# Patient Record
Sex: FEMALE | Race: WHITE | NOT HISPANIC OR LATINO | Employment: STUDENT | URBAN - METROPOLITAN AREA
[De-identification: names, ages, dates, MRNs, and addresses within clinical notes are randomized per-mention and may not be internally consistent; named-entity substitution may affect disease eponyms.]

---

## 2017-01-16 ENCOUNTER — TRANSFERRED RECORDS (OUTPATIENT)
Dept: HEALTH INFORMATION MANAGEMENT | Facility: CLINIC | Age: 32
End: 2017-01-16

## 2018-08-10 ENCOUNTER — OFFICE VISIT (OUTPATIENT)
Dept: FAMILY MEDICINE | Facility: CLINIC | Age: 33
End: 2018-08-10
Payer: COMMERCIAL

## 2018-08-10 VITALS
DIASTOLIC BLOOD PRESSURE: 95 MMHG | WEIGHT: 159.1 LBS | SYSTOLIC BLOOD PRESSURE: 137 MMHG | HEART RATE: 80 BPM | RESPIRATION RATE: 16 BRPM | TEMPERATURE: 98.1 F | OXYGEN SATURATION: 96 %

## 2018-08-10 DIAGNOSIS — Z00.00 ROUTINE GENERAL MEDICAL EXAMINATION AT A HEALTH CARE FACILITY: Primary | ICD-10-CM

## 2018-08-10 DIAGNOSIS — F41.9 ANXIETY: ICD-10-CM

## 2018-08-10 DIAGNOSIS — F90.0 ADHD (ATTENTION DEFICIT HYPERACTIVITY DISORDER), INATTENTIVE TYPE: ICD-10-CM

## 2018-08-10 DIAGNOSIS — I10 BENIGN ESSENTIAL HYPERTENSION: ICD-10-CM

## 2018-08-10 ASSESSMENT — ANXIETY QUESTIONNAIRES
IF YOU CHECKED OFF ANY PROBLEMS ON THIS QUESTIONNAIRE, HOW DIFFICULT HAVE THESE PROBLEMS MADE IT FOR YOU TO DO YOUR WORK, TAKE CARE OF THINGS AT HOME, OR GET ALONG WITH OTHER PEOPLE: NOT DIFFICULT AT ALL
2. NOT BEING ABLE TO STOP OR CONTROL WORRYING: SEVERAL DAYS
GAD7 TOTAL SCORE: 3
5. BEING SO RESTLESS THAT IT IS HARD TO SIT STILL: SEVERAL DAYS
6. BECOMING EASILY ANNOYED OR IRRITABLE: NOT AT ALL
7. FEELING AFRAID AS IF SOMETHING AWFUL MIGHT HAPPEN: NOT AT ALL
3. WORRYING TOO MUCH ABOUT DIFFERENT THINGS: NOT AT ALL
1. FEELING NERVOUS, ANXIOUS, OR ON EDGE: SEVERAL DAYS

## 2018-08-10 ASSESSMENT — PATIENT HEALTH QUESTIONNAIRE - PHQ9: 5. POOR APPETITE OR OVEREATING: NOT AT ALL

## 2018-08-10 NOTE — PATIENT INSTRUCTIONS
Home blood pressure monitoring: please check your blood pressure daily and keep track. Please sign up for [employer] and send me your results please so we can go from there.   Stop the metoprolol.   When you need refills on your Vyvanse please bring in your prescription bottle with you to do that.   Please sign the release of information to get your previous records.

## 2018-08-10 NOTE — MR AVS SNAPSHOT
After Visit Summary   8/10/2018    Gabriele Rodriguez    MRN: [id number]           Patient Information     Date Of Birth          1985        Visit Information        Provider Department      8/10/2018 4:00 PM Shana Sharif APRN [facility]        Today's Diagnoses     Routine general medical examination at a health care facility    -  1    ADHD (attention deficit hyperactivity disorder), inattentive type        Anxiety          Care Instructions    Home blood pressure monitoring: please check your blood pressure daily and keep track. Please sign up for [email or web address] and send me your results please so we can go from there.   Stop the metoprolol.   When you need refills on your Vyvanse please bring in your prescription bottle with you to do that.   Please sign the release of information to get your previous records.               Follow-ups after your visit        Follow-up notes from your care team     Return for Prior to Vyvanse runs out.      Who to contact     Please call your clinic at [phone] to:    Ask questions about your health    Make or cancel appointments    Discuss your medicines    Learn about your test results    Speak to your doctor            Additional Information About Your Visit        MyChart Information     [employer]ek gives you secure access to your electronic health record. If you see a primary care provider, you can also send messages to your care team and make appointments. If you have questions, please call your primary care clinic.  If you do not have a primary care provider, please call [phone] and they will assist you.      Muzeek is an electronic gateway that provides easy, online access to your medical records. With [employer], you can request a clinic appointment, read your test results, renew a prescription or communicate with your care team.     To access your existing account, please contact your [facility] or call  [phone] for assistance.        Care EveryWhere ID     This is your Care EveryWhere ID. This could be used by other organizations to access your [facility] medical records  [id number]        Your Vitals Were     Pulse Temperature Respirations Pulse Oximetry Breastfeeding?       80 98.1  F (36.7  C) (Oral) 16 96% No        Blood Pressure from Last 3 Encounters:   08/10/18 (!) 137/95    Weight from Last 3 Encounters:   08/10/18 159 lb 1.6 oz (72.2 kg)              Today, you had the following     No orders found for display       Primary Care Provider    None Specified       No primary provider on file.        Equal Access to Services     [facility]: Hadii aad william hadlinda Soconnor, waaxda luqadaha, qaybta kaalmada adelanceyada, aditi guzman . So [facility] [place] [phone].    ATENCIÓN: Si habla español, tiene a hensley disposición servicios gratuitos de asistencia lingüística. Llame al [phone].    We comply with applicable federal civil rights laws and [place] laws. We do not discriminate on the basis of race, color, national origin, age, disability, sex, sexual orientation, or gender identity.            Thank you!     Thank you for choosing [facility]  for your care. Our goal is always to provide you with excellent care. Hearing back from our patients is one way we can continue to improve our services. Please take a few minutes to complete the written survey that you may receive in the mail after your visit with us. Thank you!             Your Updated Medication List - Protect others around you: Learn how to safely use, store and throw away your medicines at www.disposemymeds.org.          This list is accurate as of 8/10/18  4:34 PM.  Always use your most recent med list.                   Brand Name Dispense Instructions for use Diagnosis    BUPROPION HCL PO      Take 100 mg by mouth        etonogestrel 68 MG Impl    IMPLANON/NEXPLANON     1 each by Subdermal route once         TRAZODONE HCL PO      Take 50 mg by mouth as needed for sleep        VYVANSE PO      Take 50 mg by mouth

## 2018-08-10 NOTE — PROGRESS NOTES
" SUBJECTIVE:   Gabriele Rodriguez is an 33 year old year old woman who presents for preventive health visit.     ADD: diagnosed several years ago at age 21 just prior to college. Also had neuropsychiatric testing to confirm as well. Was previously going to a mental health provider in [place] at [facility]. Has been stable on medications for several years and feels as though the Vyvanse is the best formulation she has been on. Had recent dose increase from 40mg to 50mg and does not feel as though it would be a problem. Has tried provigil but interacted with Nexplanon, Concerta caused headaches in past.     Anxiety: ongoing for several years. Currently controlled with bupropion. 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 Has stopped in past and is self-aware on anxiety needs and when she needs to restart her medication. Has PRN trazodone and does not take at this time.     ALVIN-7 SCORE 8/10/2018   Total Score 3       Hypertension: has been on metoprolol for \"a long time\". Originally started for an adjunct for anxiety, no longer needed for that component currently but does have ongoing hypertension. Takes medication at nighttime and does not notice and dizziness, exercise intolerance. Has checked blood pressure at home intermittently, tends to run [id number] without medication. Had asked previous provider if she could go off and was told \"no:\" Is interested in stopping medication and seeing how her blood pressure is without pharmacotherapy.     Healthy Habits:    Do you get at least three servings of calcium containing foods daily (dairy, green leafy vegetables, etc.)? Soy milk drinks \"fairly often\", yogurt a few times a week, will eat salads once weekly.     Amount of exercise or daily activities, outside of work: exercises roughly 3-5 times a week with spouse.     Have you had an eye exam in the past two years? no    Do you see a dentist twice per year? " yes    Do you have sleep apnea, excessive snoring or daytime drowsiness?no    Sleep: 7 hours/night. Sleep disruptions: No    Caffeine: does not need usually, maybe has 1 cup of coffee 2-3 times/week    Water: 2-3 glasses/day      Today's PHQ-2 Score: PHQ-2 Score:   PHQ-2 ( 1999 Pfizer) 8/10/2018   Q1: Little interest or pleasure in doing things 0   Q2: Feeling down, depressed or hopeless 0   PHQ-2 Score 0     Today's ALVIN-7 Score:   ALVIN-7 SCORE 8/10/2018   Total Score 3       Abuse: Current or Past (Physical, Sexual or Emotional) - No  Do you feel safe in your environment - Yes    If you drink alcohol do you typically have >3 drinks per day or >7 drinks per week? No                     Reviewed orders with patient.  Reviewed health maintenance and updated orders accordingly - Yes      Mammo discussed, not appropriate for or declined by this patient.. Pertinent mammograms are reviewed under the imaging tab. Will review recommendations for screening for breast cancer with mother's diagnosis of breast cancer (HER-2 negative, estrogen positive, negative BRCA).     History of abnormal Pap smear: NO - age 30- 65 PAP every 3 years recommended    Reviewed and updated as needed this visit by clinical staff  Tobacco  Allergies  Meds       Reviewed and updated as needed this visit by Provider    ROS:  CONSTITUTIONAL: NEGATIVE for fever, chills, change in weight  INTEGUMENTARU/SKIN: NEGATIVE for worrisome rashes, moles or lesions  EYES: NEGATIVE for vision changes or irritation  ENT: NEGATIVE for ear, mouth and throat problems  RESP: NEGATIVE for significant cough or SOB  BREAST: NEGATIVE for masses, tenderness or discharge  CV: NEGATIVE for chest pain, palpitations or peripheral edema  GI: NEGATIVE for nausea, abdominal pain, heartburn, or change in bowel habits  : NEGATIVE for unusual urinary or vaginal symptoms. Periods are regular.  MUSCULOSKELETAL: NEGATIVE for significant arthralgias or myalgia  NEURO: NEGATIVE for  weakness, dizziness or paresthesias  PSYCHIATRIC: NEGATIVE for changes in mood or affect    OBJECTIVE:   BP (!) 137/95  Pulse 80  Temp 98.1  F (36.7  C) (Oral)  Resp 16  Wt 159 lb 1.6 oz (72.2 kg)  SpO2 96%  Breastfeeding? No      EXAM:  GENERAL: healthy, alert and no distress  EYES: Eyes grossly normal to inspection, PERRL and conjunctivae and sclerae normal  HENT: ear canals and TM's normal, nose and mouth without ulcers or lesions  NECK: no adenopathy, no asymmetry, masses, or scars and thyroid normal to palpation  RESP: lungs clear to auscultation - no rales, rhonchi or wheezes  CV: regular rate and rhythm, normal S1 S2, no S3 or S4, no murmur, click or rub, no peripheral edema and peripheral pulses strong  ABDOMEN: soft, nontender, no hepatosplenomegaly, no masses and bowel sounds normal  MS: no gross musculoskeletal defects noted, no edema  SKIN: no suspicious lesions or rashes  NEURO: Normal strength and tone, mentation intact and speech normal  PSYCH: mentation appears normal, affect normal/bright    ASSESSMENT/PLAN:     1. Routine general medical examination at a health care facility  Last pap in 2017, declined need today. Will sign CARISSA to obtain records from previous providers. Mammogram not indicated at this time, will review guidelines with patient about when she should start breast cancer monitoring with maternal history.  to , in monogamous relationship and no need for STI testing. Declined breast exam today.     2. ADHD (attention deficit hyperactivity disorder), inattentive type  Confirmed with neuropsychiatric testing prior to medical school. Currently controlled on Vyvanse. 50mg 1 tab daily and tolerating well. Does not currently need refills. Advised patient to return to clinic just prior to refill needed with prescription bottle to verify information and as long as she feels well controlled and tolerating medication can have managed within primary care.     3. Anxiety  ALVIN-7  score today 3. Currently well controlled with daily bupropion and is hoping to gradually come off of medication entirely now that she is in her residency and in a more stable work environment. Had been going to mental health provider in [place] previously. As she is stable on this medication and hoping to transition off provider is agreeable to managing at this time.     4. Benign essential hypertension  Annaliisa to stop metoprolol at nighttime, begin regular monitoring of blood pressure at home and will check in with provider after about a month to see what her readings have been and if different pharmacotherapy is indicated at that time. Does have known white-coat syndrome and has been high in PCP's offices in past.     COUNSELING:   Reviewed preventive health counseling, as reflected in patient instructions       Regular exercise       Healthy diet/nutrition       Vision screening       Family planning  Reports that she has never smoked. She has never used smokeless tobacco.      Counseling Resources:  ATP IV Guidelines  Pooled Cohorts Equation Calculator  Breast Cancer Risk Calculator  FRAX Risk Assessment  ICSI Preventive Guidelines  Dietary Guidelines for Americans, 2010  USDA's MyPlate  ASA Prophylaxis  Lung CA Screening    Shana Sharif, DORA CNP on 8/10/2018 at 4:00 PM  [facility]

## 2018-08-11 ASSESSMENT — ANXIETY QUESTIONNAIRES: GAD7 TOTAL SCORE: 3

## 2018-08-15 ENCOUNTER — HEALTH MAINTENANCE LETTER (OUTPATIENT)
Age: 33
End: 2018-08-15

## 2018-09-14 ENCOUNTER — OFFICE VISIT (OUTPATIENT)
Dept: FAMILY MEDICINE | Facility: CLINIC | Age: 33
End: 2018-09-14
Payer: COMMERCIAL

## 2018-09-14 VITALS
HEART RATE: 86 BPM | SYSTOLIC BLOOD PRESSURE: 136 MMHG | HEIGHT: 64 IN | RESPIRATION RATE: 16 BRPM | TEMPERATURE: 98.2 F | WEIGHT: 161.5 LBS | DIASTOLIC BLOOD PRESSURE: 84 MMHG | OXYGEN SATURATION: 97 % | BODY MASS INDEX: 27.57 KG/M2

## 2018-09-14 DIAGNOSIS — Z11.1 SCREENING EXAMINATION FOR PULMONARY TUBERCULOSIS: Primary | ICD-10-CM

## 2018-09-14 DIAGNOSIS — F90.0 ADHD (ATTENTION DEFICIT HYPERACTIVITY DISORDER), INATTENTIVE TYPE: ICD-10-CM

## 2018-09-14 DIAGNOSIS — I10 BENIGN ESSENTIAL HYPERTENSION: ICD-10-CM

## 2018-09-14 LAB
ALBUMIN SERPL-MCNC: 4.1 G/DL (ref 3.4–5)
ALP SERPL-CCNC: 75 U/L (ref 40–150)
ALT SERPL W P-5'-P-CCNC: 18 U/L (ref 0–50)
ANION GAP SERPL CALCULATED.3IONS-SCNC: 9 MMOL/L (ref 3–14)
AST SERPL W P-5'-P-CCNC: 17 U/L (ref 0–45)
BILIRUB SERPL-MCNC: 0.4 MG/DL (ref 0.2–1.3)
BUN SERPL-MCNC: 14 MG/DL (ref 7–30)
CALCIUM SERPL-MCNC: 8.8 MG/DL (ref 8.5–10.1)
CHLORIDE SERPL-SCNC: 108 MMOL/L (ref 94–109)
CO2 SERPL-SCNC: 24 MMOL/L (ref 20–32)
CREAT SERPL-MCNC: 0.9 MG/DL (ref 0.52–1.04)
GFR SERPL CREATININE-BSD FRML MDRD: 73 ML/MIN/1.7M2
GLUCOSE SERPL-MCNC: 104 MG/DL (ref 70–99)
POTASSIUM SERPL-SCNC: 3.8 MMOL/L (ref 3.4–5.3)
PROT SERPL-MCNC: 7.6 G/DL (ref 6.8–8.8)
SODIUM SERPL-SCNC: 140 MMOL/L (ref 133–144)

## 2018-09-14 RX ORDER — LISDEXAMFETAMINE DIMESYLATE 50 MG/1
50 CAPSULE ORAL EVERY MORNING
Qty: 30 CAPSULE | Refills: 0 | Status: SHIPPED | OUTPATIENT
Start: 2018-09-14 | End: 2018-10-31

## 2018-09-14 RX ORDER — NIFEDIPINE 30 MG/1
30 TABLET, EXTENDED RELEASE ORAL DAILY
Qty: 30 TABLET | Refills: 1 | Status: SHIPPED | OUTPATIENT
Start: 2018-09-14 | End: 2018-12-14

## 2018-09-14 ASSESSMENT — ANXIETY QUESTIONNAIRES
1. FEELING NERVOUS, ANXIOUS, OR ON EDGE: SEVERAL DAYS
GAD7 TOTAL SCORE: 2
6. BECOMING EASILY ANNOYED OR IRRITABLE: NOT AT ALL
2. NOT BEING ABLE TO STOP OR CONTROL WORRYING: NOT AT ALL
IF YOU CHECKED OFF ANY PROBLEMS ON THIS QUESTIONNAIRE, HOW DIFFICULT HAVE THESE PROBLEMS MADE IT FOR YOU TO DO YOUR WORK, TAKE CARE OF THINGS AT HOME, OR GET ALONG WITH OTHER PEOPLE: NOT DIFFICULT AT ALL
7. FEELING AFRAID AS IF SOMETHING AWFUL MIGHT HAPPEN: NOT AT ALL
5. BEING SO RESTLESS THAT IT IS HARD TO SIT STILL: NOT AT ALL
3. WORRYING TOO MUCH ABOUT DIFFERENT THINGS: SEVERAL DAYS

## 2018-09-14 ASSESSMENT — PATIENT HEALTH QUESTIONNAIRE - PHQ9: 5. POOR APPETITE OR OVEREATING: NOT AT ALL

## 2018-09-14 NOTE — PROGRESS NOTES
TOD       Gabriele Rodriguez is a 33 year old  who presents for   Chief Complaint   Patient presents with     RECHECK     Pt. presents to the clinic today for a medication follow up and TB test       ADHD Follow-Up (Adult)  Concerns: None - has been on Vyvanse in 2/2018 and feels as though it eduin going well. Drake developed headaches.   Changes since last visit: Stable  Taking controlled (daily) medications as prescribed: Yes: sometimes does not take Vyvanse dose on weekends if not needed  Sleep: has had some changes in sleep secondary to work load, not ADD or medication.  Adult ADHD Self-Reporting form given to patient?:  Yes  Currently in counseling: No    Medication Benefits:   Controlled symptoms: Attention span, Distractability and Finishing tasks  Uncontrolled symptoms:  Distractability    Medication Side Effects:  Reports:  appetite suppression  Sleep Problems? no  ++++++++++++++++++++++++++++++++++++++++++++++++    Employer Concerns/Feedback: Stable  Coworker Concerns:   Stable  Home/Family Concerns: Stable    ,   Depression/Anxiety follow up      Your mood since your last visit: No change    Medication? Bupropion 100mg     Therapy? None    Exercise? Has been working late recently; until 7p-8p and has been limited during the work-week.     What is going well? Likes her job a lot, likes moving to [place]     Life Stressors:working late nights    Other associated symptoms :None    How is your sleep? Good    New significant life event:Yes-  New job, living in [place]    Current substance use: None    Anxiety / Panic symptoms: No     Recent PHQ-9 Scores:     PHQ-9 SCORE 9/14/2018   Total Score 2     Recent ALVIN-7 Scores:      ALVIN-7 SCORE 8/10/2018 9/14/2018   Total Score 3 2         Today' sPHQ 9 Reviewed and it is same      Adherence and Exercise  Medication side effects: no  How often is a medication missed? Less than 1 time per week  Exercise: elipitical 2-5 days/week     Essential hypertension:  "has history of hypertension and was on metoprolol up until last visit where Gabriele wanted to trial going without medication for a month to see how her blood pressure was. Home readings have been roughly 130-140s/80-90smmHg consistently for the past month. With ongoing hypertensive readings Gabriele is in agreement to restart a different medication than a beta-blocker. Denies any angina, dyspnea, dizziness, headaches, lightheadedness, syncopal events.     Problem, Medication and Allergy Lists were       Current Outpatient Prescriptions   Medication Sig Dispense Refill     BUPROPION HCL PO Take 100 mg by mouth       etonogestrel (IMPLANON/NEXPLANON) 68 MG IMPL 1 each by Subdermal route once       lisdexamfetamine (VYVANSE) 50 MG capsule Take 1 capsule (50 mg) by mouth every morning 30 capsule 0     NIFEdipine ER osmotic (PROCARDIA XL) 30 MG 24 hr tablet Take 1 tablet (30 mg) by mouth daily 30 tablet 1     TRAZODONE HCL PO Take 50 mg by mouth as needed for sleep           Allergies   Allergen Reactions     Nickel    .    Patient is an established patient of this clinic..         Review of Systems:   Review of Systems         Physical Exam:     Vitals:    09/14/18 1603 09/14/18 1650   BP: (!) 134/110 136/84   BP Location: Left arm    Patient Position: Chair    Cuff Size: Adult Regular    Pulse: 86    Resp: 16    Temp: 98.2  F (36.8  C)    TempSrc: Oral    SpO2: 97%    Weight: 161 lb 8 oz (73.3 kg)    Height: 5' 4.2\" (163.1 cm)      Body mass index is 27.55 kg/(m^2).  Vitals were reviewed and were normal     Physical Exam   Constitutional: She is oriented to person, place, and time. She appears well-developed and well-nourished. No distress.   HENT:   Head: Normocephalic and atraumatic.   Right Ear: External ear normal.   Left Ear: External ear normal.   Eyes: Conjunctivae and EOM are normal. Pupils are equal, round, and reactive to light. Right eye exhibits no discharge. Left eye exhibits no discharge. No scleral " icterus.   Neck: Normal range of motion. Neck supple. No thyromegaly present.   Cardiovascular: Normal rate, regular rhythm and normal heart sounds.  Exam reveals no gallop and no friction rub.    No murmur heard.  Pulmonary/Chest: Effort normal and breath sounds normal. No respiratory distress. She has no wheezes. She has no rales. She exhibits no tenderness.   Musculoskeletal: Normal range of motion.   Lymphadenopathy:     She has no cervical adenopathy.   Neurological: She is alert and oriented to person, place, and time. No cranial nerve deficit.   Skin: Skin is warm and dry. No rash noted. She is not diaphoretic. No erythema. No pallor.   Psychiatric: She has a normal mood and affect. Her behavior is normal.   Vitals reviewed.        Results:   Results are ordered and pending  TB quantiferon gold, CMP  Assessment and Plan        1. Screening examination for pulmonary tuberculosis  - M Tuberculosis by Quantiferon    2. ADHD (attention deficit hyperactivity disorder), inattentive type  Self-report form filled out while in clinic today for baseline in symptoms. Gabriele given hard copy of medication and will need to have refills either mailed or her to  monthly. Advised her she will need to check in every 3 months for provider visits with this medication and agreeable.   - Comprehensive metabolic panel - Results > 1hr  - lisdexamfetamine (VYVANSE) 50 MG capsule; Take 1 capsule (50 mg) by mouth every morning  Dispense: 30 capsule; Refill: 0    3. Benign essential hypertension  With continued elevated BP readings at home will start calcium-channel blocker. Discussed with Gabriele preferred treatment and as she and her  are hoping to have children in a few years, she would like to start medication that is ACOG approved for hypertension control. Gabriele encouraged to continue taking blood pressure at home for continued monitoring and report side effects. Will titrate medication pending home and  clinic readings as needed. No   - NIFEdipine ER osmotic (PROCARDIA XL) 30 MG 24 hr tablet; Take 1 tablet (30 mg) by mouth daily  Dispense: 30 tablet; Refill: 1       There are no discontinued medications.    Options for treatment and follow-up care were reviewed with the patient. Gabriele Rodriguez  engaged in the decision making process and verbalized understanding of the options discussed and agreed with the final plan.    DORA Chaidez CNP

## 2018-09-14 NOTE — NURSING NOTE
"33 year old  Chief Complaint   Patient presents with     RECHECK     Pt. presents to the clinic today for a medication follow up and TB test       Blood pressure (!) 134/110, pulse 86, temperature 98.2  F (36.8  C), temperature source Oral, resp. rate 16, height 5' 4.2\" (163.1 cm), weight 161 lb 8 oz (73.3 kg), SpO2 97 %, not currently breastfeeding. Body mass index is 27.55 kg/(m^2).  BP completed using cuff size:    Patient Active Problem List   Diagnosis     Anxiety     ADHD (attention deficit hyperactivity disorder), inattentive type     Benign essential hypertension       Wt Readings from Last 2 Encounters:   09/14/18 161 lb 8 oz (73.3 kg)   08/10/18 159 lb 1.6 oz (72.2 kg)     BP Readings from Last 3 Encounters:   09/14/18 (!) 134/110   08/10/18 (!) 137/95       Allergies   Allergen Reactions     Nickel        Current Outpatient Prescriptions   Medication     BUPROPION HCL PO     etonogestrel (IMPLANON/NEXPLANON) 68 MG IMPL     Lisdexamfetamine Dimesylate (VYVANSE PO)     TRAZODONE HCL PO     No current facility-administered medications for this visit.        Social History   Substance Use Topics     Smoking status: Never Smoker     Smokeless tobacco: Never Used     Alcohol use No         Honoring Choices - Health Care Directive Guide offered to patient at time of visit.    Health Maintenance Due   Topic Date Due     TETANUS IMMUNIZATION (SYSTEM ASSIGNED)  07/24/2005     HIV SCREEN (SYSTEM ASSIGNED)  07/24/2005     PAP SCREENING Q3 YR (SYSTEM ASSIGNED)  07/24/2008     INFLUENZA VACCINE (1) 09/01/2018         There is no immunization history on file for this patient.    No results found for: PAP      No lab results found.    PHQ-2 ( 1999 Pfizer) 9/14/2018 8/10/2018   Q1: Little interest or pleasure in doing things 0 0   Q2: Feeling down, depressed or hopeless 0 0   PHQ-2 Score 0 0       PHQ-9 SCORE 9/14/2018   Total Score 2       ALVIN-7 SCORE 8/10/2018 9/14/2018   Total Score 3 2       No flowsheet data " found.    Grace Aviles, [facility]  September 14, 2018 4:06 PM

## 2018-09-14 NOTE — PATIENT INSTRUCTIONS
Continue to check your blood pressure regularly with the new medication. Please let me know via My Chart how your readings are at home.       Nifedipine extended-release tablets  Brand Names: Adalat CC, Afeditab CR, Nifediac CC, Nifedical XL, Procardia XL  What is this medicine?  NIFEDIPINE (candelario lundberg) is a calcium-channel blocker. It affects the amount of calcium found in your heart and muscle cells. This relaxes your blood vessels, which can reduce the amount of work the heart has to do. This medicine is used to treat high blood pressure and chest pain caused by angina.  How should I use this medicine?  Take this medicine by mouth with a glass of water. Follow the directions on the prescription label. Do not cut, crush or chew. Take your doses at regular intervals. Do not take your medicine more often then directed. Do not suddenly stop taking this medicine. Your doctor will tell you how much medicine to take. If your doctor wants you to stop the medicine, the dose will be slowly lowered over time to avoid any side effects.  Talk to your pediatrician regarding the use of this medicine in children. Special care may be needed.  What side effects may I notice from receiving this medicine?  Side effects that you should report to your doctor or health care professional as soon as possible:    blood in the urine    difficulty breathing    fast heartbeat, palpitations, irregular heartbeat, chest pain    redness, blistering, peeling or loosening of the skin, including inside the mouth    reduced amount of urine passed    skin rash    swelling of the legs and ankles  Side effects that usually do not require medical attention (report to your doctor or health care professional if they continue or are bothersome):    constipation    facial flushing    headache    weakness or tiredness  What may interact with this medicine?  Do not take this medicine with any of the following medications:    certain medicines for seizures  like carbamazepine, phenobarbital, phenytoin    lumacaftor; ivacaftor    rifabutin    rifampin    rifapentine    Jose L's Wort  This medicine may also interact with the following medications:    antiviral medicines for HIV or AIDS    certain medicines for blood pressure    certain medicines for diabetes    certain medicines for erectile dysfunction    certain medicines for fungal infections like ketoconazole, fluconazole, and itraconazole    certain medicines for irregular heart beat like flecainide and quinidine    certain medicines that treat or prevent blood clots like warfarin    clarithromycin    digoxin    dolasetron    erythromycin    fluoxetine    grapefruit juice    local or general anesthetics    nefazodone    orlistat    quinupristin; dalfopristin    sirolimus    stomach acid blockers like cimetidine, ranitidine, omeprazole, or pantoprazole    tacrolimus    valproic acid  What if I miss a dose?  If you miss a dose, take it as soon as you can. If it is almost time for your next dose, take only that dose. Do not take double or extra doses.  Where should I keep my medicine?  Keep out of the reach of children.  Store at room temperature below 30 degrees C (86 degrees F). Protect from moisture and humidity. Keep container tightly closed. Throw away any unused medicine after the expiration date.  What should I tell my health care provider before I take this medicine?  They need to know if you have any of these conditions:    heart problems, low blood pressure, slow or irregular heartbeat    kidney disease    liver disease    previous heart attack    stomach or intestine problems    an unusual or allergic reaction to nifedipine, other medicines, foods, dyes, or preservatives    pregnant or trying to get pregnant    breast-feeding  What should I watch for while using this medicine?  Visit your doctor or health care professional for regular check ups. Check your blood pressure and pulse rate regularly. Ask your  doctor or health care professional what your blood pressure and pulse rate should be and when you should contact him or her.  You may get drowsy or dizzy. Do not drive, use machinery, or do anything that needs mental alertness until you know how this medicine affects you. Do not stand or sit up quickly, especially if you are an older patient. This reduces the risk of dizzy or fainting spells. Alcohol may interfere with the effect of this medicine. Avoid alcoholic drinks.  If you are taking Procardia XL, you may notice the empty shell of the tablet in your stool.  NOTE:This sheet is a summary. It may not cover all possible information. If you have questions about this medicine, talk to your doctor, pharmacist, or health care provider. Copyright  2018 Elsevier

## 2018-09-14 NOTE — MR AVS SNAPSHOT
After Visit Summary   9/14/2018    Gabriele Rodriguez    MRN: [id number]           Patient Information     Date Of Birth          1985        Visit Information        Provider Department      9/14/2018 4:00 PM Shana Sharif APRN CNP [facility]        Today's Diagnoses     Screening examination for pulmonary tuberculosis    -  1    ADHD (attention deficit hyperactivity disorder), inattentive type        Benign essential hypertension          Care Instructions    Continue to check your blood pressure regularly with the new medication. Please let me know via My Chart how your readings are at home.       Nifedipine extended-release tablets  Brand Names: Adalat CC, Afeditab CR, Nifediac CC, Nifedical XL, Procardia XL  What is this medicine?  NIFEDIPINE (candelario FED i peen) is a calcium-channel blocker. It affects the amount of calcium found in your heart and muscle cells. This relaxes your blood vessels, which can reduce the amount of work the heart has to do. This medicine is used to treat high blood pressure and chest pain caused by angina.  How should I use this medicine?  Take this medicine by mouth with a glass of water. Follow the directions on the prescription label. Do not cut, crush or chew. Take your doses at regular intervals. Do not take your medicine more often then directed. Do not suddenly stop taking this medicine. Your doctor will tell you how much medicine to take. If your doctor wants you to stop the medicine, the dose will be slowly lowered over time to avoid any side effects.  Talk to your pediatrician regarding the use of this medicine in children. Special care may be needed.  What side effects may I notice from receiving this medicine?  Side effects that you should report to your doctor or health care professional as soon as possible:    blood in the urine    difficulty breathing    fast heartbeat, palpitations, irregular heartbeat, chest pain    redness, blistering,  peeling or loosening of the skin, including inside the mouth    reduced amount of urine passed    skin rash    swelling of the legs and ankles  Side effects that usually do not require medical attention (report to your doctor or health care professional if they continue or are bothersome):    constipation    facial flushing    headache    weakness or tiredness  What may interact with this medicine?  Do not take this medicine with any of the following medications:    certain medicines for seizures like carbamazepine, phenobarbital, phenytoin    lumacaftor; ivacaftor    rifabutin    rifampin    rifapentine    Jose L's Wort  This medicine may also interact with the following medications:    antiviral medicines for HIV or AIDS    certain medicines for blood pressure    certain medicines for diabetes    certain medicines for erectile dysfunction    certain medicines for fungal infections like ketoconazole, fluconazole, and itraconazole    certain medicines for irregular heart beat like flecainide and quinidine    certain medicines that treat or prevent blood clots like warfarin    clarithromycin    digoxin    dolasetron    erythromycin    fluoxetine    grapefruit juice    local or general anesthetics    nefazodone    orlistat    quinupristin; dalfopristin    sirolimus    stomach acid blockers like cimetidine, ranitidine, omeprazole, or pantoprazole    tacrolimus    valproic acid  What if I miss a dose?  If you miss a dose, take it as soon as you can. If it is almost time for your next dose, take only that dose. Do not take double or extra doses.  Where should I keep my medicine?  Keep out of the reach of children.  Store at room temperature below 30 degrees C (86 degrees F). Protect from moisture and humidity. Keep container tightly closed. Throw away any unused medicine after the expiration date.  What should I tell my health care provider before I take this medicine?  They need to know if you have any of these  conditions:    heart problems, low blood pressure, slow or irregular heartbeat    kidney disease    liver disease    previous heart attack    stomach or intestine problems    an unusual or allergic reaction to nifedipine, other medicines, foods, dyes, or preservatives    pregnant or trying to get pregnant    breast-feeding  What should I watch for while using this medicine?  Visit your doctor or health care professional for regular check ups. Check your blood pressure and pulse rate regularly. Ask your doctor or health care professional what your blood pressure and pulse rate should be and when you should contact him or her.  You may get drowsy or dizzy. Do not drive, use machinery, or do anything that needs mental alertness until you know how this medicine affects you. Do not stand or sit up quickly, especially if you are an older patient. This reduces the risk of dizzy or fainting spells. Alcohol may interfere with the effect of this medicine. Avoid alcoholic drinks.  If you are taking Procardia XL, you may notice the empty shell of the tablet in your stool.  NOTE:This sheet is a summary. It may not cover all possible information. If you have questions about this medicine, talk to your doctor, pharmacist, or health care provider. Copyright  2018 [employer]                Follow-ups after your visit        Who to contact     Please call your clinic at [phone] to:    Ask questions about your health    Make or cancel appointments    Discuss your medicines    Learn about your test results    Speak to your doctor            Additional Information About Your Visit        [employer] Information     [employer] gives you secure access to your electronic health record. If you see a primary care provider, you can also send messages to your care team and make appointments. If you have questions, please call your primary care clinic.  If you do not have a primary care provider, please call [phone] and they will assist you.    "   [employer] is an electronic gateway that provides easy, online access to your medical records. With [employer] you can request a clinic appointment, read your test results, renew a prescription or communicate with your care team.     To access your existing account, please contact your [facility] or call [phone] for assistance.        Care EveryWhere ID     This is your Care EveryWhere ID. This could be used by other organizations to access your [facility] medical records  [id number]        Your Vitals Were     Pulse Temperature Respirations Height Pulse Oximetry BMI (Body Mass Index)    86 98.2  F (36.8  C) (Oral) 16 5' 4.2\" (163.1 cm) 97% 27.55 kg/m2       Blood Pressure from Last 3 Encounters:   09/14/18 (!) 134/110   08/10/18 (!) 137/95    Weight from Last 3 Encounters:   09/14/18 161 lb 8 oz (73.3 kg)   08/10/18 159 lb 1.6 oz (72.2 kg)              We Performed the Following     Comprehensive metabolic panel - Results > 1hr     M Tuberculosis by Quantiferon          Today's Medication Changes          These changes are accurate as of 9/14/18  4:36 PM.  If you have any questions, ask your nurse or doctor.               Start taking these medicines.        Dose/Directions    NIFEdipine ER osmotic 30 MG 24 hr tablet   Commonly known as:  PROCARDIA XL   Used for:  Benign essential hypertension   Started by:  Shana Sharif APRN CNP        Dose:  30 mg   Take 1 tablet (30 mg) by mouth daily   Quantity:  30 tablet   Refills:  1         These medicines have changed or have updated prescriptions.        Dose/Directions    lisdexamfetamine 50 MG capsule   Commonly known as:  VYVANSE   This may have changed:    - medication strength  - when to take this   Used for:  ADHD (attention deficit hyperactivity disorder), inattentive type   Changed by:  Shana Sharif APRN CNP        Dose:  50 mg   Take 1 capsule (50 mg) by mouth every morning   Quantity:  30 capsule   Refills:  0    "         Where to get your medicines      Some of these will need a paper prescription and others can be bought over the counter.  Ask your nurse if you have questions.     Bring a paper prescription for each of these medications     lisdexamfetamine 50 MG capsule    NIFEdipine ER osmotic 30 MG 24 hr tablet                Primary Care Provider    None Specified       No primary provider on file.        Equal Access to Services     RAFA RAMAN : Hadii sukhjinder thomas hadramirezo Sotoniali, waaxda luqadaha, qaybta kaalmada wei, aditi marquezmaritachristei drake. So [facility] [phone].    ATENCIÓN: Si habla español, tiene a hensley disposición servicios gratuitos de asistencia lingüística. Llame al [phone].    We comply with applicable federal civil rights laws and [place] laws. We do not discriminate on the basis of race, color, national origin, age, disability, sex, sexual orientation, or gender identity.            Thank you!     Thank you for choosing [facility]  for your care. Our goal is always to provide you with excellent care. Hearing back from our patients is one way we can continue to improve our services. Please take a few minutes to complete the written survey that you may receive in the mail after your visit with us. Thank you!             Your Updated Medication List - Protect others around you: Learn how to safely use, store and throw away your medicines at www.disposemymeds.org.          This list is accurate as of 9/14/18  4:36 PM.  Always use your most recent med list.                   Brand Name Dispense Instructions for use Diagnosis    BUPROPION HCL PO      Take 100 mg by mouth        etonogestrel 68 MG Impl    IMPLANON/NEXPLANON     1 each by Subdermal route once        lisdexamfetamine 50 MG capsule    VYVANSE    30 capsule    Take 1 capsule (50 mg) by mouth every morning    ADHD (attention deficit hyperactivity disorder), inattentive type       NIFEdipine ER osmotic 30 MG 24 hr  tablet    PROCARDIA XL    30 tablet    Take 1 tablet (30 mg) by mouth daily    Benign essential hypertension       TRAZODONE HCL PO      Take 50 mg by mouth as needed for sleep

## 2018-09-15 ASSESSMENT — ANXIETY QUESTIONNAIRES: GAD7 TOTAL SCORE: 2

## 2018-09-15 ASSESSMENT — PATIENT HEALTH QUESTIONNAIRE - PHQ9: SUM OF ALL RESPONSES TO PHQ QUESTIONS 1-9: 2

## 2018-09-18 LAB
M TB TUBERC IFN-G BLD QL: NEGATIVE
M TB TUBERC IFN-G/MITOGEN IGNF BLD: 0 IU/ML

## 2018-10-31 DIAGNOSIS — F90.0 ADHD (ATTENTION DEFICIT HYPERACTIVITY DISORDER), INATTENTIVE TYPE: ICD-10-CM

## 2018-10-31 RX ORDER — LISDEXAMFETAMINE DIMESYLATE 50 MG/1
50 CAPSULE ORAL EVERY MORNING
Qty: 30 CAPSULE | Refills: 0 | Status: SHIPPED | OUTPATIENT
Start: 2018-10-31 | End: 2018-10-31

## 2018-10-31 RX ORDER — LISDEXAMFETAMINE DIMESYLATE 50 MG/1
50 CAPSULE ORAL EVERY MORNING
Qty: 30 CAPSULE | Refills: 0 | Status: SHIPPED | OUTPATIENT
Start: 2018-10-31 | End: 2018-12-14 | Stop reason: DRUGHIGH

## 2018-12-13 NOTE — PROGRESS NOTES
"       HPI       Gabriele Rodriguez is a 33 year old  who presents for   Chief Complaint   Patient presents with     Hypertension     Would like to discuss meds, some side effects: constipation, edema,      A.D.H.D       ADHD Follow-Up (Adult)  Concerns: feels as though she can taper down in dose  Changes since last visit: Stable  Taking controlled (daily) medications as prescribed: Yes  Sleep: has had some issues with falling asleep related to anxiety; overall with ADHD is not an issue.  Adult ADHD Self-Reporting form given to patient?:  Yes  Currently in counseling: No    Medication Benefits:   Controlled symptoms: Attention span and Distractability  Uncontrolled symptoms:  None    Medication Side Effects:  Reports:  appetite suppression  Sleep Problems? no  ++++++++++++++++++++++++++++++++++++++++++++++++    Employer Concerns/Feedback: Stable  Coworker Concerns:   Stable  Home/Family Concerns: Stable        Adherence and Exercise  Medication side effects: no  How often is a medication missed? Never  Exercise: elipitical 2-3 days/week for an average of 30-45 minutes     Hypertension Follow-up  <130/80    Outpatient blood pressures are not being checked.    Chest Pain? :No     Low Salt Diet: \"Not so great\"    Daily NSAID Use?No     Did patient take their HTN pills today/last night as usual?  Yes Details: Has not had medication for past week.     Last Basic Metabolic Panel:  Lab Results   Component Value Date     09/14/2018      Lab Results   Component Value Date    POTASSIUM 3.8 09/14/2018     Lab Results   Component Value Date    CHLORIDE 108 09/14/2018     Lab Results   Component Value Date    COLT 8.8 09/14/2018     Lab Results   Component Value Date    CO2 24 09/14/2018     Lab Results   Component Value Date    BUN 14 09/14/2018     Lab Results   Component Value Date    CR 0.90 09/14/2018     Lab Results   Component Value Date     09/14/2018       Adherence and Exercise  Medication side effects: yes: " "edema and constipation. These issues has resolved with her not being on medication for the past week.   How often is a medication missed? Aside from not picking up refill, none.   Exercise:elipitical 2-3 days/week for an average of 30-45 minutes     Problem, Medication and Allergy Lists were       Current Outpatient Medications   Medication Sig Dispense Refill     BUPROPION HCL PO Take 100 mg by mouth       etonogestrel (IMPLANON/NEXPLANON) 68 MG IMPL 1 each by Subdermal route once       hydrochlorothiazide (HYDRODIURIL) 12.5 MG tablet Take 1 tablet (12.5 mg) by mouth daily 60 tablet 5     lisdexamfetamine (VYVANSE) 40 MG capsule Take 1 capsule (40 mg) by mouth every morning 30 capsule 0         Allergies   Allergen Reactions     Nickel    .    Patient is an established patient of this clinic..         Review of Systems:   Review of Systems   Constitutional: Negative for activity change, appetite change, fatigue and fever.   Respiratory: Negative for cough, chest tightness, shortness of breath and wheezing.    Cardiovascular: Positive for leg swelling. Negative for chest pain and palpitations.   Gastrointestinal: Negative for abdominal distention, diarrhea, nausea and vomiting.   Neurological: Negative for dizziness, tremors, speech difficulty, weakness, light-headedness, numbness and headaches.            Physical Exam:     Vitals:    12/14/18 1555   BP: (!) 143/97   Pulse: 92   Resp: 16   Temp: 98.7  F (37.1  C)   TempSrc: Oral   SpO2: 95%   Weight: 69.9 kg (154 lb 1.6 oz)   Height: 1.585 m (5' 2.4\")     Body mass index is 27.83 kg/m .  Vitals were reviewed and were normal except for elevated blood pressure consistent with not taking medication for past 7 days.      Physical Exam   Constitutional: She is oriented to person, place, and time. She appears well-developed and well-nourished. No distress.   HENT:   Head: Normocephalic and atraumatic.   Right Ear: External ear normal.   Left Ear: External ear normal. "   Eyes: Conjunctivae and EOM are normal. Right eye exhibits no discharge. Left eye exhibits no discharge. No scleral icterus.   Neck: Normal range of motion. Neck supple. Normal carotid pulses present. Carotid bruit is not present. No thyromegaly present.   Cardiovascular: Normal rate, regular rhythm and normal heart sounds. PMI is not displaced. Exam reveals no gallop and no friction rub.   No murmur heard.  Pulmonary/Chest: Effort normal and breath sounds normal. No stridor. No respiratory distress. She has no wheezes. She has no rales. She exhibits no tenderness.   Abdominal: Soft. Bowel sounds are normal. She exhibits no distension and no mass. There is no tenderness. There is no rebound and no guarding. No hernia.   Musculoskeletal: Normal range of motion. She exhibits no edema.   Lymphadenopathy:     She has no cervical adenopathy.   Neurological: She is alert and oriented to person, place, and time.   Skin: Skin is warm and dry. Capillary refill takes less than 2 seconds. No rash noted. She is not diaphoretic. No erythema. No pallor.   Psychiatric: Her behavior is normal.   Vitals reviewed.      Results:   No testing ordered today    Assessment and Plan        1. Attention deficit hyperactivity disorder (ADHD), predominantly inattentive type  Gabriele requesting dose decrease to see how she does with 40mg rather than 50mg daily. Self-report survey filled out and consistent with previous months. Overall Gabriele reports distractability and inattentive her largest issues but are managed and she feels she is doing well in all aspects of her life. Plan to follow up in 3-4 weeks to evaluate effectiveness of dose change and discuss tapering of bupropion.   - lisdexamfetamine (VYVANSE) 40 MG capsule; Take 1 capsule (40 mg) by mouth every morning  Dispense: 30 capsule; Refill: 0    2. Essential hypertension  Currently uncontrolled: with side effects experienced by nifedipine will discontinue and start  hydrochlorothiazide as it is second-line recommended medication by ACOG and first line medication within [facility] for treatment of HTN. As patient has lost 7lbs, continuing to work on weight loss, and requesting dose decrease of Vyvanse will start with 12.5mg rather than higher dose and gradually titrate up as needed. Plan to follow up in 3-4 weeks for blood pressure recheck as well as BMP to ensure no hypokalemia secondary to starting diuretic.   - hydrochlorothiazide (HYDRODIURIL) 12.5 MG tablet; Take 1 tablet (12.5 mg) by mouth daily  Dispense: 60 tablet; Refill: 5  BP Readings from Last 3 Encounters:   12/14/18 (!) 143/97   09/14/18 136/84   08/10/18 (!) 137/95            Medications Discontinued During This Encounter   Medication Reason     TRAZODONE HCL PO Stopped by Patient     NIFEdipine ER osmotic (PROCARDIA XL) 30 MG 24 hr tablet Therapy completed     lisdexamfetamine (VYVANSE) 50 MG capsule Dose adjustment       Options for treatment and follow-up care were reviewed with the patient. Gabriele Rodriguez  engaged in the decision making process and verbalized understanding of the options discussed and agreed with the final plan.    DORA Chaidez CNP

## 2018-12-14 ENCOUNTER — OFFICE VISIT (OUTPATIENT)
Dept: FAMILY MEDICINE | Facility: CLINIC | Age: 33
End: 2018-12-14
Payer: COMMERCIAL

## 2018-12-14 VITALS
HEIGHT: 62 IN | SYSTOLIC BLOOD PRESSURE: 143 MMHG | BODY MASS INDEX: 28.36 KG/M2 | DIASTOLIC BLOOD PRESSURE: 97 MMHG | TEMPERATURE: 98.7 F | WEIGHT: 154.1 LBS | OXYGEN SATURATION: 95 % | RESPIRATION RATE: 16 BRPM | HEART RATE: 92 BPM

## 2018-12-14 DIAGNOSIS — I10 ESSENTIAL HYPERTENSION: ICD-10-CM

## 2018-12-14 DIAGNOSIS — F90.0 ATTENTION DEFICIT HYPERACTIVITY DISORDER (ADHD), PREDOMINANTLY INATTENTIVE TYPE: Primary | ICD-10-CM

## 2018-12-14 RX ORDER — HYDROCHLOROTHIAZIDE 12.5 MG/1
12.5 TABLET ORAL DAILY
Qty: 60 TABLET | Refills: 5 | Status: SHIPPED | OUTPATIENT
Start: 2018-12-14 | End: 2019-01-07

## 2018-12-14 RX ORDER — LISDEXAMFETAMINE DIMESYLATE 40 MG/1
40 CAPSULE ORAL EVERY MORNING
Qty: 30 CAPSULE | Refills: 0 | Status: SHIPPED | OUTPATIENT
Start: 2018-12-14 | End: 2019-03-12

## 2018-12-14 ASSESSMENT — ENCOUNTER SYMPTOMS
APPETITE CHANGE: 0
TREMORS: 0
WHEEZING: 0
LIGHT-HEADEDNESS: 0
ABDOMINAL DISTENTION: 0
SHORTNESS OF BREATH: 0
NUMBNESS: 0
FEVER: 0
ACTIVITY CHANGE: 0
CHEST TIGHTNESS: 0
VOMITING: 0
WEAKNESS: 0
SPEECH DIFFICULTY: 0
DIARRHEA: 0
NAUSEA: 0
FATIGUE: 0
PALPITATIONS: 0
COUGH: 0
DIZZINESS: 0
HEADACHES: 0

## 2018-12-14 ASSESSMENT — MIFFLIN-ST. JEOR: SCORE: 1373.59

## 2018-12-14 NOTE — NURSING NOTE
"33 year old  Chief Complaint   Patient presents with     Hypertension     Would like to discuss meds, some side effects: constipation, edema,      A.D.H.D       Blood pressure (!) 143/97, pulse 92, temperature 98.7  F (37.1  C), temperature source Oral, resp. rate 16, height 1.585 m (5' 2.4\"), weight 69.9 kg (154 lb 1.6 oz), SpO2 95 %, not currently breastfeeding. Body mass index is 27.83 kg/m .  BP completed using cuff size:    Patient Active Problem List   Diagnosis     Anxiety     ADHD (attention deficit hyperactivity disorder), inattentive type     Benign essential hypertension       Wt Readings from Last 2 Encounters:   12/14/18 69.9 kg (154 lb 1.6 oz)   09/14/18 73.3 kg (161 lb 8 oz)     BP Readings from Last 3 Encounters:   12/14/18 (!) 143/97   09/14/18 136/84   08/10/18 (!) 137/95       Allergies   Allergen Reactions     Nickel        Current Outpatient Medications   Medication     BUPROPION HCL PO     etonogestrel (IMPLANON/NEXPLANON) 68 MG IMPL     lisdexamfetamine (VYVANSE) 50 MG capsule     NIFEdipine ER osmotic (PROCARDIA XL) 30 MG 24 hr tablet     TRAZODONE HCL PO     No current facility-administered medications for this visit.        Social History     Tobacco Use     Smoking status: Never Smoker     Smokeless tobacco: Never Used   Substance Use Topics     Alcohol use: No     Drug use: No         Honoring Choices - Health Care Directive Guide offered to patient at time of visit.    Health Maintenance Due   Topic Date Due     HIV SCREEN (SYSTEM ASSIGNED)  07/24/2005     PAP SCREENING Q3 YR (SYSTEM ASSIGNED)  07/24/2008     DTAP/TDAP/TD IMMUNIZATION (1 - Tdap) 07/24/2012     INFLUENZA VACCINE (1) 09/01/2018         There is no immunization history on file for this patient.    No results found for: PAP      Recent Labs   Lab Test 09/14/18  1609   ALT 18   CR 0.90   GFRESTIMATED 73   GFRESTBLACK 89   ALBUMIN 4.1   POTASSIUM 3.8       PHQ-2 ( 1999 Pfizer) 12/14/2018 9/14/2018   Q1: Little interest or " pleasure in doing things 0 0   Q2: Feeling down, depressed or hopeless 0 0   PHQ-2 Score 0 0       PHQ-9 SCORE 9/14/2018   PHQ-9 Total Score 2       ALVIN-7 SCORE 8/10/2018 9/14/2018   Total Score 3 2       No flowsheet data found.    Abby Toscano, [facility]     December 14, 2018 3:56 PM

## 2018-12-14 NOTE — PATIENT INSTRUCTIONS
- Change in meds to hydrochlorothiazide 1 tab daily and increase fluid intake   - Decrease Vyvanse to 40mg 1 tab daily  - Follow up in 3-4 weeks for BP check and labs

## 2019-01-07 ENCOUNTER — OFFICE VISIT (OUTPATIENT)
Dept: FAMILY MEDICINE | Facility: CLINIC | Age: 34
End: 2019-01-07
Payer: COMMERCIAL

## 2019-01-07 VITALS
WEIGHT: 162 LBS | OXYGEN SATURATION: 100 % | HEIGHT: 63 IN | SYSTOLIC BLOOD PRESSURE: 137 MMHG | TEMPERATURE: 98.7 F | BODY MASS INDEX: 28.7 KG/M2 | DIASTOLIC BLOOD PRESSURE: 91 MMHG | HEART RATE: 84 BPM | RESPIRATION RATE: 16 BRPM

## 2019-01-07 DIAGNOSIS — R07.0 THROAT PAIN: Primary | ICD-10-CM

## 2019-01-07 DIAGNOSIS — J01.20 ACUTE NON-RECURRENT ETHMOIDAL SINUSITIS: ICD-10-CM

## 2019-01-07 DIAGNOSIS — H10.32 ACUTE BACTERIAL CONJUNCTIVITIS OF LEFT EYE: ICD-10-CM

## 2019-01-07 DIAGNOSIS — I10 ESSENTIAL HYPERTENSION: ICD-10-CM

## 2019-01-07 LAB — S PYO AG THROAT QL IA.RAPID: NEGATIVE

## 2019-01-07 RX ORDER — ERYTHROMYCIN 5 MG/G
OINTMENT OPHTHALMIC EVERY 6 HOURS SCHEDULED
Status: DISCONTINUED | OUTPATIENT
Start: 2019-01-07 | End: 2020-11-22

## 2019-01-07 RX ORDER — HYDROCHLOROTHIAZIDE 25 MG/1
25 TABLET ORAL DAILY
Qty: 60 TABLET | Refills: 0 | Status: SHIPPED | OUTPATIENT
Start: 2019-01-07 | End: 2019-03-12

## 2019-01-07 ASSESSMENT — ENCOUNTER SYMPTOMS
EYE DISCHARGE: 1
SORE THROAT: 1
VOICE CHANGE: 0
DIZZINESS: 0
WEAKNESS: 0
FATIGUE: 1
WHEEZING: 0
ACTIVITY CHANGE: 1
SINUS PRESSURE: 1
TROUBLE SWALLOWING: 0
EYE ITCHING: 1
FEVER: 0
COUGH: 1
CHEST TIGHTNESS: 0
SHORTNESS OF BREATH: 0
SINUS PAIN: 0
LIGHT-HEADEDNESS: 1
RHINORRHEA: 1

## 2019-01-07 ASSESSMENT — MIFFLIN-ST. JEOR: SCORE: 1415.78

## 2019-01-07 NOTE — PATIENT INSTRUCTIONS
Patient Education     Bacterial Conjunctivitis    You have an infection in the membranes covering the white part of the eye. This part of the eye is called the conjunctiva. The infection is called conjunctivitis. The most common symptoms of conjunctivitis include a thick, pus-like discharge from the eye, swollen eyelids, redness, eyelids sticking together upon awakening, and a gritty or scratchy feeling in the eye. Your infection was caused by bacteria. It may be treated with medicine. With treatment, the infection takes about 7 to 10 days to resolve.  Home care    Use prescribed antibiotic eye drops or ointment as directed to treat the infection.    Apply a warm compress (towel soaked in warm water) to the affected eye 3 to 4 times a day. Do this just before applying medicine to the eye.    Use a warm, wet cloth to wipe away crusting of the eyelids in the morning. This is caused by mucus drainage during the night. You may also use saline irrigating solution or artificial tears to rinse away mucus in the eye. Do not put a patch over the eye.    Wash your hands before and after touching the infected eye. This is to prevent spreading the infection to the other eye, and to other people. Don't share your towels or washcloths with others.    You may use acetaminophen or ibuprofen to control pain, unless another medicine was prescribed. (Note: If you have chronic liver or kidney disease or have ever had a stomach ulcer or gastrointestinal bleeding, talk with your doctor before using these medicines.)    Don't wear contact lenses until your eyes have healed and all symptoms are gone.  Follow-up care  Follow up with your healthcare provider, or as advised.  When to seek medical advice  Call your healthcare provider right away if any of these occur:    Worsening vision    Increasing pain in the eye    Increasing swelling or redness of the eyelid    Redness spreading around the eye  Date Last Reviewed: 7/1/2017 2000-2018  [employer]. [address] [place] [address], [place] [address]. All rights reserved. This information is not intended as a substitute for professional medical care. Always follow your healthcare professional's instructions.    Patient Education     Acute Sinusitis    Acute sinusitis is irritation and swelling of the sinuses. It is usually caused by a viral infection after a common cold. Your doctor can help you find relief.  What is acute sinusitis?  Sinuses are air-filled spaces in the skull behind the face. They are kept moist and clean by a lining of mucosa. Things such as pollen, smoke, and chemical fumes can irritate the mucosa. It can then swell up. As a response to irritation, the mucosa makes more mucus and other fluids. Tiny hairlike cilia cover the mucosa. Cilia help carry mucus toward the opening of the sinus. Too much mucus may cause the cilia to stop working. This blocks the sinus opening. A buildup of fluid in the sinuses then causes pain and pressure. It can also encourage bacteria to grow in the sinuses.  Common symptoms of acute sinusitis  You may have:    Facial soreness pain    Headache    Fever    Fluid draining in the back of the throat (postnasal drip)    Congestion    Drainage that is thick and colored, instead of clear    Cough  Diagnosing acute sinusitis  Your doctor will ask about your symptoms and health history. He or she will look at your ear, nose, and throat. You usually won't need to have X-rays taken.    The doctor may take a sample of mucus to check for bacteria. If you have sinusitis that keeps coming back, you may need imaging tests such as X-rays or CAT scans. This will help your doctor check for a structural problem that may be causing the infection.  Treating acute sinusitis  Treatment is aimed at unblocking the sinus opening and helping the cilia work again. You may need to take antihistamine and decongestant medicine. These can reduce inflammation and decrease the  amount of fluid your sinuses make. If you have a bacterial infection, you will need to take antibiotic medicine for 10 to 14 days. Take this medicine until it is gone, even if you feel better.  Date Last Reviewed: 10/1/2016    8797-1902 [employer]. [address] [place] [address], [place] [address]. All rights reserved. This information is not intended as a substitute for professional medical care. Always follow your healthcare professional's instructions.

## 2019-01-07 NOTE — PROGRESS NOTES
"       HPI       Gabriele Rodriguez is a 33 year old  who presents for   Chief Complaint   Patient presents with     Eye Problem     Pt. presents to the clinic today with left eye crust X 1 day.      Nasal Congestion     chills, sinus drainage, pressure in ears, cough at night. X 1 week.     Woke with left eye crusted over/shut yesterday morning and again this morning. Continues to have drainage that is yellow in color. Has irritation and foreign body sensation.     Has had cold symptoms for the past week with mainly congestion, rhinorrhea, eustactian tube dysfunction, \"sick voice\", cough at nighttime, chills last week without any actual fever. Malaise, some sinus pressure mainly in the ethmoid sinus area. Was taking sudafed for symptoms, last dose was yesterday. Uncertain if it was helpful or not.     Problem, Medication and Allergy Lists were       Current Outpatient Medications   Medication Sig Dispense Refill     amoxicillin-clavulanate (AUGMENTIN) 875-125 MG tablet Take 1 tablet by mouth 2 times daily for 10 days 20 tablet 0     hydrochlorothiazide (HYDRODIURIL) 25 MG tablet Take 1 tablet (25 mg) by mouth daily 60 tablet 0     BUPROPION HCL PO Take 100 mg by mouth       etonogestrel (IMPLANON/NEXPLANON) 68 MG IMPL 1 each by Subdermal route once       lisdexamfetamine (VYVANSE) 40 MG capsule Take 1 capsule (40 mg) by mouth every morning 30 capsule 0         Allergies   Allergen Reactions     Nickel    .    Patient is an established patient of this clinic..         Review of Systems:   Review of Systems   Constitutional: Positive for activity change and fatigue. Negative for fever.   HENT: Positive for congestion, ear pain, postnasal drip, rhinorrhea, sinus pressure and sore throat. Negative for ear discharge, hearing loss, sinus pain, sneezing, tinnitus, trouble swallowing and voice change.    Eyes: Positive for discharge and itching.   Respiratory: Positive for cough. Negative for chest tightness, shortness of " "breath and wheezing.    Cardiovascular: Negative for chest pain.   Neurological: Positive for light-headedness. Negative for dizziness, syncope and weakness.            Physical Exam:     Vitals:    01/07/19 1033   BP: (!) 137/91   BP Location: Left arm   Patient Position: Chair   Cuff Size: Adult Regular   Pulse: 84   Resp: 16   Temp: 98.7  F (37.1  C)   TempSrc: Oral   SpO2: 100%   Weight: 73.5 kg (162 lb)   Height: 1.595 m (5' 2.8\")     Body mass index is 28.88 kg/m .  Vitals were reviewed and were normal     Physical Exam   Constitutional: She is oriented to person, place, and time. She appears well-developed and well-nourished. No distress.   HENT:   Head: Normocephalic and atraumatic.   Right Ear: Hearing, tympanic membrane, external ear and ear canal normal.   Left Ear: Hearing, external ear and ear canal normal. Tympanic membrane is bulging.   Nose: Mucosal edema and rhinorrhea present. Right sinus exhibits no maxillary sinus tenderness and no frontal sinus tenderness. Left sinus exhibits no maxillary sinus tenderness and no frontal sinus tenderness.   Mouth/Throat: Uvula is midline, oropharynx is clear and moist and mucous membranes are normal.   Tenderness predominantly along sides of nose consistent with ethmoid sinusitis   Eyes: EOM and lids are normal. Pupils are equal, round, and reactive to light. Lids are everted and swept, no foreign bodies found. Right eye exhibits no discharge. Left eye exhibits chemosis and discharge. Left eye exhibits no hordeolum. No foreign body present in the left eye. Right conjunctiva is not injected. Right conjunctiva has no hemorrhage. Left conjunctiva is injected. Left conjunctiva has no hemorrhage. No scleral icterus. Right eye exhibits normal extraocular motion and no nystagmus. Left eye exhibits normal extraocular motion and no nystagmus.   Neck: Normal range of motion. Neck supple. No thyromegaly present.   Cardiovascular: Normal rate, regular rhythm and normal heart " sounds. Exam reveals no gallop and no friction rub.   No murmur heard.  Pulmonary/Chest: Effort normal and breath sounds normal. No stridor. No respiratory distress. She has no wheezes. She has no rales. She exhibits no tenderness.   Lymphadenopathy:     She has cervical adenopathy.   Neurological: She is alert and oriented to person, place, and time.   Skin: Skin is warm and dry. Capillary refill takes less than 2 seconds. No rash noted. She is not diaphoretic. No erythema. No pallor.   Psychiatric: She has a normal mood and affect. Her behavior is normal.   Nursing note and vitals reviewed.        Results:      Results from this visit  Results for orders placed or performed in visit on 01/07/19   Rapid Strep Screen (Group) ([facility])   Result Value Ref Range    Rapid Strep A Screen NEGATIVE Negative       Assessment and Plan        1. Throat pain  - Rapid Strep Screen (Group) ([facility])  - Beta strep group A culture    2. Acute bacterial conjunctivitis of left eye  - erythromycin (ROMYCIN) ophthalmic ointment; Place Into the left eye every 6 hours    3. Acute non-recurrent ethmoidal sinusitis  - amoxicillin-clavulanate (AUGMENTIN) 875-125 MG tablet; Take 1 tablet by mouth 2 times daily for 10 days  Dispense: 20 tablet; Refill: 0    4. Essential hypertension  Noted continue elevation of diastolic blood pressure on exam today. Gabriele tolerating hydrochlorothiazide well and agreeable to increasing dose from 12.5mg to 25mg daily. Advised to take 2 of current prescription,   - hydrochlorothiazide (HYDRODIURIL) 25 MG tablet; Take 1 tablet (25 mg) by mouth daily  Dispense: 60 tablet; Refill: 0       There are no discontinued medications.    Options for treatment and follow-up care were reviewed with the patient. Gabriele Rodriguez  engaged in the decision making process and verbalized understanding of the options discussed and agreed with the final plan.    DORA Chaidez CNP

## 2019-01-09 LAB
BACTERIA SPEC CULT: NORMAL
SPECIMEN SOURCE: NORMAL

## 2019-03-12 ENCOUNTER — OFFICE VISIT (OUTPATIENT)
Dept: FAMILY MEDICINE | Facility: CLINIC | Age: 34
End: 2019-03-12
Payer: COMMERCIAL

## 2019-03-12 VITALS
TEMPERATURE: 99.2 F | HEIGHT: 63 IN | BODY MASS INDEX: 27.84 KG/M2 | WEIGHT: 157.1 LBS | RESPIRATION RATE: 16 BRPM | HEART RATE: 107 BPM | OXYGEN SATURATION: 96 % | SYSTOLIC BLOOD PRESSURE: 145 MMHG | DIASTOLIC BLOOD PRESSURE: 96 MMHG

## 2019-03-12 DIAGNOSIS — F90.0 ATTENTION DEFICIT HYPERACTIVITY DISORDER (ADHD), PREDOMINANTLY INATTENTIVE TYPE: ICD-10-CM

## 2019-03-12 DIAGNOSIS — I10 ESSENTIAL HYPERTENSION: Primary | ICD-10-CM

## 2019-03-12 LAB — TSH SERPL DL<=0.005 MIU/L-ACNC: 2.78 MU/L (ref 0.4–4)

## 2019-03-12 RX ORDER — LISDEXAMFETAMINE DIMESYLATE 40 MG/1
40 CAPSULE ORAL EVERY MORNING
Qty: 30 CAPSULE | Refills: 0 | Status: SHIPPED | OUTPATIENT
Start: 2019-03-12 | End: 2019-04-17

## 2019-03-12 RX ORDER — HYDROCHLOROTHIAZIDE 25 MG/1
25 TABLET ORAL DAILY
Qty: 60 TABLET | Refills: 0 | Status: SHIPPED | OUTPATIENT
Start: 2019-03-12 | End: 2019-04-17

## 2019-03-12 RX ORDER — LISINOPRIL 5 MG/1
5 TABLET ORAL DAILY
Qty: 30 TABLET | Refills: 1 | Status: SHIPPED | OUTPATIENT
Start: 2019-03-12 | End: 2019-04-17

## 2019-03-12 ASSESSMENT — MIFFLIN-ST. JEOR: SCORE: 1390.38

## 2019-03-12 ASSESSMENT — ANXIETY QUESTIONNAIRES
5. BEING SO RESTLESS THAT IT IS HARD TO SIT STILL: NOT AT ALL
GAD7 TOTAL SCORE: 4
2. NOT BEING ABLE TO STOP OR CONTROL WORRYING: SEVERAL DAYS
1. FEELING NERVOUS, ANXIOUS, OR ON EDGE: SEVERAL DAYS
6. BECOMING EASILY ANNOYED OR IRRITABLE: NOT AT ALL
7. FEELING AFRAID AS IF SOMETHING AWFUL MIGHT HAPPEN: NOT AT ALL
3. WORRYING TOO MUCH ABOUT DIFFERENT THINGS: MORE THAN HALF THE DAYS

## 2019-03-12 ASSESSMENT — PATIENT HEALTH QUESTIONNAIRE - PHQ9
5. POOR APPETITE OR OVEREATING: NOT AT ALL
SUM OF ALL RESPONSES TO PHQ QUESTIONS 1-9: 2

## 2019-03-12 NOTE — PROGRESS NOTES
TSH       HPI       Gabriele Rodriguez is a 33 year old  who presents for   Chief Complaint   Patient presents with     A.D.H.D     Medican f/u, went down smaller dose, would like to stay at that dose for now     Hypertension     concerns about BP climbing in   33 year-old female presents to clinic for follow up. Tapered Vyvanse at last visit down to 40mg. Would like to stay at this level for now as feels like attention might be suffering a little and is noting more stress. She just started a program in pathology. She is also worried about her BP which has been creeping up a little.  She is interested in lowering this. Was on Metoprolol in the past for both anxiety and BP and felt BP was better controlled. She also tried nifedipine in the past for BP but that did not improve BP.  FMH: Mother has HTN also and was recently started on lisinopril.     Habits  Diet: Typical day: egg sandwich or no breakfast  Lunch: sushi  Dinner: largest meal: usually prepackaged meal from freezer  Has been trying to lose weight. Trying to exercise 2-3 times per week.   Denies drinking sugared beverages      Nexplanon summer 2017  Problem, Medication and Allergy Lists were   reviewed and updated if needed.     Patient Active Problem List    Diagnosis Date Noted     Anxiety 08/10/2018     Priority: Medium     ADHD (attention deficit hyperactivity disorder), inattentive type 08/10/2018     Priority: Medium     Benign essential hypertension 08/10/2018     Priority: Medium         Current Outpatient Medications   Medication Sig Dispense Refill     BUPROPION HCL PO Take 100 mg by mouth       etonogestrel (IMPLANON/NEXPLANON) 68 MG IMPL 1 each by Subdermal route once       hydrochlorothiazide (HYDRODIURIL) 25 MG tablet Take 1 tablet (25 mg) by mouth daily 60 tablet 0     lisdexamfetamine (VYVANSE) 40 MG capsule Take 1 capsule (40 mg) by mouth every morning 30 capsule 0     lisinopril (PRINIVIL/ZESTRIL) 5 MG tablet Take 1 tablet (5 mg) by mouth  "daily 30 tablet 1         Allergies   Allergen Reactions     Nickel    .    Patient is .         Review of Systems:   Review of Systems  GEN: has been trying to lose weight  EYES: denies vision changes  Head: occasional sinus headache above nose  CV: negative for chest pain, irregular heart beat or peripheral edema although has noted sometimes ankles swell a little.   RESP: negative for SOB  ENDO: dry skin, dry hair  MOOD: anxiety as per HPI about starting school and having a lot to do.       Physical Exam:     Vitals:    03/12/19 1600   BP: (!) 145/96   Pulse: 107   Resp: 16   Temp: 99.2  F (37.3  C)   TempSrc: Oral   SpO2: 96%   Weight: 71.3 kg (157 lb 1.6 oz)   Height: 1.59 m (5' 2.6\")     Body mass index is 28.19 kg/m .  Vital signs normal except BP was elevated and this has been increasing over time     Physical Exam  GEN: alert female, talkative in no acute distress  HEENT: Eyes clear, EOM intact  NECK: Thyroid smooth and not enlarged.   RESP: Lungs clear to auscutation  CV: HRRR, s1, S2, no MRG, slight dysrhythmia    Results:   Results are ordered and pending    Assessment and Plan        1. Essential hypertension  Reviewed diet and exercise recommendations. Discussed small lifestyle changes she can make.  - lisinopril (PRINIVIL/ZESTRIL) 5 MG tablet; Take 1 tablet (5 mg) by mouth daily  Dispense: 30 tablet; Refill: 1  - hydrochlorothiazide (HYDRODIURIL) 25 MG tablet; Take 1 tablet (25 mg) by mouth daily  Dispense: 60 tablet; Refill: 0  - TSH with free T4 reflex  Reviewed SE of lisinopril. If she tolerates and we titrate up, we can change to combination pill.    2. Attention deficit hyperactivity disorder (ADHD), predominantly inattentive type  Reviewed mood: OK for now. Will stay at same Vyvanse dose and she will follow up with Phuong Sharif  - lisdexamfetamine (VYVANSE) 40 MG capsule; Take 1 capsule (40 mg) by mouth every morning  Dispense: 30 capsule; Refill: 0       There are no discontinued " medications.    Options for treatment and follow-up care were reviewed with the patient. Gabriele Rodriguez  engaged in the decision making process and verbalized understanding of the options discussed and agreed with the final plan.    I was present with the NP student, Karmen Rodriguez RN, P N student who participated in the service and in the documentation of the services provided. I have verified the history and personally performed the physical exam and medical decision making, as documented by the student and edited by me.    DORA Christian CNP CNP

## 2019-03-12 NOTE — PATIENT INSTRUCTIONS
ADHD  Refilled Vyvanese (paper RX)  Will try to taper when stress level allows.   Mindfulness may help reduce stress    HTN  Adding 5 mg lisinopril. Continue hydrochlorothiazide dose of 25 mg. Make sure to get plenty of foods with potassium (oranges, bananas, citrus fruits)   If you tolerate the lisinopril, will titrate up to a combination pill.   Attention to diet: low sodium, low fat, Increase vegetables  Regular exercise  Follow with Phuong  Bring in lab results from insurance

## 2019-03-12 NOTE — NURSING NOTE
"33 year old  Chief Complaint   Patient presents with     A.D.H.RAINA     Medican f/u, went down smaller dose, would like to stay at that dose for now     Hypertension     concerns about BP climbing in       Blood pressure (!) 145/96, pulse 107, temperature 99.2  F (37.3  C), temperature source Oral, resp. rate 16, height 1.59 m (5' 2.6\"), weight 71.3 kg (157 lb 1.6 oz), SpO2 96 %, not currently breastfeeding. Body mass index is 28.19 kg/m .  BP completed using cuff size:    Patient Active Problem List   Diagnosis     Anxiety     ADHD (attention deficit hyperactivity disorder), inattentive type     Benign essential hypertension       Wt Readings from Last 2 Encounters:   03/12/19 71.3 kg (157 lb 1.6 oz)   01/07/19 73.5 kg (162 lb)     BP Readings from Last 3 Encounters:   03/12/19 (!) 145/96   01/07/19 (!) 137/91   12/14/18 (!) 143/97       Allergies   Allergen Reactions     Nickel        Current Outpatient Medications   Medication     BUPROPION HCL PO     etonogestrel (IMPLANON/NEXPLANON) 68 MG IMPL     hydrochlorothiazide (HYDRODIURIL) 25 MG tablet     Current Facility-Administered Medications   Medication     erythromycin (ROMYCIN) ophthalmic ointment       Social History     Tobacco Use     Smoking status: Never Smoker     Smokeless tobacco: Never Used   Substance Use Topics     Alcohol use: No     Drug use: No       Honoring Choices - Health Care Directive Guide offered to patient at time of visit.    Health Maintenance Due   Topic Date Due     HIV SCREEN (SYSTEM ASSIGNED)  07/24/2005     PAP SCREENING Q3 YR (SYSTEM ASSIGNED)  07/24/2008     DTAP/TDAP/TD IMMUNIZATION (1 - Tdap) 07/24/2012     INFLUENZA VACCINE (1) 09/01/2018         There is no immunization history on file for this patient.    No results found for: PAP      Recent Labs   Lab Test 09/14/18  1609   ALT 18   CR 0.90   GFRESTIMATED 73   GFRESTBLACK 89   ALBUMIN 4.1   POTASSIUM 3.8       PHQ-2 ( 1999 Pfizer) 12/14/2018 9/14/2018   Q1: Little interest or " pleasure in doing things 0 0   Q2: Feeling down, depressed or hopeless 0 0   PHQ-2 Score 0 0       PHQ-9 SCORE 9/14/2018   PHQ-9 Total Score 2       ALVIN-7 SCORE 8/10/2018 9/14/2018   Total Score 3 2       No flowsheet data found.      Abby Toscano, [facility]  March 12, 2019 4:02 PM

## 2019-03-13 ASSESSMENT — ANXIETY QUESTIONNAIRES: GAD7 TOTAL SCORE: 4

## 2019-04-17 ENCOUNTER — MYC REFILL (OUTPATIENT)
Dept: FAMILY MEDICINE | Facility: CLINIC | Age: 34
End: 2019-04-17

## 2019-04-17 DIAGNOSIS — F90.0 ATTENTION DEFICIT HYPERACTIVITY DISORDER (ADHD), PREDOMINANTLY INATTENTIVE TYPE: ICD-10-CM

## 2019-04-17 DIAGNOSIS — I10 ESSENTIAL HYPERTENSION: ICD-10-CM

## 2019-04-17 RX ORDER — HYDROCHLOROTHIAZIDE 25 MG/1
25 TABLET ORAL DAILY
Qty: 60 TABLET | Refills: 0 | Status: SHIPPED | OUTPATIENT
Start: 2019-04-17 | End: 2019-04-17

## 2019-04-17 RX ORDER — LISDEXAMFETAMINE DIMESYLATE 40 MG/1
40 CAPSULE ORAL EVERY MORNING
Qty: 30 CAPSULE | Refills: 0 | Status: SHIPPED | OUTPATIENT
Start: 2019-04-17 | End: 2019-04-29

## 2019-04-17 RX ORDER — HYDROCHLOROTHIAZIDE 25 MG/1
25 TABLET ORAL DAILY
Qty: 60 TABLET | Refills: 0 | Status: SHIPPED | OUTPATIENT
Start: 2019-04-17 | End: 2019-07-23

## 2019-04-17 RX ORDER — LISINOPRIL 5 MG/1
5 TABLET ORAL DAILY
Qty: 30 TABLET | Refills: 1 | Status: SHIPPED | OUTPATIENT
Start: 2019-04-17 | End: 2019-07-23

## 2019-04-17 RX ORDER — LISINOPRIL 5 MG/1
5 TABLET ORAL DAILY
Qty: 30 TABLET | Refills: 1 | Status: SHIPPED | OUTPATIENT
Start: 2019-04-17 | End: 2019-04-17

## 2019-04-29 DIAGNOSIS — F90.0 ATTENTION DEFICIT HYPERACTIVITY DISORDER (ADHD), PREDOMINANTLY INATTENTIVE TYPE: ICD-10-CM

## 2019-04-29 RX ORDER — LISDEXAMFETAMINE DIMESYLATE 40 MG/1
40 CAPSULE ORAL EVERY MORNING
Qty: 30 CAPSULE | Refills: 0 | Status: SHIPPED | OUTPATIENT
Start: 2019-04-29 | End: 2019-06-13

## 2019-06-12 ENCOUNTER — TELEPHONE (OUTPATIENT)
Dept: FAMILY MEDICINE | Facility: CLINIC | Age: 34
End: 2019-06-12

## 2019-06-12 NOTE — TELEPHONE ENCOUNTER
Patient called the clinic today needing a refill on her Vyvanse.     Patient would like to come pick this up Thursday afternoon. This is her 3rd request for the refill.    Grace Aviles CMA

## 2019-06-13 DIAGNOSIS — F90.0 ATTENTION DEFICIT HYPERACTIVITY DISORDER (ADHD), PREDOMINANTLY INATTENTIVE TYPE: ICD-10-CM

## 2019-06-13 RX ORDER — LISDEXAMFETAMINE DIMESYLATE 40 MG/1
40 CAPSULE ORAL EVERY MORNING
Qty: 30 CAPSULE | Refills: 0 | Status: SHIPPED | OUTPATIENT
Start: 2019-06-13 | End: 2019-07-23

## 2019-07-23 ENCOUNTER — OFFICE VISIT (OUTPATIENT)
Dept: FAMILY MEDICINE | Facility: CLINIC | Age: 34
End: 2019-07-23
Payer: COMMERCIAL

## 2019-07-23 VITALS
TEMPERATURE: 98.1 F | WEIGHT: 154.6 LBS | DIASTOLIC BLOOD PRESSURE: 96 MMHG | SYSTOLIC BLOOD PRESSURE: 136 MMHG | HEART RATE: 71 BPM | BODY MASS INDEX: 27.39 KG/M2 | OXYGEN SATURATION: 97 % | RESPIRATION RATE: 16 BRPM | HEIGHT: 63 IN

## 2019-07-23 DIAGNOSIS — F90.0 ATTENTION DEFICIT HYPERACTIVITY DISORDER (ADHD), PREDOMINANTLY INATTENTIVE TYPE: ICD-10-CM

## 2019-07-23 DIAGNOSIS — I10 ESSENTIAL HYPERTENSION: ICD-10-CM

## 2019-07-23 LAB
ALBUMIN SERPL-MCNC: 4.5 G/DL (ref 3.4–5)
ALP SERPL-CCNC: 71 U/L (ref 40–150)
ALT SERPL W P-5'-P-CCNC: 22 U/L (ref 0–50)
ANION GAP SERPL CALCULATED.3IONS-SCNC: 5 MMOL/L (ref 3–14)
AST SERPL W P-5'-P-CCNC: 18 U/L (ref 0–45)
BILIRUB SERPL-MCNC: 0.7 MG/DL (ref 0.2–1.3)
BUN SERPL-MCNC: 10 MG/DL (ref 7–30)
CALCIUM SERPL-MCNC: 9.6 MG/DL (ref 8.5–10.1)
CHLORIDE SERPL-SCNC: 104 MMOL/L (ref 94–109)
CO2 SERPL-SCNC: 27 MMOL/L (ref 20–32)
CREAT SERPL-MCNC: 0.83 MG/DL (ref 0.52–1.04)
GFR SERPL CREATININE-BSD FRML MDRD: >90 ML/MIN/{1.73_M2}
GLUCOSE SERPL-MCNC: 85 MG/DL (ref 70–99)
POTASSIUM SERPL-SCNC: 3.6 MMOL/L (ref 3.4–5.3)
PROT SERPL-MCNC: 8.3 G/DL (ref 6.8–8.8)
SODIUM SERPL-SCNC: 137 MMOL/L (ref 133–144)

## 2019-07-23 RX ORDER — LISDEXAMFETAMINE DIMESYLATE 40 MG/1
40 CAPSULE ORAL EVERY MORNING
Qty: 30 CAPSULE | Refills: 0 | Status: SHIPPED | OUTPATIENT
Start: 2019-07-23 | End: 2019-08-29

## 2019-07-23 RX ORDER — HYDROCHLOROTHIAZIDE 25 MG/1
25 TABLET ORAL DAILY
Qty: 90 TABLET | Refills: 1 | Status: SHIPPED | OUTPATIENT
Start: 2019-07-23 | End: 2019-11-06

## 2019-07-23 RX ORDER — LISINOPRIL 5 MG/1
5 TABLET ORAL DAILY
Qty: 90 TABLET | Refills: 1 | Status: SHIPPED | OUTPATIENT
Start: 2019-07-23 | End: 2019-11-06

## 2019-07-23 ASSESSMENT — PAIN SCALES - GENERAL: PAINLEVEL: NO PAIN (0)

## 2019-07-23 ASSESSMENT — MIFFLIN-ST. JEOR: SCORE: 1379.04

## 2019-07-23 NOTE — PATIENT INSTRUCTIONS

## 2019-07-23 NOTE — PROGRESS NOTES
Subjective     Gabriele Rodriguez is a 33 year old female who presents to clinic today for the following health issues:    HPI   Hypertension Follow-up      Do you check your blood pressure regularly outside of the clinic? Yes - less than 130/90.     Are your blood pressures ever more than 140 on the top number (systolic) OR more   than 90 on the bottom number (diastolic), for example 140/90? NO.  She ran out of the lisinopril 1 week ago.     Amount of exercise or physical activity: 1 day/week running - for about an hour.      Problems taking medications regularly: No    Medication side effects: none    Diet: regular (no restrictions)    She had been on metoprolol in the past for this and wondering about that medication.    She is on Vyvanse 40mg daily.  She states this is going well.  She is a resident in pathology.  She did recently reduce her dose from 60mg to 40mg.   She declines side effects or reduced appetite.  She feels like this dose is going well.     Wt Readings from Last 2 Encounters:   07/23/19 70.1 kg (154 lb 9.6 oz)   03/12/19 71.3 kg (157 lb 1.6 oz)       Patient Active Problem List   Diagnosis     Anxiety     ADHD (attention deficit hyperactivity disorder), inattentive type     Benign essential hypertension     Past Surgical History:   Procedure Laterality Date     BIOPSY NAIL (LOCATION)      left hand, 4th digit       Social History     Tobacco Use     Smoking status: Never Smoker     Smokeless tobacco: Never Used   Substance Use Topics     Alcohol use: No     Family History   Problem Relation Age of Onset     Hypertension Mother      Breast Cancer Mother         ductal stage 2, HER-2 negative, (+) estrogen/progesterone     No Known Problems Father      No Known Problems Sister      Pancreatic Cancer Maternal Grandfather      Brain Cancer Paternal Aunt          Current Outpatient Medications   Medication Sig Dispense Refill     etonogestrel (IMPLANON/NEXPLANON) 68 MG IMPL 1 each by Subdermal route  "once       hydrochlorothiazide (HYDRODIURIL) 25 MG tablet Take 1 tablet (25 mg) by mouth daily 90 tablet 1     lisdexamfetamine (VYVANSE) 40 MG capsule Take 1 capsule (40 mg) by mouth every morning 30 capsule 0     lisinopril (PRINIVIL/ZESTRIL) 5 MG tablet Take 1 tablet (5 mg) by mouth daily 90 tablet 1     BUPROPION HCL PO Take 100 mg by mouth       Allergies   Allergen Reactions     Nickel      Recent Labs   Lab Test 07/23/19  1509 03/12/19  1627 09/14/18  1609   ALT 22  --  18   CR 0.83  --  0.90   GFRESTIMATED >90  --  73   GFRESTBLACK >90  --  89   POTASSIUM 3.6  --  3.8   TSH  --  2.78  --       BP Readings from Last 3 Encounters:   07/23/19 (!) 136/96   03/12/19 (!) 145/96   01/07/19 (!) 137/91    Wt Readings from Last 3 Encounters:   07/23/19 70.1 kg (154 lb 9.6 oz)   03/12/19 71.3 kg (157 lb 1.6 oz)   01/07/19 73.5 kg (162 lb)           Reviewed and updated as needed this visit by Provider         Review of Systems   CONSTITUTIONAL: NEGATIVE for fever, chills, change in weight  RESP: NEGATIVE for significant cough or SOB  CV: NEGATIVE for chest pain, palpitations or peripheral edema  PSYCHIATRIC:  History of ADHD - see HPI.          Objective    BP (!) 136/96   Pulse 71   Temp 98.1  F (36.7  C) (Oral)   Resp 16   Ht 1.59 m (5' 2.6\")   Wt 70.1 kg (154 lb 9.6 oz)   SpO2 97%   BMI 27.74 kg/m    Body mass index is 27.74 kg/m .  Physical Exam   GENERAL: healthy, alert and no distress  RESP: lungs clear to auscultation - no rales, rhonchi or wheezes  CV: regular rate and rhythm, normal S1 S2, no S3 or S4, no murmur, click or rub, no peripheral edema and peripheral pulses strong  PSYCH: mentation appears normal, affect normal/bright    Diagnostic Test Results:  Labs reviewed in Epic  Results for orders placed or performed in visit on 03/12/19   TSH with free T4 reflex   Result Value Ref Range    TSH 2.78 0.40 - 4.00 mU/L           Assessment & Plan     Gabriele was seen today for recheck " "medication.    Diagnoses and all orders for this visit:    Essential hypertension  -     hydrochlorothiazide (HYDRODIURIL) 25 MG tablet; Take 1 tablet (25 mg) by mouth daily  -     lisinopril (PRINIVIL/ZESTRIL) 5 MG tablet; Take 1 tablet (5 mg) by mouth daily  -     Comprehensive metabolic panel; Future        -      Patient had ran out of lisinopril.  Discussed returning for a nurse only BP check in 1 to 2 weeks and her BP goal of less than 140/90.        Attention deficit hyperactivity disorder (ADHD), predominantly inattentive type  -     lisdexamfetamine (VYVANSE) 40 MG capsule; Take 1 capsule (40 mg) by mouth every morning  -     She could like to cotinue on this dose for now.        Patient is considering whether to have her nexplanon removed and replaced.  She said it has been about 3 years that she had it placed. She will consider and follow-up regarding.        BMI:   Estimated body mass index is 27.74 kg/m  as calculated from the following:    Height as of this encounter: 1.59 m (5' 2.6\").    Weight as of this encounter: 70.1 kg (154 lb 9.6 oz).           See Patient Instructions  Follow-up in regards to nexplanon  Follow-up for a nurse only BP check in 1 to 2 weeks  Follow-up in 3 to 6 months, sooner if needed.  Follow-up regarding BP medications if she decides she would like to become pregnant, or becomes pregnant.  Return to clinic if no improvement or symptoms worsen.  Return to clinic if no improvement or symptoms worsen.      DORA Teixeira, CNP  [facility]        "

## 2019-07-23 NOTE — NURSING NOTE
Chief Complaint   Patient presents with     Recheck Medication     Pt is here for medication refills.         Chase Crespo, EMT on 7/23/2019 at 2:35 PM

## 2019-08-29 ENCOUNTER — TELEPHONE (OUTPATIENT)
Dept: FAMILY MEDICINE | Facility: CLINIC | Age: 34
End: 2019-08-29

## 2019-08-29 DIAGNOSIS — F90.0 ATTENTION DEFICIT HYPERACTIVITY DISORDER (ADHD), PREDOMINANTLY INATTENTIVE TYPE: ICD-10-CM

## 2019-08-29 RX ORDER — LISDEXAMFETAMINE DIMESYLATE 40 MG/1
40 CAPSULE ORAL EVERY MORNING
Qty: 30 CAPSULE | Refills: 0 | Status: SHIPPED | OUTPATIENT
Start: 2019-08-29 | End: 2019-10-10

## 2019-08-29 NOTE — TELEPHONE ENCOUNTER
lisdexamfetamine (VYVANSE) 40 MG capsule      Last Written Prescription Date:  7-23-19  Last Fill Quantity: 30,   # refills: 0  Last Office Visit : 7-23-19  Future Office visit:  none    Routing refill request to provider for review/approval because:  Controlled medication.      Kathleen M Doege RN

## 2019-08-29 NOTE — TELEPHONE ENCOUNTER
Please call patient. I refilled her Vyvanse electronically and sent it to the [facility].   Has the patient had her BP rechecked since the last itme I saw her?    BP Readings from Last 6 Encounters:   07/23/19 (!) 136/96   03/12/19 (!) 145/96   01/07/19 (!) 137/91   12/14/18 (!) 143/97   09/14/18 136/84   08/10/18 (!) 137/95       Thanks,  Jojo

## 2019-08-29 NOTE — TELEPHONE ENCOUNTER
[facility] Call Center    Phone Message    May a detailed message be left on voicemail: yes    Reason for Call: Medication Refill Request    Has the patient contacted the pharmacy for the refill? Yes   Name of medication being requested:  lisdexamfetamine (VYVANSE) 40 MG capsule  Provider who prescribed the medication: Jojo Gardner  Pharmacy: [facility]  Date medication is needed: 8/30/19          Action Taken: Message routed to:  Other: med Refill Team

## 2019-08-29 NOTE — TELEPHONE ENCOUNTER
Patient tried calling back but she missed the call please call her back asap to address concerns thank you

## 2019-08-30 ENCOUNTER — TELEPHONE (OUTPATIENT)
Dept: FAMILY MEDICINE | Facility: CLINIC | Age: 34
End: 2019-08-30

## 2019-10-09 ENCOUNTER — TELEPHONE (OUTPATIENT)
Dept: FAMILY MEDICINE | Facility: CLINIC | Age: 34
End: 2019-10-09

## 2019-10-09 NOTE — TELEPHONE ENCOUNTER
[facility] phone call message- patient requesting a refill:    Full Medication Name: lisdexamfetamine (VYVANSE) 40 MG capsule    Dose: See Chart     Pharmacy confirmed as     [facility] - [place], [place] - [address]  [place]   [facility] [address]  Phone: [phone] Fax: [phone]  : Yes    Additional Comments: Patient states she has only one pill remaining. Patient also would like to know if she can get the refill electronically sent to the pharmacy above. Patient does plan to follow up in clinic in soon but states she wouldn't be able to make one before she runs out.      OK to leave a message on voice mail? Yes    Primary language: English      needed? No    Call taken on October 9, 2019 at 10:34 AM by Abby Toscano CMA

## 2019-10-10 DIAGNOSIS — F90.0 ATTENTION DEFICIT HYPERACTIVITY DISORDER (ADHD), PREDOMINANTLY INATTENTIVE TYPE: ICD-10-CM

## 2019-10-10 RX ORDER — LISDEXAMFETAMINE DIMESYLATE 40 MG/1
40 CAPSULE ORAL EVERY MORNING
Qty: 30 CAPSULE | Refills: 0 | Status: SHIPPED | OUTPATIENT
Start: 2019-10-10 | End: 2019-11-11

## 2019-10-10 RX ORDER — LISDEXAMFETAMINE DIMESYLATE 40 MG/1
40 CAPSULE ORAL EVERY MORNING
Qty: 30 CAPSULE | Refills: 0 | Status: SHIPPED | OUTPATIENT
Start: 2019-10-10 | End: 2019-10-10

## 2019-11-06 ENCOUNTER — MYC REFILL (OUTPATIENT)
Dept: FAMILY MEDICINE | Facility: CLINIC | Age: 34
End: 2019-11-06

## 2019-11-06 DIAGNOSIS — I10 ESSENTIAL HYPERTENSION: ICD-10-CM

## 2019-11-06 RX ORDER — HYDROCHLOROTHIAZIDE 25 MG/1
25 TABLET ORAL DAILY
Qty: 30 TABLET | Refills: 0 | Status: SHIPPED | OUTPATIENT
Start: 2019-11-06 | End: 2019-11-06

## 2019-11-06 RX ORDER — LISINOPRIL 5 MG/1
5 TABLET ORAL DAILY
Qty: 90 TABLET | Refills: 1 | Status: SHIPPED | OUTPATIENT
Start: 2019-11-06 | End: 2019-11-08

## 2019-11-06 RX ORDER — HYDROCHLOROTHIAZIDE 25 MG/1
25 TABLET ORAL DAILY
Qty: 90 TABLET | Refills: 1 | Status: SHIPPED | OUTPATIENT
Start: 2019-11-06 | End: 2019-11-08

## 2019-11-08 ENCOUNTER — OFFICE VISIT (OUTPATIENT)
Dept: FAMILY MEDICINE | Facility: CLINIC | Age: 34
End: 2019-11-08
Payer: COMMERCIAL

## 2019-11-08 VITALS
DIASTOLIC BLOOD PRESSURE: 85 MMHG | OXYGEN SATURATION: 98 % | WEIGHT: 150 LBS | SYSTOLIC BLOOD PRESSURE: 125 MMHG | TEMPERATURE: 98.2 F | BODY MASS INDEX: 27.6 KG/M2 | HEART RATE: 88 BPM | HEIGHT: 62 IN | RESPIRATION RATE: 16 BRPM

## 2019-11-08 DIAGNOSIS — I10 ESSENTIAL HYPERTENSION: ICD-10-CM

## 2019-11-08 DIAGNOSIS — F90.0 ATTENTION DEFICIT HYPERACTIVITY DISORDER (ADHD), PREDOMINANTLY INATTENTIVE TYPE: ICD-10-CM

## 2019-11-08 DIAGNOSIS — Z00.00 ENCOUNTER FOR PREVENTIVE CARE: Primary | ICD-10-CM

## 2019-11-08 DIAGNOSIS — Z11.1 SCREENING EXAMINATION FOR PULMONARY TUBERCULOSIS: ICD-10-CM

## 2019-11-08 DIAGNOSIS — Z11.4 SCREENING FOR HIV (HUMAN IMMUNODEFICIENCY VIRUS): ICD-10-CM

## 2019-11-08 DIAGNOSIS — Z12.4 SCREENING FOR CERVICAL CANCER: ICD-10-CM

## 2019-11-08 RX ORDER — LABETALOL 100 MG/1
100 TABLET, FILM COATED ORAL 2 TIMES DAILY
Qty: 60 TABLET | Refills: 3 | Status: SHIPPED | OUTPATIENT
Start: 2019-11-08 | End: 2020-01-02

## 2019-11-08 ASSESSMENT — MIFFLIN-ST. JEOR: SCORE: 1343.65

## 2019-11-08 ASSESSMENT — ANXIETY QUESTIONNAIRES
1. FEELING NERVOUS, ANXIOUS, OR ON EDGE: SEVERAL DAYS
7. FEELING AFRAID AS IF SOMETHING AWFUL MIGHT HAPPEN: NOT AT ALL
5. BEING SO RESTLESS THAT IT IS HARD TO SIT STILL: SEVERAL DAYS
2. NOT BEING ABLE TO STOP OR CONTROL WORRYING: SEVERAL DAYS
GAD7 TOTAL SCORE: 5
IF YOU CHECKED OFF ANY PROBLEMS ON THIS QUESTIONNAIRE, HOW DIFFICULT HAVE THESE PROBLEMS MADE IT FOR YOU TO DO YOUR WORK, TAKE CARE OF THINGS AT HOME, OR GET ALONG WITH OTHER PEOPLE: NOT DIFFICULT AT ALL
3. WORRYING TOO MUCH ABOUT DIFFERENT THINGS: SEVERAL DAYS
6. BECOMING EASILY ANNOYED OR IRRITABLE: NOT AT ALL

## 2019-11-08 ASSESSMENT — PATIENT HEALTH QUESTIONNAIRE - PHQ9
SUM OF ALL RESPONSES TO PHQ QUESTIONS 1-9: 0
5. POOR APPETITE OR OVEREATING: SEVERAL DAYS

## 2019-11-08 NOTE — NURSING NOTE
"34 year old  Chief Complaint   Patient presents with     Gyn Exam     Pt. presents to the clinic today for a PAP and a BP check       Blood pressure 125/85, pulse 88, temperature 98.2  F (36.8  C), temperature source Oral, resp. rate 16, height 1.575 m (5' 2\"), weight 68 kg (150 lb), SpO2 98 %, not currently breastfeeding. Body mass index is 27.44 kg/m .  BP completed using cuff size:    Patient Active Problem List   Diagnosis     Anxiety     ADHD (attention deficit hyperactivity disorder), inattentive type     Benign essential hypertension       Wt Readings from Last 2 Encounters:   11/08/19 68 kg (150 lb)   07/23/19 70.1 kg (154 lb 9.6 oz)     BP Readings from Last 3 Encounters:   11/08/19 125/85   07/23/19 (!) 136/96   03/12/19 (!) 145/96       Allergies   Allergen Reactions     Nickel        Current Outpatient Medications   Medication     etonogestrel (IMPLANON/NEXPLANON) 68 MG IMPL     hydrochlorothiazide (HYDRODIURIL) 25 MG tablet     lisdexamfetamine (VYVANSE) 40 MG capsule     lisinopril (PRINIVIL/ZESTRIL) 5 MG tablet     BUPROPION HCL PO     Current Facility-Administered Medications   Medication     erythromycin (ROMYCIN) ophthalmic ointment       Social History     Tobacco Use     Smoking status: Never Smoker     Smokeless tobacco: Never Used   Substance Use Topics     Alcohol use: No     Drug use: No       Honoring Choices - Health Care Directive Guide offered to patient at time of visit.    Health Maintenance Due   Topic Date Due     HIV SCREENING  07/24/2002     HPV  07/24/2008     PAP  07/24/2012     DTAP/TDAP/TD IMMUNIZATION (1 - Tdap) 07/24/2012     PREVENTIVE CARE VISIT  08/10/2019       Immunization History   Administered Date(s) Administered     Influenza Vaccine IM > 6 months Valent IIV4 09/24/2019       No results found for: PAP      Recent Labs   Lab Test 07/23/19  1509 03/12/19  1627 09/14/18  1609   ALT 22  --  18   CR 0.83  --  0.90   GFRESTIMATED >90  --  73   GFRESTBLACK >90  --  89 "   ALBUMIN 4.5  --  4.1   POTASSIUM 3.6  --  3.8   TSH  --  2.78  --        PHQ-2 ( 1999 Pfizer) 11/8/2019 12/14/2018   Q1: Little interest or pleasure in doing things 0 0   Q2: Feeling down, depressed or hopeless 0 0   PHQ-2 Score 0 0       PHQ-9 SCORE 9/14/2018 3/12/2019 11/8/2019   PHQ-9 Total Score 2 2 0       ALVIN-7 SCORE 9/14/2018 3/12/2019 11/8/2019   Total Score 2 4 5       No flowsheet data found.    Grace Aviles, MAGDALENA  November 8, 2019 3:48 PM

## 2019-11-08 NOTE — PROGRESS NOTES
SUBJECTIVE:   Gabriele Rodriguez is an 34 year old year old woman who presents for preventive health visit.     Healthy Habits:    Amount of exercise or daily activities, outside of work: intermittently.     Problems taking medications regularly No    Medication side effects: No    Have you had an eye exam in the past two years? no    Do you see a dentist twice per year? yes    Do you have sleep apnea, excessive snoring or daytime drowsiness?no    Water: 'pretty good'     Caffeine: 1 cup of coffee/daily     Currently has nexplanon for birth control, has not had a period since implanted in 2016. Uncertain if she would like to have implant replaced in 2020 or to star trying to have a family. Feels as though she may be ready and would like to make changes to her medications to reflect pregnancy-safe options before having it removed.     Discussed hypertension, BP readings at home remain consistent with what is seen in clinic; denies any angina, dyspnea, dizziness, Exercising intermittently. Has been taking medication as prescribed, does not routinely check BP at home. In light of possibly starting a family would like to change medication to ones that are safe in pregnancy. Is also wondering if there is a better alternative than Vyvanse for her ADHD and possible pregnancy. Is in need of a refill but interested in collaboration with pharmacist for options that minimize fetal risk.     Today's PHQ-2 Score:   PHQ-2 Score:     PHQ-2 ( 1999 Pfizer) 11/8/2019 12/14/2018   Q1: Little interest or pleasure in doing things 0 0   Q2: Feeling down, depressed or hopeless 0 0   PHQ-2 Score 0 0     Abuse: Current or Past(Physical, Sexual or Emotional)- No  Do you feel safe in your environment - Yes    Past Medical History:   Diagnosis Date     ADHD      Anxiety      Depressive disorder      Hypertension      Past Surgical History:   Procedure Laterality Date     BIOPSY NAIL (LOCATION)      left hand, 4th digit     Social History      Socioeconomic History     Marital status:      Spouse name: Not on file     Number of children: Not on file     Years of education: Not on file     Highest education level: Not on file   Occupational History     Not on file   Social Needs     Financial resource strain: Not on file     Food insecurity:     Worry: Not on file     Inability: Not on file     Transportation needs:     Medical: Not on file     Non-medical: Not on file   Tobacco Use     Smoking status: Never Smoker     Smokeless tobacco: Never Used   Substance and Sexual Activity     Alcohol use: No     Drug use: No     Sexual activity: Yes     Partners: Male     Birth control/protection: Implant   Lifestyle     Physical activity:     Days per week: Not on file     Minutes per session: Not on file     Stress: Not on file   Relationships     Social connections:     Talks on phone: Not on file     Gets together: Not on file     Attends [employer] service: Not on file     Active member of club or organization: Not on file     Attends meetings of clubs or organizations: Not on file     Relationship status: Not on file     Intimate partner violence:     Fear of current or ex partner: Not on file     Emotionally abused: Not on file     Physically abused: Not on file     Forced sexual activity: Not on file   Other Topics Concern     Not on file   Social History Narrative    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 No                     Reviewed orders with patient.  Reviewed health maintenance and updated orders accordingly -       Current Outpatient Medications   Medication Sig  Dispense Refill     etonogestrel (IMPLANON/NEXPLANON) 68 MG IMPL 1 each by Subdermal route once       hydrochlorothiazide (HYDRODIURIL) 25 MG tablet Take 1 tablet (25 mg) by mouth daily 90 tablet 1     lisdexamfetamine (VYVANSE) 40 MG capsule Take 1 capsule (40 mg) by mouth every morning 30 capsule 0     lisinopril (PRINIVIL/ZESTRIL) 5 MG tablet Take 1 tablet (5 mg) by mouth daily 90 tablet 1     BUPROPION HCL PO Take 100 mg by mouth            Allergies   Allergen Reactions     Nickel       Preventive Health Screenings:  Mammogram: not indicated  Pap: completed today 11/8/19  Colonoscopy: not indicated  HIV: completed today 11/8/19  STI: patient declined  A1c: not indicated  Lung cancer screening:  Lipids: not indicated  HCV: not indicated  Vaccinations:  DEXA: not indicated    ROS:  Constitutional: no fevers, chills, night sweats or unintentional weight change   Eyes: no vision change, diplopia or red eyes   Ears, Nose, Mouth, Throat: no tinnitus or hearing change, no epistaxis or nasal discharge, no oral lesions, throat clear   Cardiovascular: no chest pain, palpitations, or pain with walking, no orthopnea or PND   Respiratory: no dyspnea, cough, shortness of breath or wheezing   GI: no nausea, vomiting, diarrhea or constipation, no abdominal pain   : no change in urine, no dysuria or hematuria, no sexual dysfunction   Musculoskeletal: no joint or muscle pain or swelling   Integumentary: no concerning lesions or moles   Neuro: no loss of strength or sensation, no numbness or tingling, no tremor, no dizziness, no headache, no gait disturbance   Endo: no polyuria or polydipsia, no temperature intolerance   Heme/Lymph: no concerning bumps, no bleeding problems   Allergy: no environmental allergies   Psych: no depression or anxiety, no sleep problems    OBJECTIVE:   /85 (BP Location: Left arm, Patient Position: Chair, Cuff Size: Adult Regular)   Pulse 88   Temp 98.2  F (36.8  C) (Oral)   Resp 16   Ht  "1.575 m (5' 2\")   Wt 68 kg (150 lb)   LMP  (LMP Unknown)   SpO2 98%   BMI 27.44 kg/m    EXAM:  GENERAL: healthy, alert and no distress  EYES: Eyes grossly normal to inspection, PERRL and conjunctivae and sclerae normal  HENT: ear canals and TM's normal, nose and mouth without ulcers or lesions  NECK: no adenopathy, no asymmetry, masses, or scars and thyroid normal to palpation  RESP: lungs clear to auscultation - no rales, rhonchi or wheezes  BREAST: normal without masses, tenderness or nipple discharge and no palpable axillary masses or adenopathy  CV: regular rate and rhythm, normal S1 S2, no S3 or S4, no murmur, click or rub, no peripheral edema and peripheral pulses strong  ABDOMEN: soft, nontender, no hepatosplenomegaly, no masses and bowel sounds normal   (female): normal female external genitalia, normal urethral meatus, vaginal mucosa pink, moist, well rugated, and normal cervix/adnexa/uterus without masses or discharge  MS: no gross musculoskeletal defects noted, no edema  SKIN: no suspicious lesions or rashes  NEURO: Normal strength and tone, mentation intact and speech normal  PSYCH: mentation appears normal, affect normal/bright  LYMPH: no cervical, supraclavicular, axillary, or inguinal adenopathy    ASSESSMENT/PLAN:   1. Encounter for preventive care  *Exercise recommended: goal from American Heart Association is 150 minutes/week (30 minutes 5 days a week)  *Sunscreen to exposed skin when outdoors >15 minutes during spring and summer months  *Use seatbelts while in vehicles  *Use helmets with bicycling/scooter use  *Limit alcohol consumption to less than 7 drinks/week if a woman, 14 if male, and less than five at any one time    2. Screening for HIV (human immunodeficiency virus)  - HIV Antigen Antibody Combo; Future    3. 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  - Quantiferon TB " "Gold Plus; Future    4. Screening for cervical cancer  - HPV High Risk Types DNA Cervical  - Pap imaged thin layer screen with HPV - recommended age 30 - 65 years (select HPV order below)    5. Essential hypertension  Chronic condition: discussed with patient options for hypertension, recommendations from certain OBGYNs and medication to control hypertension in pregnancy. Will proceed with changes of medication to labetalol. Reviewed side effects of medication, risks and benefits, and proper medication administration. Discussed red flag symptoms and when patient should seek immediate medical attention. Gabriele Rodriguez verbalized understanding.   - labetalol (NORMODYNE) 100 MG tablet; Take 1 tablet (100 mg) by mouth 2 times daily  Dispense: 60 tablet; Refill: 3    6. Attention deficit hyperactivity disorder (ADHD), predominantly inattentive type  Chronic condition. Refill printed; will collaborate with pharmacist for guidance on medication during pregnancy and best options for Qi.   - lisdexamfetamine (VYVANSE) 40 MG capsule; Take 1 capsule (40 mg) by mouth every morning  Dispense: 30 capsule; Refill: 0    COUNSELING:     Reviewed preventive health counseling, as reflected in patient instructions  Special attention given to:        Regular exercise       Healthy diet/nutrition       Family planning       HIV screeninx in teen years, 1x in adult years, and at intervals if high risk    Estimated body mass index is Body mass index is 27.44 kg/m . as calculated from the following:    Height as of this encounter: 5' 2\"    Weight as of this encounter: 150 lbs 0 oz    Counseling Resources:  ATP IV Guidelines  Pooled Cohorts Equation Calculator  ICSI Preventive Guidelines  Dietary Guidelines for Americans, 2010  USDA's MyPlate  ASA Prophylaxis  Lung CA Screening    Options for treatment and follow-up care were reviewed with the patient. Gabriele Rodriguez   engaged in the decision making process and verbalized " understanding of the options discussed and agreed with the final plan.    Shana Sharif, APRN CNP  November 12, 2019

## 2019-11-09 ASSESSMENT — ANXIETY QUESTIONNAIRES: GAD7 TOTAL SCORE: 5

## 2019-11-11 RX ORDER — LISDEXAMFETAMINE DIMESYLATE 40 MG/1
40 CAPSULE ORAL EVERY MORNING
Qty: 30 CAPSULE | Refills: 0 | Status: SHIPPED | OUTPATIENT
Start: 2019-11-11 | End: 2019-11-11

## 2019-11-11 RX ORDER — LISDEXAMFETAMINE DIMESYLATE 40 MG/1
40 CAPSULE ORAL EVERY MORNING
Qty: 30 CAPSULE | Refills: 0 | Status: SHIPPED | OUTPATIENT
Start: 2019-11-11 | End: 2019-11-12

## 2019-11-12 LAB
COPATH REPORT: NORMAL
PAP: NORMAL

## 2019-11-12 RX ORDER — LISDEXAMFETAMINE DIMESYLATE 40 MG/1
40 CAPSULE ORAL EVERY MORNING
Qty: 30 CAPSULE | Refills: 0 | Status: SHIPPED | OUTPATIENT
Start: 2019-11-12 | End: 2020-01-02

## 2019-11-12 RX ORDER — LISDEXAMFETAMINE DIMESYLATE 40 MG/1
40 CAPSULE ORAL EVERY MORNING
Qty: 30 CAPSULE | Refills: 0 | Status: SHIPPED | OUTPATIENT
Start: 2019-11-12 | End: 2019-11-12

## 2019-11-13 ENCOUNTER — TELEPHONE (OUTPATIENT)
Dept: FAMILY MEDICINE | Facility: CLINIC | Age: 34
End: 2019-11-13

## 2019-11-13 NOTE — TELEPHONE ENCOUNTER
Called patient to follow up on her question regarding stimulant use during pregnancy.  Left message to return call.    Amaya Davenport, MadisynD

## 2019-11-13 NOTE — TELEPHONE ENCOUNTER
Received call back from patient.  Discussed pros and cons of continuing Vyvanse during a future pregnancy.  Discussed the many unknowns of Vyvanse use during pregnancy and possible risks (including potential risk for  birth, or low birth weight infants).  Noted that some women may decide to decrease dose, not take stimulant during first 12 weeks, or not adjust their stimulant altogether.  Also discussed transfer of stimulants to breast milk and potential strategies around when to take medication relative to breastfeeding.    Lastly, discussed second line options for ADD.  Gabriele noted that she has taken bupropion in the past and this likely increased her anxiety, so likely would not be the best medication for her moving forward.  She did wonder about the safety of atomoxetine in pregnancy.  Noted I will review this and send [employer] message.    Gabriele will consider the information shared today and will get back to this writer with follow up questions.    Amaya Davenport, MadisynD

## 2019-11-14 ENCOUNTER — MYC MEDICAL ADVICE (OUTPATIENT)
Dept: PHARMACY | Facility: CLINIC | Age: 34
End: 2019-11-14

## 2019-11-14 LAB
FINAL DIAGNOSIS: NORMAL
HPV HR 12 DNA CVX QL NAA+PROBE: NEGATIVE
HPV16 DNA SPEC QL NAA+PROBE: NEGATIVE
HPV18 DNA SPEC QL NAA+PROBE: NEGATIVE
SPECIMEN DESCRIPTION: NORMAL
SPECIMEN SOURCE CVX/VAG CYTO: NORMAL

## 2019-11-15 DIAGNOSIS — Z11.1 SCREENING EXAMINATION FOR PULMONARY TUBERCULOSIS: ICD-10-CM

## 2019-11-15 DIAGNOSIS — Z11.4 SCREENING FOR HIV (HUMAN IMMUNODEFICIENCY VIRUS): ICD-10-CM

## 2019-11-18 LAB
GAMMA INTERFERON BACKGROUND BLD IA-ACNC: 0.02 IU/ML
HIV 1+2 AB+HIV1 P24 AG SERPL QL IA: NONREACTIVE
M TB IFN-G BLD-IMP: NEGATIVE
M TB IFN-G CD4+ BCKGRND COR BLD-ACNC: 8.91 IU/ML
MITOGEN IGNF BCKGRD COR BLD-ACNC: 0.01 IU/ML
MITOGEN IGNF BCKGRD COR BLD-ACNC: 0.01 IU/ML

## 2019-12-16 ENCOUNTER — TELEPHONE (OUTPATIENT)
Dept: FAMILY MEDICINE | Facility: CLINIC | Age: 34
End: 2019-12-16

## 2019-12-16 NOTE — TELEPHONE ENCOUNTER
[facility] phone call message- patient requesting a refill:    Full Medication Name: lisdexamfetamine (VYVANSE) 40 MG capsule     Pharmacy confirmed as   [facility] - [place], [place] - [address]  [address] [address]  Phone: [phone] Fax: [phone] Alternate Fax: [phone], [phone]  : Yes        Additional Comments: Patient would like to receive the prescription electronically if possible. If not, patient is wondering if we could mail the prescription to her. I did confirm the address in the chart is correct.     OK to leave a message on voice mail? Yes    Primary language: English      needed? No    Call taken on December 16, 2019 at 3:10 PM by Abby Toscano CMA

## 2020-01-02 ENCOUNTER — MYC REFILL (OUTPATIENT)
Dept: FAMILY MEDICINE | Facility: CLINIC | Age: 35
End: 2020-01-02

## 2020-01-02 DIAGNOSIS — F90.0 ATTENTION DEFICIT HYPERACTIVITY DISORDER (ADHD), PREDOMINANTLY INATTENTIVE TYPE: ICD-10-CM

## 2020-01-02 DIAGNOSIS — I10 ESSENTIAL HYPERTENSION: ICD-10-CM

## 2020-01-02 RX ORDER — LABETALOL 100 MG/1
100 TABLET, FILM COATED ORAL 2 TIMES DAILY
Qty: 60 TABLET | Refills: 3 | Status: SHIPPED | OUTPATIENT
Start: 2020-01-02 | End: 2020-01-13

## 2020-01-02 RX ORDER — LISDEXAMFETAMINE DIMESYLATE 40 MG/1
40 CAPSULE ORAL EVERY MORNING
Qty: 30 CAPSULE | Refills: 0 | Status: SHIPPED | OUTPATIENT
Start: 2020-01-02 | End: 2020-01-03

## 2020-01-03 DIAGNOSIS — F90.0 ATTENTION DEFICIT HYPERACTIVITY DISORDER (ADHD), PREDOMINANTLY INATTENTIVE TYPE: ICD-10-CM

## 2020-01-03 RX ORDER — LISDEXAMFETAMINE DIMESYLATE 40 MG/1
40 CAPSULE ORAL EVERY MORNING
Qty: 30 CAPSULE | Refills: 0 | Status: SHIPPED | OUTPATIENT
Start: 2020-01-03 | End: 2020-01-03

## 2020-01-03 RX ORDER — LISDEXAMFETAMINE DIMESYLATE 40 MG/1
40 CAPSULE ORAL EVERY MORNING
Qty: 30 CAPSULE | Refills: 0 | Status: SHIPPED | OUTPATIENT
Start: 2020-01-03 | End: 2020-01-13

## 2020-01-12 ASSESSMENT — ANXIETY QUESTIONNAIRES
7. FEELING AFRAID AS IF SOMETHING AWFUL MIGHT HAPPEN: NOT AT ALL
3. WORRYING TOO MUCH ABOUT DIFFERENT THINGS: SEVERAL DAYS
5. BEING SO RESTLESS THAT IT IS HARD TO SIT STILL: NOT AT ALL
GAD7 TOTAL SCORE: 2
GAD7 TOTAL SCORE: 2
6. BECOMING EASILY ANNOYED OR IRRITABLE: NOT AT ALL
2. NOT BEING ABLE TO STOP OR CONTROL WORRYING: NOT AT ALL
4. TROUBLE RELAXING: NOT AT ALL
1. FEELING NERVOUS, ANXIOUS, OR ON EDGE: SEVERAL DAYS
7. FEELING AFRAID AS IF SOMETHING AWFUL MIGHT HAPPEN: NOT AT ALL

## 2020-01-13 ENCOUNTER — OFFICE VISIT (OUTPATIENT)
Dept: FAMILY MEDICINE | Facility: CLINIC | Age: 35
End: 2020-01-13
Payer: COMMERCIAL

## 2020-01-13 VITALS
DIASTOLIC BLOOD PRESSURE: 85 MMHG | HEIGHT: 62 IN | OXYGEN SATURATION: 95 % | SYSTOLIC BLOOD PRESSURE: 131 MMHG | HEART RATE: 75 BPM | BODY MASS INDEX: 29.66 KG/M2 | WEIGHT: 161.2 LBS

## 2020-01-13 DIAGNOSIS — I10 ESSENTIAL HYPERTENSION: ICD-10-CM

## 2020-01-13 DIAGNOSIS — F90.0 ATTENTION DEFICIT HYPERACTIVITY DISORDER (ADHD), PREDOMINANTLY INATTENTIVE TYPE: ICD-10-CM

## 2020-01-13 RX ORDER — LABETALOL 200 MG/1
100 TABLET, FILM COATED ORAL 2 TIMES DAILY
Qty: 60 TABLET | Refills: 1 | Status: SHIPPED | OUTPATIENT
Start: 2020-01-13 | End: 2020-01-20

## 2020-01-13 RX ORDER — LISDEXAMFETAMINE DIMESYLATE 40 MG/1
40 CAPSULE ORAL EVERY MORNING
Qty: 30 CAPSULE | Refills: 0 | Status: SHIPPED | OUTPATIENT
Start: 2020-01-13 | End: 2020-03-11

## 2020-01-13 ASSESSMENT — ENCOUNTER SYMPTOMS
DECREASED APPETITE: 0
DEPRESSION: 0
INSOMNIA: 0
FATIGUE: 0
WHEEZING: 0
SHORTNESS OF BREATH: 0
LIGHT-HEADEDNESS: 0
DECREASED CONCENTRATION: 0
FEVER: 0
PANIC: 0
SYNCOPE: 0
TACHYCARDIA: 0
PALPITATIONS: 0
LEG SWELLING: 0
CLAUDICATION: 0
NERVOUS/ANXIOUS: 0
COUGH: 0

## 2020-01-13 ASSESSMENT — ANXIETY QUESTIONNAIRES: GAD7 TOTAL SCORE: 2

## 2020-01-13 ASSESSMENT — MIFFLIN-ST. JEOR: SCORE: 1394.45

## 2020-01-13 ASSESSMENT — PAIN SCALES - GENERAL: PAINLEVEL: NO PAIN (0)

## 2020-01-13 NOTE — NURSING NOTE
"34 year old  Chief Complaint   Patient presents with     Hypertension     Pt is here to discuss hypertension medication.        Blood pressure 131/85, pulse 75, height 1.575 m (5' 2\"), weight 73.1 kg (161 lb 3.2 oz), SpO2 95 %, not currently breastfeeding. Body mass index is 29.48 kg/m .  BP completed using cuff size:      Lisa Dia, TOM  January 13, 2020 7:36 AM  "

## 2020-01-13 NOTE — PROGRESS NOTES
"       HPI       Gabriele Rodriguez is a 34 year old woman who presents for follow up of concern(s) listed below:.  Chief Complaint   Patient presents with     Hypertension     Pt is here to discuss hypertension medication.      Qi presents to clinic today to discuss control of hypertension with recent medication change to labetolol. Has phan checking blood pressures at home, remain in 130s/80s. Denies any dizziness, lightheadedness, syncope, angina, dyspnea. Denies any fatigue. Not currently checking resting heart rate at home. Is exercising regularly and continuing to limit sodium in her diet.     Is interested in having Nexplanon removal, was due to be removed roughly 6 months ago. Feels she and her  are in a \"good place\" and ready to start a family. In part this is why she has switched to labetolol for her blood pressure control.     Feels as though ADHD remains well controlled with Vyvanse.     Problem, Medication and Allergy Lists were       Current Outpatient Medications   Medication Sig Dispense Refill     etonogestrel (IMPLANON/NEXPLANON) 68 MG IMPL 1 each by Subdermal route once       labetalol (NORMODYNE) 200 MG tablet Take 0.5 tablets (100 mg) by mouth 2 times daily 60 tablet 1     lisdexamfetamine (VYVANSE) 40 MG capsule Take 1 capsule (40 mg) by mouth every morning 30 capsule 0         Allergies   Allergen Reactions     Nickel    .    Patient is an established patient of this clinic..           Review of Systems:   Review of Systems     Constitutional:  Negative for fever, fatigue, decreased appetite and confused.   Respiratory:   Negative for cough, shortness of breath and wheezing.    Cardiovascular:  Negative for chest pain, palpitations, claudication, leg swelling, syncope, tachycardia, light-headedness and edema.   Neurological:  Negative for light-headedness.   Psychiatric/Behavioral:  Negative for depression, decreased concentration, mood swings and panic attacks.    All other systems " "reviewed and are negative.              Physical Exam:     Vitals:    01/13/20 0732   BP: 131/85   Pulse: 75   SpO2: 95%   Weight: 73.1 kg (161 lb 3.2 oz)   Height: 1.575 m (5' 2\")     Body mass index is 29.48 kg/m .  Vitals were reviewed and were normal     Physical Exam  Vitals signs and nursing note reviewed.   Constitutional:       General: She is not in acute distress.     Appearance: Normal appearance. She is not ill-appearing, toxic-appearing or diaphoretic.   HENT:      Head: Normocephalic and atraumatic.      Nose: Nose normal. No congestion or rhinorrhea.      Mouth/Throat:      Mouth: Mucous membranes are moist.      Pharynx: Oropharynx is clear. No oropharyngeal exudate or posterior oropharyngeal erythema.   Eyes:      General:         Right eye: No discharge.         Left eye: No discharge.      Conjunctiva/sclera: Conjunctivae normal.   Cardiovascular:      Rate and Rhythm: Normal rate and regular rhythm.      Pulses: Normal pulses.      Heart sounds: Normal heart sounds. No murmur. No friction rub. No gallop.    Pulmonary:      Effort: Pulmonary effort is normal. No respiratory distress.      Breath sounds: Normal breath sounds. No stridor. No wheezing, rhonchi or rales.   Chest:      Chest wall: No tenderness.   Musculoskeletal:         General: No edema.   Skin:     General: Skin is warm and dry.      Coloration: Skin is not jaundiced or pale.      Findings: No bruising, erythema, lesion or rash.   Neurological:      General: No focal deficit present.      Mental Status: She is alert. Mental status is at baseline.      Motor: No weakness.      Gait: Gait normal.   Psychiatric:         Mood and Affect: Mood normal.         Thought Content: Thought content normal.            Results:     No labs drawn at today's visit    Assessment and Plan     1. Attention deficit hyperactivity disorder (ADHD), predominantly inattentive type  - lisdexamfetamine (VYVANSE) 40 MG capsule; Take 1 capsule (40 mg) by mouth " every morning  Dispense: 30 capsule; Refill: 0    2. Essential hypertension  Blood pressure remains higher than desired goal of less than 130/80mmHg. Will increase labetalol to 200mg BID as Qi is tolerating well.   - labetalol (NORMODYNE) 200 MG tablet; Take 1 tablets (200 mg) by mouth 2 times daily  Dispense: 60 tablet; Refill: 1    There are no discontinued medications.    Options for treatment and follow-up care were reviewed with the patient. Gabriele Rodriguez  engaged in the decision making process and verbalized understanding of the options discussed and agreed with the final plan.    DORA Chaidez CNP            Answers for HPI/ROS submitted by the patient on 1/12/2020   ALVIN 7 TOTAL SCORE: 2

## 2020-01-13 NOTE — PATIENT INSTRUCTIONS
Ekta Corrigan can remove your Nexplanon at the [address] location. She works Tuesdays only.         [facility] Medication Refill Request Information:  * Please contact your pharmacy regarding ANY request for medication refills.  ** NP Clinic Prescription Fax = [phone]  * Please allow 3 business days for routine medication refills.  * Please allow 5 business days for controlled substance medication refills.     [facility] Test Result notification information:  *You will be notified with in 7-10 days of your appointment day regarding the results of your test.  If you are on MyChart you will be notified as soon as the provider has reviewed the results and signed off on them.    [facility]: [phone]

## 2020-01-13 NOTE — PROGRESS NOTES
"       HPI       Gabriele Rodriguez is a 34 year old {woman/man etc:593598} who presents {for follow up/new concern:362757}:.  Chief Complaint   Patient presents with     Hypertension     Pt is here to discuss hypertension medication.        {Superlists :187240:x}    {:401637}   +++++++  {Additional Concerns  (FM):835331}    Problem, Medication and Allergy Lists were {Reviewed Lists:370476}.    Patient is {New/Established:290315::\"an established patient of this clinic.\"}.           Review of Systems:   ROS            Physical Exam:     Vitals:    01/13/20 0732   BP: 131/85   Pulse: 75   SpO2: 95%   Weight: 73.1 kg (161 lb 3.2 oz)   Height: 1.575 m (5' 2\")     Body mass index is 29.48 kg/m .  {SMI VITALS OPTIONS:299757::\"Vitals were reviewed and were normal\"}     Physical Exam   { Ortho/Psych  exam:920175}    Results:     {Result Choices:222159}    Assessment and Plan     {Diagnosis Pick List UMP :799884}  {Assessment and Plan:448683}    There are no discontinued medications.    Options for treatment and follow-up care were reviewed with the patient. Gabriele Rodriguez  engaged in the decision making process and verbalized understanding of the options discussed and agreed with the final plan.    DORA Chaidez CNP            "

## 2020-01-20 ENCOUNTER — TELEPHONE (OUTPATIENT)
Dept: FAMILY MEDICINE | Facility: CLINIC | Age: 35
End: 2020-01-20

## 2020-01-20 RX ORDER — LABETALOL 200 MG/1
200 TABLET, FILM COATED ORAL 2 TIMES DAILY
Qty: 60 TABLET | Refills: 1 | Status: SHIPPED | OUTPATIENT
Start: 2020-01-20 | End: 2020-03-11

## 2020-01-20 NOTE — TELEPHONE ENCOUNTER
Qi returned call to clinic, informed her prescription from office visit last week for Labetalol had been for 200mg 1/2 tab BID, not 1 tab daily. Qi stated she had not picked up prescription yet and had not started new dose yet.   Apologized for error, confirmed correct dose of 200mg BID. Qi stated she would get medication next week and start new dose then.   Shana Sharif, APRN CNP  January 20, 2020

## 2020-02-25 ENCOUNTER — OFFICE VISIT (OUTPATIENT)
Dept: FAMILY MEDICINE | Facility: CLINIC | Age: 35
End: 2020-02-25
Payer: COMMERCIAL

## 2020-02-25 VITALS
SYSTOLIC BLOOD PRESSURE: 136 MMHG | HEIGHT: 63 IN | HEART RATE: 77 BPM | BODY MASS INDEX: 28.88 KG/M2 | DIASTOLIC BLOOD PRESSURE: 85 MMHG | OXYGEN SATURATION: 99 % | WEIGHT: 163 LBS | RESPIRATION RATE: 16 BRPM | TEMPERATURE: 99.4 F

## 2020-02-25 DIAGNOSIS — Z30.46 ENCOUNTER FOR NEXPLANON REMOVAL: Primary | ICD-10-CM

## 2020-02-25 ASSESSMENT — MIFFLIN-ST. JEOR: SCORE: 1415.31

## 2020-02-25 NOTE — NURSING NOTE
"34 year old  Chief Complaint   Patient presents with     Consult     Pt. presents to the clinic today to have her nexplanon removed.        Blood pressure 136/85, pulse 77, temperature 99.4  F (37.4  C), temperature source Oral, resp. rate 16, height 1.595 m (5' 2.8\"), weight 73.9 kg (163 lb), SpO2 99 %, not currently breastfeeding. Body mass index is 29.06 kg/m .  BP completed using cuff size:    Patient Active Problem List   Diagnosis     Anxiety     ADHD (attention deficit hyperactivity disorder), inattentive type     Benign essential hypertension       Wt Readings from Last 2 Encounters:   02/25/20 73.9 kg (163 lb)   01/13/20 73.1 kg (161 lb 3.2 oz)     BP Readings from Last 3 Encounters:   02/25/20 136/85   01/13/20 131/85   11/08/19 125/85       Allergies   Allergen Reactions     Nickel        Current Outpatient Medications   Medication     labetalol (NORMODYNE) 200 MG tablet     lisdexamfetamine (VYVANSE) 40 MG capsule     etonogestrel (IMPLANON/NEXPLANON) 68 MG IMPL     Current Facility-Administered Medications   Medication     erythromycin (ROMYCIN) ophthalmic ointment       Social History     Tobacco Use     Smoking status: Never Smoker     Smokeless tobacco: Never Used   Substance Use Topics     Alcohol use: No     Drug use: No       Honoring Choices - Health Care Directive Guide offered to patient at time of visit.    Health Maintenance Due   Topic Date Due     DTAP/TDAP/TD IMMUNIZATION (1 - Tdap) 07/24/1998     PHQ-2  01/01/2020       Immunization History   Administered Date(s) Administered     Influenza Vaccine IM > 6 months Valent IIV4 09/24/2019       Lab Results   Component Value Date    PAP NIL 11/08/2019         Recent Labs   Lab Test 07/23/19  1509 03/12/19  1627 09/14/18  1609   ALT 22  --  18   CR 0.83  --  0.90   GFRESTIMATED >90  --  73   GFRESTBLACK >90  --  89   ALBUMIN 4.5  --  4.1   POTASSIUM 3.6  --  3.8   TSH  --  2.78  --        PHQ-2 ( 1999 Pfizer) 11/8/2019 12/14/2018   Q1: Little " interest or pleasure in doing things 0 0   Q2: Feeling down, depressed or hopeless 0 0   PHQ-2 Score 0 0       PHQ-9 SCORE 9/14/2018 3/12/2019 11/8/2019   PHQ-9 Total Score 2 2 0       ALVIN-7 SCORE 3/12/2019 11/8/2019 1/12/2020   Total Score - - 2 (minimal anxiety)   Total Score 4 5 2       No flowsheet data found.    Grace Aviles CMA  February 25, 2020 3:43 PM

## 2020-02-25 NOTE — PATIENT INSTRUCTIONS
Post Nexplanon removal     After removing the implant, close the incision with a sterile adhesive wound closure.   Apply a pressure bandage with sterile gauze to minimize bruising. You may  remove the pressure bandage in 24 hours and the sterile adhesive wound closure in 3 to 5 days.  Watch for redness, drainage, fever, increased tenderness (signs of infection)  Return to clinic if not healing, numbness or tingling.   You can expect a rapid return of your fertility after having Nexplanon removed--usually within one month. Also, the amount of time that you used Nexplanon does not affect how quickly fertility returns.

## 2020-02-25 NOTE — PROGRESS NOTES
TOD       Gabriele Rodriguez is a 34 year old  who presents for   Chief Complaint   Patient presents with     Consult     Pt. presents to the clinic today to have her nexplanon removed.      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  It has been in place for 3 1/2 year.  She denies any concerns or difficulties. She is desiring pregnancy after removal.   Good health overall.     Problem, Medication and Allergy Lists were   reviewed and updated if needed.     Patient Active Problem List    Diagnosis Date Noted     Anxiety 08/10/2018     Priority: Medium     ADHD (attention deficit hyperactivity disorder), inattentive type 08/10/2018     Priority: Medium     Benign essential hypertension 08/10/2018     Priority: Medium         Current Outpatient Medications   Medication Sig Dispense Refill     labetalol (NORMODYNE) 200 MG tablet Take 1 tablet (200 mg) by mouth 2 times daily 60 tablet 1     lisdexamfetamine (VYVANSE) 40 MG capsule Take 1 capsule (40 mg) by mouth every morning 30 capsule 0         Allergies   Allergen Reactions     Nickel    .    Patient is   an established patient of this clinic.  Past Medical History:   Diagnosis Date     ADHD      Anxiety      Depressive disorder      Hypertension      Family History   Problem Relation Age of Onset     Hypertension Mother      Breast Cancer Mother         ductal stage 2, HER-2 negative, (+) estrogen/progesterone     No Known Problems Father      No Known Problems Sister      Pancreatic Cancer Maternal Grandfather      Brain Cancer Paternal Aunt      Social History     Socioeconomic History     Marital status:      Spouse name: None     Number of children: None     Years of education: None     Highest education level: None   Occupational History     Occupation: Medical resident   Social Needs     Financial resource strain: None     Food insecurity:     Worry: None     Inability: None      "Transportation needs:     Medical: None     Non-medical: None   Tobacco Use     Smoking status: Never Smoker     Smokeless tobacco: Never Used   Substance and Sexual Activity     Alcohol use: No     Drug use: No     Sexual activity: Yes     Partners: Male     Birth control/protection: Implant   Lifestyle     Physical activity:     Days per week: None     Minutes per session: None     Stress: None   Relationships     Social connections:     Talks on phone: None     Gets together: None     Attends [employer] service: None     Active member of club or organization: None     Attends meetings of clubs or organizations: None     Relationship status: None     Intimate partner violence:     Fear of current or ex partner: None     Emotionally abused: None     Physically abused: None     Forced sexual activity: None   Other Topics Concern     None   Social History Narrative    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         Review of Systems:   Review of Systems  GEN: Good health  : no concerns       Physical Exam:     Vitals:    20 1541   BP: 136/85   BP Location: Left arm   Patient Position: Chair   Cuff Size: Adult Regular   Pulse: 77   Resp: 16   Temp: 99.4  F (37.4  C)   TempSrc: Oral   SpO2: 99%   Weight: 73.9 kg (163 lb)   Height: 1.595 m (5' 2.8\")     Body mass index is 29.06 kg/m .  Vital signs normal except BP slightly elevated and T 99.4 late afternoon.      Physical Exam  GEN: alert female in no acute distress. Appears slightly anxious.   Procedure Note  An informed consent was taken prior to removal and is to be scanned into the Electronic Health Record.  Risks of the procedure include: bleeding, infection, difficulty with removal, scarring and nerve damage.  There may be bruising at the site of incision and down the arm.    Procedure Note:  Time out taken: 4:10pm 2020  Patient name and  confirmed: Yes  Procedure: Nexplanon Implant Removal left upper arm  Procedure confirmed by patient and team Yes  Side: " Left  Position palpated prior to removal: mid sulcus left arm 8-10 cm above elbow.  Equipment for procedure available (YES/NO)  The patient is place in the supine position. Asceptic conditions are maintained. The alessandra is located by  palpation. The area is cleaned with antiseptic. (1.5) cc of 1% lidocaine  is injected  just underneath the end of the implant closest to the elbow. After firmly pressing down on the end of the  implant closer to the axilla a 2-3 mm incision is made with a scalpel, #11 blade. The alessandra is pushed to the incision siteand grasped with a mosquito forceps and gently removed. Blunt dissection was needed.  The patient did tolerate the procedure well. The 4 cm alessandra was removed in its entirety. The  incision was dressed with a small adhesive bandage closure and a pressure dressing was applied.  An alternate plan for contraception was discussed. The patient desires pregnancy. Discussed return of fertility common within 1 month.    Results:   No testing ordered today    Assessment and Plan        1. Encounter for Nexplanon removal  Nexplanon removed from left upper arm as above. No complications noted; tolerated well Slight dissection required. Nexplanon intact.   Provided instructions on wound care, warning signs.    RTC if experiences problems.      Holly Gonzalez RN, N FNP student observed the procedure.   DORA Caldera CNP  02/25/20  4:25 PM         There are no discontinued medications.    Options for treatment and follow-up care were reviewed with the patient. Gabriele Rodriguez  engaged in the decision making process and verbalized understanding of the options discussed and agreed with the final plan.    DORA Caldera CNP

## 2020-03-11 ENCOUNTER — OFFICE VISIT (OUTPATIENT)
Dept: FAMILY MEDICINE | Facility: CLINIC | Age: 35
End: 2020-03-11
Payer: COMMERCIAL

## 2020-03-11 VITALS
DIASTOLIC BLOOD PRESSURE: 88 MMHG | HEART RATE: 73 BPM | SYSTOLIC BLOOD PRESSURE: 142 MMHG | WEIGHT: 169.2 LBS | BODY MASS INDEX: 30.16 KG/M2 | OXYGEN SATURATION: 99 %

## 2020-03-11 DIAGNOSIS — F90.0 ATTENTION DEFICIT HYPERACTIVITY DISORDER (ADHD), PREDOMINANTLY INATTENTIVE TYPE: ICD-10-CM

## 2020-03-11 DIAGNOSIS — I10 ESSENTIAL HYPERTENSION: Primary | ICD-10-CM

## 2020-03-11 DIAGNOSIS — J01.11 ACUTE RECURRENT FRONTAL SINUSITIS: ICD-10-CM

## 2020-03-11 RX ORDER — LISDEXAMFETAMINE DIMESYLATE 40 MG/1
40 CAPSULE ORAL EVERY MORNING
Qty: 30 CAPSULE | Refills: 0 | Status: SHIPPED | OUTPATIENT
Start: 2020-05-06 | End: 2020-04-13 | Stop reason: DRUGHIGH

## 2020-03-11 RX ORDER — LABETALOL 200 MG/1
200 TABLET, FILM COATED ORAL 2 TIMES DAILY
Qty: 60 TABLET | Refills: 3 | Status: SHIPPED | OUTPATIENT
Start: 2020-03-11 | End: 2020-04-13

## 2020-03-11 RX ORDER — LISDEXAMFETAMINE DIMESYLATE 40 MG/1
40 CAPSULE ORAL EVERY MORNING
Qty: 30 CAPSULE | Refills: 0 | Status: SHIPPED | OUTPATIENT
Start: 2020-03-11 | End: 2020-03-11

## 2020-03-11 RX ORDER — LISDEXAMFETAMINE DIMESYLATE 40 MG/1
40 CAPSULE ORAL EVERY MORNING
Qty: 30 CAPSULE | Refills: 0 | Status: SHIPPED | OUTPATIENT
Start: 2020-04-08 | End: 2020-03-11

## 2020-03-11 ASSESSMENT — ANXIETY QUESTIONNAIRES
5. BEING SO RESTLESS THAT IT IS HARD TO SIT STILL: NOT AT ALL
4. TROUBLE RELAXING: NOT AT ALL
1. FEELING NERVOUS, ANXIOUS, OR ON EDGE: SEVERAL DAYS
GAD7 TOTAL SCORE: 3
2. NOT BEING ABLE TO STOP OR CONTROL WORRYING: SEVERAL DAYS
7. FEELING AFRAID AS IF SOMETHING AWFUL MIGHT HAPPEN: NOT AT ALL
7. FEELING AFRAID AS IF SOMETHING AWFUL MIGHT HAPPEN: NOT AT ALL
6. BECOMING EASILY ANNOYED OR IRRITABLE: NOT AT ALL
3. WORRYING TOO MUCH ABOUT DIFFERENT THINGS: SEVERAL DAYS
GAD7 TOTAL SCORE: 3

## 2020-03-11 ASSESSMENT — PAIN SCALES - GENERAL: PAINLEVEL: NO PAIN (0)

## 2020-03-11 NOTE — NURSING NOTE
34 year old  Chief Complaint   Patient presents with     Establish Care     Pt is here to establish care.     Sinus Problem     Pt has sinus congestion.       Blood pressure (!) 142/88, pulse 73, weight 76.7 kg (169 lb 3.2 oz), SpO2 99 %, not currently breastfeeding. Body mass index is 30.16 kg/m .  BP completed using cuff size:      Lisa Dia, TOM  March 11, 2020 5:32 PM

## 2020-03-11 NOTE — PATIENT INSTRUCTIONS

## 2020-03-11 NOTE — PROGRESS NOTES
TOD       Gabriele Rodriguez is a 34 year old woman who presents as a new patient to me. 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 She has been had sinus symptoms for the past 10 days, they are not improving. She would like to discuss treatment for this. She also has ADHD and needs to have refills of her Vyvanse.    Chief Complaint   Patient presents with     Establish Care     Pt is here to establish care.     Sinus Problem     Pt has sinus congestion.     Problem, Medication and Allergy Lists were reviewed and updated if needed.    Patient is an established patient of this clinic.         Review of Systems:   Review of Systems     Constitutional:  Positive for fatigue. Negative for fever and chills.   HENT:  Positive for sinus pain.    Respiratory:   Negative for cough.    Neurological:  Positive for headaches.               Physical Exam:     Vitals:    03/11/20 1729   BP: (!) 142/88   Pulse: 73   SpO2: 99%   Weight: 76.7 kg (169 lb 3.2 oz)     Body mass index is 30.16 kg/m .  Vitals were reviewed and were normal     Physical Exam  Constitutional:       Appearance: Normal appearance.   HENT:      Head: Normocephalic.      Right Ear: Hearing, tympanic membrane, ear canal and external ear normal.      Left Ear: Hearing, tympanic membrane, ear canal and external ear normal.      Nose:      Right Sinus: Maxillary sinus tenderness and frontal sinus tenderness present.      Left Sinus: Maxillary sinus tenderness and frontal sinus tenderness present.      Mouth/Throat:      Lips: Pink.      Mouth: Mucous membranes are moist.      Pharynx: Oropharynx is clear.   Cardiovascular:      Rate and Rhythm: Normal rate and regular rhythm.      Pulses: Normal pulses.      Heart sounds: Normal heart sounds.   Pulmonary:      Effort: Pulmonary effort is normal.      Breath sounds: Normal breath sounds.   Musculoskeletal: Normal range of motion.   Skin:     General: Skin is warm and dry.   Neurological:      General:  No focal deficit present.      Mental Status: She is alert and oriented to person, place, and time.   Psychiatric:         Mood and Affect: Mood normal.         Behavior: Behavior normal.           Results:     No diagnostics ordered today.     Assessment and Plan     1. Essential hypertension    - labetalol (NORMODYNE) 200 MG tablet; Take 1 tablet (200 mg) by mouth 2 times daily  Dispense: 60 tablet; Refill: 3    2. Attention deficit hyperactivity disorder (ADHD), predominantly inattentive type    - lisdexamfetamine (VYVANSE) 40 MG capsule; Take 1 capsule (40 mg) by mouth every morning  Dispense: 30 capsule; Refill: 0    3. Acute recurrent frontal sinusitis    - OTOLARYNGOLOGY REFERRAL  - amoxicillin-clavulanate (AUGMENTIN) 875-125 MG tablet; Take 1 tablet by mouth 2 times daily for 7 days  Dispense: 14 tablet; Refill: 0  Total time spent 25 minutes.  More than 50% of the time spent with patient on counseling / coordinating her care. First, please continue on the labetalol for your hypertension. Next, continue on your Vyvanse. Next, start Augmentin for your likely sinuitis. Finally, please return every 3 months for follow up on you ADHD. Options for treatment and follow-up care were reviewed with the patient. Gabriele Rodriguez  engaged in the decision making process and verbalized understanding of the options discussed and agreed with the final plan.  Kelsey Gonzalez, APRN, CNP

## 2020-03-11 NOTE — LETTER
Controlled Medication Agreement for Stimulant Medication    [facility]  -- Controlled Medication Agreement    3/11/2020   Gabriele Rodriguez   1985   [id number]       I understand that my provider is prescribing controlled medications to assist me in managing my ADHD.  The risks, benefits, and side effects of these medications have been explained to me and I agree to the following conditions for this type of treatment.    Stimulant Medication Prescribed: Vyvanse 40 mg daily.     1.  I will take my medications exactly as prescribed and will not change the medication dosage or schedule without my provider's approval.  Refills will not be given if I  runs out early.     2.  I will keep all regular appointments at this clinic.  If there are three or more missed appointments or appointments canceled less than 2 hours before the scheduled time, my medication may be discontinued.    3.  I understand that prescriptions may only be written for one month at a time, and a written prescription is required each month.  Prescriptions cannot be called in or faxed to the pharmacy.    4.  If the prescription is lost or stolen, replacement is at the discretion of my provider.  I understand that this may mean the prescription might not be replaced.    5.  If I am late for scheduled follow up, I understand that I must make an appointment and that another refill is at the discretion of my provider.  This may mean a prescription for only the amount required until the appointment, regardless of prescription co-pay.  For example, if an appointment is made in 1 week, a prescription might only be written for 7 pills.      I understand that if I violate any of the above conditions, my prescription medications and/or treatment may be terminated.  If the violation includes providing controlled substances to anyone other than to whom the medication is prescribed, a report may be made to my child's physician, pharmacy, and other  authorities, including the police.    I have read this contract and it has been explained to me.  I fully understand the consequences of violating this agreement.    _________________________________/______________/____________________________    Patient signature/Date/Witness

## 2020-03-12 ASSESSMENT — ENCOUNTER SYMPTOMS
CHILLS: 0
FEVER: 0
HEADACHES: 1
COUGH: 0
FATIGUE: 1
SINUS PAIN: 1

## 2020-03-12 ASSESSMENT — ANXIETY QUESTIONNAIRES: GAD7 TOTAL SCORE: 3

## 2020-03-12 NOTE — TELEPHONE ENCOUNTER
FUTURE VISIT INFORMATION      FUTURE VISIT INFORMATION:    Date: 4/6/20    Time: 4:15 PM    [place] [facility]  REFERRAL INFORMATION:    Referring provider:  Kelsey Gonzalez NP    Referring providers clinic:  [facility]    Reason for visit/diagnosis: Acute Recurrent Frontal Sinusitis    RECORDS REQUESTED FROM:       Clinic name Comments Records Status Imaging Status   [facility] with Kelsey Gonzalez NP  1/7/19 - OV with Shana Sharif NP Epic

## 2020-04-02 ENCOUNTER — TELEPHONE (OUTPATIENT)
Dept: OTOLARYNGOLOGY | Facility: CLINIC | Age: 35
End: 2020-04-02

## 2020-04-02 ENCOUNTER — MYC MEDICAL ADVICE (OUTPATIENT)
Dept: OTOLARYNGOLOGY | Facility: CLINIC | Age: 35
End: 2020-04-02

## 2020-04-02 NOTE — TELEPHONE ENCOUNTER
Spoke with patient regarding virtual visit with Dr. Goode on 4/6/20. Confirmed patient's email address. Also verified patient's appointment time, and informed him/her that we would be calling again 10-15 minutes prior to the appointment. Patient expressed understanding. Will send [facility] message with virtual visit information.    Patient email: [email or web address]  Appointment date: 4/6/20  Appointment time: 3:00pm  MyChart active? YES, [facility] message sent

## 2020-04-06 ENCOUNTER — PRE VISIT (OUTPATIENT)
Dept: OTOLARYNGOLOGY | Facility: CLINIC | Age: 35
End: 2020-04-06

## 2020-04-06 ENCOUNTER — VIRTUAL VISIT (OUTPATIENT)
Dept: OTOLARYNGOLOGY | Facility: CLINIC | Age: 35
End: 2020-04-06
Attending: NURSE PRACTITIONER
Payer: COMMERCIAL

## 2020-04-06 VITALS — WEIGHT: 169 LBS | BODY MASS INDEX: 29.95 KG/M2 | HEIGHT: 63 IN

## 2020-04-06 DIAGNOSIS — J01.91 ACUTE RECURRENT SINUSITIS, UNSPECIFIED LOCATION: Primary | ICD-10-CM

## 2020-04-06 ASSESSMENT — PAIN SCALES - GENERAL: PAINLEVEL: NO PAIN (0)

## 2020-04-06 ASSESSMENT — MIFFLIN-ST. JEOR: SCORE: 1445.71

## 2020-04-06 NOTE — NURSING NOTE
Chief Complaint   Patient presents with     Consult     recurrent frontal sinusitis.going on for several years.steroids helped at first, but didnt seem to help after.using flonase.had sinus infection and tx with antibiotics.finished about  2 weeks ago.     Juany Rico LPN

## 2020-04-13 ENCOUNTER — VIRTUAL VISIT (OUTPATIENT)
Dept: FAMILY MEDICINE | Facility: CLINIC | Age: 35
End: 2020-04-13
Payer: COMMERCIAL

## 2020-04-13 DIAGNOSIS — F90.0 ATTENTION DEFICIT HYPERACTIVITY DISORDER (ADHD), PREDOMINANTLY INATTENTIVE TYPE: ICD-10-CM

## 2020-04-13 DIAGNOSIS — Z32.01 PREGNANCY TEST POSITIVE: Primary | ICD-10-CM

## 2020-04-13 DIAGNOSIS — I10 ESSENTIAL HYPERTENSION: ICD-10-CM

## 2020-04-13 RX ORDER — LABETALOL 200 MG/1
200 TABLET, FILM COATED ORAL 2 TIMES DAILY
Qty: 60 TABLET | Refills: 3 | Status: SHIPPED | OUTPATIENT
Start: 2020-04-13 | End: 2020-06-16

## 2020-04-13 RX ORDER — PRENATAL VIT/IRON FUM/FOLIC AC 27MG-0.8MG
1 TABLET ORAL DAILY
Qty: 100 TABLET | Refills: 3 | Status: SHIPPED | OUTPATIENT
Start: 2020-04-13 | End: 2020-08-17

## 2020-04-13 RX ORDER — LISDEXAMFETAMINE DIMESYLATE 30 MG/1
30 CAPSULE ORAL EVERY MORNING
Qty: 30 CAPSULE | Refills: 0 | Status: SHIPPED | OUTPATIENT
Start: 2020-04-13 | End: 2020-06-09

## 2020-04-13 NOTE — PROGRESS NOTES
"Gabriele Rodriguez is a 34 year old female who is being evaluated via a billable video visit.      The patient has been notified of following:     \"This video visit will be conducted via a call between you and your physician/provider. We have found that certain health care needs can be provided without the need for an in-person physical exam.  This service lets us provide the care you need with a video conversation.  If a prescription is necessary we can send it directly to your pharmacy.  If lab work is needed we can place an order for that and you can then stop by our lab to have the test done at a later time.    Video visits are billed at different rates depending on your insurance coverage.  Please reach out to your insurance provider with any questions.    If during the course of the call the physician/provider feels a video visit is not appropriate, you will not be charged for this service.\"    Patient has given verbal consent for Video visit? Yes    Patient would like the video invitation sent by: Text to cell phone: [phone]      Subjective     Gabriele Rodriguez is a 34 year old female who presents to clinic today for the following health issues: She took a pregnancy test and learned that it was positive. Her last period was March 2, estimated date of delivery is December 9th, 2020. Qi needs a prenatal vitamin to take daily. She also needs her blood pressure medication refilled as well as her Vyvanse filled. She is going to try and not take her Vyvanse while pregnant. However, the last time she stopped taking this, she did not do well. She became very sad because she was unable to complete tasks related to school.   Video Start Time: 1405  Reviewed and updated as needed this visit by Provider    Review of Systems   ROS COMP: Constitutional, HEENT, cardiovascular, pulmonary, gi and gu systems are negative, except as otherwise noted.      Objective    There were no vitals taken for this visit.  Estimated body " "mass index is 29.94 kg/m  as calculated from the following:    Height as of 4/6/20: 1.6 m (5' 3\").    Weight as of 4/6/20: 76.7 kg (169 lb).  Physical Exam   GENERAL: healthy, alert and no distress    Assessment & Plan     1. Pregnancy test positive    - Prenatal Vit-Fe Fumarate-FA (PRENATAL MULTIVITAMIN W/IRON) 27-0.8 MG tablet; Take 1 tablet by mouth daily  Dispense: 100 tablet; Refill: 3    2. Essential hypertension    - labetalol (NORMODYNE) 200 MG tablet; Take 1 tablet (200 mg) by mouth 2 times daily  Dispense: 60 tablet; Refill: 3    3. Attention deficit hyperactivity disorder (ADHD), predominantly inattentive type    - lisdexamfetamine (VYVANSE) 30 MG capsule; Take 1 capsule (30 mg) by mouth every morning  Dispense: 30 capsule; Refill: 0     BMI:   Estimated body mass index is 29.94 kg/m  as calculated from the following:    Height as of 4/6/20: 1.6 m (5' 3\").    Weight as of 4/6/20: 76.7 kg (169 lb).     MEDICATIONS:  Continue current medications without change  *Check in with OB at first visit regarding safety of continuing Vyvanse and Labetalol.   Return if symptoms worsen or fail to improve.    Kelsey Gonzalez NP   [facility]      Video-Visit Details    Type of service:  Video Visit    Video End Time (time video stopped): 1425    Originating Location (pt. Location): Home    Distant Location (provider location):  Home    Mode of Communication:  Video Conference via [employer]    Return if symptoms worsen or fail to improve.   I spent a total of 25 minutes face-to-face with Qi. Over 50% of this time was spent counseling the patient and/or coordinating care regarding their care.  See note for details. First, I did decrease the dose of Vyvanse she has been taking. Next, she will check in with her OB about continuing the Vyvanse. Next, she will message me with whom she would like to see in OB. Finally, she will return for follow up as needed or for refills of Vyvanse. She verbalizes " understanding of the plan.   DORA Parry, CNP

## 2020-04-14 NOTE — PATIENT INSTRUCTIONS

## 2020-04-15 NOTE — PATIENT INSTRUCTIONS
Thank You for choosing [facility]. You were seen in the ENT Clinic today.     has recommended the followin. CT scan, to be done post COVID.  2. Continue nasal steroids.  3. Follow up with  will be based on CT results when that is completed.      If you have any questions or concerns after your appointment, please  Call [phone].

## 2020-05-13 ENCOUNTER — ANCILLARY PROCEDURE (OUTPATIENT)
Dept: ULTRASOUND IMAGING | Facility: CLINIC | Age: 35
End: 2020-05-13
Attending: ADVANCED PRACTICE MIDWIFE
Payer: COMMERCIAL

## 2020-05-13 ENCOUNTER — OFFICE VISIT (OUTPATIENT)
Dept: OBGYN | Facility: CLINIC | Age: 35
End: 2020-05-13
Attending: ADVANCED PRACTICE MIDWIFE
Payer: COMMERCIAL

## 2020-05-13 VITALS
SYSTOLIC BLOOD PRESSURE: 138 MMHG | WEIGHT: 168.3 LBS | HEART RATE: 80 BPM | BODY MASS INDEX: 29.82 KG/M2 | DIASTOLIC BLOOD PRESSURE: 88 MMHG | HEIGHT: 63 IN

## 2020-05-13 DIAGNOSIS — O10.919 CHRONIC HYPERTENSION AFFECTING PREGNANCY: ICD-10-CM

## 2020-05-13 DIAGNOSIS — J32.0 CHRONIC MAXILLARY SINUSITIS: ICD-10-CM

## 2020-05-13 DIAGNOSIS — Z34.91 ENCOUNTER FOR SUPERVISION OF NORMAL PREGNANCY IN FIRST TRIMESTER, UNSPECIFIED GRAVIDITY: ICD-10-CM

## 2020-05-13 DIAGNOSIS — O09.91 SUPERVISION OF HIGH RISK PREGNANCY IN FIRST TRIMESTER: Primary | ICD-10-CM

## 2020-05-13 LAB
ABO + RH BLD: NORMAL
ABO + RH BLD: NORMAL
ALT SERPL W P-5'-P-CCNC: 32 U/L (ref 0–50)
AST SERPL W P-5'-P-CCNC: 22 U/L (ref 0–45)
BASOPHILS # BLD AUTO: 0 10E9/L (ref 0–0.2)
BASOPHILS NFR BLD AUTO: 0.2 %
BLD GP AB SCN SERPL QL: NORMAL
BLOOD BANK CMNT PATIENT-IMP: NORMAL
BLOOD BANK CMNT PATIENT-IMP: NORMAL
CREAT SERPL-MCNC: 0.56 MG/DL (ref 0.52–1.04)
CREAT UR-MCNC: 10 MG/DL
DIFFERENTIAL METHOD BLD: ABNORMAL
EOSINOPHIL # BLD AUTO: 0.1 10E9/L (ref 0–0.7)
EOSINOPHIL NFR BLD AUTO: 0.9 %
ERYTHROCYTE [DISTWIDTH] IN BLOOD BY AUTOMATED COUNT: 11.8 % (ref 10–15)
GFR SERPL CREATININE-BSD FRML MDRD: >90 ML/MIN/{1.73_M2}
HCT VFR BLD AUTO: 40.9 % (ref 35–47)
HGB BLD-MCNC: 14 G/DL (ref 11.7–15.7)
IMM GRANULOCYTES # BLD: 0.1 10E9/L (ref 0–0.4)
IMM GRANULOCYTES NFR BLD: 0.4 %
LYMPHOCYTES # BLD AUTO: 3.4 10E9/L (ref 0.8–5.3)
LYMPHOCYTES NFR BLD AUTO: 28.6 %
MCH RBC QN AUTO: 30 PG (ref 26.5–33)
MCHC RBC AUTO-ENTMCNC: 34.2 G/DL (ref 31.5–36.5)
MCV RBC AUTO: 88 FL (ref 78–100)
MONOCYTES # BLD AUTO: 0.5 10E9/L (ref 0–1.3)
MONOCYTES NFR BLD AUTO: 4.3 %
NEUTROPHILS # BLD AUTO: 7.9 10E9/L (ref 1.6–8.3)
NEUTROPHILS NFR BLD AUTO: 65.6 %
NRBC # BLD AUTO: 0 10*3/UL
NRBC BLD AUTO-RTO: 0 /100
PLATELET # BLD AUTO: 235 10E9/L (ref 150–450)
PROT UR-MCNC: 0.12 G/L
PROT/CREAT 24H UR: 1.16 G/G CR (ref 0–0.2)
RBC # BLD AUTO: 4.66 10E12/L (ref 3.8–5.2)
SPECIMEN EXP DATE BLD: NORMAL
URATE SERPL-MCNC: 3.3 MG/DL (ref 2.6–6)
WBC # BLD AUTO: 12 10E9/L (ref 4–11)

## 2020-05-13 PROCEDURE — 86780 TREPONEMA PALLIDUM: CPT | Performed by: ADVANCED PRACTICE MIDWIFE

## 2020-05-13 PROCEDURE — 87389 HIV-1 AG W/HIV-1&-2 AB AG IA: CPT | Performed by: ADVANCED PRACTICE MIDWIFE

## 2020-05-13 PROCEDURE — 86900 BLOOD TYPING SEROLOGIC ABO: CPT | Performed by: ADVANCED PRACTICE MIDWIFE

## 2020-05-13 PROCEDURE — 86901 BLOOD TYPING SEROLOGIC RH(D): CPT | Performed by: ADVANCED PRACTICE MIDWIFE

## 2020-05-13 PROCEDURE — 83021 HEMOGLOBIN CHROMOTOGRAPHY: CPT | Performed by: ADVANCED PRACTICE MIDWIFE

## 2020-05-13 PROCEDURE — 85025 COMPLETE CBC W/AUTO DIFF WBC: CPT | Performed by: ADVANCED PRACTICE MIDWIFE

## 2020-05-13 PROCEDURE — 76801 OB US < 14 WKS SINGLE FETUS: CPT

## 2020-05-13 PROCEDURE — 87340 HEPATITIS B SURFACE AG IA: CPT | Performed by: ADVANCED PRACTICE MIDWIFE

## 2020-05-13 PROCEDURE — 36415 COLL VENOUS BLD VENIPUNCTURE: CPT | Performed by: ADVANCED PRACTICE MIDWIFE

## 2020-05-13 PROCEDURE — G0463 HOSPITAL OUTPT CLINIC VISIT: HCPCS

## 2020-05-13 PROCEDURE — 86803 HEPATITIS C AB TEST: CPT | Performed by: ADVANCED PRACTICE MIDWIFE

## 2020-05-13 PROCEDURE — 86706 HEP B SURFACE ANTIBODY: CPT | Performed by: ADVANCED PRACTICE MIDWIFE

## 2020-05-13 PROCEDURE — 82565 ASSAY OF CREATININE: CPT | Performed by: ADVANCED PRACTICE MIDWIFE

## 2020-05-13 PROCEDURE — 87086 URINE CULTURE/COLONY COUNT: CPT | Performed by: ADVANCED PRACTICE MIDWIFE

## 2020-05-13 PROCEDURE — 84550 ASSAY OF BLOOD/URIC ACID: CPT | Performed by: ADVANCED PRACTICE MIDWIFE

## 2020-05-13 PROCEDURE — 84450 TRANSFERASE (AST) (SGOT): CPT | Performed by: ADVANCED PRACTICE MIDWIFE

## 2020-05-13 PROCEDURE — 84460 ALANINE AMINO (ALT) (SGPT): CPT | Performed by: ADVANCED PRACTICE MIDWIFE

## 2020-05-13 PROCEDURE — 86762 RUBELLA ANTIBODY: CPT | Performed by: ADVANCED PRACTICE MIDWIFE

## 2020-05-13 PROCEDURE — 84156 ASSAY OF PROTEIN URINE: CPT | Performed by: ADVANCED PRACTICE MIDWIFE

## 2020-05-13 PROCEDURE — 86850 RBC ANTIBODY SCREEN: CPT | Performed by: ADVANCED PRACTICE MIDWIFE

## 2020-05-13 PROCEDURE — 82306 VITAMIN D 25 HYDROXY: CPT | Performed by: ADVANCED PRACTICE MIDWIFE

## 2020-05-13 RX ORDER — AMOXICILLIN 500 MG/1
500 CAPSULE ORAL 3 TIMES DAILY
Qty: 21 CAPSULE | Refills: 0 | Status: SHIPPED | OUTPATIENT
Start: 2020-05-13 | End: 2020-06-09

## 2020-05-13 ASSESSMENT — MIFFLIN-ST. JEOR: SCORE: 1442.53

## 2020-05-13 NOTE — LETTER
2020       RE: Gabriele Rodriguez  [address] [address]     Dear Colleague,    Thank you for referring your patient, Gabriele Rodriguez, to the [facility] at [facility]. Please see a copy of my visit note below.    [facility] OB Intake note  Subjective:  34 year old woman presents to clinic for initiation of OB care. Patient's last menstrual period was 2020.  at 9w2d by Estimated Date of Delivery: Dec 14, 2020 based on US.  Pt unsure if she has regular cycles, implant removed and had only one period before becoming pregnant. Reviewed dating ultrasound. Pregnancy is planned, happened faster than expected.      Symptoms since LMP include nausea. Patient has tried these relief measures: diet modification.  Pt had questions regarding chronic sinusitis and best ways to treat in pregnancy. Was planning CT with previous provider, but planning on delaying d/t pregnancy.  Also questions regarding formaldehyde exposure via work.    - Genetic/Infection questionnaire completed, risks include maternal side of family Ashkenazi Sikh. Pt  does not have a recent known exposure to Parvo or CMV so IgG/IgM testing WILL NOT be ordered.   Have you traveled during the pregnancy?No  Have your sexual partner(s) travelled during the pregnancy?No    - Current Medications   Current Outpatient Medications   Medication Sig Dispense Refill     labetalol (NORMODYNE) 200 MG tablet Take 1 tablet (200 mg) by mouth 2 times daily 60 tablet 3     Prenatal Vit-Fe Fumarate-FA (PRENATAL MULTIVITAMIN W/IRON) 27-0.8 MG tablet Take 1 tablet by mouth daily 100 tablet 3         - Co-morbids   Past Medical History:   Diagnosis Date     ADHD      Anxiety      Chronic sinusitis      Depressive disorder      Hypertension      - Risk for GDM -  has Pre pregnancy BMI=30 WILL consider early GCT and Hgb A1C    - High Risk for Pre E-  Has Chronic hypertension so WILL start low dose aspirin  (81mg) starting between 12 and 28 weeks to prevent early onset preeclampsia.    - Moderate risk - has moderate risk factors for Pre E including Meets high risk criteria     - The patient  does not have a history of spontaneous  birth so  WILL NOT consider progesterone starting at 16-20 weeks and/or serial transvaginal cervical length ultrasounds from 16-24 weeks.     -The patient does not have a history of immunosuppresion or HIV so Toxoplasma IgG/IgM WILL NOT be ordered.    PERSONAL/SOCIAL HISTORY  , lives with their family. FOB, Pedro, involved and supportive. Together 9 years.  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  History of anxiety or depression: + history, stable off meds  Additional items: None    Objective  -VS: reviewed and within normal limits   -General appearance: no acute distress, patient is comfortable   NEUROLOGICAL/PSYCHIATRIC   - Orientated x 3   -Mood and affect: normal     Assessment/Plan:  34 year old  9w2d weeks of pregnancy with TADEO of Dec 14, 2020 by ultrasound.   Outpatient Encounter Medications as of 2020   Medication Sig Dispense Refill     amoxicillin (AMOXIL) 500 MG capsule Take 1 capsule (500 mg) by mouth 3 times daily for 7 days 21 capsule 0     aspirin (ASA) 81 MG EC tablet Take 1 tablet (81 mg) by mouth daily , begin at 12 weeks pregnant. 90 tablet 3      Orders Placed This Encounter   Procedures     Vitamin D Deficiency     CBC with platelets differential     Hepatitis B Surface Antibody     Hepatitis B surface antigen     Hepatitis C antibody     Treponema Abs w Reflex to RPR and Titer     Rubella Antibody IgG Quantitative     HIV Antigen Antibody Combo     ALT     AST     Protein  random urine with Creat Ratio     Uric acid     Creatinine     Creatinine urine calculation only     HGB Eval Reflex to ELP or RBC Solubility     MAT FETAL MED CTR REFERRAL-PREGNANCY     ABO/Rh type and screen     - Oriented to Practice, types  of care, and how to reach a provider.  Pt MD team d/t CHTN.  - Patient received 1st trimester new OB education packet complete with aide of [employer] Expect[employer] booklet including information on genetic screening test options.    - Patient desires 1st trimester screening which was ordered.  - Educational handout on the prevention of infections diseases during pregnancy provided.  - Patient was encouraged to start prenatal vitamins as tolerated.    - Patient was sent to lab for routine OB labs including baseline pre-e labs, Hgb ELP.   - At risk for diabetes in pregnancy d/t BMI, discuss further at NOB visit.  - Reviewed recommendation for low dose aspirin daily to prevent pre eclampsia, pt agrees, follow up at NOB visit.  - Pregnancy concerns to be addressed by provider at new OB exam include: early GCT, GC/CT, pap up to date.  - Reviewed potential workplace exposures, limiting contact, wearing protective equipment, etc.  - Rx sent for amoxicillin x 7 days for sinus infection. Encouraged to call if symptoms persist or worsen.  - Pt to RTO for NOB visit in 3-4 weeks and prn if questions or concerns    DORA Cardenas CNM

## 2020-05-13 NOTE — PROGRESS NOTES
[facility] OB Intake note  Subjective:  34 year old woman presents to clinic for initiation of OB care. Patient's last menstrual period was 2020.  at 9w2d by Estimated Date of Delivery: Dec 14, 2020 based on US.  Pt unsure if she has regular cycles, implant removed and had only one period before becoming pregnant. Reviewed dating ultrasound. Pregnancy is planned, happened faster than expected.      Symptoms since LMP include nausea. Patient has tried these relief measures: diet modification.  Pt had questions regarding chronic sinusitis and best ways to treat in pregnancy. Was planning CT with previous provider, but planning on delaying d/t pregnancy.  Also questions regarding formaldehyde exposure via work.    - Genetic/Infection questionnaire completed, risks include maternal side of family Ashkenazi Muslim. Pt  does not have a recent known exposure to Parvo or CMV so IgG/IgM testing WILL NOT be ordered.   Have you traveled during the pregnancy?No  Have your sexual partner(s) travelled during the pregnancy?No    - Current Medications   Current Outpatient Medications   Medication Sig Dispense Refill     labetalol (NORMODYNE) 200 MG tablet Take 1 tablet (200 mg) by mouth 2 times daily 60 tablet 3     Prenatal Vit-Fe Fumarate-FA (PRENATAL MULTIVITAMIN W/IRON) 27-0.8 MG tablet Take 1 tablet by mouth daily 100 tablet 3         - Co-morbids   Past Medical History:   Diagnosis Date     ADHD      Anxiety      Chronic sinusitis      Depressive disorder      Hypertension      - Risk for GDM -  has Pre pregnancy BMI=30 WILL consider early GCT and Hgb A1C    - High Risk for Pre E-  Has Chronic hypertension so WILL start low dose aspirin (81mg) starting between 12 and 28 weeks to prevent early onset preeclampsia.    - Moderate risk - has moderate risk factors for Pre E including Meets high risk criteria     - The patient  does not have a history of spontaneous  birth so  WILL NOT consider progesterone starting at  16-20 weeks and/or serial transvaginal cervical length ultrasounds from 16-24 weeks.     -The patient does not have a history of immunosuppresion or HIV so Toxoplasma IgG/IgM WILL NOT be ordered.    PERSONAL/SOCIAL HISTORY  , lives with their family. MARYANN, Pedro, involved and supportive. Together 9 years.  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  History of anxiety or depression: + history, stable off meds  Additional items: None    Objective  -VS: reviewed and within normal limits   -General appearance: no acute distress, patient is comfortable   NEUROLOGICAL/PSYCHIATRIC   - Orientated x 3   -Mood and affect: normal     Assessment/Plan:  34 year old  9w2d weeks of pregnancy with TADEO of Dec 14, 2020 by ultrasound.   Outpatient Encounter Medications as of 2020   Medication Sig Dispense Refill     amoxicillin (AMOXIL) 500 MG capsule Take 1 capsule (500 mg) by mouth 3 times daily for 7 days 21 capsule 0     aspirin (ASA) 81 MG EC tablet Take 1 tablet (81 mg) by mouth daily , begin at 12 weeks pregnant. 90 tablet 3      Orders Placed This Encounter   Procedures     Vitamin D Deficiency     CBC with platelets differential     Hepatitis B Surface Antibody     Hepatitis B surface antigen     Hepatitis C antibody     Treponema Abs w Reflex to RPR and Titer     Rubella Antibody IgG Quantitative     HIV Antigen Antibody Combo     ALT     AST     Protein  random urine with Creat Ratio     Uric acid     Creatinine     Creatinine urine calculation only     HGB Eval Reflex to ELP or RBC Solubility     MAT FETAL MED CTR REFERRAL-PREGNANCY     ABO/Rh type and screen     - Oriented to Practice, types of care, and how to reach a provider.  Pt MD team d/t CHTN.  - Patient received 1st trimester new OB education packet complete with aide of The Expectant Family booklet including information on genetic screening test options.    - Patient desires 1st trimester screening which was  ordered.  - Educational handout on the prevention of infections diseases during pregnancy provided.  - Patient was encouraged to start prenatal vitamins as tolerated.    - Patient was sent to lab for routine OB labs including baseline pre-e labs, Hgb ELP.   - At risk for diabetes in pregnancy d/t BMI, discuss further at NOB visit.  - Reviewed recommendation for low dose aspirin daily to prevent pre eclampsia, pt agrees, follow up at NOB visit.  - Pregnancy concerns to be addressed by provider at new OB exam include: early GCT, GC/CT, pap up to date.  - Reviewed potential workplace exposures, limiting contact, wearing protective equipment, etc.  - Rx sent for amoxicillin x 7 days for sinus infection. Encouraged to call if symptoms persist or worsen.  - Pt to RTO for NOB visit in 3-4 weeks and prn if questions or concerns    DORA Cardenas CNM

## 2020-05-14 DIAGNOSIS — R80.8 OTHER PROTEINURIA: ICD-10-CM

## 2020-05-14 DIAGNOSIS — O10.919 CHRONIC HYPERTENSION AFFECTING PREGNANCY: Primary | ICD-10-CM

## 2020-05-14 LAB
BACTERIA SPEC CULT: NORMAL
DEPRECATED CALCIDIOL+CALCIFEROL SERPL-MC: 29 UG/L (ref 20–75)
HBV SURFACE AB SERPL IA-ACNC: 154.98 M[IU]/ML
HBV SURFACE AG SERPL QL IA: NONREACTIVE
HCV AB SERPL QL IA: NONREACTIVE
HGB A1 MFR BLD: 96.4 % (ref 95–97.9)
HGB A2 MFR BLD: 2.9 % (ref 2–3.5)
HGB C MFR BLD: 0 % (ref 0–0)
HGB E MFR BLD: 0 % (ref 0–0)
HGB F MFR BLD: 0.7 % (ref 0–2.1)
HGB FRACT BLD ELPH-IMP: NORMAL
HGB OTHER MFR BLD: 0 % (ref 0–0)
HGB S BLD QL SOLY: NORMAL
HGB S MFR BLD: 0 % (ref 0–0)
HIV 1+2 AB+HIV1 P24 AG SERPL QL IA: NONREACTIVE
Lab: NORMAL
PATH INTERP BLD-IMP: NORMAL
RUBV IGG SERPL IA-ACNC: 55 IU/ML
SPECIMEN SOURCE: NORMAL
T PALLIDUM AB SER QL: NONREACTIVE

## 2020-05-15 ENCOUNTER — TELEPHONE (OUTPATIENT)
Dept: OBGYN | Facility: CLINIC | Age: 35
End: 2020-05-15

## 2020-05-15 DIAGNOSIS — R80.9 PROTEIN IN URINE: Primary | ICD-10-CM

## 2020-05-15 DIAGNOSIS — O10.919 CHRONIC HYPERTENSION AFFECTING PREGNANCY: Primary | ICD-10-CM

## 2020-05-15 DIAGNOSIS — O09.91 SUPERVISION OF HIGH RISK PREGNANCY IN FIRST TRIMESTER: ICD-10-CM

## 2020-05-15 DIAGNOSIS — O10.919 CHRONIC HYPERTENSION AFFECTING PREGNANCY: ICD-10-CM

## 2020-05-15 LAB
CREAT UR-MCNC: 251 MG/DL
PROT UR-MCNC: 0.19 G/L
PROT/CREAT 24H UR: 0.08 G/G CR (ref 0–0.2)

## 2020-05-15 PROCEDURE — 84156 ASSAY OF PROTEIN URINE: CPT | Performed by: ADVANCED PRACTICE MIDWIFE

## 2020-05-15 NOTE — TELEPHONE ENCOUNTER
RECORDS RECEIVED FROM: [facility] - DORA Antoine CNM   DATE RECEIVED: 6/4/2020   NOTES STATUS DETAILS   OFFICE NOTE from referring provider Internal 5/14/2020 Referral   5/13/2020 Office visit with DORA Carbajal CNM    OFFICE NOTE from other specialist  N/A    *Only VASCULITIS or LUPUS gather office notes for the following N/A    *PULMONARY   N/A    *ENT N/A    *DERMATOLOGY N/A    *RHEUMATOLOGY N/A    DISCHARGE SUMMARY from hospital N/A    DISCHARGE REPORT from the ER N/A    MEDICATION LIST N/A    IMAGING  (NEED IMAGES AND REPORTS)     KIDNEY CT SCAN N/A    KIDNEY ULTRASOUND N/A    MR ABDOMEN N/A    NUCLEAR MEDICINE RENAL N/A    LABS     CBC N/A    CMP N/A    BMP N/A    UA N/A    URINE PROTEIN Internal 5/15/2020, 5/13/2020   RENAL PANEL N/A    BIOPSY     KIDNEY BIOPSY  N/A

## 2020-05-15 NOTE — TELEPHONE ENCOUNTER
Gabriele informed that orders have been placed by Rob Nieves CNM  for random protein in urine and could complete today if she would like.  She would just go to lab to complete this test.

## 2020-05-15 NOTE — TELEPHONE ENCOUNTER
Pt is requesting to repeat urine test today if at all possible for peace of mind.  She has a nephrology appt scheduled for 06/04/2020      If ok, UA orders are pended to sign and then please route back to nursing and I will contact Gabriele.          Notes recorded by Nevin Caruso RN on 5/15/2020 at 9:32 AM CDT   Spoke with Tee and she does have an appt with renal in June 2020.  She is wondering for peace of mind could she please repeat a screening urine prior to her consult. Advised to keep the renal consult as well.  Forwarded to Erica Bird CNM also to review.  She would like to repeat urine today if possible.   ------     Notes recorded by Keyla Bird APRN CNM on 5/14/2020 at 5:48 PM CDT   Mohan Suazo,   Here are your lab results  You had a high level of protein in your urine.  This result was reviewed by Dr. Hubbard.  She is recommending a renal consult regarding this result.  A referral was placed and you should be contacted to schedule an appt.  If you need assistance please contact the clinic RN line at [phone]   Erica JOHN   ------     Notes recorded by Ekta Hubbard MD on 5/14/2020 at 12:45 PM CDT

## 2020-05-20 ENCOUNTER — VIRTUAL VISIT (OUTPATIENT)
Dept: MATERNAL FETAL MEDICINE | Facility: CLINIC | Age: 35
End: 2020-05-20
Attending: ADVANCED PRACTICE MIDWIFE
Payer: COMMERCIAL

## 2020-05-20 DIAGNOSIS — Z36.9 UNSPECIFIED ANTENATAL SCREENING: Primary | ICD-10-CM

## 2020-05-20 DIAGNOSIS — Z13.79 ASHKENAZI JEWISH ANCESTRY REQUIRING POPULATION-SPECIFIC SCREENING FOR GENETIC DISORDER: ICD-10-CM

## 2020-05-20 DIAGNOSIS — O26.90 PREGNANCY RELATED CONDITION, ANTEPARTUM: ICD-10-CM

## 2020-05-20 PROCEDURE — 96040 ZZH GENETIC COUNSELING, EACH 30 MINUTES: CPT | Mod: TEL,ZF | Performed by: GENETIC COUNSELOR, MS

## 2020-05-20 NOTE — PROGRESS NOTES
[facility]  Genetic Counseling Consult    Patient: Gabriele Rodriguez YOB: 1985   Date of Service: 20        Gabriele was evaluated via a billable telephone visit at [facility] for genetic consultation for routine genetic screening options during pregnancy.    The patient has been notified of the following:  This telephone visit will be conducted via a call between you and your physician/provider. We have found that certain health care needs can be provided without the need for a physical exam. This service lets us provide the care you need with a short phone conversation. If a prescription is necessary we can send it directly to your pharmacy. If lab work is needed we can place an order for that and you can then stop by our lab to have the test done at a later time.     If during the course of the call the provider feels a telephone visit is not appropriate, you will not be charged for this service.          Impression/Plan:   1.  Gabriele had a genetic counseling session only today and plans to have blood drawn for NIPT next week (Innatal test through [facility] lab).  Results are expected within 7-10 days from the blood draw, and will be available in [employer].  We will contact her to discuss the results, and a copy will be forwarded to the office of the referring OB provider.  Gabriele will be contacted at the phone number she provided, [phone], and requests that results not be left in her voicemail if she cannot be reached.    2.  Maternal serum AFP (single marker screen) is recommended after 15 weeks to screen for open neural tube defects. A quad screen should not be performed.    Pregnancy History:   /Parity:    Age at Delivery: 35 year old  TADEO: 2020, by Ultrasound  Gestational Age: 10w2d    No significant complications or exposures were reported in the current pregnancy.      Medical History:    Gabriele s reported medical history is not expected to impact pregnancy management or risks to fetal development.       Family History:   A three-generation pedigree was obtained, and is scanned under the  Media  tab.   The reported family history is negative for multiple miscarriages, stillbirths, birth defects, cognitive impairment, known genetic conditions, and consanguinity.       Carrier Screening:   The patient reports that she and the father of the pregnancy have  ancestry:      Cystic fibrosis is an autosomal recessive genetic condition that occurs with increased frequency in individuals of  ancestry and carrier screening for this condition is available.  In addition,  screening in the [facility] includes cystic fibrosis.      The patient reports that she is of Ashkenazi Episcopal ancestry:      There is a group of several autosomal recessive genetic conditions that occur with increased frequency in individuals of Ashkenazi Episcopal ancestry, such as Lukas Sachs disease and Canavan disease.  Carrier screening is available for a variety of these conditions.      Expanded carrier screening for mutations in a large panel of genes associated with autosomal recessive conditions including cystic fibrosis, spinal muscular atrophy, and others, is now available.      The patient was not certain about whether to pursue carrier screening today.  She plans to check insurance coverage for testing first, and will contact our clinic if she has questions or wishes to pursue screening.       Risk Assessment for Chromosome Conditions:   We explained that the risk for fetal chromosome abnormalities increases with maternal age. We discussed specific features of common chromosome abnormalities, including Down syndrome, trisomy 13, trisomy 18, and sex chromosome trisomies.      - At age 34 at midtrimester, the risk to have a baby with Down syndrome is 1 in 508.     - At age 34 at midtrimester, the risk  to have a baby with any chromosome abnormality is 1 in 254.          Testing Options:   We discussed the following options:   First trimester screening    First trimester ultrasound with nuchal translucency and nasal bone assessments, maternal plasma hCG, TYSHAWN-A, and AFP measurement    Screens for fetal trisomy 21, trisomy 13, and trisomy 18    Cannot screen for open neural tube defects; maternal serum AFP after 15 weeks is recommended       Non-invasive Prenatal Testing (NIPT)    Maternal plasma cell-free DNA testing; first trimester ultrasound with nuchal translucency and nasal bone assessment is recommended, when appropriate    Screens for fetal trisomy 21, trisomy 13, trisomy 18, and sex chromosome aneuploidy    Cannot screen for open neural tube defects; maternal serum AFP after 15 weeks is recommended       Genetic Amniocentesis    Invasive procedure typically performed in the second trimester by which amniotic fluid is obtained for the purpose of chromosome analysis and/or other prenatal genetic analysis    Diagnostic results; >99% sensitivity for fetal chromosome abnormalities    AFAFP measurement tests for open neural tube defects       Comprehensive (Level II) ultrasound: Detailed ultrasound performed between 18-22 weeks gestation to screen for major birth defects and markers for aneuploidy.        We reviewed the benefits and limitations of this testing.  Screening tests provide a risk assessment specific to the pregnancy for certain fetal chromosome abnormalities, but cannot definitively diagnose or exclude a fetal chromosome abnormality.  Follow-up genetic counseling and consideration of diagnostic testing is recommended with any abnormal screening result.     Diagnostic tests carry inherent risks- including risk of miscarriage- that require careful consideration.  These tests can detect fetal chromosome abnormalities with greater than 99% certainty.  Results can be compromised by maternal cell  contamination or mosaicism, and are limited by the resolution of cytogenetic G-banding technology.  There is no screening nor diagnostic test that can detect all forms of birth defects or mental disability.     It was a pleasure to be involved with Gabriele s care.     Call Duration 36 Minutes  Call Start 10:19  Call End 10:55    Peter Alexis MS, [facility]  Licensed Genetic Counselor  Phone: [phone]  Pager: [phone]

## 2020-05-27 DIAGNOSIS — O26.90 PREGNANCY RELATED CONDITION, ANTEPARTUM: ICD-10-CM

## 2020-05-27 DIAGNOSIS — Z36.9 UNSPECIFIED ANTENATAL SCREENING: ICD-10-CM

## 2020-05-27 PROCEDURE — 36415 COLL VENOUS BLD VENIPUNCTURE: CPT | Performed by: OBSTETRICS & GYNECOLOGY

## 2020-05-27 PROCEDURE — 40000791 ZZHCL STATISTIC VERIFI PRENATAL TRISOMY 21,18,13: Performed by: OBSTETRICS & GYNECOLOGY

## 2020-06-04 ENCOUNTER — VIRTUAL VISIT (OUTPATIENT)
Dept: NEPHROLOGY | Facility: CLINIC | Age: 35
End: 2020-06-04
Attending: MIDWIFE
Payer: COMMERCIAL

## 2020-06-04 ENCOUNTER — PRE VISIT (OUTPATIENT)
Dept: NEPHROLOGY | Facility: CLINIC | Age: 35
End: 2020-06-04

## 2020-06-04 ENCOUNTER — TELEPHONE (OUTPATIENT)
Dept: MATERNAL FETAL MEDICINE | Facility: CLINIC | Age: 35
End: 2020-06-04

## 2020-06-04 DIAGNOSIS — I10 HYPERTENSION, UNSPECIFIED TYPE: Primary | ICD-10-CM

## 2020-06-04 DIAGNOSIS — O09.90 HIGH-RISK PREGNANCY, UNSPECIFIED TRIMESTER: ICD-10-CM

## 2020-06-04 LAB — LAB SCANNED RESULT: NORMAL

## 2020-06-04 ASSESSMENT — PAIN SCALES - GENERAL: PAINLEVEL: NO PAIN (0)

## 2020-06-04 NOTE — PROGRESS NOTES
Attempted to reach to set up video visit, no answer, left vm writer will call back in 1-2 hours.    Lorene Reyes, [facility]  6/4/2020 11:30 AM

## 2020-06-04 NOTE — LETTER
"6/4/2020       RE: Gabriele Rodriguez  [address] [facility] [address]     Dear Colleague,    Thank you for referring your patient, Gabriele Rodriguez, to the [facility] NEPHROLOGY at [facility]. Please see a copy of my visit note below.    Attempted to reach to set up video visit, no answer, left vm writer will call back in 1-2 hours.    Lorene KSukh Reyes, [facility]  6/4/2020 11:30 AM        Gabriele Rodriguez is a 34 year old female who is being evaluated via a billable video visit.      The patient has been notified of following:     \"This video visit will be conducted via a call between you and your physician/provider. We have found that certain health care needs can be provided without the need for an in-person physical exam.  This service lets us provide the care you need with a video conversation.  If a prescription is necessary we can send it directly to your pharmacy.  If lab work is needed we can place an order for that and you can then stop by our lab to have the test done at a later time.    Video visits are billed at different rates depending on your insurance coverage.  Please reach out to your insurance provider with any questions.    If during the course of the call the physician/provider feels a video visit is not appropriate, you will not be charged for this service.\"    Patient has given verbal consent for Video visit? Yes    How would you like to obtain your AVS? Jarrodhart     Patient would like the video invitation sent by: Text to cell phone: [phone]    Will anyone else be joining your video visit? No        Video-Visit Details    Type of service:  Video Visit    Video Start Time: 3:33 PM  Video End Time: 4:05    Originating Location (pt. Location): Home    Distant Location (provider location):  [facility] NEPHROLOGY     Platform used for Video Visit: Bart Vargas MD          Nephrology Clinic    Gabriele Rodriguez MRN:[id number] Date of Birth: " 1985  Date of Service: 06/04/2020  Primary care provider: Shana Sharif  Requesting physician: Rob Nieves       REASON FOR CONSULT: Proteinuria and hypertension in pregnancy    HISTORY OF PRESENT ILLNESS:   Gabriele Rodriguez is a 34 year old female who presents for evaluation of proteinuria and hypertension in pregnancy.     Ms Rodriguez is currently 32 weeks pregnant, on her first pregnancy.  She reports feeling fair other than for some am nausea, without vomiting.  The nausea is is getting better.  She has had HTN for at least 4 years, and was first diagnosed during Med School. She does not recall exactly what work up for HTN she has had while she was at [facility]. She does not recall undergoing a renal ultrasound.  (She does not currently have her med records).  She was initially treated with metoprolol, then switched to nifedipine which was stopped because of L Ext swelling.  She was then switched to lisinopril + hydrochlorothiazide.  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 She has since been switched to labetalol.  On review of systems, she reports occasional mild epistaxis, but the ROS is otherwise negative for symptoms of systemic disease.  She has had HgbA1c testing in the past which were reportedly normal    Interestingly, the first UPCR was obtained after she drinking a large volume of water.      PAST MEDICAL HISTORY:  Reviewed with the patient  Past Medical History:   Diagnosis Date     ADHD      Anxiety      Chronic sinusitis      Depressive disorder      Hypertension      PAST SURGICAL HISTORY:  Past Surgical History:   Procedure Laterality Date     BIOPSY NAIL (LOCATION)      left hand, 4th digit     MEDICATIONS:  Prescription Medications as of 6/4/2020       Rx Number Disp Refills Start End Last Dispensed Date Next Fill Date Owning Pharmacy    aspirin (ASA) 81 MG EC tablet  90 tablet 3 5/13/2020    [facility] [id number] [facility]  [place] [address] [address]    Sig: Take 1 tablet (81 mg) by mouth daily , begin at 12 weeks pregnant.    Class: E-Prescribe    Route: Oral    labetalol (NORMODYNE) 200 MG tablet  60 tablet 3 4/13/2020    [facility] [id number] [place] [address] [address]    Sig: Take 1 tablet (200 mg) by mouth 2 times daily    Class: E-Prescribe    Route: Oral    Prenatal Vit-Fe Fumarate-FA (PRENATAL MULTIVITAMIN W/IRON) 27-0.8 MG tablet  100 tablet 3 4/13/2020    [facility] [id number] [place] [address] [address]    Sig: Take 1 tablet by mouth daily    Class: E-Prescribe    Route: Oral    amoxicillin (AMOXIL) 500 MG capsule (Ended)  21 capsule 0 5/13/2020 5/20/2020   [facility] [id number] [place] [address] [address]    Sig: Take 1 capsule (500 mg) by mouth 3 times daily for 7 days    Class: E-Prescribe    Route: Oral    lisdexamfetamine (VYVANSE) 30 MG capsule    NOT TAKING DURING PREGNANCY  30 capsule 0 4/13/2020    [facility] [id number] [place] [address] [address]    Sig: Take 1 capsule (30 mg) by mouth every morning    Class: E-Prescribe    Earliest Fill Date: 4/13/2020    Route: Oral      Clinic-Administered Medications as of 6/4/2020       Dose Frequency Start End    erythromycin (ROMYCIN) ophthalmic ointment  EVERY 6 HOURS SCHEDULED 1/7/2019     Admin Instructions: Apply to left eye four times a day for 5 days    Class: E-Prescribe    Route: Left Eye         ALLERGIES:    Allergies   Allergen Reactions     Nickel      REVIEW OF SYSTEMS:  Review Of Systems  Skin: negative  Eyes: negative  Ears/Nose/Throat: epistaxis, mild related to dry air.  sinus trouble  Respiratory: No shortness of breath, dyspnea on exertion, cough, or hemoptysis  Cardiovascular: negative  Gastrointestinal: nausea  Genitourinary: negative  Musculoskeletal: negative  Neurologic: negative    A comprehensive review of systems was performed and found to be negative except as described here or  "above.  SOCIAL HISTORY:   Social History     Socioeconomic History     Marital status:      Spouse name: Pedro     Number of children: Not on file     Years of education: Not on file     Highest education level: Not on file   Occupational History     Occupation: Medical resident     Comment: pathology   Social Needs     Financial resource strain: Not on file     Food insecurity     Worry: Not on file     Inability: Not on file     Transportation needs     Medical: Not on file     Non-medical: Not on file   Tobacco Use     Smoking status: Never Smoker     Smokeless tobacco: Never Used   Substance and Sexual Activity     Alcohol use: No     Drug use: No     Sexual activity: Yes     Partners: Male   Lifestyle     Physical activity     Days per week: Not on file     Minutes per session: Not on file     Stress: Not on file   Relationships     Social connections     Talks on phone: Not on file     Gets together: Not on file     Attends [employer] service: Not on file     Active member of club or organization: Not on file     Attends meetings of clubs or organizations: Not on file     Relationship status: Not on file     Intimate partner violence     Fear of current or ex partner: Not on file     Emotionally abused: Not on file     Physically abused: Not on file     Forced sexual activity: Not on file   Other Topics Concern     Not on file   Social History Narrative    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  Height 5'3\"/.    BMI 29.2  GENERAL APPEARANCE: alert and no distress  EYES: nonicteric  RESP: breathing non labored "   SKIN: no facial rash  NEURO: mentation intact and speech normal  PSYCH: affect normal/bright   LABS:   Recent Results (from the past 1008 hour(s))   Urine Culture Aerobic Bacterial    Collection Time: 05/13/20  4:29 PM    Specimen: Midstream Urine   Result Value Ref Range    Specimen Description Midstream Urine     Special Requests Specimen received in preservative     Culture Micro <10,000 colonies/mL  mixed urogenital jolene      Protein  random urine with Creat Ratio    Collection Time: 05/13/20  4:29 PM   Result Value Ref Range    Protein Random Urine 0.12 g/L    Protein Total Urine g/gr Creatinine 1.16 (H) 0 - 0.2 g/g Cr   Creatinine urine calculation only    Collection Time: 05/13/20  4:29 PM   Result Value Ref Range    Creatinine Urine 10 mg/dL   Vitamin D Deficiency    Collection Time: 05/13/20  4:32 PM   Result Value Ref Range    Vitamin D Deficiency screening 29 20 - 75 ug/L   ABO/Rh type and screen    Collection Time: 05/13/20  4:32 PM   Result Value Ref Range    ABO O     RH(D) Pos     Antibody Screen Neg     Test Valid Only At          [facility]    Specimen Expires 05/16/2020     Blood Bank Comment       Patient ID verified using a positive patient identification system or by two individuals   at time of collection.     CBC with platelets differential    Collection Time: 05/13/20  4:32 PM   Result Value Ref Range    WBC 12.0 (H) 4.0 - 11.0 10e9/L    RBC Count 4.66 3.8 - 5.2 10e12/L    Hemoglobin 14.0 11.7 - 15.7 g/dL    Hematocrit 40.9 35.0 - 47.0 %    MCV 88 78 - 100 fl    MCH 30.0 26.5 - 33.0 pg    MCHC 34.2 31.5 - 36.5 g/dL    RDW 11.8 10.0 - 15.0 %    Platelet Count 235 150 - 450 10e9/L    Diff Method Automated Method     % Neutrophils 65.6 %    % Lymphocytes 28.6 %    % Monocytes 4.3 %    % Eosinophils 0.9 %    % Basophils 0.2 %    % Immature Granulocytes 0.4 %    Nucleated RBCs 0 0 /100    Absolute Neutrophil 7.9 1.6 - 8.3 10e9/L    Absolute  Lymphocytes 3.4 0.8 - 5.3 10e9/L    Absolute Monocytes 0.5 0.0 - 1.3 10e9/L    Absolute Eosinophils 0.1 0.0 - 0.7 10e9/L    Absolute Basophils 0.0 0.0 - 0.2 10e9/L    Abs Immature Granulocytes 0.1 0 - 0.4 10e9/L    Absolute Nucleated RBC 0.0    Hepatitis B Surface Antibody    Collection Time: 05/13/20  4:32 PM   Result Value Ref Range    Hepatitis B Surface Antibody 154.98 (H) <8.00 m[IU]/mL   Hepatitis B surface antigen    Collection Time: 05/13/20  4:32 PM   Result Value Ref Range    Hep B Surface Agn Nonreactive NR^Nonreactive   Hepatitis C antibody    Collection Time: 05/13/20  4:32 PM   Result Value Ref Range    Hepatitis C Antibody Nonreactive NR^Nonreactive   Treponema Abs w Reflex to RPR and Titer    Collection Time: 05/13/20  4:32 PM   Result Value Ref Range    Treponema Antibodies Nonreactive NR^Nonreactive   Rubella Antibody IgG Quantitative    Collection Time: 05/13/20  4:32 PM   Result Value Ref Range    Rubella Antibody IgG Quantitative 55 IU/mL   HIV Antigen Antibody Combo    Collection Time: 05/13/20  4:32 PM   Result Value Ref Range    HIV Antigen Antibody Combo Nonreactive NR^Nonreactive       ALT    Collection Time: 05/13/20  4:32 PM   Result Value Ref Range    ALT 32 0 - 50 U/L   AST    Collection Time: 05/13/20  4:32 PM   Result Value Ref Range    AST 22 0 - 45 U/L   Uric acid    Collection Time: 05/13/20  4:32 PM   Result Value Ref Range    Uric Acid 3.3 2.6 - 6.0 mg/dL   Creatinine    Collection Time: 05/13/20  4:32 PM   Result Value Ref Range    Creatinine 0.56 0.52 - 1.04 mg/dL    GFR Estimate >90 >60 mL/min/[1.73_m2]    GFR Estimate If Black >90 >60 mL/min/[1.73_m2]   HGB Eval Reflex to ELP or RBC Solubility    Collection Time: 05/13/20  4:32 PM   Result Value Ref Range    Hemoglobin A1 96.4 95.0 - 97.9 %    Hemoglobin A2 2.9 2.0 - 3.5 %    Hemoglobin F 0.7 0.0 - 2.1 %    Hemoglobin S Eval 0.0 0.0 - 0.0 %    Hemoglobin C 0.0 0.0 - 0.0 %    Hemoglobin E 0.0 0.0 - 0.0 %    Hemoglobin Other  0.0 0.0 - 0.0 %    HGB Abn Evaluation SEE NOTE     Sickle Cell Solubility Confirm Not Performed     Hemoglobin Capillary ELP Not Performed    Protein  random urine with Creat Ratio    Collection Time: 05/15/20 12:01 PM   Result Value Ref Range    Protein Random Urine 0.19 g/L    Protein Total Urine g/gr Creatinine 0.08 0 - 0.2 g/g Cr   Creatinine urine calculation only    Collection Time: 05/15/20 12:01 PM   Result Value Ref Range    Creatinine Urine 251 mg/dL   Non Invasive Prenatal Test Cell Free DNA    Collection Time: 05/27/20 10:01 AM   Result Value Ref Range    Lab Scanned Result NON INVAS PRENATAL DNA-Scanned      CMP  Recent Labs   Lab Test 05/13/20  1632 07/23/19  1509 09/14/18  1609   NA  --  137 140   POTASSIUM  --  3.6 3.8   CHLORIDE  --  104 108   CO2  --  27 24   ANIONGAP  --  5 9   GLC  --  85 104*   BUN  --  10 14   CR 0.56 0.83 0.90   GFRESTIMATED >90 >90 73   GFRESTBLACK >90 >90 89   COLT  --  9.6 8.8   PROTTOTAL  --  8.3 7.6   ALBUMIN  --  4.5 4.1   BILITOTAL  --  0.7 0.4   ALKPHOS  --  71 75   AST 22 18 17   ALT 32 22 18     CBC  Recent Labs   Lab Test 05/13/20  1632   HGB 14.0   WBC 12.0*   RBC 4.66   HCT 40.9   MCV 88   MCH 30.0   MCHC 34.2   RDW 11.8        INRNo lab results found.  ABGNo lab results found.   URINE STUDIES  No lab results found.  Recent Labs   Lab Test 05/15/20  1201 05/13/20  1629   UTPG 0.08 1.16*       ASSESSMENT AND PLAN:    Michael is referred for evaluation of proteinuria detected at 9 weeks gestation by UPCR.  The repeat thest 2 days later revealed a normal UPCR. THe urine creatinine on the first sample is very low (and likely erroneous?).  We will obtain a full UA and repeat the UPCR + UACR.  Her routine serologies for pregnancy are unrevealing.  Her ROS is not suggestive of a systemic inflammatory disease (e.g.SLE).  We will pursue a more extensive serologic workup depending on the results of the urine tests.    2- HTN.  Dr Rodriguez will try to obtain copy of her  med records from [place] to review what work up she has had for HTN.  Her risk factors are her weight and ? Family history (unclear how old her mother was when she developed HTN).   Her BP is under acceptable control on labetalol only - for pregnancy.  Dr Rodriguez also recognizes that anxiety is likely is significant contributor for her HTN.     At this point, and barring any unanticipated lab abnormalities, I will obtain a renal ultrasound as initial evaluation for HTN.  She will page me next week after obtaining the urine tests.      Jerome Vargas MD  Division of Renal Disease and Hypertension  June 4, 2020  3:34 PM      Again, thank you for allowing me to participate in the care of your patient.      Sincerely,    Jerome Vargas MD

## 2020-06-04 NOTE — PROGRESS NOTES
Nephrology Clinic    Gabriele Rodriguez MRN:[id number] YOB: 1985  Date of Service: 06/04/2020  Primary care provider: Shana Sharif  Requesting physician: Rob Nieves       REASON FOR CONSULT: Proteinuria and hypertension in pregnancy    HISTORY OF PRESENT ILLNESS:   Gabriele Rodriguez is a 34 year old female who presents for evaluation of proteinuria and hypertension in pregnancy.     Ms Rodriguez is currently 32 weeks pregnant, on her first pregnancy.  She reports feeling fair other than for some am nausea, without vomiting.  The nausea is is getting better.  She has had HTN for at least 4 years, and was first diagnosed during Med School. She does not recall exactly what work up for HTN she has had while she was at [facility]. She does not recall undergoing a renal ultrasound.  (She does not currently have her med records).  She was initially treated with metoprolol, then switched to nifedipine which was stopped because of L Ext swelling.  She was then switched to lisinopril + hydrochlorothiazide.  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 She has since been switched to labetalol.  On review of systems, she reports occasional mild epistaxis, but the ROS is otherwise negative for symptoms of systemic disease.  She has had HgbA1c testing in the past which were reportedly normal    Interestingly, the first UPCR was obtained after she drinking a large volume of water.      PAST MEDICAL HISTORY:  Reviewed with the patient  Past Medical History:   Diagnosis Date     ADHD      Anxiety      Chronic sinusitis      Depressive disorder      Hypertension      PAST SURGICAL HISTORY:  Past Surgical History:   Procedure Laterality Date     BIOPSY NAIL (LOCATION)      left hand, 4th digit     MEDICATIONS:  Prescription Medications as of 6/4/2020       Rx Number Disp Refills Start End Last Dispensed Date Next Fill Date Owning Pharmacy    aspirin  (ASA) 81 MG EC tablet  90 tablet 3 5/13/2020    [id number] [place] [address]    Sig: Take 1 tablet (81 mg) by mouth daily , begin at 12 weeks pregnant.    Class: E-Prescribe    Route: Oral    labetalol (NORMODYNE) 200 MG tablet  60 tablet 3 4/13/2020    [facility] [place] [address]    Sig: Take 1 tablet (200 mg) by mouth 2 times daily    Class: E-Prescribe    Route: Oral    Prenatal Vit-Fe Fumarate-FA (PRENATAL MULTIVITAMIN W/IRON) 27-0.8 MG tablet  100 tablet 3 4/13/2020    [facility] [place] [address]    Sig: Take 1 tablet by mouth daily    Class: E-Prescribe    Route: Oral    amoxicillin (AMOXIL) 500 MG capsule (Ended)  21 capsule 0 5/13/2020 5/20/2020   [id number] [place] [address]    Sig: Take 1 capsule (500 mg) by mouth 3 times daily for 7 days    Class: E-Prescribe    Route: Oral    lisdexamfetamine (VYVANSE) 30 MG capsule    NOT TAKING DURING PREGNANCY  30 capsule 0 4/13/2020    [id number] [place] [address]    Sig: Take 1 capsule (30 mg) by mouth every morning    Class: E-Prescribe    Earliest Fill Date: 4/13/2020    Route: Oral      Clinic-Administered Medications as of 6/4/2020       Dose Frequency Start End    erythromycin (ROMYCIN) ophthalmic ointment  EVERY 6 HOURS SCHEDULED 1/7/2019     Admin Instructions: Apply to left eye four times a day for 5 days    Class: E-Prescribe    Route: Left Eye         ALLERGIES:    Allergies   Allergen Reactions     Nickel      REVIEW OF SYSTEMS:  Review Of Systems  Skin: negative  Eyes: negative  Ears/Nose/Throat: epistaxis, mild related to dry air.  sinus trouble  Respiratory: No shortness of breath, dyspnea on exertion, cough, or hemoptysis  Cardiovascular: negative  Gastrointestinal: nausea  Genitourinary: negative  Musculoskeletal: negative  Neurologic: negative    A comprehensive review of systems  "was performed and found to be negative except as described here or above.  SOCIAL HISTORY:   Social History     Socioeconomic History     Marital status:      Spouse name: Pedro     Number of children: Not on file     Years of education: Not on file     Highest education level: Not on file   Occupational History     Occupation: Medical resident     Comment: pathology   Social Needs     Financial resource strain: Not on file     Food insecurity     Worry: Not on file     Inability: Not on file     Transportation needs     Medical: Not on file     Non-medical: Not on file   Tobacco Use     Smoking status: Never Smoker     Smokeless tobacco: Never Used   Substance and Sexual Activity     Alcohol use: No     Drug use: No     Sexual activity: Yes     Partners: Male   Lifestyle     Physical activity     Days per week: Not on file     Minutes per session: Not on file     Stress: Not on file   Relationships     Social connections     Talks on phone: Not on file     Gets together: Not on file     Attends Oriental orthodox service: Not on file     Active member of club or organization: Not on file     Attends meetings of clubs or organizations: Not on file     Relationship status: Not on file     Intimate partner violence     Fear of current or ex partner: Not on file     Emotionally abused: Not on file     Physically abused: Not on file     Forced sexual activity: Not on file   Other Topics Concern     Not on file   Social History Narrative    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  Height 5'3\"/.    BMI 29.2  GENERAL APPEARANCE: " alert and no distress  EYES: nonicteric  RESP: breathing non labored   SKIN: no facial rash  NEURO: mentation intact and speech normal  PSYCH: affect normal/bright   LABS:   Recent Results (from the past 1008 hour(s))   Urine Culture Aerobic Bacterial    Collection Time: 05/13/20  4:29 PM    Specimen: Midstream Urine   Result Value Ref Range    Specimen Description Midstream Urine     Special Requests Specimen received in preservative     Culture Micro <10,000 colonies/mL  mixed urogenital jolene      Protein  random urine with Creat Ratio    Collection Time: 05/13/20  4:29 PM   Result Value Ref Range    Protein Random Urine 0.12 g/L    Protein Total Urine g/gr Creatinine 1.16 (H) 0 - 0.2 g/g Cr   Creatinine urine calculation only    Collection Time: 05/13/20  4:29 PM   Result Value Ref Range    Creatinine Urine 10 mg/dL   Vitamin D Deficiency    Collection Time: 05/13/20  4:32 PM   Result Value Ref Range    Vitamin D Deficiency screening 29 20 - 75 ug/L   ABO/Rh type and screen    Collection Time: 05/13/20  4:32 PM   Result Value Ref Range    ABO O     RH(D) Pos     Antibody Screen Neg     Test Valid Only At          [facility]    Specimen Expires 05/16/2020     Blood Bank Comment       Patient ID verified using a positive patient identification system or by two individuals   at time of collection.     CBC with platelets differential    Collection Time: 05/13/20  4:32 PM   Result Value Ref Range    WBC 12.0 (H) 4.0 - 11.0 10e9/L    RBC Count 4.66 3.8 - 5.2 10e12/L    Hemoglobin 14.0 11.7 - 15.7 g/dL    Hematocrit 40.9 35.0 - 47.0 %    MCV 88 78 - 100 fl    MCH 30.0 26.5 - 33.0 pg    MCHC 34.2 31.5 - 36.5 g/dL    RDW 11.8 10.0 - 15.0 %    Platelet Count 235 150 - 450 10e9/L    Diff Method Automated Method     % Neutrophils 65.6 %    % Lymphocytes 28.6 %    % Monocytes 4.3 %    % Eosinophils 0.9 %    % Basophils 0.2 %    % Immature Granulocytes 0.4 %    Nucleated RBCs 0 0  /100    Absolute Neutrophil 7.9 1.6 - 8.3 10e9/L    Absolute Lymphocytes 3.4 0.8 - 5.3 10e9/L    Absolute Monocytes 0.5 0.0 - 1.3 10e9/L    Absolute Eosinophils 0.1 0.0 - 0.7 10e9/L    Absolute Basophils 0.0 0.0 - 0.2 10e9/L    Abs Immature Granulocytes 0.1 0 - 0.4 10e9/L    Absolute Nucleated RBC 0.0    Hepatitis B Surface Antibody    Collection Time: 05/13/20  4:32 PM   Result Value Ref Range    Hepatitis B Surface Antibody 154.98 (H) <8.00 m[IU]/mL   Hepatitis B surface antigen    Collection Time: 05/13/20  4:32 PM   Result Value Ref Range    Hep B Surface Agn Nonreactive NR^Nonreactive   Hepatitis C antibody    Collection Time: 05/13/20  4:32 PM   Result Value Ref Range    Hepatitis C Antibody Nonreactive NR^Nonreactive   Treponema Abs w Reflex to RPR and Titer    Collection Time: 05/13/20  4:32 PM   Result Value Ref Range    Treponema Antibodies Nonreactive NR^Nonreactive   Rubella Antibody IgG Quantitative    Collection Time: 05/13/20  4:32 PM   Result Value Ref Range    Rubella Antibody IgG Quantitative 55 IU/mL   HIV Antigen Antibody Combo    Collection Time: 05/13/20  4:32 PM   Result Value Ref Range    HIV Antigen Antibody Combo Nonreactive NR^Nonreactive       ALT    Collection Time: 05/13/20  4:32 PM   Result Value Ref Range    ALT 32 0 - 50 U/L   AST    Collection Time: 05/13/20  4:32 PM   Result Value Ref Range    AST 22 0 - 45 U/L   Uric acid    Collection Time: 05/13/20  4:32 PM   Result Value Ref Range    Uric Acid 3.3 2.6 - 6.0 mg/dL   Creatinine    Collection Time: 05/13/20  4:32 PM   Result Value Ref Range    Creatinine 0.56 0.52 - 1.04 mg/dL    GFR Estimate >90 >60 mL/min/[1.73_m2]    GFR Estimate If Black >90 >60 mL/min/[1.73_m2]   HGB Eval Reflex to ELP or RBC Solubility    Collection Time: 05/13/20  4:32 PM   Result Value Ref Range    Hemoglobin A1 96.4 95.0 - 97.9 %    Hemoglobin A2 2.9 2.0 - 3.5 %    Hemoglobin F 0.7 0.0 - 2.1 %    Hemoglobin S Eval 0.0 0.0 - 0.0 %    Hemoglobin C 0.0 0.0 -  0.0 %    Hemoglobin E 0.0 0.0 - 0.0 %    Hemoglobin Other 0.0 0.0 - 0.0 %    HGB Abn Evaluation SEE NOTE     Sickle Cell Solubility Confirm Not Performed     Hemoglobin Capillary ELP Not Performed    Protein  random urine with Creat Ratio    Collection Time: 05/15/20 12:01 PM   Result Value Ref Range    Protein Random Urine 0.19 g/L    Protein Total Urine g/gr Creatinine 0.08 0 - 0.2 g/g Cr   Creatinine urine calculation only    Collection Time: 05/15/20 12:01 PM   Result Value Ref Range    Creatinine Urine 251 mg/dL   Non Invasive Prenatal Test Cell Free DNA    Collection Time: 05/27/20 10:01 AM   Result Value Ref Range    Lab Scanned Result NON INVAS PRENATAL DNA-Scanned      CMP  Recent Labs   Lab Test 05/13/20  1632 07/23/19  1509 09/14/18  1609   NA  --  137 140   POTASSIUM  --  3.6 3.8   CHLORIDE  --  104 108   CO2  --  27 24   ANIONGAP  --  5 9   GLC  --  85 104*   BUN  --  10 14   CR 0.56 0.83 0.90   GFRESTIMATED >90 >90 73   GFRESTBLACK >90 >90 89   COLT  --  9.6 8.8   PROTTOTAL  --  8.3 7.6   ALBUMIN  --  4.5 4.1   BILITOTAL  --  0.7 0.4   ALKPHOS  --  71 75   AST 22 18 17   ALT 32 22 18     CBC  Recent Labs   Lab Test 05/13/20  1632   HGB 14.0   WBC 12.0*   RBC 4.66   HCT 40.9   MCV 88   MCH 30.0   MCHC 34.2   RDW 11.8        INRNo lab results found.  ABGNo lab results found.   URINE STUDIES  No lab results found.  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  The repeat thest 2 days later revealed a normal UPCR. THe urine creatinine on the first sample is very low (and likely erroneous?).  We will obtain a full UA and repeat the UPCR + UACR.  Her routine serologies for pregnancy are unrevealing.  Her ROS is not suggestive of a systemic inflammatory disease (e.g.SLE).  We will pursue a more extensive serologic workup depending on the results of the urine  tests.    2- HTN.  Dr Rodriguez will try to obtain copy of her med records from [place] to review what work up she has had for HTN.  Her risk factors are her weight and ? Family history (unclear how old her mother was when she developed HTN).   Her BP is under acceptable control on labetalol only - for pregnancy.  Dr Rodriguez also recognizes that anxiety is likely is significant contributor for her HTN.     At this point, and barring any unanticipated lab abnormalities, I will obtain a renal ultrasound as initial evaluation for HTN.  She will page me next week after obtaining the urine tests.      Jerome Vargas MD  Division of Renal Disease and Hypertension  June 4, 2020  3:34 PM

## 2020-06-04 NOTE — TELEPHONE ENCOUNTER
June 4, 2020    Left  for Annaliisa stating that NIPT result is available for review. Requested call back to discuss per patient request.     Laura Vance MS, [facility]  Maternal Fetal Medicine  [facility]  Ph: [phone]  [email or web address]

## 2020-06-04 NOTE — PROGRESS NOTES
"Gabriele Rodriguez is a 34 year old female who is being evaluated via a billable video visit.      The patient has been notified of following:     \"This video visit will be conducted via a call between you and your physician/provider. We have found that certain health care needs can be provided without the need for an in-person physical exam.  This service lets us provide the care you need with a video conversation.  If a prescription is necessary we can send it directly to your pharmacy.  If lab work is needed we can place an order for that and you can then stop by our lab to have the test done at a later time.    Video visits are billed at different rates depending on your insurance coverage.  Please reach out to your insurance provider with any questions.    If during the course of the call the physician/provider feels a video visit is not appropriate, you will not be charged for this service.\"    Patient has given verbal consent for Video visit? Yes    How would you like to obtain your AVS? MyChart     Patient would like the video invitation sent by: Text to cell phone: [phone]    Will anyone else be joining your video visit? No        Video-Visit Details    Type of service:  Video Visit    Video Start Time: 3:33 PM  Video End Time: 4:05    Originating Location (pt. Location): Home    Distant Location (provider location):  [facility] NEPHROLOGY     Platform used for Video Visit: Bart Vargas MD        "

## 2020-06-04 NOTE — TELEPHONE ENCOUNTER
June 4, 2020  I spoke with Gabriele and her partner regarding her NIPT results.     Results indicate NO ANEUPLOIDY DETECTED for chromosomes 21, 18, 13, or sex chromosomes (XX).     This puts her current pregnancy at low risk for Down syndrome, trisomy 18, trisomy 13 and sex chromosome abnormalities. This test is reported to have the following sensitivities: Down syndrome- 99%, trisomy 18- 98%, and trisomy 13- 98%. Although these results are reassuring, this does not replace a standard chromosome analysis from a chorionic villus sampling or amniocentesis.     MSAFP is the appropriate second trimester screening test for open neural tube defects; the maternal quad screen is not recommended.    Her results are available in her Epic chart for her primary OB to review.     Laura Vance MS, [facility]  Genetic Counselor  Phone: [phone]  Pager: [phone]

## 2020-06-05 ENCOUNTER — TELEPHONE (OUTPATIENT)
Dept: NEPHROLOGY | Facility: CLINIC | Age: 35
End: 2020-06-05

## 2020-06-08 DIAGNOSIS — O09.90 HIGH-RISK PREGNANCY, UNSPECIFIED TRIMESTER: ICD-10-CM

## 2020-06-08 LAB
ALBUMIN UR-MCNC: NEGATIVE MG/DL
APPEARANCE UR: CLEAR
BILIRUB UR QL STRIP: NEGATIVE
COLOR UR AUTO: YELLOW
CREAT UR-MCNC: 82 MG/DL
GLUCOSE UR STRIP-MCNC: NEGATIVE MG/DL
HGB UR QL STRIP: NEGATIVE
KETONES UR STRIP-MCNC: 40 MG/DL
LEUKOCYTE ESTERASE UR QL STRIP: NEGATIVE
MICROALBUMIN UR-MCNC: <5 MG/L
MICROALBUMIN/CREAT UR: NORMAL MG/G CR (ref 0–25)
MUCOUS THREADS #/AREA URNS LPF: PRESENT /LPF
NITRATE UR QL: NEGATIVE
PH UR STRIP: 6 PH (ref 5–7)
PROT UR-MCNC: 0.07 G/L
PROT/CREAT 24H UR: 0.08 G/G CR (ref 0–0.2)
RBC #/AREA URNS AUTO: 4 /HPF (ref 0–2)
SOURCE: ABNORMAL
SP GR UR STRIP: 1.01 (ref 1–1.03)
SQUAMOUS #/AREA URNS AUTO: 3 /HPF (ref 0–1)
UROBILINOGEN UR STRIP-MCNC: NORMAL MG/DL (ref 0–2)
WBC #/AREA URNS AUTO: 0 /HPF (ref 0–5)

## 2020-06-08 PROCEDURE — 82043 UR ALBUMIN QUANTITATIVE: CPT | Performed by: INTERNAL MEDICINE

## 2020-06-08 PROCEDURE — 84156 ASSAY OF PROTEIN URINE: CPT | Performed by: INTERNAL MEDICINE

## 2020-06-08 PROCEDURE — 81001 URINALYSIS AUTO W/SCOPE: CPT | Performed by: INTERNAL MEDICINE

## 2020-06-09 ENCOUNTER — OFFICE VISIT (OUTPATIENT)
Dept: OBGYN | Facility: CLINIC | Age: 35
End: 2020-06-09
Attending: ADVANCED PRACTICE MIDWIFE
Payer: COMMERCIAL

## 2020-06-09 ENCOUNTER — APPOINTMENT (OUTPATIENT)
Dept: LAB | Facility: CLINIC | Age: 35
End: 2020-06-09
Attending: ADVANCED PRACTICE MIDWIFE
Payer: COMMERCIAL

## 2020-06-09 VITALS
DIASTOLIC BLOOD PRESSURE: 77 MMHG | SYSTOLIC BLOOD PRESSURE: 121 MMHG | BODY MASS INDEX: 29.76 KG/M2 | WEIGHT: 168 LBS | HEART RATE: 93 BPM

## 2020-06-09 DIAGNOSIS — O09.92 SUPERVISION OF HIGH RISK PREGNANCY IN SECOND TRIMESTER: Primary | ICD-10-CM

## 2020-06-09 LAB — GLUCOSE 1H P 50 G GLC PO SERPL-MCNC: 110 MG/DL (ref 60–129)

## 2020-06-09 PROCEDURE — 87491 CHLMYD TRACH DNA AMP PROBE: CPT | Performed by: ADVANCED PRACTICE MIDWIFE

## 2020-06-09 PROCEDURE — G0463 HOSPITAL OUTPT CLINIC VISIT: HCPCS | Mod: ZF

## 2020-06-09 PROCEDURE — 87591 N.GONORRHOEAE DNA AMP PROB: CPT | Performed by: ADVANCED PRACTICE MIDWIFE

## 2020-06-09 PROCEDURE — 82950 GLUCOSE TEST: CPT | Performed by: ADVANCED PRACTICE MIDWIFE

## 2020-06-09 PROCEDURE — 36415 COLL VENOUS BLD VENIPUNCTURE: CPT | Performed by: ADVANCED PRACTICE MIDWIFE

## 2020-06-09 ASSESSMENT — PATIENT HEALTH QUESTIONNAIRE - PHQ9
SUM OF ALL RESPONSES TO PHQ QUESTIONS 1-9: 2
5. POOR APPETITE OR OVEREATING: NOT AT ALL

## 2020-06-09 ASSESSMENT — ANXIETY QUESTIONNAIRES
7. FEELING AFRAID AS IF SOMETHING AWFUL MIGHT HAPPEN: NOT AT ALL
GAD7 TOTAL SCORE: 2
3. WORRYING TOO MUCH ABOUT DIFFERENT THINGS: SEVERAL DAYS
2. NOT BEING ABLE TO STOP OR CONTROL WORRYING: NOT AT ALL
1. FEELING NERVOUS, ANXIOUS, OR ON EDGE: SEVERAL DAYS
6. BECOMING EASILY ANNOYED OR IRRITABLE: NOT AT ALL
5. BEING SO RESTLESS THAT IT IS HARD TO SIT STILL: NOT AT ALL

## 2020-06-09 ASSESSMENT — PAIN SCALES - GENERAL: PAINLEVEL: NO PAIN (0)

## 2020-06-09 NOTE — PROGRESS NOTES
Subjective:  Gabriele is a 34 year old female who presents to clinic for a new OB visit.   at 13w1d with Estimated Date of Delivery: Dec 14, 2020 based on 9 week US. Feels well. Has started PNV and low-dose aspirin.  She has not had bleeding since her LMP.   She has had mild nausea, which has mostly improved. Weight loss has not occurred.   This was a planned pregnancy.   Partner is involved,  Pedro.   OTHER CONCERNS:    - Reports able to cut down on formalin exposure at work, will have minimal contact while on 3-week call during pregnancy   - Vit D 29, will begin to take vitamin D supplements at home   - Had nephrology consult d/t proteinuria    ===========================================  ROS: 10 point ROS neg other than the symptoms noted above in the HPI.    PSYCHIATRIC: Mild anxiety.  Has History of mood changes  PHQ-9 score:    PHQ-9 SCORE 2020   PHQ-9 Total Score 2     ALVIN-7 SCORE 2020 3/11/2020 2020   Total Score 2 (minimal anxiety) 3 (minimal anxiety) -   Total Score 2 3 2     Past History:  Her past medical history   Past Medical History:   Diagnosis Date     ADHD      Anxiety      Chronic sinusitis      Depressive disorder      Hypertension      This is her first pregnancy  Since her last LMP she denies use of alcohol, tobacco and street drugs.  HISTORY:  Family History   Problem Relation Age of Onset     Hypertension Mother      Breast Cancer Mother         ductal stage 2, HER-2 negative, (+) estrogen/progesterone     No Known Problems Father      No Known Problems Sister      Pancreatic Cancer Maternal Grandfather      Brain Cancer Paternal Aunt      Social History     Socioeconomic History     Marital status:      Spouse name: Pedro     Number of children: Not on file     Years of education: Not on file     Highest education level: Not on file   Occupational History     Occupation: Medical resident     Comment: pathology   Social Needs     Financial resource strain: Not on file      Food insecurity     Worry: Not on file     Inability: Not on file     Transportation needs     Medical: Not on file     Non-medical: Not on file   Tobacco Use     Smoking status: Never Smoker     Smokeless tobacco: Never Used   Substance and Sexual Activity     Alcohol use: No     Drug use: No     Sexual activity: Yes     Partners: Male   Lifestyle     Physical activity     Days per week: Not on file     Minutes per session: Not on file     Stress: Not on file   Relationships     Social connections     Talks on phone: Not on file     Gets together: Not on file     Attends Yazdanism service: Not on file     Active member of club or organization: Not on file     Attends meetings of clubs or organizations: Not on file     Relationship status: Not on file     Intimate partner violence     Fear of current or ex partner: Not on file     Emotionally abused: Not on file     Physically abused: Not on file     Forced sexual activity: Not on file   Other Topics Concern     Not on file   Social History Narrative    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     labetalol (NORMODYNE) 200 MG tablet Take 1 tablet (200 mg) by mouth 2 times daily     Prenatal Vit-Fe Fumarate-FA (PRENATAL MULTIVITAMIN W/IRON) 27-0.8 MG tablet Take 1 tablet by mouth daily     Current Facility-Administered Medications   Medication     erythromycin (ROMYCIN) ophthalmic ointment     Allergies   Allergen Reactions     Nickel        ============================================  MEDICAL HISTORY  Past Medical History:   Diagnosis Date     ADHD      Anxiety      Chronic sinusitis      Depressive disorder      Hypertension      Past Surgical History:   Procedure Laterality Date     BIOPSY NAIL (LOCATION)      left hand, 4th digit       OB History    Para Term  AB Living   1 0 0 0 0 0   SAB TAB Ectopic Multiple  Live Births   0 0 0 0 0      # Outcome Date GA Lbr Jelani/2nd Weight Sex Delivery Anes PTL Lv   1 Current              GYN History- Denies Abnormal Pap Smears                        Cervical procedures: denies                        History of STI: denies    I personally reviewed the past social/family/medical and surgical history on the date of service.   I reviewed lab work done at Intake visit with patient.    EXAM:  /77   Pulse 93   Wt 76.2 kg (168 lb)   LMP 2020   Breastfeeding No   BMI 29.76 kg/m     EXAM:  GENERAL:  Pleasant pregnant female, alert, cooperative and well groomed.  SKIN:  Warm and dry, without lesions or rashes  HEAD: Symmetrical features.  MOUTH:  Buccal mucosa pink, moist without lesions.  Teeth in good repair.    NECK:  Thyroid without enlargement and nodules.  Lymph nodes not palpable.   LUNGS:  Clear to auscultation.  BREAST:    No dominant, fixed or suspicious masses are noted.  No skin or nipple changes or axillary nodes.   Nipples everted.      HEART:  RRR without murmur.  ABDOMEN: Soft without masses , tenderness or organomegaly.  No CVA tenderness.  Uterus not palpable appropriate for dates.  No scars noted.. Fetal heart tones present.  MUSCULOSKELETAL:  Full range of motion  EXTREMITIES:  No edema. No significant varicosities.   PELVIC EXAM: Deferred  WET PREP:Not done  GC/CHLAMYDIA CULTURE OBTAINED:YES    Lab Results   Component Value Date    PAP NIL 2019      ASSESSMENT:  34 year old , 13w1d weeks of pregnancy with TADEO of Dec 14, 2020 by    Genetic Screening: neg NIPT. Level II scheduled.    ICD-10-CM    1. Supervision of high risk pregnancy in second trimester  O09.92 Chlamydia trachomatis PCR     Neisseria gonorrhoeae PCR     Glucose tolerance gest screen 1 hour     PLAN:  - Reviewed use of triage nurse line and contacting the on-call provider after hours for an urgent need such as fever, vagina bleeding, bladder or vaginal infection, rupture of  membranes,  or term labor.    - Reviewed best evidence for: weight gain for her weight and height for pregnancy:  Based on pre-pregnancy Body mass index is 29.76 kg/m . RECOMMENDED WEIGHT GAIN: 11-20 lbs.  - Reviewed healthy diet and foods to avoid, exercise and activity during pregnancy; avoiding exposure to toxoplasmosis; and maintenance of a generally healthy lifestyle.   - Discussed the harms, benefits, side effects and alternative therapies for current prescribed and OTC medications.  - Reviewed high risk for gestational diabetes d/t Pre pregnancy BMI>30, IS agreeable to early 1 hour today.  - Reviewed High risk for Pre Eclampsia d/t Chronic hypertension - has to started 81mg aspirin daily after 12 weeks .  - All pt's questions discussed and answered.  Pt verbalized understanding of and agreement to plan of care.   - Continue scheduled prenatal care, RTC in 4-5 weeks and prn if questions or concerns    DORA Cardenas CNM

## 2020-06-09 NOTE — LETTER
2020       RE: Gabriele Rodriguez  [address] [facility] [address]     Dear Colleague,    Thank you for referring your patient, Gabriele Rodriguez, to the [facility] at [facility]. Please see a copy of my visit note below.    Subjective:  Gabriele is a 34 year old female who presents to clinic for a new OB visit.   at 13w1d with Estimated Date of Delivery: Dec 14, 2020 based on 9 week US. Feels well. Has started PNV and low-dose aspirin.  She has not had bleeding since her LMP.   She has had mild nausea, which has mostly improved. Weight loss has not occurred.   This was a planned pregnancy.   Partner is involved,  Pedro.   OTHER CONCERNS:    - Reports able to cut down on formalin exposure at work, will have minimal contact while on 3-week call during pregnancy   - Vit D 29, will begin to take vitamin D supplements at home   - Had nephrology consult d/t proteinuria    ===========================================  ROS: 10 point ROS neg other than the symptoms noted above in the HPI.    PSYCHIATRIC: Mild anxiety.  Has History of mood changes  PHQ-9 score:    PHQ-9 SCORE 2020   PHQ-9 Total Score 2     ALVIN-7 SCORE 2020 3/11/2020 2020   Total Score 2 (minimal anxiety) 3 (minimal anxiety) -   Total Score 2 3 2     Past History:  Her past medical history   Past Medical History:   Diagnosis Date     ADHD      Anxiety      Chronic sinusitis      Depressive disorder      Hypertension      This is her first pregnancy  Since her last LMP she denies use of alcohol, tobacco and street drugs.  HISTORY:  Family History   Problem Relation Age of Onset     Hypertension Mother      Breast Cancer Mother         ductal stage 2, HER-2 negative, (+) estrogen/progesterone     No Known Problems Father      No Known Problems Sister      Pancreatic Cancer Maternal Grandfather      Brain Cancer Paternal Aunt      Social History     Socioeconomic History     Marital  status:      Spouse name: Pedro     Number of children: Not on file     Years of education: Not on file     Highest education level: Not on file   Occupational History     Occupation: Medical resident     Comment: pathology   Social Needs     Financial resource strain: Not on file     Food insecurity     Worry: Not on file     Inability: Not on file     Transportation needs     Medical: Not on file     Non-medical: Not on file   Tobacco Use     Smoking status: Never Smoker     Smokeless tobacco: Never Used   Substance and Sexual Activity     Alcohol use: No     Drug use: No     Sexual activity: Yes     Partners: Male   Lifestyle     Physical activity     Days per week: Not on file     Minutes per session: Not on file     Stress: Not on file   Relationships     Social connections     Talks on phone: Not on file     Gets together: Not on file     Attends Confucianism service: Not on file     Active member of club or organization: Not on file     Attends meetings of clubs or organizations: Not on file     Relationship status: Not on file     Intimate partner violence     Fear of current or ex partner: Not on file     Emotionally abused: Not on file     Physically abused: Not on file     Forced sexual activity: Not on file   Other Topics Concern     Not on file   Social History Narrative    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     labetalol (NORMODYNE) 200 MG tablet Take 1 tablet (200 mg) by mouth 2 times daily     Prenatal Vit-Fe Fumarate-FA (PRENATAL MULTIVITAMIN W/IRON) 27-0.8 MG tablet Take 1 tablet by mouth daily     Current Facility-Administered Medications   Medication     erythromycin (ROMYCIN) ophthalmic ointment     Allergies   Allergen Reactions     Nickel        ============================================  MEDICAL HISTORY  Past Medical History:   Diagnosis Date      ADHD      Anxiety      Chronic sinusitis      Depressive disorder      Hypertension      Past Surgical History:   Procedure Laterality Date     BIOPSY NAIL (LOCATION)      left hand, 4th digit       OB History    Para Term  AB Living   1 0 0 0 0 0   SAB TAB Ectopic Multiple Live Births   0 0 0 0 0      # Outcome Date GA Lbr Jelani/2nd Weight Sex Delivery Anes PTL Lv   1 Current              GYN History- Denies Abnormal Pap Smears                        Cervical procedures: denies                        History of STI: denies    I personally reviewed the past social/family/medical and surgical history on the date of service.   I reviewed lab work done at Intake visit with patient.    EXAM:  /77   Pulse 93   Wt 76.2 kg (168 lb)   LMP 2020   Breastfeeding No   BMI 29.76 kg/m     EXAM:  GENERAL:  Pleasant pregnant female, alert, cooperative and well groomed.  SKIN:  Warm and dry, without lesions or rashes  HEAD: Symmetrical features.  MOUTH:  Buccal mucosa pink, moist without lesions.  Teeth in good repair.    NECK:  Thyroid without enlargement and nodules.  Lymph nodes not palpable.   LUNGS:  Clear to auscultation.  BREAST:    No dominant, fixed or suspicious masses are noted.  No skin or nipple changes or axillary nodes.   Nipples everted.      HEART:  RRR without murmur.  ABDOMEN: Soft without masses , tenderness or organomegaly.  No CVA tenderness.  Uterus not palpable appropriate for dates.  No scars noted.. Fetal heart tones present.  MUSCULOSKELETAL:  Full range of motion  EXTREMITIES:  No edema. No significant varicosities.   PELVIC EXAM: Deferred  WET PREP:Not done  GC/CHLAMYDIA CULTURE OBTAINED:YES    Lab Results   Component Value Date    PAP NIL 2019      ASSESSMENT:  34 year old , 13w1d weeks of pregnancy with TADEO of Dec 14, 2020 by    Genetic Screening: neg NIPT. Level II scheduled.    ICD-10-CM    1. Supervision of high risk pregnancy in second trimester  O09.92  Chlamydia trachomatis PCR     Neisseria gonorrhoeae PCR     Glucose tolerance gest screen 1 hour     PLAN:  - Reviewed use of triage nurse line and contacting the on-call provider after hours for an urgent need such as fever, vagina bleeding, bladder or vaginal infection, rupture of membranes,  or term labor.    - Reviewed best evidence for: weight gain for her weight and height for pregnancy:  Based on pre-pregnancy Body mass index is 29.76 kg/m . RECOMMENDED WEIGHT GAIN: 11-20 lbs.  - Reviewed healthy diet and foods to avoid, exercise and activity during pregnancy; avoiding exposure to toxoplasmosis; and maintenance of a generally healthy lifestyle.   - Discussed the harms, benefits, side effects and alternative therapies for current prescribed and OTC medications.  - Reviewed high risk for gestational diabetes d/t Pre pregnancy BMI>30, IS agreeable to early 1 hour today.  - Reviewed High risk for Pre Eclampsia d/t Chronic hypertension - has to started 81mg aspirin daily after 12 weeks .  - All pt's questions discussed and answered.  Pt verbalized understanding of and agreement to plan of care.   - Continue scheduled prenatal care, RTC in 4-5 weeks and prn if questions or concerns    DORA Cardenas CNM

## 2020-06-10 ENCOUNTER — VIRTUAL VISIT (OUTPATIENT)
Dept: FAMILY MEDICINE | Facility: CLINIC | Age: 35
End: 2020-06-10
Payer: COMMERCIAL

## 2020-06-10 DIAGNOSIS — I10 ESSENTIAL HYPERTENSION: Primary | ICD-10-CM

## 2020-06-10 DIAGNOSIS — R80.0 ISOLATED PROTEINURIA WITHOUT SPECIFIC MORPHOLOGIC LESION: ICD-10-CM

## 2020-06-10 DIAGNOSIS — B07.0 PLANTAR WARTS: ICD-10-CM

## 2020-06-10 LAB
C TRACH DNA SPEC QL NAA+PROBE: NEGATIVE
N GONORRHOEA DNA SPEC QL NAA+PROBE: NEGATIVE
SPECIMEN SOURCE: NORMAL
SPECIMEN SOURCE: NORMAL

## 2020-06-10 ASSESSMENT — ANXIETY QUESTIONNAIRES: GAD7 TOTAL SCORE: 2

## 2020-06-10 NOTE — PROGRESS NOTES
"Gabriele Rodriguez is a 34 year old female who is being evaluated via a billable video visit.      The patient has been notified of following:     \"This video visit will be conducted via a call between you and your physician/provider. We have found that certain health care needs can be provided without the need for an in-person physical exam.  This service lets us provide the care you need with a video conversation.  If a prescription is necessary we can send it directly to your pharmacy.  If lab work is needed we can place an order for that and you can then stop by our lab to have the test done at a later time.    Video visits are billed at different rates depending on your insurance coverage.  Please reach out to your insurance provider with any questions.    If during the course of the call the physician/provider feels a video visit is not appropriate, you will not be charged for this service.\"    Patient has given verbal consent for Video visit? Yes    How would you like to obtain your AVS? Kavitha    Patient would like the video invitation sent by: Text to cell phone: [phone]    Will anyone else be joining your video visit? No      Subjective     Gabriele Rodriguez is a 34 year old female who presents today via video visit for the following health issues:  Pregnant-TADEO 12/14/2020.  She has stopped the Vyvanse due to Pregnancy. She is ok with not being as productive. She does not have the workload she usually has, so this has been ok.   She had some protein in her urine and is seeing Nephrology for this.   She would like to see Dermatology for a wart on her left foot.   HPI    Video Start Time: 1128    Reviewed and updated as needed this visit by Provider       Review of Systems   Constitutional, HEENT, cardiovascular, pulmonary, gi and gu systems are negative, except as otherwise noted.      Objective    LMP 03/02/2020   Estimated body mass index is 29.76 kg/m  as calculated from the following:    Height as of " "5/13/20: 1.6 m (5' 3\").    Weight as of 6/9/20: 76.2 kg (168 lb).  Physical Exam  Constitutional:       Appearance: Normal appearance.   HENT:      Head: Normocephalic.   Neurological:      General: No focal deficit present.      Mental Status: She is alert and oriented to person, place, and time.   Psychiatric:         Behavior: Behavior normal.          Assessment & Plan       ICD-10-CM    1. Essential hypertension  I10    2. Isolated proteinuria without specific morphologic lesion  R80.0    3. Plantar warts  B07.0 DERMATOLOGY REFERRAL        BMI:   Estimated body mass index is 29.76 kg/m  as calculated from the following:    Height as of 5/13/20: 1.6 m (5' 3\").    Weight as of 6/9/20: 76.2 kg (168 lb).     Return if symptoms worsen or fail to improve.    Video-Visit Details    Type of service:  Video Visit    Video End Time:1139    Originating Location (pt. Location): Home    Distant Location (provider location):  [facility] clinic    Platform used for Video Visit: Keren    Return if symptoms worsen or fail to improve.     Renal US in a few weeks ordered by Nephrology.   Qi is feeling well, nausea has improved. She will follow up with our clinic as needed. OB will manage pregnancy. She verbalizes understanding of the plan.   DORA Parry, CNP          "

## 2020-06-10 NOTE — PATIENT INSTRUCTIONS

## 2020-06-16 ENCOUNTER — MYC REFILL (OUTPATIENT)
Dept: FAMILY MEDICINE | Facility: CLINIC | Age: 35
End: 2020-06-16

## 2020-06-16 DIAGNOSIS — I10 ESSENTIAL HYPERTENSION: ICD-10-CM

## 2020-06-16 RX ORDER — LABETALOL 200 MG/1
200 TABLET, FILM COATED ORAL 2 TIMES DAILY
Qty: 60 TABLET | Refills: 3 | Status: SHIPPED | OUTPATIENT
Start: 2020-06-16 | End: 2020-07-13

## 2020-06-23 ENCOUNTER — ANCILLARY PROCEDURE (OUTPATIENT)
Dept: ULTRASOUND IMAGING | Facility: CLINIC | Age: 35
End: 2020-06-23
Attending: INTERNAL MEDICINE
Payer: COMMERCIAL

## 2020-06-23 DIAGNOSIS — O09.90 HIGH-RISK PREGNANCY, UNSPECIFIED TRIMESTER: ICD-10-CM

## 2020-06-23 DIAGNOSIS — I10 HYPERTENSION, UNSPECIFIED TYPE: ICD-10-CM

## 2020-06-25 ENCOUNTER — VIRTUAL VISIT (OUTPATIENT)
Dept: NEPHROLOGY | Facility: CLINIC | Age: 35
End: 2020-06-25
Attending: INTERNAL MEDICINE
Payer: COMMERCIAL

## 2020-06-25 DIAGNOSIS — R80.9 PROTEINURIA, UNSPECIFIED TYPE: Primary | ICD-10-CM

## 2020-06-25 DIAGNOSIS — I10 ESSENTIAL HYPERTENSION: ICD-10-CM

## 2020-06-25 ASSESSMENT — PAIN SCALES - GENERAL: PAINLEVEL: NO PAIN (0)

## 2020-06-25 NOTE — PROGRESS NOTES
"Gabriele Rodriguez is a 34 year old female who is being evaluated via a billable video visit.      The patient has been notified of following:     \"This video visit will be conducted via a call between you and your physician/provider. We have found that certain health care needs can be provided without the need for an in-person physical exam.  This service lets us provide the care you need with a video conversation.  If a prescription is necessary we can send it directly to your pharmacy.  If lab work is needed we can place an order for that and you can then stop by our lab to have the test done at a later time.    Video visits are billed at different rates depending on your insurance coverage.  Please reach out to your insurance provider with any questions.    If during the course of the call the physician/provider feels a video visit is not appropriate, you will not be charged for this service.\"    Patient has given verbal consent for Video visit? Yes    Will anyone else be joining your video visit? No      Video-Visit Details    Type of service:  Video Visit    Video Start Time: 9:30  Video End Time: 9:41 AM    Originating Location (pt. Location): Other work/office    Distant Location (provider location):   [facility]     Platform used for Video Visit: Keren Vargas MD          Nephrology Clinic    Gabriele Rodriguez MRN:[id number] YOB: 1985  Date of Service: 06/25/2020  Primary care provider: Shana Sharif  Requesting physician: Rob Nieves       REASON FOR CONSULT: Proteinuria and hypertension in pregnancy    HISTORY OF PRESENT ILLNESS:   Gabriele Rodriguez is a 34 year old female who presents for evaluation of proteinuria and hypertension in pregnancy.     Ms Rodriguez is currently 32 weeks pregnant, on her first pregnancy.  She reports feeling fair other than for some am nausea, without vomiting.  The nausea is is getting better.  She has had HTN for at least 4 years, and was " first diagnosed during Med School. She does not recall exactly what work up for HTN she has had while she was at [facility]. She does not recall undergoing a renal ultrasound.  (She does not currently have her med records).  She was initially treated with metoprolol, then switched to nifedipine which was stopped because of L Ext swelling.  She was then switched to lisinopril + hydrochlorothiazide.  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 She has since been switched to labetalol.  On review of systems, she reports occasional mild epistaxis, but the ROS is otherwise negative for symptoms of systemic disease.  She has had HgbA1c testing in the past which were reportedly normal    Interestingly, the first UPCR was obtained after she drinking a large volume of water.      June 25, 2020   Rodriguez feels generally well.  She is now 15w3d into her pregnancy. Reports that morning sickness is resolved.   She is not checking her BP at home.  Her last check was on 6/9/2020.  Reports no acute complaints.     PAST MEDICAL HISTORY:  Reviewed with the patient  Past Medical History:   Diagnosis Date     ADHD      Anxiety      Chronic sinusitis      Depressive disorder      Hypertension      PAST SURGICAL HISTORY:  Past Surgical History:   Procedure Laterality Date     BIOPSY NAIL (LOCATION)      left hand, 4th digit     MEDICATIONS:  Prescription Medications as of June 25, 2020        Rx Number Disp Refills Start End Last Dispensed Date Next Fill Date Owning Pharmacy    aspirin (ASA) 81 MG EC tablet  90 tablet 3 5/13/2020    [facility] [id number] [place] [address]    Sig: Take 1 tablet (81 mg) by mouth daily , begin at 12 weeks pregnant.    Class: E-Prescribe    Route: Oral    labetalol (NORMODYNE) 200 MG tablet  60 tablet 3 4/13/2020    [facility] [id number] [place] [address]    Sig: Take 1 tablet (200 mg) by  mouth 2 times daily    Class: E-Prescribe    Route: Oral    Prenatal Vit-Fe Fumarate-FA (PRENATAL MULTIVITAMIN W/IRON) 27-0.8 MG tablet  100 tablet 3 4/13/2020    [facility] [id number] [place] [facility] [place] - [address] [facility]    Sig: Take 1 tablet by mouth daily    Class: E-Prescribe    Route: Oral      ALLERGIES:    Allergies   Allergen Reactions     Nickel      REVIEW OF SYSTEMS:  Review Of Systems  A comprehensive review of systems was performed and found to be negative except as described here or above.  SOCIAL HISTORY:   Social History     Socioeconomic History     Marital status:      Spouse name: Pedro     Number of children: Not on file     Years of education: Not on file     Highest education level: Not on file   Occupational History     Occupation: Medical resident     Comment: pathology   Social Needs     Financial resource strain: Not on file     Food insecurity     Worry: Not on file     Inability: Not on file     Transportation needs     Medical: Not on file     Non-medical: Not on file   Tobacco Use     Smoking status: Never Smoker     Smokeless tobacco: Never Used   Substance and Sexual Activity     Alcohol use: No     Drug use: No     Sexual activity: Yes     Partners: Male   Lifestyle     Physical activity     Days per week: Not on file     Minutes per session: Not on file     Stress: Not on file   Relationships     Social connections     Talks on phone: Not on file     Gets together: Not on file     Attends Mormon service: Not on file     Active member of club or organization: Not on file     Attends meetings of clubs or organizations: Not on file     Relationship status: Not on file     Intimate partner violence     Fear of current or ex partner: Not on file     Emotionally abused: Not on file     Physically abused: Not on file     Forced sexual activity: Not on file   Other Topics Concern     Not on file   Social History Narrative    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  Height 5'3\"/.    BMI 29.2  GENERAL APPEARANCE: alert and no distress  EYES: nonicteric  RESP: breathing non labored   SKIN: no facial rash  NEURO: mentation intact and speech normal  PSYCH: affect normal/bright   LABS:   Recent Results (from the past 1008 hour(s))   Protein  random urine with Creat Ratio    Collection Time: 05/15/20 12:01 PM   Result Value Ref Range    Protein Random Urine 0.19 g/L    Protein Total Urine g/gr Creatinine 0.08 0 - 0.2 g/g Cr   Creatinine urine calculation only    Collection Time: 05/15/20 12:01 PM   Result Value Ref Range    Creatinine Urine 251 mg/dL   Non Invasive Prenatal Test Cell Free DNA    Collection Time: 05/27/20 10:01 AM   Result Value Ref Range    Lab Scanned Result NON INVAS PRENATAL DNA-Scanned    Routine UA with microscopic    Collection Time: 06/08/20  6:20 AM   Result Value Ref Range    Color Urine Yellow     Appearance Urine Clear     Glucose Urine Negative NEG^Negative mg/dL    Bilirubin Urine Negative NEG^Negative    Ketones Urine 40 (A) NEG^Negative mg/dL    Specific Gravity Urine 1.014 1.003 - 1.035    Blood Urine Negative NEG^Negative    pH Urine 6.0 5.0 - 7.0 pH    Protein Albumin Urine Negative NEG^Negative mg/dL    Urobilinogen mg/dL Normal 0.0 - 2.0 mg/dL    Nitrite Urine Negative NEG^Negative    Leukocyte Esterase Urine Negative NEG^Negative    Source Midstream Urine     WBC Urine 0 0 - 5 /HPF    RBC Urine 4 (H) 0 - 2 /HPF    Squamous Epithelial /HPF Urine 3 (H) 0 - 1 /HPF    Mucous Urine Present (A) NEG^Negative /LPF   Protein  random " urine with Creat Ratio    Collection Time: 06/08/20  6:20 AM   Result Value Ref Range    Protein Random Urine 0.07 g/L    Protein Total Urine g/gr Creatinine 0.08 0 - 0.2 g/g Cr   Albumin Random Urine Quantitative with Creat Ratio    Collection Time: 06/08/20  6:20 AM   Result Value Ref Range    Creatinine Urine 82 mg/dL    Albumin Urine mg/L <5 mg/L    Albumin Urine mg/g Cr Unable to calculate due to low value 0 - 25 mg/g Cr   Chlamydia trachomatis PCR    Collection Time: 06/09/20  8:00 AM    Specimen: Urine   Result Value Ref Range    Specimen Description Urine     Chlamydia Trachomatis PCR Negative NEG^Negative   Neisseria gonorrhoeae PCR    Collection Time: 06/09/20  8:00 AM    Specimen: Urine   Result Value Ref Range    Specimen Descrip Urine     N Gonorrhea PCR Negative NEG^Negative   Glucose tolerance gest screen 1 hour    Collection Time: 06/09/20  9:07 AM   Result Value Ref Range    Glu Gest Screen 1hr 50g 110 60 - 129 mg/dL     CMP  Recent Labs   Lab Test 05/13/20  1632 07/23/19  1509 09/14/18  1609   NA  --  137 140   POTASSIUM  --  3.6 3.8   CHLORIDE  --  104 108   CO2  --  27 24   ANIONGAP  --  5 9   GLC  --  85 104*   BUN  --  10 14   CR 0.56 0.83 0.90   GFRESTIMATED >90 >90 73   GFRESTBLACK >90 >90 89   COLT  --  9.6 8.8   PROTTOTAL  --  8.3 7.6   ALBUMIN  --  4.5 4.1   BILITOTAL  --  0.7 0.4   ALKPHOS  --  71 75   AST 22 18 17   ALT 32 22 18     CBC  Recent Labs   Lab Test 05/13/20  1632   HGB 14.0   WBC 12.0*   RBC 4.66   HCT 40.9   MCV 88   MCH 30.0   MCHC 34.2   RDW 11.8        INRNo lab results found.  ABGNo lab results found.   URINE STUDIES  Recent Labs   Lab Test 06/08/20 0620   COLOR Yellow   APPEARANCE Clear   URINEGLC Negative   URINEBILI Negative   URINEKETONE 40*   SG 1.014   UBLD Negative   URINEPH 6.0   PROTEIN Negative   NITRITE Negative   LEUKEST Negative   RBCU 4*   WBCU 0     Recent Labs   Lab Test 06/08/20  0620 05/15/20  1201 05/13/20  1629   UTPG 0.08 0.08 1.16*      EXAMINATION: US RENAL COMPLETE, 6/23/2020 12:49 PM      COMPARISON: None.     HISTORY: History of hypertension and young woman. Evaluate kidney size  and echogenicity.     FINDINGS:     Right kidney: Measures 10.9 cm cm in length. Parenchyma is of normal  thickness and echogenicity. No focal mass. No hydronephrosis.     Left kidney: Measures 11.5 cm cm in length. Parenchyma is of normal  thickness and echogenicity. No focal mass. No hydronephrosis.      Bladder: Decompressed.     Incidentally noted mildly increased echogenicity within the liver.  Anechoic avascular cystic-appearing lesion within the spleen measuring  1.2 x 1.0 x 1.0 cm. Single intrauterine pregnancy with a heart rate of  157 bpm.                                                                      IMPRESSION:  1.  Normal renal ultrasound.  2.  Incidentally noted  increased echogenicity throughout the liver  which is suggestive of hepatic steatosis.  3.  Single living intrauterine pregnancy.     I have personally reviewed the examination and initial interpretation  and I agree with the findings.     SNEHA KING, DOASSESSMENT AND PLAN:   Dr Rodriguez is referred for evaluation of proteinuria detected at 9 weeks gestation by UPCR.  The repeat thest 2 days later revealed a normal UPCR. THe urine creatinine on the first sample is very low (and likely erroneous?).  We will obtain a full UA and repeat the UPCR + UACR.  Her routine serologies for pregnancy are unrevealing.  Her ROS is not suggestive of a systemic inflammatory disease (e.g.SLE).  We will pursue a more extensive serologic workup depending on the results of the urine tests.  Her repeat UA, UPCR and UACR were all negative for proteinuria.  I therefore suspect that the result of 5/13/20 was spurious.  I recommended that she gets a UPCR when she goes for routine OB visits.     2- HTN.  Dr Rodriguez will try to obtain copy of her med records from [place] to review what work up she has had for HTN.   Her risk factors are her weight and ? Family history (unclear how old her mother was when she developed HTN).     Her last BP was under good control on labetalol only - for pregnancy.    We discussed that she should check her BP at home about once a week.     3- Abd ultrasound.  I reviewed with Dr Rodriguez the results of the renal and liver ultrasound, including the finding of increased liver echogenicity suggestive of steatosis.      We agreed to a follow up appointment in 3 months.  Dr Rodriguez will call me if she has any concerns in the interim     Jerome Vargas MD  Division of Renal Disease and Hypertension  June 25, 2020

## 2020-06-25 NOTE — LETTER
"6/25/2020       RE: Gabriele Rodriguez  [address] [facility] [address]     Dear Colleague,    Thank you for referring your patient, Gabriele Rodriguez, to the [facility] NEPHROLOGY at [facility]. Please see a copy of my visit note below.    Gabriele Rodriguez is a 34 year old female who is being evaluated via a billable video visit.      The patient has been notified of following:     \"This video visit will be conducted via a call between you and your physician/provider. We have found that certain health care needs can be provided without the need for an in-person physical exam.  This service lets us provide the care you need with a video conversation.  If a prescription is necessary we can send it directly to your pharmacy.  If lab work is needed we can place an order for that and you can then stop by our lab to have the test done at a later time.    Video visits are billed at different rates depending on your insurance coverage.  Please reach out to your insurance provider with any questions.    If during the course of the call the physician/provider feels a video visit is not appropriate, you will not be charged for this service.\"    Patient has given verbal consent for Video visit? Yes    Will anyone else be joining your video visit? No      Video-Visit Details    Type of service:  Video Visit    Video Start Time: 9:30  Video End Time: 9:41 AM    Originating Location (pt. Location): Other work/office    Distant Location (provider location):  [facility] NEPHROLOGY     Platform used for Video Visit: Keren Vargas MD          Nephrology Clinic    Gabriele Rodriguez MRN:[id number] YOB: 1985  Date of Service: 06/25/2020  Primary care provider: Shana Sharif  Requesting physician: Rob Nieves       REASON FOR CONSULT: Proteinuria and hypertension in pregnancy    HISTORY OF PRESENT ILLNESS:   Gabriele Rodriguez is a 34 year old female who presents for " evaluation of proteinuria and hypertension in pregnancy.     Ms Rodriguez is currently 32 weeks pregnant, on her first pregnancy.  She reports feeling fair other than for some am nausea, without vomiting.  The nausea is is getting better.  She has had HTN for at least 4 years, and was first diagnosed during Med School. She does not recall exactly what work up for HTN she has had while she was at [facility]. She does not recall undergoing a renal ultrasound.  (She does not currently have her med records).  She was initially treated with metoprolol, then switched to nifedipine which was stopped because of L Ext swelling.  She was then switched to lisinopril + hydrochlorothiazide.  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 She has since been switched to labetalol.  On review of systems, she reports occasional mild epistaxis, but the ROS is otherwise negative for symptoms of systemic disease.  She has had HgbA1c testing in the past which were reportedly normal    Interestingly, the first UPCR was obtained after she drinking a large volume of water.      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  She is now 15w3d into her pregnancy. Reports that morning sickness is resolved.   She is not checking her BP at home.  Her last check was on 6/9/2020.  Reports no acute complaints.     PAST MEDICAL HISTORY:  Reviewed with the patient  Past Medical History:   Diagnosis Date     ADHD      Anxiety      Chronic sinusitis      Depressive disorder      Hypertension      PAST SURGICAL HISTORY:  Past Surgical History:   Procedure Laterality Date     BIOPSY NAIL (LOCATION)      left hand, 4th digit     MEDICATIONS:  Prescription Medications as of June 25, 2020        Rx Number Disp Refills Start End Last Dispensed Date Next Fill Date Owning Pharmacy    aspirin (ASA) 81 MG EC tablet  90 tablet 3 5/13/2020    [facility] [id number] [place], [place] - [address]    Sig: Take 1 tablet (81 mg) by mouth daily , begin at 12 weeks pregnant.    Class: E-Prescribe    Route: Oral    labetalol (NORMODYNE) 200 MG tablet  60 tablet 3 4/13/2020    [facility] [id number] [place] [address]    Sig: Take 1 tablet (200 mg) by mouth 2 times daily    Class: E-Prescribe    Route: Oral    Prenatal Vit-Fe Fumarate-FA (PRENATAL MULTIVITAMIN W/IRON) 27-0.8 MG tablet  100 tablet 3 4/13/2020    [facility] [id number] [place], [place] - [address]    Sig: Take 1 tablet by mouth daily    Class: E-Prescribe    Route: Oral      ALLERGIES:    Allergies   Allergen Reactions     Nickel      REVIEW OF SYSTEMS:  Review Of Systems  A comprehensive review of systems was performed and found to be negative except as described here or above.  SOCIAL HISTORY:   Social History     Socioeconomic History     Marital status:      Spouse name: Pedro     Number of children: Not on file     Years of education: Not on file     Highest education level: Not on file   Occupational History     Occupation: Medical resident     Comment: pathology   Social Needs     Financial resource strain: Not on file     Food insecurity     Worry: Not on file     Inability: Not on file     Transportation needs     Medical: Not on file     Non-medical: Not on file   Tobacco Use     Smoking status: Never Smoker     Smokeless tobacco: Never Used   Substance and Sexual Activity     Alcohol use: No     Drug use: No     Sexual activity: Yes     Partners: Male   Lifestyle     Physical activity     Days per week: Not on file     Minutes per session: Not on file     Stress: Not on file   Relationships     Social connections     Talks on phone: Not on file     Gets together: Not on file     Attends Taoist service: Not on file     Active member of club or organization: Not on file     Attends meetings of clubs or organizations: Not on file     Relationship status: Not on file     Intimate partner violence  "    Fear of current or ex partner: Not on file     Emotionally abused: Not on file     Physically abused: Not on file     Forced sexual activity: Not on file   Other Topics Concern     Not on file   Social History Narrative    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  Height 5'3\"/.    BMI 29.2  GENERAL APPEARANCE: alert and no distress  EYES: nonicteric  RESP: breathing non labored   SKIN: no facial rash  NEURO: mentation intact and speech normal  PSYCH: affect normal/bright   LABS:   Recent Results (from the past 1008 hour(s))   Protein  random urine with Creat Ratio    Collection Time: 05/15/20 12:01 PM   Result Value Ref Range    Protein Random Urine 0.19 g/L    Protein Total Urine g/gr Creatinine 0.08 0 - 0.2 g/g Cr   Creatinine urine calculation only    Collection Time: 05/15/20 12:01 PM   Result Value Ref Range    Creatinine Urine 251 mg/dL   Non Invasive Prenatal Test Cell Free DNA    Collection Time: 05/27/20 10:01 AM   Result Value Ref Range    Lab Scanned Result NON INVAS PRENATAL DNA-Scanned    Routine UA with microscopic    Collection Time: 06/08/20  6:20 AM   Result Value Ref Range    Color Urine Yellow     Appearance Urine Clear     Glucose Urine Negative NEG^Negative mg/dL    Bilirubin Urine Negative NEG^Negative    Ketones Urine 40 (A) NEG^Negative mg/dL    Specific Gravity Urine 1.014 1.003 - 1.035    Blood Urine Negative NEG^Negative    pH Urine 6.0 5.0 - 7.0 pH    Protein Albumin Urine Negative NEG^Negative mg/dL    Urobilinogen mg/dL Normal 0.0 - 2.0 mg/dL    Nitrite " Urine Negative NEG^Negative    Leukocyte Esterase Urine Negative NEG^Negative    Source Midstream Urine     WBC Urine 0 0 - 5 /HPF    RBC Urine 4 (H) 0 - 2 /HPF    Squamous Epithelial /HPF Urine 3 (H) 0 - 1 /HPF    Mucous Urine Present (A) NEG^Negative /LPF   Protein  random urine with Creat Ratio    Collection Time: 06/08/20  6:20 AM   Result Value Ref Range    Protein Random Urine 0.07 g/L    Protein Total Urine g/gr Creatinine 0.08 0 - 0.2 g/g Cr   Albumin Random Urine Quantitative with Creat Ratio    Collection Time: 06/08/20  6:20 AM   Result Value Ref Range    Creatinine Urine 82 mg/dL    Albumin Urine mg/L <5 mg/L    Albumin Urine mg/g Cr Unable to calculate due to low value 0 - 25 mg/g Cr   Chlamydia trachomatis PCR    Collection Time: 06/09/20  8:00 AM    Specimen: Urine   Result Value Ref Range    Specimen Description Urine     Chlamydia Trachomatis PCR Negative NEG^Negative   Neisseria gonorrhoeae PCR    Collection Time: 06/09/20  8:00 AM    Specimen: Urine   Result Value Ref Range    Specimen Descrip Urine     N Gonorrhea PCR Negative NEG^Negative   Glucose tolerance gest screen 1 hour    Collection Time: 06/09/20  9:07 AM   Result Value Ref Range    Glu Gest Screen 1hr 50g 110 60 - 129 mg/dL     CMP  Recent Labs   Lab Test 05/13/20  1632 07/23/19  1509 09/14/18  1609   NA  --  137 140   POTASSIUM  --  3.6 3.8   CHLORIDE  --  104 108   CO2  --  27 24   ANIONGAP  --  5 9   GLC  --  85 104*   BUN  --  10 14   CR 0.56 0.83 0.90   GFRESTIMATED >90 >90 73   GFRESTBLACK >90 >90 89   COLT  --  9.6 8.8   PROTTOTAL  --  8.3 7.6   ALBUMIN  --  4.5 4.1   BILITOTAL  --  0.7 0.4   ALKPHOS  --  71 75   AST 22 18 17   ALT 32 22 18     CBC  Recent Labs   Lab Test 05/13/20  1632   HGB 14.0   WBC 12.0*   RBC 4.66   HCT 40.9   MCV 88   MCH 30.0   MCHC 34.2   RDW 11.8        INRNo lab results found.  ABGNo lab results found.   URINE STUDIES  Recent Labs   Lab Test 06/08/20  0620   COLOR Yellow   APPEARANCE Clear    URINEGLC Negative   URINEBILI Negative   URINEKETONE 40*   SG 1.014   UBLD Negative   URINEPH 6.0   PROTEIN Negative   NITRITE Negative   LEUKEST Negative   RBCU 4*   WBCU 0     Recent Labs   Lab Test 06/08/20  0620 05/15/20  1201 05/13/20  1629   UTPG 0.08 0.08 1.16*     EXAMINATION: US RENAL COMPLETE, 6/23/2020 12:49 PM      COMPARISON: None.     HISTORY: History of hypertension and young woman. Evaluate kidney size  and echogenicity.     FINDINGS:     Right kidney: Measures 10.9 cm cm in length. Parenchyma is of normal  thickness and echogenicity. No focal mass. No hydronephrosis.     Left kidney: Measures 11.5 cm cm in length. Parenchyma is of normal  thickness and echogenicity. No focal mass. No hydronephrosis.      Bladder: Decompressed.     Incidentally noted mildly increased echogenicity within the liver.  Anechoic avascular cystic-appearing lesion within the spleen measuring  1.2 x 1.0 x 1.0 cm. Single intrauterine pregnancy with a heart rate of  157 bpm.                                                                      IMPRESSION:  1.  Normal renal ultrasound.  2.  Incidentally noted  increased echogenicity throughout the liver  which is suggestive of hepatic steatosis.  3.  Single living intrauterine pregnancy.     I have personally reviewed the examination and initial interpretation  and I agree with the findings.     SNEHA KING, DOASSESSMENT AND PLAN:   Dr Rodriguez is referred for evaluation of proteinuria detected at 9 weeks gestation by UPCR.  The repeat thest 2 days later revealed a normal UPCR. THe urine creatinine on the first sample is very low (and likely erroneous?).  We will obtain a full UA and repeat the UPCR + UACR.  Her routine serologies for pregnancy are unrevealing.  Her ROS is not suggestive of a systemic inflammatory disease (e.g.SLE).  We will pursue a more extensive serologic workup depending on the results of the urine tests.  Her repeat UA, UPCR and UACR were all negative for  proteinuria.  I therefore suspect that the result of 5/13/20 was spurious.  I recommended that she gets a UPCR when she goes for routine OB visits.     2- HTN.  Dr Rodriguez will try to obtain copy of her med records from [facility] to review what work up she has had for HTN.  Her risk factors are her weight and ? Family history (unclear how old her mother was when she developed HTN).     Her last BP was under good control on labetalol only - for pregnancy.    We discussed that she should check her BP at home about once a week.     3- Abd ultrasound.  I reviewed with Dr Rodriguez the results of the renal and liver ultrasound, including the finding of increased liver echogenicity suggestive of steatosis.      We agreed to a follow up appointment in 3 months.  Dr Rodriguez will call me if she has any concerns in the interim     Jerome Vargas MD  Division of Renal Disease and Hypertension  June 25, 2020        Gabriele Rodriguez is a 34 year old female who is being evaluated via a billable video visit.      See below          Again, thank you for allowing me to participate in the care of your patient.      Sincerely,    Jerome Vargas MD

## 2020-06-25 NOTE — PROGRESS NOTES
Gabriele Rodriguez is a 34 year old female who is being evaluated via a billable video visit.      See below

## 2020-07-13 ENCOUNTER — MYC REFILL (OUTPATIENT)
Dept: FAMILY MEDICINE | Facility: CLINIC | Age: 35
End: 2020-07-13

## 2020-07-13 DIAGNOSIS — I10 ESSENTIAL HYPERTENSION: ICD-10-CM

## 2020-07-13 RX ORDER — LABETALOL 200 MG/1
200 TABLET, FILM COATED ORAL 2 TIMES DAILY
Qty: 60 TABLET | Refills: 3 | Status: SHIPPED | OUTPATIENT
Start: 2020-07-13 | End: 2020-08-17

## 2020-07-16 ENCOUNTER — PRE VISIT (OUTPATIENT)
Dept: MATERNAL FETAL MEDICINE | Facility: CLINIC | Age: 35
End: 2020-07-16

## 2020-07-20 ENCOUNTER — OFFICE VISIT (OUTPATIENT)
Dept: MATERNAL FETAL MEDICINE | Facility: CLINIC | Age: 35
End: 2020-07-20
Attending: ADVANCED PRACTICE MIDWIFE
Payer: COMMERCIAL

## 2020-07-20 ENCOUNTER — HOSPITAL ENCOUNTER (OUTPATIENT)
Dept: ULTRASOUND IMAGING | Facility: CLINIC | Age: 35
End: 2020-07-20
Attending: ADVANCED PRACTICE MIDWIFE
Payer: COMMERCIAL

## 2020-07-20 ENCOUNTER — OFFICE VISIT (OUTPATIENT)
Dept: OBGYN | Facility: CLINIC | Age: 35
End: 2020-07-20
Attending: OBSTETRICS & GYNECOLOGY
Payer: COMMERCIAL

## 2020-07-20 VITALS
DIASTOLIC BLOOD PRESSURE: 80 MMHG | BODY MASS INDEX: 30.83 KG/M2 | WEIGHT: 174 LBS | HEART RATE: 80 BPM | SYSTOLIC BLOOD PRESSURE: 125 MMHG | HEIGHT: 63 IN

## 2020-07-20 DIAGNOSIS — O10.012 BENIGN ESSENTIAL HTN, CHRONIC, ANTEPARTUM, SECOND TRIMESTER: ICD-10-CM

## 2020-07-20 DIAGNOSIS — O16.2 HYPERTENSION AFFECTING PREGNANCY IN SECOND TRIMESTER: ICD-10-CM

## 2020-07-20 DIAGNOSIS — O26.90 PREGNANCY RELATED CONDITION, ANTEPARTUM: ICD-10-CM

## 2020-07-20 DIAGNOSIS — O09.92 SUPERVISION OF HIGH RISK PREGNANCY IN SECOND TRIMESTER: Primary | ICD-10-CM

## 2020-07-20 DIAGNOSIS — O09.899 TWO VESSEL UMBILICAL CORD, ANTEPARTUM, SINGLE GESTATION: ICD-10-CM

## 2020-07-20 DIAGNOSIS — O09.899 SINGLE UMBILICAL ARTERY AFFECTING MANAGEMENT OF MOTHER IN SINGLETON PREGNANCY, ANTEPARTUM: Primary | ICD-10-CM

## 2020-07-20 PROCEDURE — 76811 OB US DETAILED SNGL FETUS: CPT

## 2020-07-20 PROCEDURE — G0463 HOSPITAL OUTPT CLINIC VISIT: HCPCS | Mod: ZF

## 2020-07-20 ASSESSMENT — MIFFLIN-ST. JEOR: SCORE: 1468.39

## 2020-07-20 NOTE — LETTER
"2020       RE: Gabriele Rodriguez  [address] [facility] [address]     Dear Colleague,    Thank you for referring your patient, Gabriele Rodriguez, to the [facility] at [facility]. Please see a copy of my visit note below.    Subjective:     34 year old  at 19w0d presents for a routine prenatal appointment.      Denies vaginal bleeding and leakage of fluid.  No contractions or cramping.        No HA, visual changes, RUQ or epigastric pain.   The patient presents with the following concerns: two vessel cord on Level II US today, fetal echo has already been scheuduled     Objective:  /80 (BP Location: Left arm, Patient Position: Chair)   Pulse 80   Ht 1.6 m (5' 3\")   Wt 78.9 kg (174 lb)   LMP 2020   Breastfeeding No   BMI 30.82 kg/m    See OB flowsheet    [email or web address]  IMPRESSION  ---------------------------------------------------------------------------------------------------------  Sonographic biometry agrees with gestational age predicted by assigned TADEO. A 2 vessel cord is seen. The fetal anatomy was adequately visualized and appeared  otherwise normal. None of the other anomalies commonly detected by ultrasound were evident. No markers for aneuploidy seen.    Assessment/Plan:    Gabriele Rodriguez is a 34 year old  at 19w0d presents for a routine prenatal appointment.   Pregnancy is complicated by chronic HTN on medication and newly diagnosed 2 vessel cord    - Discussed fetal risks of two vessel cord but that the increased surveillance is not different that what we are already planning given her chronic HTN-serial growth assessment, weekly BPPs starting at 32 weeks.   - Begin q4week growth scans  - Follow up fetal echo scheduled today  - Reviewed total weight gain, encouraged continued healthy diet and exercise.      - Reviewed why/how to contact provider.   - Return to clinic in 5 weeks at 24 weeks with growth " ultrasound then, call prn if questions or concerns.     Maurisio Dash MD  OBGYN PGY-1  July 20, 2020 10:49 AM.    The patient was seen and examined with Dr. Dash.  I have reviewed and edited the above note.    Barbara August MD, FACOG

## 2020-07-20 NOTE — PROGRESS NOTES
"Please see \"Imaging\" tab under \"Chart Review\" for details of today's US at the [facility].    Rip Garner MD  Maternal-Fetal Medicine      "

## 2020-07-20 NOTE — NURSING NOTE
Chief Complaint   Patient presents with     Prenatal Care     19w0d       See MAGDALENA Ayers 7/20/2020

## 2020-07-20 NOTE — PROGRESS NOTES
"Subjective:     34 year old  at 19w0d presents for a routine prenatal appointment.      Denies vaginal bleeding and leakage of fluid.  No contractions or cramping.        No HA, visual changes, RUQ or epigastric pain.   The patient presents with the following concerns: two vessel cord on Level II US today, fetal echo has already been scheuduled     Objective:  /80 (BP Location: Left arm, Patient Position: Chair)   Pulse 80   Ht 1.6 m (5' 3\")   Wt 78.9 kg (174 lb)   LMP 2020   Breastfeeding No   BMI 30.82 kg/m    See OB flowsheet    [email or web address]  IMPRESSION  ---------------------------------------------------------------------------------------------------------  Sonographic biometry agrees with gestational age predicted by assigned TADEO. A 2 vessel cord is seen. The fetal anatomy was adequately visualized and appeared  otherwise normal. None of the other anomalies commonly detected by ultrasound were evident. No markers for aneuploidy seen.    Assessment/Plan:    Gabriele Rodriguez is a 34 year old  at 19w0d presents for a routine prenatal appointment.   Pregnancy is complicated by chronic HTN on medication and newly diagnosed 2 vessel cord    - Discussed fetal risks of two vessel cord but that the increased surveillance is not different that what we are already planning given her chronic HTN-serial growth assessment, weekly BPPs starting at 32 weeks.   - Begin q4week growth scans  - Follow up fetal echo scheduled today  - Reviewed total weight gain, encouraged continued healthy diet and exercise.      - Reviewed why/how to contact provider.   - Return to clinic in 5 weeks at 24 weeks with growth ultrasound then, call prn if questions or concerns.     Maurisio Dash MD  OBGYN PGY-1  2020 10:49 AM.    The patient was seen and examined with Dr. Dash.  I have reviewed and edited the above note.    Barbara August MD, FACOG    "

## 2020-08-12 ENCOUNTER — OFFICE VISIT (OUTPATIENT)
Dept: MATERNAL FETAL MEDICINE | Facility: CLINIC | Age: 35
End: 2020-08-12
Attending: OBSTETRICS & GYNECOLOGY
Payer: COMMERCIAL

## 2020-08-12 ENCOUNTER — HOSPITAL ENCOUNTER (OUTPATIENT)
Dept: ULTRASOUND IMAGING | Facility: CLINIC | Age: 35
End: 2020-08-12
Attending: OBSTETRICS & GYNECOLOGY
Payer: COMMERCIAL

## 2020-08-12 DIAGNOSIS — O09.899 SINGLE UMBILICAL ARTERY AFFECTING MANAGEMENT OF MOTHER IN SINGLETON PREGNANCY, ANTEPARTUM: Primary | ICD-10-CM

## 2020-08-12 DIAGNOSIS — O09.899 SINGLE UMBILICAL ARTERY AFFECTING MANAGEMENT OF MOTHER IN SINGLETON PREGNANCY, ANTEPARTUM: ICD-10-CM

## 2020-08-12 PROCEDURE — 76825 ECHO EXAM OF FETAL HEART: CPT

## 2020-08-12 NOTE — PROGRESS NOTES
"Please see \"Imaging\" tab under \"Chart Review\" for details of today's US at the [facility].    Rip Garner MD  Maternal-Fetal Medicine      "

## 2020-08-17 ENCOUNTER — MYC REFILL (OUTPATIENT)
Dept: FAMILY MEDICINE | Facility: CLINIC | Age: 35
End: 2020-08-17

## 2020-08-17 DIAGNOSIS — Z32.01 PREGNANCY TEST POSITIVE: ICD-10-CM

## 2020-08-17 DIAGNOSIS — I10 ESSENTIAL HYPERTENSION: ICD-10-CM

## 2020-08-17 RX ORDER — PRENATAL VIT/IRON FUM/FOLIC AC 27MG-0.8MG
1 TABLET ORAL DAILY
Qty: 100 TABLET | Refills: 3 | Status: SHIPPED | OUTPATIENT
Start: 2020-08-17 | End: 2020-12-07

## 2020-08-17 RX ORDER — LABETALOL 200 MG/1
200 TABLET, FILM COATED ORAL 2 TIMES DAILY
Qty: 60 TABLET | Refills: 3 | Status: SHIPPED | OUTPATIENT
Start: 2020-08-17 | End: 2020-09-20

## 2020-08-24 ENCOUNTER — ANCILLARY PROCEDURE (OUTPATIENT)
Dept: ULTRASOUND IMAGING | Facility: CLINIC | Age: 35
End: 2020-08-24
Attending: OBSTETRICS & GYNECOLOGY
Payer: COMMERCIAL

## 2020-08-24 ENCOUNTER — OFFICE VISIT (OUTPATIENT)
Dept: OBGYN | Facility: CLINIC | Age: 35
End: 2020-08-24
Attending: OBSTETRICS & GYNECOLOGY
Payer: COMMERCIAL

## 2020-08-24 VITALS
SYSTOLIC BLOOD PRESSURE: 121 MMHG | DIASTOLIC BLOOD PRESSURE: 77 MMHG | HEIGHT: 63 IN | HEART RATE: 80 BPM | WEIGHT: 180 LBS | BODY MASS INDEX: 31.89 KG/M2

## 2020-08-24 DIAGNOSIS — Z34.92 PRENATAL CARE IN SECOND TRIMESTER: ICD-10-CM

## 2020-08-24 DIAGNOSIS — O09.899 TWO VESSEL UMBILICAL CORD, ANTEPARTUM, SINGLE GESTATION: ICD-10-CM

## 2020-08-24 DIAGNOSIS — O99.891 PREGNANCY COMPLICATION BEFORE BIRTH: ICD-10-CM

## 2020-08-24 DIAGNOSIS — N94.89 UTERINE CRAMPING: ICD-10-CM

## 2020-08-24 DIAGNOSIS — K21.9 GASTROESOPHAGEAL REFLUX DISEASE WITHOUT ESOPHAGITIS: ICD-10-CM

## 2020-08-24 DIAGNOSIS — R25.2 CRAMP OF LIMB: Primary | ICD-10-CM

## 2020-08-24 LAB
ALBUMIN UR-MCNC: NEGATIVE MG/DL
APPEARANCE UR: ABNORMAL
BACTERIA #/AREA URNS HPF: ABNORMAL /HPF
BILIRUB UR QL STRIP: NEGATIVE
COLOR UR AUTO: YELLOW
CREAT UR-MCNC: 160 MG/DL
GLUCOSE UR STRIP-MCNC: NEGATIVE MG/DL
HGB UR QL STRIP: ABNORMAL
KETONES UR STRIP-MCNC: NEGATIVE MG/DL
LEUKOCYTE ESTERASE UR QL STRIP: ABNORMAL
MUCOUS THREADS #/AREA URNS LPF: PRESENT /LPF
NITRATE UR QL: NEGATIVE
PH UR STRIP: 5.5 PH (ref 5–7)
PROT UR-MCNC: 0.17 G/L
PROT/CREAT 24H UR: 0.11 G/G CR (ref 0–0.2)
RBC #/AREA URNS AUTO: 2 /HPF (ref 0–2)
SOURCE: ABNORMAL
SP GR UR STRIP: 1.02 (ref 1–1.03)
SQUAMOUS #/AREA URNS AUTO: 30 /HPF (ref 0–1)
UROBILINOGEN UR STRIP-MCNC: NORMAL MG/DL (ref 0–2)
WBC #/AREA URNS AUTO: 6 /HPF (ref 0–5)

## 2020-08-24 PROCEDURE — 87086 URINE CULTURE/COLONY COUNT: CPT | Performed by: OBSTETRICS & GYNECOLOGY

## 2020-08-24 PROCEDURE — 76805 OB US >/= 14 WKS SNGL FETUS: CPT

## 2020-08-24 PROCEDURE — G0463 HOSPITAL OUTPT CLINIC VISIT: HCPCS | Mod: ZF,25

## 2020-08-24 PROCEDURE — 84156 ASSAY OF PROTEIN URINE: CPT | Performed by: OBSTETRICS & GYNECOLOGY

## 2020-08-24 PROCEDURE — 81001 URINALYSIS AUTO W/SCOPE: CPT | Performed by: OBSTETRICS & GYNECOLOGY

## 2020-08-24 RX ORDER — FAMOTIDINE 20 MG/1
20 TABLET, FILM COATED ORAL 2 TIMES DAILY
Qty: 60 TABLET | Refills: 3 | Status: ON HOLD | OUTPATIENT
Start: 2020-08-24 | End: 2020-11-22

## 2020-08-24 ASSESSMENT — MIFFLIN-ST. JEOR: SCORE: 1490.6

## 2020-08-24 NOTE — LETTER
"2020       RE: Gabriele Rodriguez  [address] [facility] [address]     Dear Colleague,    Thank you for referring your patient, Gabriele Rodriguez, to the [facility] at [facility]. Please see a copy of my visit note below.    Feeling well. Some cramping last night and this AM. No bleeding or fluid leaking. +FM.  Notes heartburn for which TUMS is helping some.     /77   Pulse 80   Ht 1.6 m (5' 3\")   Wt 81.6 kg (180 lb)   LMP 2020   BMI 31.89 kg/m    See flow  Cervix  Closed.     A/p: 36 yo  at 24 wks, HARRY. 2VC, CHTN.     1. PNC: utd.  Discussed EOB and repeat GCT.     2. Cramping. UA/UCx today. Cervix long.   Discussed hydration and eating well.     3. Chronic HTN no meds.  Serial growth uls reced.  AGA today.   Weekly BPPs at 32 wks.     4. GERD: pepcid rx sent.     Ava Neri MD, FACOG  [facility]  OB/GYN    2020  4:45 PM  "

## 2020-08-24 NOTE — PROGRESS NOTES
"Feeling well. Some cramping last night and this AM. No bleeding or fluid leaking. +FM.  Notes heartburn for which TUMS is helping some.     /77   Pulse 80   Ht 1.6 m (5' 3\")   Wt 81.6 kg (180 lb)   LMP 2020   BMI 31.89 kg/m    See flow  Cervix  Closed.     A/p: 34 yo  at 24 wks, HARRY. 2VC, CHTN.     1. PNC: utd.  Discussed EOB and repeat GCT.     2. Cramping. UA/UCx today. Cervix long.   Discussed hydration and eating well.     3. Chronic HTN no meds.  Serial growth uls reced.  AGA today.   Weekly BPPs at 32 wks.     4. GERD: pepcid rx sent.     Ava Neri MD, FACOG  [facility]  OB/GYN    2020  4:45 PM        "

## 2020-08-25 LAB
BACTERIA SPEC CULT: NORMAL
SPECIMEN SOURCE: NORMAL

## 2020-09-14 ENCOUNTER — OFFICE VISIT (OUTPATIENT)
Dept: DERMATOLOGY | Facility: CLINIC | Age: 35
End: 2020-09-14
Attending: NURSE PRACTITIONER
Payer: COMMERCIAL

## 2020-09-14 DIAGNOSIS — B07.0 PLANTAR WART, LEFT FOOT: Primary | ICD-10-CM

## 2020-09-14 ASSESSMENT — PAIN SCALES - GENERAL
PAINLEVEL: NO PAIN (1)
PAINLEVEL: NO PAIN (0)

## 2020-09-14 NOTE — NURSING NOTE
"Chief Complaint   Patient presents with     Derm Problem     Qi is here today for a wart on the bottom of her left foot. She states \" The wart has been on my foot for 9 months\"     Maria G Eller, RMA  "

## 2020-09-14 NOTE — PATIENT INSTRUCTIONS
Cryotherapy    What is it?    Use of a very cold liquid, such as liquid nitrogen, to freeze and destroy abnormal skin cells that need to be removed    What should I expect?    Tenderness and redness    A small blister that might grow and fill with dark purple blood. There may be crusting.    More than one treatment may be needed if the lesions do not go away.    How do I care for the treated area?    Gently wash the area with your hands when bathing.    Use a thin layer of Vaseline to help with healing. You may use a Band-Aid.     The area should heal within 7-10 days and may leave behind a pink or lighter color.     Do not use an antibiotic or Neosporin ointment.     You may take acetaminophen (Tylenol) for pain.     Call your Doctor if you have:    Severe pain    Signs of infection (warmth, redness, cloudy yellow drainage, and or a bad smell)    Questions or concerns    Who should I call with questions?       [facility]: [phone]       [facility]: [phone]       For urgent needs outside of business hours call the [facility] at [phone]        and ask for the dermatology resident on call

## 2020-09-14 NOTE — LETTER
"9/14/2020       RE: Gabriele Rodriguez  [address] [facility] [address]     Dear Colleague,    Thank you for referring your patient, Gabriele Rodriguez, to the [facility] DERMATOLOGY at [facility]. Please see a copy of my visit note below.    [facility] Dermatology Note      Dermatology Problem List:  1.Wart left heel s/p paring and cryotherapy 9/14/2020    CC:   Chief Complaint   Patient presents with     Derm Problem     Qi is here today for a wart on the bottom of her left foot. She states \" The wart has been on my foot for 9 months\"         Encounter Date: Sep 14, 2020    History of Present Illness:  dr. Gabriele Rodriguez is a 35 year old female who presents with about a 9 month history of a wart of the foot.  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  She is due in December and does have to make rounds several days per week on her feet.  She has tried to treat the wart with at home cryotherapy and salicylic acid    Past Medical History:   Patient Active Problem List   Diagnosis     Anxiety     ADHD (attention deficit hyperactivity disorder), inattentive type     Benign essential hypertension     Supervision of high risk pregnancy in first trimester     Chronic hypertension affecting pregnancy     Chronic sinusitis      proteinuria- initial was elevated but follow up was normal.      Past Medical History:   Diagnosis Date     ADHD      Anxiety      Chronic sinusitis      Depressive disorder      Hypertension      Past Surgical History:   Procedure Laterality Date     BIOPSY NAIL (LOCATION)      left hand, 4th digit       Social History:  Patient reports that she has never smoked. She has never used smokeless tobacco. She reports that she does not drink alcohol or use drugs.    Family History:  Family History   Problem Relation Age of Onset     Hypertension Mother      Breast Cancer Mother         ductal stage 2, HER-2 negative, (+) " estrogen/progesterone     No Known Problems Father      No Known Problems Sister      Pancreatic Cancer Maternal Grandfather      Brain Cancer Paternal Aunt        Medications:  Current Outpatient Medications   Medication Sig Dispense Refill     aspirin (ASA) 81 MG EC tablet Take 1 tablet (81 mg) by mouth daily , begin at 12 weeks pregnant. 90 tablet 3     famotidine (PEPCID) 20 MG tablet Take 1 tablet (20 mg) by mouth 2 times daily 60 tablet 3     labetalol (NORMODYNE) 200 MG tablet Take 1 tablet (200 mg) by mouth 2 times daily 60 tablet 3     Prenatal Vit-Fe Fumarate-FA (PRENATAL MULTIVITAMIN W/IRON) 27-0.8 MG tablet Take 1 tablet by mouth daily 100 tablet 3     Allergies   Allergen Reactions     Nickel              Physical exam:  Vitals: LMP 03/02/2020   GEN: This is a well developed, well-nourished female in no acute distress, in a pleasant mood.    SKIN: Focused examination of the left foot was performed.  -Shah skin type: I  -There is a verrucous papule with thrombosed capillaries interrupting dermatoglyphics on the left heel.    -No other lesions of concern on areas examined.     Impression/Plan:  1. Verruca plantaris  - Cryotherapy procedure note: After verbal consent and discussion of risks and benefits including but no limited to dyspigmentation/scar, blister, and pain, one was pared with a curette and treated with 1-2mm freeze border for 2 cycles with liquid nitrogen. Post cryotherapy instructions were provided.  - May use at home salicylic acid product between in office treatments        NIKKI Gonzalez NP  [facility] NP CLINIC [address], [address]  [place] [phone] on close of this encounter.  Follow-up in 1 months, earlier for new or changing lesions.       Staff Involved:  Staff Only

## 2020-09-14 NOTE — PROGRESS NOTES
"[facility] Dermatology Note      Dermatology Problem List:  1.Wart left heel s/p paring and cryotherapy 9/14/2020    CC:   Chief Complaint   Patient presents with     Derm Problem     Qi is here today for a wart on the bottom of her left foot. She states \" The wart has been on my foot for 9 months\"         Encounter Date: Sep 14, 2020    History of Present Illness:  dr. Suazo Michael is a 35 year old female who presents with about a 9 month history of a wart of the foot.  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  She is due in December and does have to make rounds several days per week on her feet.  She has tried to treat the wart with at home cryotherapy and salicylic acid    Past Medical History:   Patient Active Problem List   Diagnosis     Anxiety     ADHD (attention deficit hyperactivity disorder), inattentive type     Benign essential hypertension     Supervision of high risk pregnancy in first trimester     Chronic hypertension affecting pregnancy     Chronic sinusitis      proteinuria- initial was elevated but follow up was normal.      Past Medical History:   Diagnosis Date     ADHD      Anxiety      Chronic sinusitis      Depressive disorder      Hypertension      Past Surgical History:   Procedure Laterality Date     BIOPSY NAIL (LOCATION)      left hand, 4th digit       Social History:  Patient reports that she has never smoked. She has never used smokeless tobacco. She reports that she does not drink alcohol or use drugs.    Family History:  Family History   Problem Relation Age of Onset     Hypertension Mother      Breast Cancer Mother         ductal stage 2, HER-2 negative, (+) estrogen/progesterone     No Known Problems Father      No Known Problems Sister      Pancreatic Cancer Maternal Grandfather      Brain Cancer Paternal Aunt        Medications:  Current Outpatient Medications   Medication Sig Dispense Refill     aspirin (ASA) 81 MG EC tablet Take 1 tablet " (81 mg) by mouth daily , begin at 12 weeks pregnant. 90 tablet 3     famotidine (PEPCID) 20 MG tablet Take 1 tablet (20 mg) by mouth 2 times daily 60 tablet 3     labetalol (NORMODYNE) 200 MG tablet Take 1 tablet (200 mg) by mouth 2 times daily 60 tablet 3     Prenatal Vit-Fe Fumarate-FA (PRENATAL MULTIVITAMIN W/IRON) 27-0.8 MG tablet Take 1 tablet by mouth daily 100 tablet 3     Allergies   Allergen Reactions     Nickel              Physical exam:  Vitals: LMP 03/02/2020   GEN: This is a well developed, well-nourished female in no acute distress, in a pleasant mood.    SKIN: Focused examination of the left foot was performed.  -Shah skin type: I  -There is a verrucous papule with thrombosed capillaries interrupting dermatoglyphics on the left heel.    -No other lesions of concern on areas examined.     Impression/Plan:  1. Verruca plantaris  - Cryotherapy procedure note: After verbal consent and discussion of risks and benefits including but no limited to dyspigmentation/scar, blister, and pain, one was pared with a curette and treated with 1-2mm freeze border for 2 cycles with liquid nitrogen. Post cryotherapy instructions were provided.  - May use at home salicylic acid product between in office treatments        NIKKI Gonzalez NP  [facility] NP CLINIC [address], [address]  [place] [address] on close of this encounter.  Follow-up in 1 months, earlier for new or changing lesions.       Staff Involved:  Staff Only

## 2020-09-17 ENCOUNTER — MYC REFILL (OUTPATIENT)
Dept: FAMILY MEDICINE | Facility: CLINIC | Age: 35
End: 2020-09-17

## 2020-09-17 DIAGNOSIS — I10 ESSENTIAL HYPERTENSION: ICD-10-CM

## 2020-09-17 RX ORDER — LABETALOL 200 MG/1
200 TABLET, FILM COATED ORAL 2 TIMES DAILY
Qty: 60 TABLET | Refills: 3 | OUTPATIENT
Start: 2020-09-17

## 2020-09-17 NOTE — TELEPHONE ENCOUNTER
labetalol (NORMODYNE) 200 MG tablet     labetalol (NORMODYNE) 200 MG tablet  60 tablet  3  8/17/2020   No    Sig - Route: Take 1 tablet (200 mg) by mouth 2 times daily - Oral    Sent to pharmacy as: Labetalol HCl 200 MG Oral Tablet (NORMODYNE)    Class: E-Prescribe    Order: [id number]    E-Prescribing Status: Receipt confirmed by pharmacy (8/17/2020  8:57 AM CDT)    Printout Tracking     External Result Report    Pharmacy     [facility] - [place], [place] - [address] [place] [address]            Liana Díaz RN  Central Triage Red Flags/Med Refills

## 2020-09-20 ENCOUNTER — MYC REFILL (OUTPATIENT)
Dept: FAMILY MEDICINE | Facility: CLINIC | Age: 35
End: 2020-09-20

## 2020-09-20 DIAGNOSIS — I10 ESSENTIAL HYPERTENSION: ICD-10-CM

## 2020-09-21 ENCOUNTER — OFFICE VISIT (OUTPATIENT)
Dept: OBGYN | Facility: CLINIC | Age: 35
End: 2020-09-21
Attending: OBSTETRICS & GYNECOLOGY
Payer: COMMERCIAL

## 2020-09-21 ENCOUNTER — ANCILLARY PROCEDURE (OUTPATIENT)
Dept: ULTRASOUND IMAGING | Facility: CLINIC | Age: 35
End: 2020-09-21
Attending: OBSTETRICS & GYNECOLOGY
Payer: COMMERCIAL

## 2020-09-21 VITALS
WEIGHT: 186 LBS | HEART RATE: 82 BPM | BODY MASS INDEX: 32.95 KG/M2 | SYSTOLIC BLOOD PRESSURE: 127 MMHG | DIASTOLIC BLOOD PRESSURE: 76 MMHG

## 2020-09-21 DIAGNOSIS — O10.919 CHRONIC HYPERTENSION AFFECTING PREGNANCY: ICD-10-CM

## 2020-09-21 DIAGNOSIS — J32.0 CHRONIC MAXILLARY SINUSITIS: ICD-10-CM

## 2020-09-21 DIAGNOSIS — O09.93 SUPERVISION OF HIGH RISK PREGNANCY IN THIRD TRIMESTER: Primary | ICD-10-CM

## 2020-09-21 PROCEDURE — 90471 IMMUNIZATION ADMIN: CPT | Mod: ZF

## 2020-09-21 PROCEDURE — 25000128 H RX IP 250 OP 636: Mod: ZF

## 2020-09-21 PROCEDURE — 90715 TDAP VACCINE 7 YRS/> IM: CPT | Mod: ZF

## 2020-09-21 PROCEDURE — 76816 OB US FOLLOW-UP PER FETUS: CPT

## 2020-09-21 PROCEDURE — G0463 HOSPITAL OUTPT CLINIC VISIT: HCPCS | Mod: ZF,25

## 2020-09-21 RX ORDER — LABETALOL 200 MG/1
200 TABLET, FILM COATED ORAL 2 TIMES DAILY
Qty: 60 TABLET | Refills: 3 | Status: SHIPPED | OUTPATIENT
Start: 2020-09-21 | End: 2020-12-03

## 2020-09-21 ASSESSMENT — ANXIETY QUESTIONNAIRES
2. NOT BEING ABLE TO STOP OR CONTROL WORRYING: NOT AT ALL
3. WORRYING TOO MUCH ABOUT DIFFERENT THINGS: NOT AT ALL
GAD7 TOTAL SCORE: 0
6. BECOMING EASILY ANNOYED OR IRRITABLE: NOT AT ALL
1. FEELING NERVOUS, ANXIOUS, OR ON EDGE: NOT AT ALL
7. FEELING AFRAID AS IF SOMETHING AWFUL MIGHT HAPPEN: NOT AT ALL
5. BEING SO RESTLESS THAT IT IS HARD TO SIT STILL: NOT AT ALL

## 2020-09-21 ASSESSMENT — PATIENT HEALTH QUESTIONNAIRE - PHQ9
SUM OF ALL RESPONSES TO PHQ QUESTIONS 1-9: 1
5. POOR APPETITE OR OVEREATING: NOT AT ALL

## 2020-09-21 NOTE — PROGRESS NOTES
35 year old, , 28w0d, presents for EOB visit.    Patient concerns: Feeling well overall.    - Nasal stuffiness x2w, patient concerned sinus infection vs allergies.  Declines cough, fever, facial or tooth pain.  - CHTN- on labetalol. No HA, vision changes, ruq/epigastric pain.    Has f/u appt on 10/1/20 r/t CHTN and proteinuria.    - 2 vessel cord on level 2 ultrasound.    Denies cramping/contractions, vaginal bleeding, discharge or leakage of fluid. Reports +fetal movement.      PHQ-9 SCORE 2019   PHQ-9 Total Score 0 2 1       Education completed today includes breast feeding, [facility] hand out , contraception, counting movements, signs of pre-term labor, when to present to birthplace, post partum depression, GBS, getting enough iron and labor induction.  Birth preferences reviewed: Medicated  Labor support:   Pedro, considering    Warren Feeding plans :    Contraception planned: nexplanon vs IUD  The following labs were ordered today: future orders      GCT, CBC w platelets, Vitamin d, Hep C, Anti-treponema  Water birth consent form was not given- patient declines.    Blood type:   ABO   Date Value Ref Range Status   2020 O  Final     RH(D)   Date Value Ref Range Status   2020 Pos  Final     Antibody Screen   Date Value Ref Range Status   2020 Neg  Final   Rhogam  was not given.  TDAP  was given.    A/P:  Encounter Diagnoses   Name Primary?     Supervision of high risk pregnancy in third trimester Yes     Chronic hypertension affecting pregnancy      Chronic sinusitis      Orders Placed This Encounter   Procedures     US OB Fetal Biophys Prf wo NonStrs Singls Sgl     US OB >14 Weeks Follow Up     TDAP VACCINE (BOOSTRIX)     25- OH-Vitamin D     CBC with Platelets     Glucose 1 Hour     Treponema Abs w Reflex to RPR and Titer     - EOB education reviewed.  - EOB labs ordered- 1 hour glucose, cbc with plts, anti-trep, vitamin D.  - Recommended Flu  Vaccine.  Patient already received Flu Vaccine   - Nasal Stuffiness: Recommend trying claritin or zyrtec for allergy symptoms.  F/U with fever, chills, or signs of infection, or with no improvement in symptoms with allergy meds.    - Discussed  testing due to 2VC and chronic HTN.  Recommend weekly BPPs starting at 32 weeks and serial growth U/S.    Patient verbalized understanding. Will schedule.    Continue scheduled prenatal care, RTC in 2 weeks for  HARRY with MD and in 4 weeks  For growth us, BPP and HARRY  with MD.    I, Jojo Valverde, am serving as a scribe; to document services personally performed by  DORA Gomez CNM based on data collection and the provider's statements to me.     Jojo Valverde RN, SNM    The encounter was performed by me and scribed by the SNM. The scribed note accurately reflects my personal services and decisions made by me.     DORA Gomez CNM

## 2020-09-21 NOTE — LETTER
2020       RE: Gabriele Rodriguez  [address] [facility] [address]     Dear Colleague,    Thank you for referring your patient, Gabriele Rodriguez, to the [facility] at [facility]. Please see a copy of my visit note below.     35 year old, , 28w0d, presents for EOB visit.    Patient concerns: Feeling well overall.    - Nasal stuffiness x2w, patient concerned sinus infection vs allergies.  Declines cough, fever, facial or tooth pain.  - CHTN- on labetalol. No HA, vision changes, ruq/epigastric pain.    Has f/u appt on 10/1/20 r/t CHTN and proteinuria.    - 2 vessel cord on level 2 ultrasound.    Denies cramping/contractions, vaginal bleeding, discharge or leakage of fluid. Reports +fetal movement.      PHQ-9 SCORE 2019   PHQ-9 Total Score 0 2 1       Education completed today includes breast feeding, [facility] hand out , contraception, counting movements, signs of pre-term labor, when to present to birthplace, post partum depression, GBS, getting enough iron and labor induction.  Birth preferences reviewed: Medicated  Labor support:   Pedro, considering     Feeding plans :    Contraception planned: nexplanon vs IUD  The following labs were ordered today: future orders      GCT, CBC w platelets, Vitamin d, Hep C, Anti-treponema  Water birth consent form was not given- patient declines.    Blood type:   ABO   Date Value Ref Range Status   2020 O  Final     RH(D)   Date Value Ref Range Status   2020 Pos  Final     Antibody Screen   Date Value Ref Range Status   2020 Neg  Final   Rhogam  was not given.  TDAP  was given.    A/P:  Encounter Diagnoses   Name Primary?     Supervision of high risk pregnancy in third trimester Yes     Chronic hypertension affecting pregnancy      Chronic sinusitis      Orders Placed This Encounter   Procedures     US OB Fetal Biophys Prf wo NonStrs Singls  Sgl     US OB >14 Weeks Follow Up     TDAP VACCINE (BOOSTRIX)     25- OH-Vitamin D     CBC with Platelets     Glucose 1 Hour     Treponema Abs w Reflex to RPR and Titer     - EOB education reviewed.  - EOB labs ordered- 1 hour glucose, cbc with plts, anti-trep, vitamin D.  - Recommended Flu Vaccine.  Patient already received Flu Vaccine   - Nasal Stuffiness: Recommend trying claritin or zyrtec for allergy symptoms.  F/U with fever, chills, or signs of infection, or with no improvement in symptoms with allergy meds.    - Discussed  testing due to 2VC and chronic HTN.  Recommend weekly BPPs starting at 32 weeks and serial growth U/S.    Patient verbalized understanding. Will schedule.    Continue scheduled prenatal care, RTC in 2 weeks for  HARRY with MD and in 4 weeks  For growth us, BPP and HARRY  with MD.    I, Jojo Valverde, am serving as a scribe; to document services personally performed by  DORA Gomez CNM based on data collection and the provider's statements to me.     Jojo Valverde RN, SNM    The encounter was performed by me and scribed by the SNM. The scribed note accurately reflects my personal services and decisions made by me.     DORA Gomez CNM

## 2020-09-22 DIAGNOSIS — R80.9 PROTEINURIA, UNSPECIFIED TYPE: Primary | ICD-10-CM

## 2020-09-22 ASSESSMENT — ANXIETY QUESTIONNAIRES: GAD7 TOTAL SCORE: 0

## 2020-09-23 DIAGNOSIS — R80.9 PROTEINURIA, UNSPECIFIED TYPE: ICD-10-CM

## 2020-09-23 DIAGNOSIS — O09.93 SUPERVISION OF HIGH RISK PREGNANCY IN THIRD TRIMESTER: ICD-10-CM

## 2020-09-23 LAB
ALBUMIN SERPL-MCNC: 2.7 G/DL (ref 3.4–5)
ALBUMIN UR-MCNC: NEGATIVE MG/DL
ANION GAP SERPL CALCULATED.3IONS-SCNC: 9 MMOL/L (ref 3–14)
APPEARANCE UR: ABNORMAL
BACTERIA #/AREA URNS HPF: ABNORMAL /HPF
BILIRUB UR QL STRIP: NEGATIVE
BUN SERPL-MCNC: 6 MG/DL (ref 7–30)
CALCIUM SERPL-MCNC: 8.4 MG/DL (ref 8.5–10.1)
CHLORIDE SERPL-SCNC: 107 MMOL/L (ref 94–109)
CO2 SERPL-SCNC: 21 MMOL/L (ref 20–32)
COLOR UR AUTO: YELLOW
CREAT SERPL-MCNC: 0.53 MG/DL (ref 0.52–1.04)
CREAT UR-MCNC: 88 MG/DL
DEPRECATED CALCIDIOL+CALCIFEROL SERPL-MC: 33 UG/L (ref 20–75)
ERYTHROCYTE [DISTWIDTH] IN BLOOD BY AUTOMATED COUNT: 11.9 % (ref 10–15)
GFR SERPL CREATININE-BSD FRML MDRD: >90 ML/MIN/{1.73_M2}
GLUCOSE 1H P 50 G GLC PO SERPL-MCNC: 168 MG/DL (ref 60–129)
GLUCOSE SERPL-MCNC: 160 MG/DL (ref 70–99)
GLUCOSE UR STRIP-MCNC: NEGATIVE MG/DL
HCT VFR BLD AUTO: 34.7 % (ref 35–47)
HGB BLD-MCNC: 11.5 G/DL (ref 11.7–15.7)
HGB UR QL STRIP: NEGATIVE
KETONES UR STRIP-MCNC: NEGATIVE MG/DL
LEUKOCYTE ESTERASE UR QL STRIP: ABNORMAL
MCH RBC QN AUTO: 30.6 PG (ref 26.5–33)
MCHC RBC AUTO-ENTMCNC: 33.1 G/DL (ref 31.5–36.5)
MCV RBC AUTO: 92 FL (ref 78–100)
MUCOUS THREADS #/AREA URNS LPF: PRESENT /LPF
NITRATE UR QL: NEGATIVE
PH UR STRIP: 7 PH (ref 5–7)
PHOSPHATE SERPL-MCNC: 2.8 MG/DL (ref 2.5–4.5)
PLATELET # BLD AUTO: 180 10E9/L (ref 150–450)
POTASSIUM SERPL-SCNC: 3.4 MMOL/L (ref 3.4–5.3)
PROT UR-MCNC: 0.18 G/L
PROT/CREAT 24H UR: 0.21 G/G CR (ref 0–0.2)
RBC # BLD AUTO: 3.76 10E12/L (ref 3.8–5.2)
RBC #/AREA URNS AUTO: 2 /HPF (ref 0–2)
SODIUM SERPL-SCNC: 137 MMOL/L (ref 133–144)
SOURCE: ABNORMAL
SP GR UR STRIP: 1.01 (ref 1–1.03)
SQUAMOUS #/AREA URNS AUTO: 24 /HPF (ref 0–1)
T PALLIDUM AB SER QL: NONREACTIVE
UROBILINOGEN UR STRIP-MCNC: 0 MG/DL (ref 0–2)
WBC # BLD AUTO: 10.5 10E9/L (ref 4–11)
WBC #/AREA URNS AUTO: 4 /HPF (ref 0–5)

## 2020-09-23 NOTE — RESULT ENCOUNTER NOTE
Reviewed elevated one hour GTT and need to complete 3 hour GTT, fasting 10-12 hours with sips only before testing, bring a snack for after 3 hour GTT.  Voices understanding.  Appt made for 09/30/2020 3 hour GTT. Nevin Caruso RN

## 2020-09-24 DIAGNOSIS — O99.810 IMPAIRED GLUCOSE IN PREGNANCY, ANTEPARTUM: Primary | ICD-10-CM

## 2020-09-24 DIAGNOSIS — O99.810 IMPAIRED GLUCOSE IN PREGNANCY, ANTEPARTUM: ICD-10-CM

## 2020-09-24 LAB
GLUCOSE 1H P 100 G GLC PO SERPL-MCNC: 162 MG/DL (ref 60–179)
GLUCOSE 2H P 100 G GLC PO SERPL-MCNC: 127 MG/DL (ref 60–154)
GLUCOSE 3H P 100 G GLC PO SERPL-MCNC: 133 MG/DL (ref 60–139)
GLUCOSE P FAST SERPL-MCNC: 81 MG/DL (ref 60–94)

## 2020-09-24 PROCEDURE — 36415 COLL VENOUS BLD VENIPUNCTURE: CPT | Performed by: ADVANCED PRACTICE MIDWIFE

## 2020-09-24 PROCEDURE — 82952 GTT-ADDED SAMPLES: CPT | Performed by: ADVANCED PRACTICE MIDWIFE

## 2020-09-24 PROCEDURE — 82951 GLUCOSE TOLERANCE TEST (GTT): CPT | Performed by: ADVANCED PRACTICE MIDWIFE

## 2020-10-01 ENCOUNTER — VIRTUAL VISIT (OUTPATIENT)
Dept: NEPHROLOGY | Facility: CLINIC | Age: 35
End: 2020-10-01
Attending: INTERNAL MEDICINE
Payer: COMMERCIAL

## 2020-10-01 DIAGNOSIS — I10 BENIGN ESSENTIAL HYPERTENSION: Primary | ICD-10-CM

## 2020-10-01 PROCEDURE — 99214 OFFICE O/P EST MOD 30 MIN: CPT | Mod: 95 | Performed by: INTERNAL MEDICINE

## 2020-10-01 ASSESSMENT — PAIN SCALES - GENERAL: PAINLEVEL: NO PAIN (0)

## 2020-10-01 NOTE — LETTER
"10/1/2020       RE: Gabriele Rodriguez  [address] [facility] [address]     Dear Colleague,    Thank you for referring your patient, Gabriele Rodriguez, to the [facility]PHROLOG[facility] [place] at [facility]. Please see a copy of my visit note below.    Gabriele Rodriguez is a 34 year old female who is being evaluated via a billable video visit.      The patient has been notified of following:     \"This video visit will be conducted via a call between you and your physician/provider. We have found that certain health care needs can be provided without the need for an in-person physical exam.  This service lets us provide the care you need with a video conversation.  If a prescription is necessary we can send it directly to your pharmacy.  If lab work is needed we can place an order for that and you can then stop by our lab to have the test done at a later time.    Video visits are billed at different rates depending on your insurance coverage.  Please reach out to your insurance provider with any questions.    If during the course of the call the physician/provider feels a video visit is not appropriate, you will not be charged for this service.\"    Patient has given verbal consent for Video visit? Yes    Will anyone else be joining your video visit? No      Video-Visit Details    Type of service:  Video Visit    Video Start Time:13:49  Video End Time: 13:59    Originating Location (pt. Location): Other work/office    Distant Location (provider location):  [facility] NEPHROLOGY     Platform used for Video Visit: Keren Vargas MD          Nephrology Clinic    Gabriele Rodriguez MRN:[id number] YOB: 1985  Date of Service: 10/01/2020  Primary care provider: Shana Sharif  Requesting physician: Rob Nieves       REASON FOR CONSULT: Proteinuria and hypertension in pregnancy    HISTORY OF PRESENT ILLNESS:   Gabriele Rodriguez is a 34 year old " female who presents for evaluation of proteinuria and hypertension in pregnancy.     Ms Rodriguez is currently 15 weeks pregnant, on her first pregnancy.  She reports feeling fair other than for some am nausea, without vomiting.  The nausea is is getting better.  She has had HTN for at least 4 years, and was first diagnosed during Med School. She does not recall exactly what work up for HTN she has had while she was at [facility]. She does not recall undergoing a renal ultrasound.  (She does not currently have her med records).  She was initially treated with metoprolol, then switched to nifedipine which was stopped because of L Ext swelling.  She was then switched to lisinopril + hydrochlorothiazide.  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 She has since been switched to labetalol.  On review of systems, she reports occasional mild epistaxis, but the ROS is otherwise negative for symptoms of systemic disease.  She has had HgbA1c testing in the past which were reportedly normal    Interestingly, the first UPCR was obtained after she drinking a large volume of water.      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  She is now 15w3d into her pregnancy. Reports that morning sickness is resolved.   She is not checking her BP at home.  Her last check was on 6/9/2020.  Reports no acute complaints.     October 1, 2020  Qi is now 29w3d into her first pregnancy.   She has been feeling well. Sleeping OK, although is feeling a bit tired sometimes.  Denies L Ext swelling.  No rash, oral ulcers, joint pains.  No SOB or CP  She had glucosuria on the last UA, but she gave the urine sample shortly after taking the dose of Glucose for the GTT.      PAST MEDICAL HISTORY:  Reviewed with the patient  Past Medical History:   Diagnosis Date     ADHD      Anxiety      Chronic sinusitis      Depressive disorder      Hypertension      PAST SURGICAL HISTORY:  Past  Surgical History:   Procedure Laterality Date     BIOPSY NAIL (LOCATION)      left hand, 4th digit     MEDICATIONS:    Current Outpatient Medications   Medication Sig Dispense Refill     aspirin (ASA) 81 MG EC tablet Take 1 tablet (81 mg) by mouth daily , begin at 12 weeks pregnant. 90 tablet 3     famotidine (PEPCID) 20 MG tablet Take 1 tablet (20 mg) by mouth 2 times daily 60 tablet 3     labetalol (NORMODYNE) 200 MG tablet Take 1 tablet (200 mg) by mouth 2 times daily 60 tablet 3     Prenatal Vit-Fe Fumarate-FA (PRENATAL MULTIVITAMIN W/IRON) 27-0.8 MG tablet Take 1 tablet by mouth daily 100 tablet 3     ALLERGIES:    Allergies   Allergen Reactions     Nickel      REVIEW OF SYSTEMS:  Review Of Systems  A comprehensive review of systems was performed and found to be negative except as described here or above.  SOCIAL HISTORY:   Social History     Socioeconomic History     Marital status:      Spouse name: Pedro     Number of children: Not on file     Years of education: Not on file     Highest education level: Not on file   Occupational History     Occupation: Medical resident     Comment: pathology   Social Needs     Financial resource strain: Not on file     Food insecurity     Worry: Not on file     Inability: Not on file     Transportation needs     Medical: Not on file     Non-medical: Not on file   Tobacco Use     Smoking status: Never Smoker     Smokeless tobacco: Never Used   Substance and Sexual Activity     Alcohol use: No     Drug use: No     Sexual activity: Yes     Partners: Male   Lifestyle     Physical activity     Days per week: Not on file     Minutes per session: Not on file     Stress: Not on file   Relationships     Social connections     Talks on phone: Not on file     Gets together: Not on file     Attends Rastafari service: Not on file     Active member of club or organization: Not on file     Attends meetings of clubs or organizations: Not on file     Relationship status: Not on file      Intimate partner violence     Fear of current or ex partner: Not on file     Emotionally abused: Not on file     Physically abused: Not on file     Forced sexual activity: Not on file   Other Topics Concern     Not on file   Social History Narrative    In 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     FAMILY MEDICAL HISTORY:   Family History   Problem Relation Age of Onset     Hypertension Mother      Breast Cancer Mother         ductal stage 2, HER-2 negative, (+) estrogen/progesterone     No Known Problems Father      No Known Problems Sister      Pancreatic Cancer Maternal Grandfather      Brain Cancer Paternal Aunt      PHYSICAL EXAM:   LMP 03/02/2020    BPs reportedly 120's/70'ss  GENERAL APPEARANCE: alert and no distress  EYES: nonicteric  RESP: breathing non labored   SKIN: no facial rash  NEURO: mentation intact and speech normal  PSYCH: affect normal/bright   LABS:   Recent Results (from the past 1008 hour(s))   Protein  random urine with Creat Ratio    Collection Time: 08/24/20  2:30 PM   Result Value Ref Range    Protein Random Urine 0.17 g/L    Protein Total Urine g/gr Creatinine 0.11 0 - 0.2 g/g Cr   Routine UA with micro reflex to culture    Collection Time: 08/24/20  2:30 PM    Specimen: Unspecified Urine   Result Value Ref Range    Color Urine Yellow     Appearance Urine Slightly Cloudy     Glucose Urine Negative NEG^Negative mg/dL    Bilirubin Urine Negative NEG^Negative    Ketones Urine Negative NEG^Negative mg/dL    Specific Gravity Urine 1.021 1.003 - 1.035    Blood Urine Trace (A) NEG^Negative    pH Urine 5.5 5.0 - 7.0 pH    Protein Albumin Urine Negative NEG^Negative mg/dL    Urobilinogen mg/dL Normal 0.0 - 2.0 mg/dL    Nitrite Urine Negative NEG^Negative    Leukocyte Esterase Urine Large (A) NEG^Negative    Source Unspecified Urine     WBC Urine 6 (H) 0 - 5 /HPF    RBC Urine 2 0 - 2 /HPF    Bacteria Urine Few (A) NEG^Negative /HPF    Squamous Epithelial /HPF Urine 30 (H) 0 - 1 /HPF    Mucous Urine Present (A)  NEG^Negative /LPF   Creatinine urine calculation only    Collection Time: 08/24/20  2:30 PM   Result Value Ref Range    Creatinine Urine 160 mg/dL   Urine Culture Aerobic Bacterial    Collection Time: 08/24/20  2:30 PM    Specimen: Midstream Urine   Result Value Ref Range    Specimen Description Midstream Urine     Culture Micro       10,000 to 50,000 colonies/mL  mixed urogenital jolene  Susceptibility testing not routinely done     25- OH-Vitamin D    Collection Time: 09/23/20  8:50 AM   Result Value Ref Range    Vitamin D Deficiency screening 33 20 - 75 ug/L   CBC with Platelets    Collection Time: 09/23/20  8:50 AM   Result Value Ref Range    WBC 10.5 4.0 - 11.0 10e9/L    RBC Count 3.76 (L) 3.8 - 5.2 10e12/L    Hemoglobin 11.5 (L) 11.7 - 15.7 g/dL    Hematocrit 34.7 (L) 35.0 - 47.0 %    MCV 92 78 - 100 fl    MCH 30.6 26.5 - 33.0 pg    MCHC 33.1 31.5 - 36.5 g/dL    RDW 11.9 10.0 - 15.0 %    Platelet Count 180 150 - 450 10e9/L   Glucose 1 Hour    Collection Time: 09/23/20  8:50 AM   Result Value Ref Range    Glu Gest Screen 1hr 50g 168 (H) 60 - 129 mg/dL   Treponema Abs w Reflex to RPR and Titer    Collection Time: 09/23/20  8:50 AM   Result Value Ref Range    Treponema Antibodies Nonreactive NR^Nonreactive   Renal panel    Collection Time: 09/23/20  8:50 AM   Result Value Ref Range    Sodium 137 133 - 144 mmol/L    Potassium 3.4 3.4 - 5.3 mmol/L    Chloride 107 94 - 109 mmol/L    Carbon Dioxide 21 20 - 32 mmol/L    Anion Gap 9 3 - 14 mmol/L    Glucose 160 (H) 70 - 99 mg/dL    Urea Nitrogen 6 (L) 7 - 30 mg/dL    Creatinine 0.53 0.52 - 1.04 mg/dL    GFR Estimate >90 >60 mL/min/[1.73_m2]    GFR Estimate If Black >90 >60 mL/min/[1.73_m2]    Calcium 8.4 (L) 8.5 - 10.1 mg/dL    Phosphorus 2.8 2.5 - 4.5 mg/dL    Albumin 2.7 (L) 3.4 - 5.0 g/dL   Routine UA with micro reflex to culture    Collection Time: 09/23/20  9:04 AM    Specimen: Midstream Urine   Result Value Ref Range    Color Urine Yellow     Appearance Urine  Slightly Cloudy     Glucose Urine Negative NEG^Negative mg/dL    Bilirubin Urine Negative NEG^Negative    Ketones Urine Negative NEG^Negative mg/dL    Specific Gravity Urine 1.013 1.003 - 1.035    Blood Urine Negative NEG^Negative    pH Urine 7.0 5.0 - 7.0 pH    Protein Albumin Urine Negative NEG^Negative mg/dL    Urobilinogen mg/dL 0.0 0.0 - 2.0 mg/dL    Nitrite Urine Negative NEG^Negative    Leukocyte Esterase Urine Small (A) NEG^Negative    Source Midstream Urine     WBC Urine 4 0 - 5 /HPF    RBC Urine 2 0 - 2 /HPF    Bacteria Urine Few (A) NEG^Negative /HPF    Squamous Epithelial /HPF Urine 24 (H) 0 - 1 /HPF    Mucous Urine Present (A) NEG^Negative /LPF   Protein  random urine with Creat Ratio    Collection Time: 09/23/20  9:04 AM   Result Value Ref Range    Protein Random Urine 0.18 g/L    Protein Total Urine g/gr Creatinine 0.21 (H) 0 - 0.2 g/g Cr   Creatinine urine calculation only    Collection Time: 09/23/20  9:04 AM   Result Value Ref Range    Creatinine Urine 88 mg/dL   Glucose 3 Hour    Collection Time: 09/24/20  8:24 AM   Result Value Ref Range    Glucose Fasting 100 Grams 81 60 - 94 mg/dL    Glucose 1 Hour 100 Grams 162 60 - 179 mg/dL    Glucose 2 Hour 100 Grams 127 60 - 154 mg/dL    Glucose 3 Hour 100 Grams 133 60 - 139 mg/dL     CMP  Recent Labs   Lab Test 09/23/20  0850 05/13/20  1632 07/23/19  1509 09/14/18  1609     --  137 140   POTASSIUM 3.4  --  3.6 3.8   CHLORIDE 107  --  104 108   CO2 21  --  27 24   ANIONGAP 9  --  5 9   *  --  85 104*   BUN 6*  --  10 14   CR 0.53 0.56 0.83 0.90   GFRESTIMATED >90 >90 >90 73   GFRESTBLACK >90 >90 >90 89   COLT 8.4*  --  9.6 8.8   PHOS 2.8  --   --   --    PROTTOTAL  --   --  8.3 7.6   ALBUMIN 2.7*  --  4.5 4.1   BILITOTAL  --   --  0.7 0.4   ALKPHOS  --   --  71 75   AST  --  22 18 17   ALT  --  32 22 18     CBC  Recent Labs   Lab Test 09/23/20  0850 05/13/20  1632   HGB 11.5* 14.0   WBC 10.5 12.0*   RBC 3.76* 4.66   HCT 34.7* 40.9   MCV 92 88    MCH 30.6 30.0   MCHC 33.1 34.2   RDW 11.9 11.8    235     INRNo lab results found.  ABGNo lab results found.   URINE STUDIES  Recent Labs   Lab Test 09/23/20  0904 08/24/20  1430 06/08/20  0620   COLOR Yellow Yellow Yellow   APPEARANCE Slightly Cloudy Slightly Cloudy Clear   URINEGLC Negative Negative Negative   URINEBILI Negative Negative Negative   URINEKETONE Negative Negative 40*   SG 1.013 1.021 1.014   UBLD Negative Trace* Negative   URINEPH 7.0 5.5 6.0   PROTEIN Negative Negative Negative   NITRITE Negative Negative Negative   LEUKEST Small* Large* Negative   RBCU 2 2 4*   WBCU 4 6* 0     Recent Labs   Lab Test 09/23/20  0904 08/24/20  1430 06/08/20  0620 05/15/20  1201 05/13/20  1629   UTPG 0.21* 0.11 0.08 0.08 1.16*     EXAMINATION: US RENAL COMPLETE, 6/23/2020 12:49 PM      COMPARISON: None.     HISTORY: History of hypertension and young woman. Evaluate kidney size  and echogenicity.     FINDINGS:     Right kidney: Measures 10.9 cm cm in length. Parenchyma is of normal  thickness and echogenicity. No focal mass. No hydronephrosis.     Left kidney: Measures 11.5 cm cm in length. Parenchyma is of normal  thickness and echogenicity. No focal mass. No hydronephrosis.      Bladder: Decompressed.     Incidentally noted mildly increased echogenicity within the liver.  Anechoic avascular cystic-appearing lesion within the spleen measuring  1.2 x 1.0 x 1.0 cm. Single intrauterine pregnancy with a heart rate of  157 bpm.                                                                      IMPRESSION:  1.  Normal renal ultrasound.  2.  Incidentally noted  increased echogenicity throughout the liver  which is suggestive of hepatic steatosis.  3.  Single living intrauterine pregnancy.     I have personally reviewed the examination and initial interpretation  and I agree with the findings.     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  The repeat thest 2 days later revealed a normal UPCR. THe urine creatinine on the first sample is very low (and likely erroneous?).  We will obtain a full UA and repeat the UPCR + UACR.  Her routine serologies for pregnancy are unrevealing.  Her ROS is not suggestive of a systemic inflammatory disease (e.g.SLE).  I that the result of 5/13/20 was spurious.  Since then, the UPCR has remained normal, although there was a minimal increase on the last measurement.  We agreed that she should continue to monitor a UPCR every ~ 4 weeks, and call me if there is an increase.    2- HTN.  Dr  Rodriguez's BP appears to be under control on the current regiment of labetalol.  No change in meds indicated at this point .     We agreed to a follow up appointment in ~4 months, after her anticipated delivery.   Michael will call/page  me if she has any concerns in the interim     Jerome Vargas MD  Division of Renal Disease and Hypertension  October 1, 2020        Gabriele Rodriguez is a 35 year old female who is being evaluated via a billable video visit.      The patient has been notified of following:     \"This video visit will be conducted via a call between you and your physician/provider. We have found that certain health care needs can be provided without the need for an in-person physical exam.  This service lets us provide the care you need with a video conversation.  If a prescription is necessary we can send it directly to your pharmacy.  If lab work is needed we can place an order for that and you can then stop by our lab to have the test done at a later time.    Video visits are billed at different rates depending on your insurance coverage.  Please reach out to your insurance provider with any questions.    If during the course of the call the physician/provider feels a video visit is not appropriate, you will not be charged for this service.\"    Patient has given verbal consent for Video visit? Yes  How " would you like to obtain your AVS? MyChart  If you are dropped from the video visit, the video invite should be resent to: Send to e-mail at: [email or web address]  Will anyone else be joining your video visit? No        Video-Visit Details    See below            Again, thank you for allowing me to participate in the care of your patient.      Sincerely,    Jerome Vargas MD

## 2020-10-01 NOTE — PROGRESS NOTES
"Gabriele Rodriguez is a 35 year old female who is being evaluated via a billable video visit.      The patient has been notified of following:     \"This video visit will be conducted via a call between you and your physician/provider. We have found that certain health care needs can be provided without the need for an in-person physical exam.  This service lets us provide the care you need with a video conversation.  If a prescription is necessary we can send it directly to your pharmacy.  If lab work is needed we can place an order for that and you can then stop by our lab to have the test done at a later time.    Video visits are billed at different rates depending on your insurance coverage.  Please reach out to your insurance provider with any questions.    If during the course of the call the physician/provider feels a video visit is not appropriate, you will not be charged for this service.\"    Patient has given verbal consent for Video visit? Yes  How would you like to obtain your AVS? MyChart  If you are dropped from the video visit, the video invite should be resent to: Send to e-mail at: [email or web address]  Will anyone else be joining your video visit? No        Video-Visit Details    See below        "

## 2020-10-05 ASSESSMENT — ANXIETY QUESTIONNAIRES
7. FEELING AFRAID AS IF SOMETHING AWFUL MIGHT HAPPEN: NOT AT ALL
3. WORRYING TOO MUCH ABOUT DIFFERENT THINGS: NOT AT ALL
7. FEELING AFRAID AS IF SOMETHING AWFUL MIGHT HAPPEN: NOT AT ALL
6. BECOMING EASILY ANNOYED OR IRRITABLE: NOT AT ALL
2. NOT BEING ABLE TO STOP OR CONTROL WORRYING: NOT AT ALL
GAD7 TOTAL SCORE: 0
1. FEELING NERVOUS, ANXIOUS, OR ON EDGE: NOT AT ALL
GAD7 TOTAL SCORE: 0
4. TROUBLE RELAXING: NOT AT ALL
5. BEING SO RESTLESS THAT IT IS HARD TO SIT STILL: NOT AT ALL

## 2020-10-06 ENCOUNTER — OFFICE VISIT (OUTPATIENT)
Dept: OBGYN | Facility: CLINIC | Age: 35
End: 2020-10-06
Attending: OBSTETRICS & GYNECOLOGY
Payer: COMMERCIAL

## 2020-10-06 VITALS
BODY MASS INDEX: 33.48 KG/M2 | HEART RATE: 92 BPM | DIASTOLIC BLOOD PRESSURE: 78 MMHG | WEIGHT: 189 LBS | SYSTOLIC BLOOD PRESSURE: 123 MMHG

## 2020-10-06 DIAGNOSIS — O09.93 SUPERVISION OF HIGH RISK PREGNANCY IN THIRD TRIMESTER: Primary | ICD-10-CM

## 2020-10-06 DIAGNOSIS — O10.919 CHRONIC HYPERTENSION AFFECTING PREGNANCY: ICD-10-CM

## 2020-10-06 DIAGNOSIS — J32.0 CHRONIC MAXILLARY SINUSITIS: ICD-10-CM

## 2020-10-06 PROCEDURE — G0463 HOSPITAL OUTPT CLINIC VISIT: HCPCS

## 2020-10-06 PROCEDURE — 99207 PR PRENATAL VISIT: CPT | Performed by: OBSTETRICS & GYNECOLOGY

## 2020-10-06 RX ORDER — FAMOTIDINE 40 MG/1
TABLET, FILM COATED ORAL
COMMUNITY
Start: 2020-09-29 | End: 2020-10-21

## 2020-10-06 ASSESSMENT — ANXIETY QUESTIONNAIRES: GAD7 TOTAL SCORE: 0

## 2020-10-06 ASSESSMENT — PAIN SCALES - GENERAL: PAINLEVEL: NO PAIN (0)

## 2020-10-06 NOTE — NURSING NOTE
Chief Complaint   Patient presents with     Prenatal Care     HARRY 30 weeks and 1 day   Isaura German LPN

## 2020-10-06 NOTE — LETTER
10/6/2020       RE: Gabriele Rodriguez  [address] [facility] [address]     Dear Colleague,    Thank you for referring your patient, Gabriele Rodriguez, to the [facility] [place] at [facility]. Please see a copy of my visit note below.    S: Patient states that she is doing well. She reports no issues with labetalol for chronic HTN and that blood pressures have been stable. Reports a lot of fetal movement. Denies leakage of fluid, bleeding, regular contractions. Denies edema, vision changes, RUQ pain, SOB, or palpitations. She has been experiencing a headache on and off due to chronic sinusitis. Was told previously she could use Zyrtec states it never worked for her in the past and she would rather not take another medication while pregnant.     O: /78   Pulse 92   Wt 85.7 kg (189 lb)   LMP 2020   Breastfeeding No   BMI 33.48 kg/m     Fundal height: 32cm   FHT: 133    A/P: Qi is a 34yo  at 30w1d that is doing well today. Pregnancy complicated by chronic HTN that is well controlled with labetalol. Patient to return to clinic in 2 weeks for follow-up and fetal growth ultrasound or sooner as needed.     Ruth Ann Cunningham, MS3    Physician Attestation   I, Barbara August, was present with the medical student who participated in the service and in the documentation of the note.  I have verified the history and personally performed the physical exam and medical decision making.  I agree with the assessment and plan of care as documented in the note.      Barbara August MD, FACOG

## 2020-10-13 ENCOUNTER — OFFICE VISIT (OUTPATIENT)
Dept: DERMATOLOGY | Facility: CLINIC | Age: 35
End: 2020-10-13
Payer: COMMERCIAL

## 2020-10-13 DIAGNOSIS — B07.0 PLANTAR WART, LEFT FOOT: Primary | ICD-10-CM

## 2020-10-13 PROCEDURE — 17110 DESTRUCTION B9 LES UP TO 14: CPT | Mod: GC | Performed by: DERMATOLOGY

## 2020-10-13 ASSESSMENT — PAIN SCALES - GENERAL
PAINLEVEL: MILD PAIN (2)
PAINLEVEL: NO PAIN (1)

## 2020-10-13 NOTE — PATIENT INSTRUCTIONS
Cryotherapy    What is it?    Use of a very cold liquid, such as liquid nitrogen, to freeze and destroy abnormal skin cells that need to be removed    What should I expect?    Tenderness and redness    A small blister that might grow and fill with dark purple blood. There may be crusting.    More than one treatment may be needed if the lesions do not go away.    How do I care for the treated area?    Gently wash the area with your hands when bathing.    Use a thin layer of Vaseline to help with healing. You may use a Band-Aid.     The area should heal within 7-10 days and may leave behind a pink or lighter color.     Do not use an antibiotic or Neosporin ointment.     You may take acetaminophen (Tylenol) for pain.     Call your Doctor if you have:    Severe pain    Signs of infection (warmth, redness, cloudy yellow drainage, and or a bad smell)    Questions or concerns    Who should I call with questions?       [facility]: [phone]       [facility]: [phone]       For urgent needs outside of business hours call the [facility] at [phone]        and ask for the dermatology resident on call

## 2020-10-13 NOTE — NURSING NOTE
Dermatology Rooming Note    Gabriele Rodriguez's goals for this visit include:   Chief Complaint   Patient presents with     Derm Problem     Qi is here for a plantar wart follow up on her left foot, states no change.       Hoda Valderrama LPN

## 2020-10-13 NOTE — LETTER
10/13/2020       RE: Gabriele Rodriguez  [address] [facility] [address]     Dear Colleague,    Thank you for referring your patient, Gabriele Rodriguez, to the [facility] DERMATOLOGY CLINIC [place] at [facility]. Please see a copy of my visit note below.    [facility] Dermatology Note      Dermatology Problem List:  1.Wart left heel s/p paring and cryotherapy 20, 10/12/20    Encounter Date: Oct 13, 2020    CC:   Chief Complaint   Patient presents with     Derm Problem     Qi is here for a plantar wart follow up on her left foot, states no change.       History of Present Illness:  Ms. Gabriele Rodriguez is a 35 year old  31 week and 1 day pregnant female who presents as a follow-up for plantar wart of the left heel. The patient was last seen on 20 when the wart was pared and treated with cryotherapy. She has been applying salicylic acid to this wart at home and believes it has helped to soften the wart a lot, however the size has not changed very much. She is otherwise doing well.     Past Medical History:   Patient Active Problem List   Diagnosis     Anxiety     ADHD (attention deficit hyperactivity disorder), inattentive type     Benign essential hypertension     Supervision of high risk pregnancy in first trimester     Chronic hypertension affecting pregnancy     Chronic sinusitis      proteinuria- initial was elevated but follow up was normal.      Past Medical History:   Diagnosis Date     ADHD      Anxiety      Chronic sinusitis      Depressive disorder      Hypertension      Past Surgical History:   Procedure Laterality Date     BIOPSY NAIL (LOCATION)      left hand, 4th digit       Social History:  Patient reports that she has never smoked. She has never used smokeless tobacco. She reports that she does not drink alcohol or use drugs.    Family History:  Family History   Problem Relation Age of Onset     Hypertension  Mother      Breast Cancer Mother         ductal stage 2, HER-2 negative, (+) estrogen/progesterone     Skin Cancer Mother      No Known Problems Father      No Known Problems Sister      Pancreatic Cancer Maternal Grandfather      Brain Cancer Paternal Aunt      Melanoma No family hx of        Medications:  Current Outpatient Medications   Medication Sig Dispense Refill     aspirin (ASA) 81 MG EC tablet Take 1 tablet (81 mg) by mouth daily , begin at 12 weeks pregnant. 90 tablet 3     famotidine (PEPCID) 20 MG tablet Take 1 tablet (20 mg) by mouth 2 times daily 60 tablet 3     famotidine (PEPCID) 40 MG tablet        labetalol (NORMODYNE) 200 MG tablet Take 1 tablet (200 mg) by mouth 2 times daily 60 tablet 3     Prenatal Vit-Fe Fumarate-FA (PRENATAL MULTIVITAMIN W/IRON) 27-0.8 MG tablet Take 1 tablet by mouth daily 100 tablet 3        Allergies   Allergen Reactions     Nickel        Review of Systems:  -As per HPI  -Const: Denies fevers, chills or changes in weight.   -Constitutional: Otherwise feeling well today, in usual state of health.  -HEENT: Patient denies nonhealing oral sores.  -Skin: As above in HPI. No additional skin concerns.    Physical exam:  Vitals: LMP 03/02/2020   GEN: This is a well developed, well-nourished female in no acute distress, in a pleasant mood.    SKIN: Focused examination of the left foot was performed.  -Shah skin type: I  -There is a thick hyperkeratotic verrucous plaque with thrombosed capillaries interrupting dermatoglyphics on the left heel.  -No other lesions of concern on areas examined.     Impression/Plan:  1. Plantar wart, left heel  - Cryotherapy procedure note: After verbal consent and discussion of risks and benefits including but no limited to dyspigmentation/scar, blister, and pain, one wart was pared with a 15 blade and treated with 1-2mm freeze border for 2 cycles with liquid nitrogen. Post cryotherapy instructions were provided.  - Continue salicylic acid  daily at home   - Agrees to soak wart prior to next office visit    CC Shana Sharif, APRN CNP  [address] [place] [id number] on close of this encounter.    Follow-up in 1 months, earlier for new or changing lesions.     Dr. William staffed the patient.    Staff Involved:  Trinity Javed DO (PGY-2)/Staff  I was present for the entire procedure. Madison William MD  I, Madison William MD, saw this patient with the resident and agree with the resident s findings and plan of care as documented in the resident s note.

## 2020-10-13 NOTE — PROGRESS NOTES
[facility] Dermatology Note      Dermatology Problem List:  1.Wart left heel s/p paring and cryotherapy 20, 10/12/20    Encounter Date: Oct 13, 2020    CC:   Chief Complaint   Patient presents with     Derm Problem     Qi is here for a plantar wart follow up on her left foot, states no change.       History of Present Illness:  Ms. Gabriele Rodriguez is a 35 year old  31 week and 1 day pregnant female who presents as a follow-up for plantar wart of the left heel. The patient was last seen on 20 when the wart was pared and treated with cryotherapy. She has been applying salicylic acid to this wart at home and believes it has helped to soften the wart a lot, however the size has not changed very much. She is otherwise doing well.     Past Medical History:   Patient Active Problem List   Diagnosis     Anxiety     ADHD (attention deficit hyperactivity disorder), inattentive type     Benign essential hypertension     Supervision of high risk pregnancy in first trimester     Chronic hypertension affecting pregnancy     Chronic sinusitis      proteinuria- initial was elevated but follow up was normal.      Past Medical History:   Diagnosis Date     ADHD      Anxiety      Chronic sinusitis      Depressive disorder      Hypertension      Past Surgical History:   Procedure Laterality Date     BIOPSY NAIL (LOCATION)      left hand, 4th digit       Social History:  Patient reports that she has never smoked. She has never used smokeless tobacco. She reports that she does not drink alcohol or use drugs.    Family History:  Family History   Problem Relation Age of Onset     Hypertension Mother      Breast Cancer Mother         ductal stage 2, HER-2 negative, (+) estrogen/progesterone     Skin Cancer Mother      No Known Problems Father      No Known Problems Sister      Pancreatic Cancer Maternal Grandfather      Brain Cancer Paternal Aunt      Melanoma No family hx of        Medications:  Current  Outpatient Medications   Medication Sig Dispense Refill     aspirin (ASA) 81 MG EC tablet Take 1 tablet (81 mg) by mouth daily , begin at 12 weeks pregnant. 90 tablet 3     famotidine (PEPCID) 20 MG tablet Take 1 tablet (20 mg) by mouth 2 times daily 60 tablet 3     famotidine (PEPCID) 40 MG tablet        labetalol (NORMODYNE) 200 MG tablet Take 1 tablet (200 mg) by mouth 2 times daily 60 tablet 3     Prenatal Vit-Fe Fumarate-FA (PRENATAL MULTIVITAMIN W/IRON) 27-0.8 MG tablet Take 1 tablet by mouth daily 100 tablet 3        Allergies   Allergen Reactions     Nickel        Review of Systems:  -As per HPI  -Const: Denies fevers, chills or changes in weight.   -Constitutional: Otherwise feeling well today, in usual state of health.  -HEENT: Patient denies nonhealing oral sores.  -Skin: As above in HPI. No additional skin concerns.    Physical exam:  Vitals: LMP 03/02/2020   GEN: This is a well developed, well-nourished female in no acute distress, in a pleasant mood.    SKIN: Focused examination of the left foot was performed.  -Shah skin type: I  -There is a thick hyperkeratotic verrucous plaque with thrombosed capillaries interrupting dermatoglyphics on the left heel.  -No other lesions of concern on areas examined.     Impression/Plan:  1. Plantar wart, left heel  - Cryotherapy procedure note: After verbal consent and discussion of risks and benefits including but no limited to dyspigmentation/scar, blister, and pain, one wart was pared with a 15 blade and treated with 1-2mm freeze border for 2 cycles with liquid nitrogen. Post cryotherapy instructions were provided.  - Continue salicylic acid daily at home   - Agrees to soak wart prior to next office visit    CC Shana Sharif, DORA CNP  [address] [place] [phone] on close of this encounter.    Follow-up in 1 months, earlier for new or changing lesions.     Dr. William staffed the patient.    Staff Involved:  Trinity Javed DO  (PGY-2)/Staff  I was present for the entire procedure. Madison William MD  I, Madison William MD, saw this patient with the resident and agree with the resident s findings and plan of care as documented in the resident s note.

## 2020-10-20 ASSESSMENT — ANXIETY QUESTIONNAIRES
1. FEELING NERVOUS, ANXIOUS, OR ON EDGE: NOT AT ALL
4. TROUBLE RELAXING: NOT AT ALL
GAD7 TOTAL SCORE: 0
7. FEELING AFRAID AS IF SOMETHING AWFUL MIGHT HAPPEN: NOT AT ALL
5. BEING SO RESTLESS THAT IT IS HARD TO SIT STILL: NOT AT ALL
7. FEELING AFRAID AS IF SOMETHING AWFUL MIGHT HAPPEN: NOT AT ALL
2. NOT BEING ABLE TO STOP OR CONTROL WORRYING: NOT AT ALL
GAD7 TOTAL SCORE: 0
6. BECOMING EASILY ANNOYED OR IRRITABLE: NOT AT ALL
3. WORRYING TOO MUCH ABOUT DIFFERENT THINGS: NOT AT ALL

## 2020-10-21 ENCOUNTER — OFFICE VISIT (OUTPATIENT)
Dept: OBGYN | Facility: CLINIC | Age: 35
End: 2020-10-21
Attending: OBSTETRICS & GYNECOLOGY
Payer: COMMERCIAL

## 2020-10-21 ENCOUNTER — ANCILLARY PROCEDURE (OUTPATIENT)
Dept: ULTRASOUND IMAGING | Facility: CLINIC | Age: 35
End: 2020-10-21
Attending: ADVANCED PRACTICE MIDWIFE
Payer: COMMERCIAL

## 2020-10-21 VITALS
HEIGHT: 63 IN | DIASTOLIC BLOOD PRESSURE: 80 MMHG | SYSTOLIC BLOOD PRESSURE: 131 MMHG | WEIGHT: 190.6 LBS | HEART RATE: 91 BPM | BODY MASS INDEX: 33.77 KG/M2

## 2020-10-21 DIAGNOSIS — O09.93 SUPERVISION OF HIGH RISK PREGNANCY IN THIRD TRIMESTER: ICD-10-CM

## 2020-10-21 DIAGNOSIS — O09.93 SUPERVISION OF HIGH RISK PREGNANCY IN THIRD TRIMESTER: Primary | ICD-10-CM

## 2020-10-21 DIAGNOSIS — O10.919 CHRONIC HYPERTENSION AFFECTING PREGNANCY: ICD-10-CM

## 2020-10-21 LAB
CREAT UR-MCNC: 40 MG/DL
PROT UR-MCNC: 0.1 G/L
PROT/CREAT 24H UR: 0.24 G/G CR (ref 0–0.2)

## 2020-10-21 PROCEDURE — 84156 ASSAY OF PROTEIN URINE: CPT | Performed by: OBSTETRICS & GYNECOLOGY

## 2020-10-21 PROCEDURE — 99207 PR PRENATAL VISIT: CPT | Performed by: OBSTETRICS & GYNECOLOGY

## 2020-10-21 PROCEDURE — 76819 FETAL BIOPHYS PROFIL W/O NST: CPT | Mod: 26 | Performed by: OBSTETRICS & GYNECOLOGY

## 2020-10-21 PROCEDURE — 76819 FETAL BIOPHYS PROFIL W/O NST: CPT

## 2020-10-21 PROCEDURE — 76816 OB US FOLLOW-UP PER FETUS: CPT | Mod: 26 | Performed by: OBSTETRICS & GYNECOLOGY

## 2020-10-21 ASSESSMENT — MIFFLIN-ST. JEOR: SCORE: 1538.69

## 2020-10-21 ASSESSMENT — ANXIETY QUESTIONNAIRES: GAD7 TOTAL SCORE: 0

## 2020-10-21 NOTE — LETTER
"10/21/2020       RE: Gabriele Rodriguez  [address] [facility] [address]     Dear Colleague,    Thank you for referring your patient, Gabriele Rodriguez, to the [facility] WOMEN'S CLINIC [place] at [facility]. Please see a copy of my visit note below.    [facility]  Prenatal Visit    SUBJECTIVE  No contractions, no vaginal bleeding, no leakage of fluid, normal fetal movement. No headache, no vision changes, no RUQ pain.     Has had a few small nosebleeds (bloody mucus) with the change in weather; wonders if this is normal.    OBJECTIVE  /80   Pulse 91   Ht 1.6 m (5' 3\")   Wt 86.5 kg (190 lb 9.6 oz)   LMP 2020   BMI 33.76 kg/m      See OB Flowsheet    ASSESSMENT/PLAN  Gabriele Rodriguez is a 35 year old  who is 32w2d, here for HARRY.    1. Chronic hypertension:  -BP has been well-controlled without medication.  -continue daily aspirin for pre-eclampsia prevention  -baseline HELLP labs normal  -urine protein:creatinine ratio 0.21; repeat today   -was referred to nephrology for proteinuria at 9 weeks; felt to be spurious; will continue to monitor UPC monthly per their recommendations  -discussed rationale for weekly BPPs starting today until delivery; BPP today    -growth US today 42%ile; plan to repeat at 36 weeks  -reviewed signs/symptoms of pre-eclampsia and when to call  -discussed delivery planning at 38-39 weeks; sooner if poor BP control    2. Two vessel cord:   -serial growth assessments given risk of growth restriction    3. Nosebleeds:   -discussed that this is often normal in pregnancy. Offered to check platelets. Will defer for now, but Qi will bring up at any time if they worsen.    4.Routine prenatal care:  -plans nexplanon for PP contraception  - Pedro will provide her with support during labor  -plans epidural for labor pain management  -Rh POS/RI/Tdap and fluvax UTD    RTC for MD visit in 2 " seymour.    Janice Hayes MD, MSCI    [facility]/OBGYN

## 2020-10-28 ENCOUNTER — ANCILLARY PROCEDURE (OUTPATIENT)
Dept: ULTRASOUND IMAGING | Facility: CLINIC | Age: 35
End: 2020-10-28
Attending: ADVANCED PRACTICE MIDWIFE
Payer: COMMERCIAL

## 2020-10-28 DIAGNOSIS — O09.93 SUPERVISION OF HIGH RISK PREGNANCY IN THIRD TRIMESTER: ICD-10-CM

## 2020-10-28 DIAGNOSIS — O10.919 CHRONIC HYPERTENSION AFFECTING PREGNANCY: ICD-10-CM

## 2020-10-28 PROCEDURE — 76819 FETAL BIOPHYS PROFIL W/O NST: CPT | Mod: 26 | Performed by: OBSTETRICS & GYNECOLOGY

## 2020-10-28 PROCEDURE — 76819 FETAL BIOPHYS PROFIL W/O NST: CPT

## 2020-11-03 ASSESSMENT — ANXIETY QUESTIONNAIRES
1. FEELING NERVOUS, ANXIOUS, OR ON EDGE: NOT AT ALL
7. FEELING AFRAID AS IF SOMETHING AWFUL MIGHT HAPPEN: NOT AT ALL
GAD7 TOTAL SCORE: 0
6. BECOMING EASILY ANNOYED OR IRRITABLE: NOT AT ALL
7. FEELING AFRAID AS IF SOMETHING AWFUL MIGHT HAPPEN: NOT AT ALL
5. BEING SO RESTLESS THAT IT IS HARD TO SIT STILL: NOT AT ALL
3. WORRYING TOO MUCH ABOUT DIFFERENT THINGS: NOT AT ALL
GAD7 TOTAL SCORE: 0
4. TROUBLE RELAXING: NOT AT ALL
2. NOT BEING ABLE TO STOP OR CONTROL WORRYING: NOT AT ALL

## 2020-11-03 NOTE — PROGRESS NOTES
"[facility]  Prenatal Visit    SUBJECTIVE  Feeling well. Denies contractions, no vaginal bleeding, no leakage of fluid, normal fetal movement. No headache, no vision changes. Wondering why her BP was lower today. Has everything squared away for maternity leave with work.     OBJECTIVE  /77 (BP Location: Right arm, Patient Position: Chair)   Pulse 87   Ht 1.6 m (5' 3\")   Wt 86.3 kg (190 lb 4.8 oz)   LMP 2020   BMI 33.71 kg/m      See OB Flowsheet  FH: 33 cm    BPP 2020  35 year old,  , presents at 34 2/7 weeks in pregnancy complicated by chronic hypertension for biophysical assessment.    Fetal Breathing Movements (FBM): Normal - 2  Gross Body Movements (GBM): Normal - 2  Fetal Tone (FT): Normal - 2  AFV: Pocket of amniotic fluid > or = to 2 cm x 2 cm - 0  Amniotic Fluid MVP: 2.9 cm, decreased from previous exam. Does not have a 2 x 2cm pocket.     BPP 6/8.  FHR = 133 bpm Normal.   MCA Not done.  NST Not done.      Single fetus in cephalic presentation.  Placenta anterior, no previa.     Recommend NST assessment in clinic today.  If reactive, plan repeat US in 2 days.  If nonreactive, recommend evaluation in Birthplace.     HARMEET Ogden MD    FHT:   130 bpm baseline, moderate variability, + accels, no decels  [place]: quiet     Answers for HPI/ROS submitted by the patient on 11/3/2020   ALVIN 7 TOTAL SCORE: 0    ASSESSMENT/PLAN  Gabriele Rodriguez is a 35 year old  who is 34w2d, here for HARRY.    1. Chronic hypertension:  -BP has been well-controlled on labetalol 200 mg BID.  -continue daily aspirin for pre-eclampsia prevention  -baseline HELLP labs normal  -urine protein:creatinine ratio 0.24 (10/21)   -was referred to nephrology for proteinuria at 9 weeks; felt to be spurious; will continue to monitor UPC monthly per their recommendations  -discussed rationale for weekly BPPs until delivery; BPP today 6/8, NST reactive. Recommend additional BPP on " .   -growth US today 42%ile; plan to repeat at 36 weeks  -reviewed signs/symptoms of pre-eclampsia and when to call  -discussed delivery planning at 38-39 weeks; sooner if poor BP control or indicated by  testing concerns    2. Two vessel cord:   -serial growth assessments given risk of growth restriction    3. Nosebleeds:   -discussed that this is often normal in pregnancy. Offered to check platelets. Will defer for now, but Qi will bring up at any time if they worsen.    4.Routine prenatal care:  -plans nexplanon for PP contraception  - Pedro will provide her with support during labor  -plans epidural for labor pain management  -Rh POS/RI/Tdap and fluvax UTD  - GBS collected today    RTC for MD visit in 2 weeks.    Miracle Rahman, PGY-4    The Patient was seen in Res[facility] Continuity Clinic by Dr. Rahman.   I reviewed the history & exam. Assessment and plan were jointly made.   Jojo Vza MD, MPH

## 2020-11-04 ENCOUNTER — OFFICE VISIT (OUTPATIENT)
Dept: OBGYN | Facility: CLINIC | Age: 35
End: 2020-11-04
Attending: OBSTETRICS & GYNECOLOGY
Payer: COMMERCIAL

## 2020-11-04 ENCOUNTER — ANCILLARY PROCEDURE (OUTPATIENT)
Dept: ULTRASOUND IMAGING | Facility: CLINIC | Age: 35
End: 2020-11-04
Attending: ADVANCED PRACTICE MIDWIFE
Payer: COMMERCIAL

## 2020-11-04 VITALS
HEART RATE: 87 BPM | HEIGHT: 63 IN | BODY MASS INDEX: 33.72 KG/M2 | WEIGHT: 190.3 LBS | DIASTOLIC BLOOD PRESSURE: 77 MMHG | SYSTOLIC BLOOD PRESSURE: 116 MMHG

## 2020-11-04 DIAGNOSIS — O10.919 CHRONIC HYPERTENSION AFFECTING PREGNANCY: ICD-10-CM

## 2020-11-04 DIAGNOSIS — O09.619 SUPERVISION OF HIGH-RISK PREGNANCY OF YOUNG PRIMIGRAVIDA: Primary | ICD-10-CM

## 2020-11-04 PROCEDURE — 99207 PR PRENATAL VISIT: CPT | Mod: GE | Performed by: OBSTETRICS & GYNECOLOGY

## 2020-11-04 PROCEDURE — 76819 FETAL BIOPHYS PROFIL W/O NST: CPT

## 2020-11-04 PROCEDURE — 76818 FETAL BIOPHYS PROFILE W/NST: CPT | Mod: 26 | Performed by: OBSTETRICS & GYNECOLOGY

## 2020-11-04 PROCEDURE — 87653 STREP B DNA AMP PROBE: CPT | Performed by: STUDENT IN AN ORGANIZED HEALTH CARE EDUCATION/TRAINING PROGRAM

## 2020-11-04 PROCEDURE — G0463 HOSPITAL OUTPT CLINIC VISIT: HCPCS

## 2020-11-04 ASSESSMENT — MIFFLIN-ST. JEOR: SCORE: 1537.33

## 2020-11-04 ASSESSMENT — ANXIETY QUESTIONNAIRES: GAD7 TOTAL SCORE: 0

## 2020-11-04 NOTE — LETTER
"2020       RE: Gabriele Rodriguez  [address] [facility] [address]     Dear Colleague,    Thank you for referring your patient, Gabriele Rodriguez, to the [facility] WOMEN'S CLINIC [place] at [facility]. Please see a copy of my visit note below.    [facility]  Prenatal Visit    SUBJECTIVE  Feeling well. Denies contractions, no vaginal bleeding, no leakage of fluid, normal fetal movement. No headache, no vision changes. Wondering why her BP was lower today. Has everything squared away for maternity leave with work.     OBJECTIVE  /77 (BP Location: Right arm, Patient Position: Chair)   Pulse 87   Ht 1.6 m (5' 3\")   Wt 86.3 kg (190 lb 4.8 oz)   LMP 2020   BMI 33.71 kg/m      See OB Flowsheet  FH: 33 cm    BPP 2020  35 year old,  , presents at 34 2/7 weeks in pregnancy complicated by chronic hypertension for biophysical assessment.    Fetal Breathing Movements (FBM): Normal - 2  Gross Body Movements (GBM): Normal - 2  Fetal Tone (FT): Normal - 2  AFV: Pocket of amniotic fluid > or = to 2 cm x 2 cm - 0  Amniotic Fluid MVP: 2.9 cm, decreased from previous exam. Does not have a 2 x 2cm pocket.     BPP 6/8.  FHR = 133 bpm Normal.   MCA Not done.  NST Not done.      Single fetus in cephalic presentation.  Placenta anterior, no previa.     Recommend NST assessment in clinic today.  If reactive, plan repeat US in 2 days.  If nonreactive, recommend evaluation in Birthplace.     HARMEET Ogden MD    FHT:   130 bpm baseline, moderate variability, + accels, no decels  [place]: quiet     Answers for HPI/ROS submitted by the patient on 11/3/2020   ALVIN 7 TOTAL SCORE: 0    ASSESSMENT/PLAN  Gabriele Rodriguez is a 35 year old  who is 34w2d, here for HARRY.    1. Chronic hypertension:  -BP has been well-controlled on labetalol 200 mg BID.  -continue daily aspirin for pre-eclampsia prevention  -baseline HELLP labs " normal  -urine protein:creatinine ratio 0.24 (10/21)   -was referred to nephrology for proteinuria at 9 weeks; felt to be spurious; will continue to monitor UPC monthly per their recommendations  -discussed rationale for weekly BPPs until delivery; BPP today , NST reactive. Recommend additional BPP on .   -growth US today 42%ile; plan to repeat at 36 weeks  -reviewed signs/symptoms of pre-eclampsia and when to call  -discussed delivery planning at 38-39 weeks; sooner if poor BP control or indicated by  testing concerns    2. Two vessel cord:   -serial growth assessments given risk of growth restriction    3. Nosebleeds:   -discussed that this is often normal in pregnancy. Offered to check platelets. Will defer for now, but Qi will bring up at any time if they worsen.    4.Routine prenatal care:  -plans nexplanon for PP contraception  - Pedro will provide her with support during labor  -plans epidural for labor pain management  -Rh POS/RI/Tdap and fluvax UTD  - GBS collected today    RTC for MD visit in 2 weeks.    Miracle Rahman, PGY-4    The Patient was seen in Resident Continuity Clinic by Dr. Rahman.   I reviewed the history & exam. Assessment and plan were jointly made.   Jojo Vaz MD, MPH

## 2020-11-05 LAB
GP B STREP DNA SPEC QL NAA+PROBE: NEGATIVE
SPECIMEN SOURCE: NORMAL

## 2020-11-06 ENCOUNTER — ANCILLARY PROCEDURE (OUTPATIENT)
Dept: ULTRASOUND IMAGING | Facility: CLINIC | Age: 35
End: 2020-11-06
Attending: STUDENT IN AN ORGANIZED HEALTH CARE EDUCATION/TRAINING PROGRAM
Payer: COMMERCIAL

## 2020-11-06 DIAGNOSIS — O10.919 CHRONIC HYPERTENSION AFFECTING PREGNANCY: ICD-10-CM

## 2020-11-06 PROCEDURE — 76819 FETAL BIOPHYS PROFIL W/O NST: CPT | Performed by: OBSTETRICS & GYNECOLOGY

## 2020-11-06 PROCEDURE — 76819 FETAL BIOPHYS PROFIL W/O NST: CPT

## 2020-11-11 ENCOUNTER — ANCILLARY PROCEDURE (OUTPATIENT)
Dept: ULTRASOUND IMAGING | Facility: CLINIC | Age: 35
End: 2020-11-11
Attending: ADVANCED PRACTICE MIDWIFE
Payer: COMMERCIAL

## 2020-11-11 PROCEDURE — 76819 FETAL BIOPHYS PROFIL W/O NST: CPT

## 2020-11-11 PROCEDURE — 76819 FETAL BIOPHYS PROFIL W/O NST: CPT | Mod: 26 | Performed by: OBSTETRICS & GYNECOLOGY

## 2020-11-17 ENCOUNTER — OFFICE VISIT (OUTPATIENT)
Dept: DERMATOLOGY | Facility: CLINIC | Age: 35
End: 2020-11-17
Payer: COMMERCIAL

## 2020-11-17 DIAGNOSIS — B07.0 PLANTAR WART, LEFT FOOT: Primary | ICD-10-CM

## 2020-11-17 PROCEDURE — 17110 DESTRUCTION B9 LES UP TO 14: CPT | Performed by: DERMATOLOGY

## 2020-11-17 PROCEDURE — 99207 PR NO CHARGE LOS: CPT | Performed by: DERMATOLOGY

## 2020-11-17 ASSESSMENT — PAIN SCALES - GENERAL
PAINLEVEL: NO PAIN (0)
PAINLEVEL: NO PAIN (1)

## 2020-11-17 NOTE — LETTER
11/17/2020       RE: Gabriele STEPHANIE Rodriguez  [address] [facility] [address]     Dear Colleague,    Thank you for referring your patient, Gabriele Rodriguez, to the [facility] DERMATOLOGY CLINIC [place] at [facility]. Please see a copy of my visit note below.    [facility] Dermatology Note      Dermatology Problem List:  1.Wart left heel s/p paring and cryotherapy 9/14/20, 10/12/20, 11/17/2020    CC:   Chief Complaint   Patient presents with     Derm Problem     Qi is here for a follow up for a plantar wart, states it's maybe slightly better.          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  She notes some improvement and did use the salicylic acid with paring at home.  She stopped on Sunday for this visit.  Her baby is due next month.     Past Medical History:   Patient Active Problem List   Diagnosis     Anxiety     ADHD (attention deficit hyperactivity disorder), inattentive type     Benign essential hypertension     Supervision of high risk pregnancy in first trimester     Chronic hypertension affecting pregnancy     Chronic sinusitis      proteinuria- initial was elevated but follow up was normal.      Past Medical History:   Diagnosis Date     ADHD      Anxiety      Chronic sinusitis      Depressive disorder      Hypertension      Past Surgical History:   Procedure Laterality Date     BIOPSY NAIL (LOCATION)      left hand, 4th digit       Social History:  Patient reports that she has never smoked. She has never used smokeless tobacco. She reports that she does not drink alcohol or use drugs.    Family History:  Family History   Problem Relation Age of Onset     Hypertension Mother      Breast Cancer Mother         ductal stage 2, HER-2 negative, (+) estrogen/progesterone     Skin Cancer Mother      No Known  Problems Father      No Known Problems Sister      Pancreatic Cancer Maternal Grandfather      Brain Cancer Paternal Aunt      Melanoma No family hx of        Medications:  Current Outpatient Medications   Medication Sig Dispense Refill     aspirin (ASA) 81 MG EC tablet Take 1 tablet (81 mg) by mouth daily , begin at 12 weeks pregnant. 90 tablet 3     famotidine (PEPCID) 20 MG tablet Take 1 tablet (20 mg) by mouth 2 times daily 60 tablet 3     labetalol (NORMODYNE) 200 MG tablet Take 1 tablet (200 mg) by mouth 2 times daily 60 tablet 3     Prenatal Vit-Fe Fumarate-FA (PRENATAL MULTIVITAMIN W/IRON) 27-0.8 MG tablet Take 1 tablet by mouth daily 100 tablet 3     Allergies   Allergen Reactions     Nickel          Physical exam:  Vitals: LMP 03/02/2020   GEN: This is a well developed, well-nourished female in no acute distress, in a pleasant mood.    SKIN: Focused examination of the left foot was performed.  -Shah skin type: I  -There is a verrucous papule with thrombosed capillaries interrupting dermatoglyphics on the left heel.  -No other lesions of concern on areas examined.     Labs:  NA    Impression/Plan:  1. Verruca plantaris left heel improved.  No paring needed  - Cryotherapy procedure note: After verbal consent and discussion of risks and benefits including but no limited to dyspigmentation/scar, blister, and pain, one was(were) treated with 1-2mm freeze border for 2 cycles with liquid nitrogen. Post cryotherapy instructions were provided.      CC DORA Box CNP  [address] [place] [id number] on close of this encounter.  Follow-up February 2021      Staff Involved:  Staff Only      Madison William MD

## 2020-11-17 NOTE — PATIENT INSTRUCTIONS
Cryotherapy    What is it?    Use of a very cold liquid, such as liquid nitrogen, to freeze and destroy abnormal skin cells that need to be removed    What should I expect?    Tenderness and redness    A small blister that might grow and fill with dark purple blood. There may be crusting.    More than one treatment may be needed if the lesions do not go away.    How do I care for the treated area?    Gently wash the area with your hands when bathing.    Use a thin layer of Vaseline to help with healing. You may use a Band-Aid.     The area should heal within 7-10 days and may leave behind a pink or lighter color.     Do not use an antibiotic or Neosporin ointment.     You may take acetaminophen (Tylenol) for pain.     Call your Doctor if you have:    Severe pain    Signs of infection (warmth, redness, cloudy yellow drainage, and or a bad smell)    Questions or concerns    Who should I call with questions?       [facility]: [phone]       [facility]: [phone]       For urgent needs outside of business hours call the [facility] at [phone]        and ask for the dermatology resident on call

## 2020-11-17 NOTE — PROGRESS NOTES
[facility] Dermatology Note      Dermatology Problem List:  1.Wart left heel s/p paring and cryotherapy 9/14/20, 10/12/20, 11/17/2020    CC:   Chief Complaint   Patient presents with     Derm Problem     Qi is here for a follow up for a plantar wart, states it's maybe slightly better.          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  She notes some improvement and did use the salicylic acid with paring at home.  She stopped on Sunday for this visit.  Her baby is due next month.     Past Medical History:   Patient Active Problem List   Diagnosis     Anxiety     ADHD (attention deficit hyperactivity disorder), inattentive type     Benign essential hypertension     Supervision of high risk pregnancy in first trimester     Chronic hypertension affecting pregnancy     Chronic sinusitis      proteinuria- initial was elevated but follow up was normal.      Past Medical History:   Diagnosis Date     ADHD      Anxiety      Chronic sinusitis      Depressive disorder      Hypertension      Past Surgical History:   Procedure Laterality Date     BIOPSY NAIL (LOCATION)      left hand, 4th digit       Social History:  Patient reports that she has never smoked. She has never used smokeless tobacco. She reports that she does not drink alcohol or use drugs.    Family History:  Family History   Problem Relation Age of Onset     Hypertension Mother      Breast Cancer Mother         ductal stage 2, HER-2 negative, (+) estrogen/progesterone     Skin Cancer Mother      No Known Problems Father      No Known Problems Sister      Pancreatic Cancer Maternal Grandfather      Brain Cancer Paternal Aunt      Melanoma No family hx of        Medications:  Current Outpatient Medications   Medication Sig Dispense Refill     aspirin (ASA) 81 MG EC tablet Take 1 tablet (81 mg) by mouth daily , begin at 12  weeks pregnant. 90 tablet 3     famotidine (PEPCID) 20 MG tablet Take 1 tablet (20 mg) by mouth 2 times daily 60 tablet 3     labetalol (NORMODYNE) 200 MG tablet Take 1 tablet (200 mg) by mouth 2 times daily 60 tablet 3     Prenatal Vit-Fe Fumarate-FA (PRENATAL MULTIVITAMIN W/IRON) 27-0.8 MG tablet Take 1 tablet by mouth daily 100 tablet 3     Allergies   Allergen Reactions     Nickel          Physical exam:  Vitals: LMP 03/02/2020   GEN: This is a well developed, well-nourished female in no acute distress, in a pleasant mood.    SKIN: Focused examination of the left foot was performed.  -Shah skin type: I  -There is a verrucous papule with thrombosed capillaries interrupting dermatoglyphics on the left heel.  -No other lesions of concern on areas examined.     Labs:  NA    Impression/Plan:  1. Verruca plantaris left heel improved.  No paring needed  - Cryotherapy procedure note: After verbal consent and discussion of risks and benefits including but no limited to dyspigmentation/scar, blister, and pain, one was(were) treated with 1-2mm freeze border for 2 cycles with liquid nitrogen. Post cryotherapy instructions were provided.      CC DORA Box CNP  [address] [place] [id number] on close of this encounter.  Follow-up February 2021      Staff Involved:  Staff Only

## 2020-11-18 ASSESSMENT — ANXIETY QUESTIONNAIRES
2. NOT BEING ABLE TO STOP OR CONTROL WORRYING: NOT AT ALL
3. WORRYING TOO MUCH ABOUT DIFFERENT THINGS: NOT AT ALL
1. FEELING NERVOUS, ANXIOUS, OR ON EDGE: NOT AT ALL
4. TROUBLE RELAXING: NOT AT ALL
6. BECOMING EASILY ANNOYED OR IRRITABLE: NOT AT ALL
7. FEELING AFRAID AS IF SOMETHING AWFUL MIGHT HAPPEN: NOT AT ALL
GAD7 TOTAL SCORE: 0
GAD7 TOTAL SCORE: 0
5. BEING SO RESTLESS THAT IT IS HARD TO SIT STILL: NOT AT ALL
7. FEELING AFRAID AS IF SOMETHING AWFUL MIGHT HAPPEN: NOT AT ALL

## 2020-11-19 ENCOUNTER — OFFICE VISIT (OUTPATIENT)
Dept: OBGYN | Facility: CLINIC | Age: 35
End: 2020-11-19
Attending: OBSTETRICS & GYNECOLOGY
Payer: COMMERCIAL

## 2020-11-19 ENCOUNTER — ANCILLARY PROCEDURE (OUTPATIENT)
Dept: ULTRASOUND IMAGING | Facility: CLINIC | Age: 35
End: 2020-11-19
Attending: ADVANCED PRACTICE MIDWIFE
Payer: COMMERCIAL

## 2020-11-19 VITALS
HEART RATE: 82 BPM | DIASTOLIC BLOOD PRESSURE: 79 MMHG | WEIGHT: 191.1 LBS | SYSTOLIC BLOOD PRESSURE: 119 MMHG | BODY MASS INDEX: 33.86 KG/M2 | HEIGHT: 63 IN

## 2020-11-19 DIAGNOSIS — O09.91 SUPERVISION OF HIGH RISK PREGNANCY IN FIRST TRIMESTER: Primary | ICD-10-CM

## 2020-11-19 DIAGNOSIS — O10.919 CHRONIC HYPERTENSION AFFECTING PREGNANCY: ICD-10-CM

## 2020-11-19 DIAGNOSIS — O09.93 SUPERVISION OF HIGH RISK PREGNANCY IN THIRD TRIMESTER: ICD-10-CM

## 2020-11-19 PROCEDURE — 76818 FETAL BIOPHYS PROFILE W/NST: CPT | Mod: 26 | Performed by: OBSTETRICS & GYNECOLOGY

## 2020-11-19 PROCEDURE — 76819 FETAL BIOPHYS PROFIL W/O NST: CPT

## 2020-11-19 PROCEDURE — 99207 PR PRENATAL VISIT: CPT | Mod: GE | Performed by: OBSTETRICS & GYNECOLOGY

## 2020-11-19 PROCEDURE — G0463 HOSPITAL OUTPT CLINIC VISIT: HCPCS

## 2020-11-19 PROCEDURE — 76816 OB US FOLLOW-UP PER FETUS: CPT | Mod: 26 | Performed by: OBSTETRICS & GYNECOLOGY

## 2020-11-19 ASSESSMENT — ANXIETY QUESTIONNAIRES: GAD7 TOTAL SCORE: 0

## 2020-11-19 ASSESSMENT — MIFFLIN-ST. JEOR: SCORE: 1540.95

## 2020-11-19 NOTE — PROGRESS NOTES
[facility]  Prenatal Visit    SUBJECTIVE  36yo  at 36w3d by 9w2d ultrasound who presents for OB visit. She reports feeling well. States that baby is very active. Denies contractions, vaginal bleeding, leakage of fluid, headache,  vision changes, or worsening edema. Is wondering about date of induction.     Pregnancy notable for:  -chronic HTN     OBJECTIVE  LMP 2020     See OB Flowsheet  FH: 36 cm    BPP 6/8, -2 for breathing.  FHR = 131bpm Normal.   MCA Not done.  NST Not done.       USEGA = 34 5/7 weeks.  EFW = 2,545 grams +/- 372g, 20 % for 36 weeks. Previously in the 42%.     Single fetus in cephalic presentation.  Placenta anterior, no previa.       ASSESSMENT/PLAN  Gabriele Rodriguez is a 35 year old  who is 36w3d, here for HARRY.    1. Chronic hypertension:  -BP has been well-controlled on labetalol 200 mg BID.  -continue daily aspirin for pre-eclampsia prevention  -baseline HELLP labs normal  -urine protein:creatinine ratio 0.24 (10/21)   -was referred to nephrology for proteinuria at 9 weeks; felt to be spurious; will continue to monitor UPC monthly per their recommendations  -Recommend twice weekly BPPs until delivery; BPP today 6/8, NST reactive. Plan for  NST at noon in triage.   -growth US decreased to 20%tile from 42%ile;   -reviewed signs/symptoms of pre-eclampsia and when to call  -discussed delivery planning at 38 weeks; sooner if poor BP control or indicated by  testing concerns. IOL scheduled for 0800 on 20. Patient will need COVID testing prior.     2. Two vessel cord:   -serial growth assessments given risk of growth restriction    4.Routine prenatal care:  -plans nexplanon for PP contraception  - Pedro will provide her with support during labor  -plans epidural for labor pain management  -Rh POS/RI/Tdap and fluvax UTD  -GBS Negative    RTC for Physician visit in 1 week.    Patient seen with Carrie Terrell, MD Alicia Myhre, DO  Obstetrics  and Gynecology, PGY-4  November 19, 2020, 4:59 PM     The Patient was seen in [facility] by MYHRE, ALICIA.  I reviewed the history & exam. Assessment and plan were jointly made.    Merle Hair MD

## 2020-11-19 NOTE — LETTER
2020       RE: Gabriele Rodriguez  [address] [facility] [address]     Dear Colleague,    Thank you for referring your patient, Gabriele Rodriguez, to the [facility] [place] [facility]. Please see a copy of my visit note below.    [facility]  Prenatal Visit    SUBJECTIVE  34yo  at 36w3d by 9w2d ultrasound who presents for OB visit. She reports feeling well. States that baby is very active. Denies contractions, vaginal bleeding, leakage of fluid, headache,  vision changes, or worsening edema. Is wondering about date of induction.     Pregnancy notable for:  -chronic HTN     OBJECTIVE  LMP 2020     See OB Flowsheet  FH: 36 cm    BPP 8, -2 for breathing.  FHR = 131bpm Normal.   MCA Not done.  NST Not done.       USEGA = 34 5/7 weeks.  EFW = 2,545 grams +/- 372g, 20 % for 36 weeks. Previously in the 42%.     Single fetus in cephalic presentation.  Placenta anterior, no previa.       ASSESSMENT/PLAN  Gabriele Rodriguez is a 35 year old  who is 36w3d, here for HARRY.    1. Chronic hypertension:  -BP has been well-controlled on labetalol 200 mg BID.  -continue daily aspirin for pre-eclampsia prevention  -baseline HELLP labs normal  -urine protein:creatinine ratio 0.24 (10/21)   -was referred to nephrology for proteinuria at 9 weeks; felt to be spurious; will continue to monitor UPC monthly per their recommendations  -Recommend twice weekly BPPs until delivery; BPP today , NST reactive. Plan for  NST at noon in triage.   -growth US decreased to 20%tile from 42%ile;   -reviewed signs/symptoms of pre-eclampsia and when to call  -discussed delivery planning at 38 weeks; sooner if poor BP control or indicated by  testing concerns. IOL scheduled for 0800 on 20. Patient will need COVID testing prior.     2. Two vessel cord:   -serial growth assessments given risk of growth restriction    4.Routine  prenatal care:  -plans nexplanon for PP contraception  - Pedro will provide her with support during labor  -plans epidural for labor pain management  -Rh POS/RI/Tdap and fluvax UTD  -GBS Negative    RTC for Physician visit in 1 week.    Patient seen with Carrie Terrell, MD Alicia Myhre, DO  Obstetrics and Gynecology, PGY-4  November 19, 2020, 4:59 PM     The Patient was seen in [facility] by MYHRE, ALICIA.  I reviewed the history & exam. Assessment and plan were jointly made.    Merle Hair MD

## 2020-11-22 ENCOUNTER — HOSPITAL ENCOUNTER (OUTPATIENT)
Facility: CLINIC | Age: 35
End: 2020-11-22
Admitting: OBSTETRICS & GYNECOLOGY
Payer: COMMERCIAL

## 2020-11-22 ENCOUNTER — HOSPITAL ENCOUNTER (OUTPATIENT)
Facility: CLINIC | Age: 35
Discharge: HOME | End: 2020-11-22
Attending: OBSTETRICS & GYNECOLOGY | Admitting: OBSTETRICS & GYNECOLOGY
Payer: COMMERCIAL

## 2020-11-22 VITALS — TEMPERATURE: 98.7 F | RESPIRATION RATE: 18 BRPM | DIASTOLIC BLOOD PRESSURE: 75 MMHG | SYSTOLIC BLOOD PRESSURE: 128 MMHG

## 2020-11-22 PROBLEM — Z36.89 ENCOUNTER FOR TRIAGE IN PREGNANT PATIENT: Status: ACTIVE | Noted: 2020-11-22

## 2020-11-22 PROCEDURE — 59025 FETAL NON-STRESS TEST: CPT

## 2020-11-22 PROCEDURE — 59025 FETAL NON-STRESS TEST: CPT | Mod: 26 | Performed by: OBSTETRICS & GYNECOLOGY

## 2020-11-22 PROCEDURE — 999N000105 HC STATISTIC NO DOCUMENTATION TO SUPPORT CHARGE

## 2020-11-22 PROCEDURE — G0463 HOSPITAL OUTPT CLINIC VISIT: HCPCS | Mod: 25

## 2020-11-22 RX ORDER — FAMOTIDINE 20 MG/1
20 TABLET, FILM COATED ORAL 2 TIMES DAILY
COMMUNITY
End: 2020-12-07

## 2020-11-22 NOTE — DISCHARGE INSTRUCTIONS
Discharge Instruction for Undelivered Patients      You were seen for: Fetal Assessment  You were seen by Dr. Hayes  You had (Test or Medicine): non stress test     Diet:   Drink 8 to 12 glasses of liquids (milk, juice, water) every day.  You may eat meals and snacks.     Activity:  Count fetal kicks everyday (see handout)  Call your doctor or nurse midwife if your baby is moving less than usual.     Call your provider if you notice:  Swelling in your face or increased swelling in your hands or legs.  Headaches that are not relieved by Tylenol (acetaminophen).  Changes in your vision (blurring: seeing spots or stars.)  Nausea (sick to your stomach) and vomiting (throwing up).   Weight gain of 5 pounds or more per week.  Heartburn that doesn't go away.  Signs of bladder infection: pain when you urinate (use the toilet), need to go more often and more urgently.  The bag of haskins (rupture of membranes) breaks, or you notice leaking in your underwear.  Bright red blood in your underwear.  Abdominal (lower belly) or stomach pain.  For first baby: Contractions (tightening) less than 5 minutes apart for one hour or more.  *If less than 34 weeks: Contractions (tightenings) more than 6 times in one hour.  Increase or change in vaginal discharge (note the color and amount)      Follow-up:  As scheduled in the clinic, plan on coming for induction on Nov. 30th at 0800.

## 2020-11-22 NOTE — PROGRESS NOTES
Triage NST note    S: no headache, vision changes, CP, SOB, RUQ pain, or leg swelling.    O:   /75   Temp 98.7  F (37.1  C) (Oral)   Resp 18   LMP 2020     NST:  Baseline: 130  Variability: moderate  Accelerations: present  Decelerations: none  Tocometer: occasional contractions    A/P:  35 year old  at 36w6d who is here for an NST for chronic hypertension. BPP on  was 6/8 with a reactive NST. NST today was overall reactive and reassuring. Normal BP and asymptomatic. Follow up as scheduled in clinic.     Discussed with Dr. Hayes.    Roc Decker MD  OB/GYN PGY-1  20 2:35 PM

## 2020-11-22 NOTE — PLAN OF CARE
Data: [facility] at 1212.   Reason for maternal/fetal assessment per patient is Non Stress Test  . Patient is a . Prenatal record reviewed.      OB History    Para Term  AB Living   1 0 0 0 0 0   SAB TAB Ectopic Multiple Live Births   0 0 0 0 0      # Outcome Date GA Lbr Jelani/2nd Weight Sex Delivery Anes PTL Lv   1 Current               Medical History:   Past Medical History:   Diagnosis Date     ADHD      Anxiety      Chronic sinusitis      Depressive disorder      Hypertension    . Gestational Age 36w6d. VSS. Cervix: not examined.  Fetal movement present. Patient denies cramping, backache, vaginal discharge, pelvic pressure, UTI symptoms, GI problems, bloody show, vaginal bleeding, edema, headache, visual disturbances, epigastric or URQ pain, abdominal pain, rupture of membranes. Support persons not present.  Action: Verbal consent for EFM. Triage assessment completed. EFM applied for NST, bpp 6/8 and chronic hypertension. Uterine assessment irregular contraction, some 2 minutes apart but patient not feeling. Fetal assessment: Presumed adequate fetal oxygenation documented (see flow record). Patient education pamphlets given on fetal movement counts and signs of labor. Patient instructed to report change in fetal movement, vaginal leaking of fluid or bleeding, abdominal pain, or any concerns related to the pregnancy to her nurse/physician.   Response: Dr. Hayes informed of arrival and has seen patient. Plan per provider is discharge home. Patient verbalized understanding of education and verbalized agreement with plan. Discharged ambulatory at 1250.

## 2020-11-24 ENCOUNTER — ANESTHESIA (OUTPATIENT)
Dept: OBGYN | Facility: CLINIC | Age: 35
End: 2020-11-24

## 2020-11-24 ENCOUNTER — HOSPITAL ENCOUNTER (INPATIENT)
Facility: CLINIC | Age: 35
LOS: 3 days | Discharge: HOME HEALTH | End: 2020-11-27
Attending: OBSTETRICS & GYNECOLOGY | Admitting: OBSTETRICS & GYNECOLOGY
Payer: COMMERCIAL

## 2020-11-24 ENCOUNTER — ANESTHESIA EVENT (OUTPATIENT)
Dept: OBGYN | Facility: CLINIC | Age: 35
End: 2020-11-24

## 2020-11-24 DIAGNOSIS — Z98.891 S/P CESAREAN SECTION: Primary | ICD-10-CM

## 2020-11-24 PROBLEM — O36.8190 DECREASED FETAL MOVEMENT: Status: ACTIVE | Noted: 2020-11-24

## 2020-11-24 LAB
ABO + RH BLD: NORMAL
ABO + RH BLD: NORMAL
ALT SERPL W P-5'-P-CCNC: 13 U/L (ref 0–50)
AST SERPL W P-5'-P-CCNC: 13 U/L (ref 0–45)
BLD GP AB SCN SERPL QL: NORMAL
BLOOD BANK CMNT PATIENT-IMP: NORMAL
CREAT SERPL-MCNC: 0.48 MG/DL (ref 0.52–1.04)
GFR SERPL CREATININE-BSD FRML MDRD: >90 ML/MIN/{1.73_M2}
HGB BLD-MCNC: 11.8 G/DL (ref 11.7–15.7)
LABORATORY COMMENT REPORT: NORMAL
PLATELET # BLD AUTO: 223 10E9/L (ref 150–450)
SARS-COV-2 RNA SPEC QL NAA+PROBE: NEGATIVE
SARS-COV-2 RNA SPEC QL NAA+PROBE: NORMAL
SPECIMEN EXP DATE BLD: NORMAL
SPECIMEN SOURCE: NORMAL
SPECIMEN SOURCE: NORMAL

## 2020-11-24 PROCEDURE — 86901 BLOOD TYPING SEROLOGIC RH(D): CPT | Performed by: STUDENT IN AN ORGANIZED HEALTH CARE EDUCATION/TRAINING PROGRAM

## 2020-11-24 PROCEDURE — 86850 RBC ANTIBODY SCREEN: CPT | Performed by: STUDENT IN AN ORGANIZED HEALTH CARE EDUCATION/TRAINING PROGRAM

## 2020-11-24 PROCEDURE — 120N000002 HC R&B MED SURG/OB UMMC

## 2020-11-24 PROCEDURE — 250N000013 HC RX MED GY IP 250 OP 250 PS 637: Performed by: STUDENT IN AN ORGANIZED HEALTH CARE EDUCATION/TRAINING PROGRAM

## 2020-11-24 PROCEDURE — U0003 INFECTIOUS AGENT DETECTION BY NUCLEIC ACID (DNA OR RNA); SEVERE ACUTE RESPIRATORY SYNDROME CORONAVIRUS 2 (SARS-COV-2) (CORONAVIRUS DISEASE [COVID-19]), AMPLIFIED PROBE TECHNIQUE, MAKING USE OF HIGH THROUGHPUT TECHNOLOGIES AS DESCRIBED BY CMS-2020-01-R: HCPCS | Performed by: STUDENT IN AN ORGANIZED HEALTH CARE EDUCATION/TRAINING PROGRAM

## 2020-11-24 PROCEDURE — 86780 TREPONEMA PALLIDUM: CPT | Performed by: STUDENT IN AN ORGANIZED HEALTH CARE EDUCATION/TRAINING PROGRAM

## 2020-11-24 PROCEDURE — 258N000003 HC RX IP 258 OP 636: Performed by: STUDENT IN AN ORGANIZED HEALTH CARE EDUCATION/TRAINING PROGRAM

## 2020-11-24 PROCEDURE — 85018 HEMOGLOBIN: CPT | Performed by: STUDENT IN AN ORGANIZED HEALTH CARE EDUCATION/TRAINING PROGRAM

## 2020-11-24 PROCEDURE — 86900 BLOOD TYPING SEROLOGIC ABO: CPT | Performed by: STUDENT IN AN ORGANIZED HEALTH CARE EDUCATION/TRAINING PROGRAM

## 2020-11-24 PROCEDURE — 84460 ALANINE AMINO (ALT) (SGPT): CPT | Performed by: STUDENT IN AN ORGANIZED HEALTH CARE EDUCATION/TRAINING PROGRAM

## 2020-11-24 PROCEDURE — 82565 ASSAY OF CREATININE: CPT | Performed by: STUDENT IN AN ORGANIZED HEALTH CARE EDUCATION/TRAINING PROGRAM

## 2020-11-24 PROCEDURE — 85049 AUTOMATED PLATELET COUNT: CPT | Performed by: STUDENT IN AN ORGANIZED HEALTH CARE EDUCATION/TRAINING PROGRAM

## 2020-11-24 PROCEDURE — 84450 TRANSFERASE (AST) (SGOT): CPT | Performed by: STUDENT IN AN ORGANIZED HEALTH CARE EDUCATION/TRAINING PROGRAM

## 2020-11-24 RX ORDER — CARBOPROST TROMETHAMINE 250 UG/ML
250 INJECTION, SOLUTION INTRAMUSCULAR
Status: DISCONTINUED | OUTPATIENT
Start: 2020-11-24 | End: 2020-11-25

## 2020-11-24 RX ORDER — IBUPROFEN 800 MG/1
800 TABLET, FILM COATED ORAL
Status: DISCONTINUED | OUTPATIENT
Start: 2020-11-24 | End: 2020-11-25

## 2020-11-24 RX ORDER — TERBUTALINE SULFATE 1 MG/ML
0.25 INJECTION, SOLUTION SUBCUTANEOUS
Status: DISCONTINUED | OUTPATIENT
Start: 2020-11-24 | End: 2020-11-25

## 2020-11-24 RX ORDER — OXYCODONE AND ACETAMINOPHEN 5; 325 MG/1; MG/1
1 TABLET ORAL
Status: DISCONTINUED | OUTPATIENT
Start: 2020-11-24 | End: 2020-11-25

## 2020-11-24 RX ORDER — FAMOTIDINE 10 MG
20 TABLET ORAL 2 TIMES DAILY
Status: DISCONTINUED | OUTPATIENT
Start: 2020-11-24 | End: 2020-11-27 | Stop reason: HOSPADM

## 2020-11-24 RX ORDER — METHYLERGONOVINE MALEATE 0.2 MG/ML
200 INJECTION INTRAVENOUS
Status: DISCONTINUED | OUTPATIENT
Start: 2020-11-24 | End: 2020-11-24

## 2020-11-24 RX ORDER — OXYTOCIN/0.9 % SODIUM CHLORIDE 30/500 ML
100-340 PLASTIC BAG, INJECTION (ML) INTRAVENOUS CONTINUOUS PRN
Status: DISCONTINUED | OUTPATIENT
Start: 2020-11-24 | End: 2020-11-25

## 2020-11-24 RX ORDER — NALBUPHINE HYDROCHLORIDE 20 MG/ML
2.5-5 INJECTION, SOLUTION INTRAMUSCULAR; INTRAVENOUS; SUBCUTANEOUS EVERY 6 HOURS PRN
Status: DISCONTINUED | OUTPATIENT
Start: 2020-11-24 | End: 2020-11-25

## 2020-11-24 RX ORDER — NALOXONE HYDROCHLORIDE 0.4 MG/ML
.1-.4 INJECTION, SOLUTION INTRAMUSCULAR; INTRAVENOUS; SUBCUTANEOUS
Status: DISCONTINUED | OUTPATIENT
Start: 2020-11-24 | End: 2020-11-25

## 2020-11-24 RX ORDER — ACETAMINOPHEN 325 MG/1
650 TABLET ORAL EVERY 4 HOURS PRN
Status: DISCONTINUED | OUTPATIENT
Start: 2020-11-24 | End: 2020-11-25

## 2020-11-24 RX ORDER — OXYTOCIN 10 [USP'U]/ML
10 INJECTION, SOLUTION INTRAMUSCULAR; INTRAVENOUS
Status: DISCONTINUED | OUTPATIENT
Start: 2020-11-24 | End: 2020-11-25

## 2020-11-24 RX ORDER — HYDROXYZINE HYDROCHLORIDE 50 MG/1
50-100 TABLET, FILM COATED ORAL
Status: COMPLETED | OUTPATIENT
Start: 2020-11-24 | End: 2020-11-24

## 2020-11-24 RX ORDER — ONDANSETRON 2 MG/ML
4 INJECTION INTRAMUSCULAR; INTRAVENOUS EVERY 6 HOURS PRN
Status: DISCONTINUED | OUTPATIENT
Start: 2020-11-24 | End: 2020-11-25

## 2020-11-24 RX ORDER — LABETALOL 200 MG/1
200 TABLET, FILM COATED ORAL 2 TIMES DAILY
Status: DISCONTINUED | OUTPATIENT
Start: 2020-11-24 | End: 2020-11-27 | Stop reason: HOSPADM

## 2020-11-24 RX ORDER — EPHEDRINE SULFATE 50 MG/ML
5 INJECTION, SOLUTION INTRAMUSCULAR; INTRAVENOUS; SUBCUTANEOUS
Status: DISCONTINUED | OUTPATIENT
Start: 2020-11-24 | End: 2020-11-25

## 2020-11-24 RX ORDER — MISOPROSTOL 100 UG/1
25 TABLET ORAL EVERY 4 HOURS PRN
Status: DISCONTINUED | OUTPATIENT
Start: 2020-11-24 | End: 2020-11-25

## 2020-11-24 RX ADMIN — Medication 25 MCG: at 21:30

## 2020-11-24 RX ADMIN — SODIUM CHLORIDE, POTASSIUM CHLORIDE, SODIUM LACTATE AND CALCIUM CHLORIDE 1000 ML: 600; 310; 30; 20 INJECTION, SOLUTION INTRAVENOUS at 18:42

## 2020-11-24 RX ADMIN — FAMOTIDINE 20 MG: 10 TABLET ORAL at 21:01

## 2020-11-24 RX ADMIN — HYDROXYZINE HYDROCHLORIDE 50 MG: 50 TABLET, FILM COATED ORAL at 22:58

## 2020-11-24 RX ADMIN — LABETALOL HYDROCHLORIDE 200 MG: 200 TABLET, FILM COATED ORAL at 21:01

## 2020-11-24 NOTE — H&P
History and Physical     Gabriele Rodriguez MRN# [id number]   YOB: 1985 Age: 35 year old      Date of Admission: 2020    Primary care provider: Kelsey Gonzalez      Assessment and Plan:       35 year old  at 37w1d by 9w2d US US here for decreased fetal movement and now being admitted for IOL due to non-reactive NST.  Pregnancy is also notable for chronic HTN, 2 vessel umbilical cord and BMI 33.     # Induction of Labor  - Admit for IOL in the setting of non-reactive NST and decreased fetal movement   - SVE on admission and likely start with cervical ripening  - Membranes: intact  - Labs: CBC, T&S, RPR  - GBS negative; Antibiotics not indicated.   - Pain Control: Per patient request  - Diet: Regular  - PPH Meds: Avoid methergine due to cHTN    # cHTN  - Serial BP monitoring   - IV Antihypertensives prn for sustained severe range blood pressures (>160/>110)  - Continue PTA labetalol 200 mg BID   - Labs: HELLP labs  - Daily weights, strict I&Os     # FWB:   # 2 vessel umbilical cord   - Cat 2 tracing, non-reactive and minimal variability  - Check fetal presentation on admission   - q4w growth ultrasounds show 42%ile (32w) > 20%ile (34w5d)  - Continuous Fetal Monitoring  - NICU for delivery   - Intrauterine resuscitative measures prn    # PNC  - Rh positive, Rubella immune,  (passed GTT), GBS neg   - Other prenatal labs wnl  - S/p flu, Tdap vaccines  - Pap last 2019    Patient discussed with Barbara August MD.       The patient was seen and evaluated with Dr. Del Cid.  I have reviewed and edited the above note.  Plan to proceed with IOL.  Discussed that that the fetus may not tolerate contractions and we would have to move toward  section.  Plan to have the anesthesia team see her and start cervical ripening once her Covid-19 test is resulted.    Barbara August MD, FACOG       HPI:     Gabriele Rodriguez is a 35 year old  at 37w1d who presents today decreased fetal movement.   The patient was working this morning and is unsure if she felt fetal movement over the morning.  Sometime in the afternoon she started to notice that she had not felt any movement.  From lunchtime until evaluation the patient states she may have felt movement once. Denies LOF, vaginal bleeding, or contractions.  She has not had any recent illnesses, fevers or chills. No headaches,       OB History:    OB History    Para Term  AB Living   1 0 0 0 0 0   SAB TAB Ectopic Multiple Live Births   0 0 0 0 0      # Outcome Date GA Lbr Jelani/2nd Weight Sex Delivery Anes PTL Lv   1 Current                 Prenatal Lab Results:  Lab Results   Component Value Date    ABO O 2020    RH Pos 2020    AS Neg 2020    HEPBANG Nonreactive 2020    CHPCRT Negative 2020    GCPCRT Negative 2020    HGB 11.5 (L) 2020       GBS Status:   Lab Results   Component Value Date    GBS Negative 2020                Past Medical History:     Past Medical History:   Diagnosis Date     ADHD      Anxiety      Chronic sinusitis      Depressive disorder      Hypertension              Past Surgical History:     Past Surgical History:   Procedure Laterality Date     BIOPSY NAIL (LOCATION)      left hand, 4th digit             Social History:     Social History     Tobacco Use     Smoking status: Never Smoker     Smokeless tobacco: Never Used   Substance Use Topics     Alcohol use: No             Family History:     Family History   Problem Relation Age of Onset     Hypertension Mother      Breast Cancer Mother         ductal stage 2, HER-2 negative, (+) estrogen/progesterone     Skin Cancer Mother      No Known Problems Father      No Known Problems Sister      Pancreatic Cancer Maternal Grandfather      Brain Cancer Paternal Aunt      Melanoma No family hx of              Immunizations:     Immunization History   Administered Date(s) Administered     Influenza Vaccine IM > 6 months Valent IIV4  09/24/2019     TDAP Vaccine (Boostrix) 09/21/2020            Allergies:     Allergies   Allergen Reactions     Nickel Itching             Medications:     Medications Prior to Admission   Medication Sig Dispense Refill Last Dose     aspirin (ASA) 81 MG EC tablet Take 1 tablet (81 mg) by mouth daily , begin at 12 weeks pregnant. 90 tablet 3 11/23/2020 at Unknown time     famotidine (PEPCID) 20 MG tablet Take 20 mg by mouth 2 times daily   11/24/2020 at Unknown time     labetalol (NORMODYNE) 200 MG tablet Take 1 tablet (200 mg) by mouth 2 times daily 60 tablet 3 11/24/2020 at Unknown time     Prenatal Vit-Fe Fumarate-FA (PRENATAL MULTIVITAMIN W/IRON) 27-0.8 MG tablet Take 1 tablet by mouth daily 100 tablet 3 11/23/2020 at Unknown time             Review of Systems & Physical Exam:     The Review of Systems is negative other than noted in the HPI      Temp 98.4  F (36.9  C) (Oral)   Resp 16   LMP 03/02/2020   Gen: NAD  CV: Regular rate   Lungs: non-labored breathing  Abd: Gravid    FHT:  Monitoring External  FHT: Baseline 145 bpm; minimal variability; no accels present; no decelerations  TOCO 1 contractions in 10 minutes         Data:     BPP 11/19   Fetal Breathing Movements (FBM): Abnormal - 0  Gross Body Movements (GBM): Normal - 2  Fetal Tone (FT): Normal - 2  AFV: Pocket of amniotic fluid > or = to 2 cm x 2 cm - 2  Amniotic Fluid MVP: 5.4 cm     BPP 6/8.  FHR = 131bpm Normal.   MCA Not done.  NST Not done.      USEGA = 34 5/7 weeks.  EFW = 2,545 grams +/- 372g, 20 % for 36 weeks. Previously in the 42%.     Single fetus in cephalic presentation.  Placenta anterior, no previa.          Jolie Del Cid MD  Obstetrics & Gynecology, PGY-2  11/24/20 5:07 PM

## 2020-11-24 NOTE — LETTER
January 11, 2021      Gabriele Rodriguez  [address] [facility] [address]        Dear ,    We are writing to inform you of your test results.    {results letter list:[id number]}    No results found from the In Basket message.    If you have any questions or concerns, please call the clinic at the number listed above.       Sincerely,

## 2020-11-25 ENCOUNTER — ANESTHESIA (OUTPATIENT)
Dept: OBGYN | Facility: CLINIC | Age: 35
End: 2020-11-25
Payer: COMMERCIAL

## 2020-11-25 ENCOUNTER — ANESTHESIA EVENT (OUTPATIENT)
Dept: OBGYN | Facility: CLINIC | Age: 35
End: 2020-11-25
Payer: COMMERCIAL

## 2020-11-25 LAB — T PALLIDUM AB SER QL: NONREACTIVE

## 2020-11-25 PROCEDURE — 250N000009 HC RX 250

## 2020-11-25 PROCEDURE — 120N000002 HC R&B MED SURG/OB UMMC

## 2020-11-25 PROCEDURE — 272N000001 HC OR GENERAL SUPPLY STERILE: Performed by: OBSTETRICS & GYNECOLOGY

## 2020-11-25 PROCEDURE — 258N000003 HC RX IP 258 OP 636: Performed by: STUDENT IN AN ORGANIZED HEALTH CARE EDUCATION/TRAINING PROGRAM

## 2020-11-25 PROCEDURE — 258N000003 HC RX IP 258 OP 636

## 2020-11-25 PROCEDURE — 3E0P7VZ INTRODUCTION OF HORMONE INTO FEMALE REPRODUCTIVE, VIA NATURAL OR ARTIFICIAL OPENING: ICD-10-PCS | Performed by: OBSTETRICS & GYNECOLOGY

## 2020-11-25 PROCEDURE — 250N000011 HC RX IP 250 OP 636: Performed by: STUDENT IN AN ORGANIZED HEALTH CARE EDUCATION/TRAINING PROGRAM

## 2020-11-25 PROCEDURE — 999N000138 HC STATISTIC PRE-PROCEDURE ASSESSMENT I: Performed by: OBSTETRICS & GYNECOLOGY

## 2020-11-25 PROCEDURE — 360N000022 HC SURGERY LEVEL 3 1ST 30 MIN - UMMC: Performed by: OBSTETRICS & GYNECOLOGY

## 2020-11-25 PROCEDURE — 250N000013 HC RX MED GY IP 250 OP 250 PS 637: Performed by: STUDENT IN AN ORGANIZED HEALTH CARE EDUCATION/TRAINING PROGRAM

## 2020-11-25 PROCEDURE — 370N000002 HC ANESTHESIA TECHNICAL FEE, EACH ADDTL 15 MIN: Performed by: OBSTETRICS & GYNECOLOGY

## 2020-11-25 PROCEDURE — 250N000011 HC RX IP 250 OP 636

## 2020-11-25 PROCEDURE — 250N000009 HC RX 250: Performed by: STUDENT IN AN ORGANIZED HEALTH CARE EDUCATION/TRAINING PROGRAM

## 2020-11-25 PROCEDURE — C9290 INJ, BUPIVACAINE LIPOSOME: HCPCS | Performed by: STUDENT IN AN ORGANIZED HEALTH CARE EDUCATION/TRAINING PROGRAM

## 2020-11-25 PROCEDURE — 88307 TISSUE EXAM BY PATHOLOGIST: CPT | Mod: 26 | Performed by: PATHOLOGY

## 2020-11-25 PROCEDURE — 761N000001 HC RECOVERY PHASE 1 LEVEL 1 FIRST HR: Performed by: OBSTETRICS & GYNECOLOGY

## 2020-11-25 PROCEDURE — 360N000023 HC SURGERY LEVEL 3 EA 15 ADDTL MIN UMMC: Performed by: OBSTETRICS & GYNECOLOGY

## 2020-11-25 PROCEDURE — 59510 CESAREAN DELIVERY: CPT | Mod: GC | Performed by: OBSTETRICS & GYNECOLOGY

## 2020-11-25 PROCEDURE — 88307 TISSUE EXAM BY PATHOLOGIST: CPT | Mod: TC | Performed by: STUDENT IN AN ORGANIZED HEALTH CARE EDUCATION/TRAINING PROGRAM

## 2020-11-25 PROCEDURE — 370N000001 HC ANESTHESIA TECHNICAL FEE, 1ST 30 MIN: Performed by: OBSTETRICS & GYNECOLOGY

## 2020-11-25 PROCEDURE — 250N000013 HC RX MED GY IP 250 OP 250 PS 637

## 2020-11-25 PROCEDURE — 271N000001 HC OR GENERAL SUPPLY NON-STERILE: Performed by: OBSTETRICS & GYNECOLOGY

## 2020-11-25 PROCEDURE — 761N000002 HC RECOVERY PHASE 1 LEVEL 1 EA ADDTL HR: Performed by: OBSTETRICS & GYNECOLOGY

## 2020-11-25 RX ORDER — HYDROMORPHONE HYDROCHLORIDE 1 MG/ML
.3-.5 INJECTION, SOLUTION INTRAMUSCULAR; INTRAVENOUS; SUBCUTANEOUS EVERY 10 MIN PRN
Status: DISCONTINUED | OUTPATIENT
Start: 2020-11-25 | End: 2020-11-27 | Stop reason: HOSPADM

## 2020-11-25 RX ORDER — NALOXONE HYDROCHLORIDE 0.4 MG/ML
0.2 INJECTION, SOLUTION INTRAMUSCULAR; INTRAVENOUS; SUBCUTANEOUS
Status: CANCELLED | OUTPATIENT
Start: 2020-11-25

## 2020-11-25 RX ORDER — SODIUM CHLORIDE, SODIUM LACTATE, POTASSIUM CHLORIDE, CALCIUM CHLORIDE 600; 310; 30; 20 MG/100ML; MG/100ML; MG/100ML; MG/100ML
INJECTION, SOLUTION INTRAVENOUS CONTINUOUS
Status: DISCONTINUED | OUTPATIENT
Start: 2020-11-25 | End: 2020-11-25 | Stop reason: HOSPADM

## 2020-11-25 RX ORDER — NALOXONE HYDROCHLORIDE 0.4 MG/ML
0.4 INJECTION, SOLUTION INTRAMUSCULAR; INTRAVENOUS; SUBCUTANEOUS
Status: DISCONTINUED | OUTPATIENT
Start: 2020-11-25 | End: 2020-11-27 | Stop reason: HOSPADM

## 2020-11-25 RX ORDER — HYDROCORTISONE 2.5 %
CREAM (GRAM) TOPICAL 3 TIMES DAILY PRN
Status: DISCONTINUED | OUTPATIENT
Start: 2020-11-25 | End: 2020-11-27 | Stop reason: HOSPADM

## 2020-11-25 RX ORDER — CEFAZOLIN SODIUM 1 G/3ML
1 INJECTION, POWDER, FOR SOLUTION INTRAMUSCULAR; INTRAVENOUS SEE ADMIN INSTRUCTIONS
Status: DISCONTINUED | OUTPATIENT
Start: 2020-11-25 | End: 2020-11-25 | Stop reason: HOSPADM

## 2020-11-25 RX ORDER — ONDANSETRON 2 MG/ML
4 INJECTION INTRAMUSCULAR; INTRAVENOUS EVERY 30 MIN PRN
Status: DISCONTINUED | OUTPATIENT
Start: 2020-11-25 | End: 2020-11-25

## 2020-11-25 RX ORDER — SIMETHICONE 80 MG
80 TABLET,CHEWABLE ORAL 4 TIMES DAILY PRN
Status: DISCONTINUED | OUTPATIENT
Start: 2020-11-25 | End: 2020-11-27 | Stop reason: HOSPADM

## 2020-11-25 RX ORDER — OXYTOCIN/0.9 % SODIUM CHLORIDE 30/500 ML
340 PLASTIC BAG, INJECTION (ML) INTRAVENOUS CONTINUOUS PRN
Status: DISCONTINUED | OUTPATIENT
Start: 2020-11-25 | End: 2020-11-27 | Stop reason: HOSPADM

## 2020-11-25 RX ORDER — NALOXONE HYDROCHLORIDE 0.4 MG/ML
0.2 INJECTION, SOLUTION INTRAMUSCULAR; INTRAVENOUS; SUBCUTANEOUS
Status: DISCONTINUED | OUTPATIENT
Start: 2020-11-25 | End: 2020-11-25

## 2020-11-25 RX ORDER — ONDANSETRON 2 MG/ML
4 INJECTION INTRAMUSCULAR; INTRAVENOUS EVERY 6 HOURS PRN
Status: DISCONTINUED | OUTPATIENT
Start: 2020-11-25 | End: 2020-11-27 | Stop reason: HOSPADM

## 2020-11-25 RX ORDER — HYDROXYZINE HYDROCHLORIDE 50 MG/1
50 TABLET, FILM COATED ORAL
Status: COMPLETED | OUTPATIENT
Start: 2020-11-25 | End: 2020-11-25

## 2020-11-25 RX ORDER — NALBUPHINE HYDROCHLORIDE 20 MG/ML
2.5-5 INJECTION, SOLUTION INTRAMUSCULAR; INTRAVENOUS; SUBCUTANEOUS EVERY 6 HOURS PRN
Status: DISCONTINUED | OUTPATIENT
Start: 2020-11-25 | End: 2020-11-25

## 2020-11-25 RX ORDER — OXYTOCIN/0.9 % SODIUM CHLORIDE 30/500 ML
100 PLASTIC BAG, INJECTION (ML) INTRAVENOUS CONTINUOUS
Status: DISCONTINUED | OUTPATIENT
Start: 2020-11-25 | End: 2020-11-27 | Stop reason: HOSPADM

## 2020-11-25 RX ORDER — ONDANSETRON 4 MG/1
4 TABLET, ORALLY DISINTEGRATING ORAL EVERY 30 MIN PRN
Status: DISCONTINUED | OUTPATIENT
Start: 2020-11-25 | End: 2020-11-25 | Stop reason: HOSPADM

## 2020-11-25 RX ORDER — ONDANSETRON 2 MG/ML
INJECTION INTRAMUSCULAR; INTRAVENOUS PRN
Status: DISCONTINUED | OUTPATIENT
Start: 2020-11-25 | End: 2020-11-25

## 2020-11-25 RX ORDER — FENTANYL CITRATE 50 UG/ML
25-50 INJECTION, SOLUTION INTRAMUSCULAR; INTRAVENOUS
Status: DISCONTINUED | OUTPATIENT
Start: 2020-11-25 | End: 2020-11-25 | Stop reason: HOSPADM

## 2020-11-25 RX ORDER — OXYTOCIN 10 [USP'U]/ML
10 INJECTION, SOLUTION INTRAMUSCULAR; INTRAVENOUS
Status: DISCONTINUED | OUTPATIENT
Start: 2020-11-25 | End: 2020-11-27 | Stop reason: HOSPADM

## 2020-11-25 RX ORDER — CARBOPROST TROMETHAMINE 250 UG/ML
250 INJECTION, SOLUTION INTRAMUSCULAR
Status: DISCONTINUED | OUTPATIENT
Start: 2020-11-25 | End: 2020-11-27 | Stop reason: HOSPADM

## 2020-11-25 RX ORDER — BUPIVACAINE HYDROCHLORIDE 2.5 MG/ML
INJECTION, SOLUTION EPIDURAL; INFILTRATION; INTRACAUDAL PRN
Status: DISCONTINUED | OUTPATIENT
Start: 2020-11-25 | End: 2020-11-25

## 2020-11-25 RX ORDER — LIDOCAINE 40 MG/G
CREAM TOPICAL
Status: DISCONTINUED | OUTPATIENT
Start: 2020-11-25 | End: 2020-11-27 | Stop reason: HOSPADM

## 2020-11-25 RX ORDER — NALOXONE HYDROCHLORIDE 0.4 MG/ML
0.4 INJECTION, SOLUTION INTRAMUSCULAR; INTRAVENOUS; SUBCUTANEOUS
Status: CANCELLED | OUTPATIENT
Start: 2020-11-25

## 2020-11-25 RX ORDER — ACETAMINOPHEN 325 MG/1
975 TABLET ORAL ONCE
Status: DISCONTINUED | OUTPATIENT
Start: 2020-11-25 | End: 2020-11-25 | Stop reason: HOSPADM

## 2020-11-25 RX ORDER — MODIFIED LANOLIN
OINTMENT (GRAM) TOPICAL
Status: DISCONTINUED | OUTPATIENT
Start: 2020-11-25 | End: 2020-11-27 | Stop reason: HOSPADM

## 2020-11-25 RX ORDER — CITRIC ACID/SODIUM CITRATE 334-500MG
30 SOLUTION, ORAL ORAL
Status: COMPLETED | OUTPATIENT
Start: 2020-11-25 | End: 2020-11-25

## 2020-11-25 RX ORDER — OXYCODONE HYDROCHLORIDE 5 MG/1
5 TABLET ORAL EVERY 4 HOURS PRN
Status: DISCONTINUED | OUTPATIENT
Start: 2020-11-25 | End: 2020-11-27 | Stop reason: HOSPADM

## 2020-11-25 RX ORDER — SODIUM CHLORIDE, SODIUM LACTATE, POTASSIUM CHLORIDE, CALCIUM CHLORIDE 600; 310; 30; 20 MG/100ML; MG/100ML; MG/100ML; MG/100ML
INJECTION, SOLUTION INTRAVENOUS CONTINUOUS PRN
Status: DISCONTINUED | OUTPATIENT
Start: 2020-11-25 | End: 2020-11-25

## 2020-11-25 RX ORDER — SODIUM CHLORIDE, SODIUM LACTATE, POTASSIUM CHLORIDE, CALCIUM CHLORIDE 600; 310; 30; 20 MG/100ML; MG/100ML; MG/100ML; MG/100ML
INJECTION, SOLUTION INTRAVENOUS CONTINUOUS
Status: DISCONTINUED | OUTPATIENT
Start: 2020-11-25 | End: 2020-11-25

## 2020-11-25 RX ORDER — BUPIVACAINE HYDROCHLORIDE 7.5 MG/ML
INJECTION, SOLUTION INTRASPINAL PRN
Status: DISCONTINUED | OUTPATIENT
Start: 2020-11-25 | End: 2020-11-25

## 2020-11-25 RX ORDER — SODIUM CHLORIDE, SODIUM LACTATE, POTASSIUM CHLORIDE, CALCIUM CHLORIDE 600; 310; 30; 20 MG/100ML; MG/100ML; MG/100ML; MG/100ML
INJECTION, SOLUTION INTRAVENOUS CONTINUOUS
Status: DISCONTINUED | OUTPATIENT
Start: 2020-11-25 | End: 2020-11-27 | Stop reason: HOSPADM

## 2020-11-25 RX ORDER — AMOXICILLIN 250 MG
2 CAPSULE ORAL 2 TIMES DAILY
Status: DISCONTINUED | OUTPATIENT
Start: 2020-11-25 | End: 2020-11-27 | Stop reason: HOSPADM

## 2020-11-25 RX ORDER — MISOPROSTOL 200 UG/1
400 TABLET ORAL
Status: DISCONTINUED | OUTPATIENT
Start: 2020-11-25 | End: 2020-11-27 | Stop reason: HOSPADM

## 2020-11-25 RX ORDER — ACETAMINOPHEN 325 MG/1
975 TABLET ORAL EVERY 6 HOURS
Status: DISCONTINUED | OUTPATIENT
Start: 2020-11-25 | End: 2020-11-27 | Stop reason: HOSPADM

## 2020-11-25 RX ORDER — FENTANYL CITRATE 50 UG/ML
INJECTION, SOLUTION INTRAMUSCULAR; INTRAVENOUS PRN
Status: DISCONTINUED | OUTPATIENT
Start: 2020-11-25 | End: 2020-11-25

## 2020-11-25 RX ORDER — BISACODYL 10 MG
10 SUPPOSITORY, RECTAL RECTAL DAILY PRN
Status: DISCONTINUED | OUTPATIENT
Start: 2020-11-27 | End: 2020-11-27 | Stop reason: HOSPADM

## 2020-11-25 RX ORDER — NALOXONE HYDROCHLORIDE 0.4 MG/ML
.1-.4 INJECTION, SOLUTION INTRAMUSCULAR; INTRAVENOUS; SUBCUTANEOUS
Status: DISCONTINUED | OUTPATIENT
Start: 2020-11-25 | End: 2020-11-25 | Stop reason: HOSPADM

## 2020-11-25 RX ORDER — DIAPER,BRIEF,INFANT-TODD,DISP
EACH MISCELLANEOUS 2 TIMES DAILY
Status: DISCONTINUED | OUTPATIENT
Start: 2020-11-25 | End: 2020-11-27 | Stop reason: HOSPADM

## 2020-11-25 RX ORDER — KETOROLAC TROMETHAMINE 30 MG/ML
INJECTION, SOLUTION INTRAMUSCULAR; INTRAVENOUS PRN
Status: DISCONTINUED | OUTPATIENT
Start: 2020-11-25 | End: 2020-11-25

## 2020-11-25 RX ORDER — KETOROLAC TROMETHAMINE 30 MG/ML
30 INJECTION, SOLUTION INTRAMUSCULAR; INTRAVENOUS EVERY 6 HOURS
Status: DISPENSED | OUTPATIENT
Start: 2020-11-25 | End: 2020-11-26

## 2020-11-25 RX ORDER — OXYTOCIN/0.9 % SODIUM CHLORIDE 30/500 ML
PLASTIC BAG, INJECTION (ML) INTRAVENOUS CONTINUOUS PRN
Status: DISCONTINUED | OUTPATIENT
Start: 2020-11-25 | End: 2020-11-25

## 2020-11-25 RX ORDER — NALOXONE HYDROCHLORIDE 0.4 MG/ML
0.4 INJECTION, SOLUTION INTRAMUSCULAR; INTRAVENOUS; SUBCUTANEOUS
Status: DISCONTINUED | OUTPATIENT
Start: 2020-11-25 | End: 2020-11-25

## 2020-11-25 RX ORDER — OXYCODONE HYDROCHLORIDE 5 MG/1
5 TABLET ORAL EVERY 4 HOURS PRN
Status: DISCONTINUED | OUTPATIENT
Start: 2020-11-25 | End: 2020-11-25

## 2020-11-25 RX ORDER — FENTANYL CITRATE-0.9 % NACL/PF 10 MCG/ML
PLASTIC BAG, INJECTION (ML) INTRAVENOUS CONTINUOUS PRN
Status: DISCONTINUED | OUTPATIENT
Start: 2020-11-25 | End: 2020-11-25

## 2020-11-25 RX ORDER — IBUPROFEN 800 MG/1
800 TABLET, FILM COATED ORAL EVERY 6 HOURS
Status: DISCONTINUED | OUTPATIENT
Start: 2020-11-25 | End: 2020-11-27 | Stop reason: HOSPADM

## 2020-11-25 RX ORDER — EPHEDRINE SULFATE 50 MG/ML
5 INJECTION, SOLUTION INTRAMUSCULAR; INTRAVENOUS; SUBCUTANEOUS
Status: DISCONTINUED | OUTPATIENT
Start: 2020-11-25 | End: 2020-11-25

## 2020-11-25 RX ORDER — NALOXONE HYDROCHLORIDE 0.4 MG/ML
0.2 INJECTION, SOLUTION INTRAMUSCULAR; INTRAVENOUS; SUBCUTANEOUS
Status: DISCONTINUED | OUTPATIENT
Start: 2020-11-25 | End: 2020-11-27 | Stop reason: HOSPADM

## 2020-11-25 RX ORDER — OXYTOCIN/0.9 % SODIUM CHLORIDE 30/500 ML
0.5 PLASTIC BAG, INJECTION (ML) INTRAVENOUS CONTINUOUS
Status: DISCONTINUED | OUTPATIENT
Start: 2020-11-25 | End: 2020-11-25

## 2020-11-25 RX ORDER — CITRIC ACID/SODIUM CITRATE 334-500MG
SOLUTION, ORAL ORAL
Status: COMPLETED
Start: 2020-11-25 | End: 2020-11-25

## 2020-11-25 RX ORDER — MEPERIDINE HYDROCHLORIDE 25 MG/ML
12.5 INJECTION INTRAMUSCULAR; INTRAVENOUS; SUBCUTANEOUS
Status: DISCONTINUED | OUTPATIENT
Start: 2020-11-25 | End: 2020-11-27 | Stop reason: HOSPADM

## 2020-11-25 RX ORDER — FLUMAZENIL 0.1 MG/ML
0.2 INJECTION, SOLUTION INTRAVENOUS
Status: DISCONTINUED | OUTPATIENT
Start: 2020-11-25 | End: 2020-11-25 | Stop reason: HOSPADM

## 2020-11-25 RX ORDER — CEFAZOLIN SODIUM 2 G/100ML
2 INJECTION, SOLUTION INTRAVENOUS
Status: COMPLETED | OUTPATIENT
Start: 2020-11-25 | End: 2020-11-25

## 2020-11-25 RX ORDER — MORPHINE SULFATE 1 MG/ML
INJECTION, SOLUTION EPIDURAL; INTRATHECAL; INTRAVENOUS PRN
Status: DISCONTINUED | OUTPATIENT
Start: 2020-11-25 | End: 2020-11-25

## 2020-11-25 RX ORDER — ONDANSETRON 2 MG/ML
4 INJECTION INTRAMUSCULAR; INTRAVENOUS EVERY 30 MIN PRN
Status: DISCONTINUED | OUTPATIENT
Start: 2020-11-25 | End: 2020-11-25 | Stop reason: HOSPADM

## 2020-11-25 RX ORDER — ONDANSETRON 4 MG/1
4 TABLET, ORALLY DISINTEGRATING ORAL EVERY 30 MIN PRN
Status: DISCONTINUED | OUTPATIENT
Start: 2020-11-25 | End: 2020-11-25

## 2020-11-25 RX ORDER — FENTANYL CITRATE 50 UG/ML
25-50 INJECTION, SOLUTION INTRAMUSCULAR; INTRAVENOUS
Status: DISCONTINUED | OUTPATIENT
Start: 2020-11-25 | End: 2020-11-27 | Stop reason: HOSPADM

## 2020-11-25 RX ORDER — DEXTROSE, SODIUM CHLORIDE, SODIUM LACTATE, POTASSIUM CHLORIDE, AND CALCIUM CHLORIDE 5; .6; .31; .03; .02 G/100ML; G/100ML; G/100ML; G/100ML; G/100ML
INJECTION, SOLUTION INTRAVENOUS CONTINUOUS
Status: DISCONTINUED | OUTPATIENT
Start: 2020-11-25 | End: 2020-11-27 | Stop reason: HOSPADM

## 2020-11-25 RX ORDER — AMOXICILLIN 250 MG
1 CAPSULE ORAL 2 TIMES DAILY
Status: DISCONTINUED | OUTPATIENT
Start: 2020-11-25 | End: 2020-11-27 | Stop reason: HOSPADM

## 2020-11-25 RX ORDER — LIDOCAINE 40 MG/G
CREAM TOPICAL
Status: DISCONTINUED | OUTPATIENT
Start: 2020-11-25 | End: 2020-11-25

## 2020-11-25 RX ADMIN — HYDROXYZINE HYDROCHLORIDE 50 MG: 50 TABLET, FILM COATED ORAL at 01:40

## 2020-11-25 RX ADMIN — LABETALOL HYDROCHLORIDE 200 MG: 200 TABLET, FILM COATED ORAL at 07:53

## 2020-11-25 RX ADMIN — ACETAMINOPHEN 975 MG: 325 TABLET, FILM COATED ORAL at 14:20

## 2020-11-25 RX ADMIN — Medication 25 MCG: at 05:40

## 2020-11-25 RX ADMIN — FAMOTIDINE 20 MG: 10 TABLET ORAL at 07:53

## 2020-11-25 RX ADMIN — IBUPROFEN 800 MG: 800 TABLET, FILM COATED ORAL at 23:10

## 2020-11-25 RX ADMIN — KETOROLAC TROMETHAMINE 30 MG: 30 INJECTION, SOLUTION INTRAMUSCULAR at 17:04

## 2020-11-25 RX ADMIN — SODIUM CHLORIDE, POTASSIUM CHLORIDE, SODIUM LACTATE AND CALCIUM CHLORIDE: 600; 310; 30; 20 INJECTION, SOLUTION INTRAVENOUS at 02:05

## 2020-11-25 RX ADMIN — DOCUSATE SODIUM AND SENNOSIDES 1 TABLET: 8.6; 5 TABLET ORAL at 19:57

## 2020-11-25 RX ADMIN — FENTANYL CITRATE 15 MCG: 50 INJECTION, SOLUTION INTRAMUSCULAR; INTRAVENOUS at 10:04

## 2020-11-25 RX ADMIN — SODIUM CHLORIDE, POTASSIUM CHLORIDE, SODIUM LACTATE AND CALCIUM CHLORIDE 125 ML/HR: 600; 310; 30; 20 INJECTION, SOLUTION INTRAVENOUS at 09:03

## 2020-11-25 RX ADMIN — MORPHINE SULFATE 0.1 MG: 1 INJECTION EPIDURAL; INTRATHECAL; INTRAVENOUS at 10:04

## 2020-11-25 RX ADMIN — OXYTOCIN-SODIUM CHLORIDE 0.9% IV SOLN 30 UNIT/500ML 300 ML/HR: 30-0.9/5 SOLUTION at 10:30

## 2020-11-25 RX ADMIN — Medication 50 MCG/MIN: at 10:04

## 2020-11-25 RX ADMIN — Medication 30 ML: at 09:48

## 2020-11-25 RX ADMIN — BUPIVACAINE 20 ML: 13.3 INJECTION, SUSPENSION, LIPOSOMAL INFILTRATION at 11:30

## 2020-11-25 RX ADMIN — SODIUM CHLORIDE, POTASSIUM CHLORIDE, SODIUM LACTATE AND CALCIUM CHLORIDE: 600; 310; 30; 20 INJECTION, SOLUTION INTRAVENOUS at 09:55

## 2020-11-25 RX ADMIN — HYDROCORTISONE: 0.5 CREAM TOPICAL at 23:06

## 2020-11-25 RX ADMIN — Medication 0.5 MILLI-UNITS/MIN: at 02:05

## 2020-11-25 RX ADMIN — KETOROLAC TROMETHAMINE 30 MG: 30 INJECTION, SOLUTION INTRAMUSCULAR at 11:06

## 2020-11-25 RX ADMIN — SODIUM CITRATE AND CITRIC ACID MONOHYDRATE 30 ML: 500; 334 SOLUTION ORAL at 09:48

## 2020-11-25 RX ADMIN — ACETAMINOPHEN 975 MG: 325 TABLET, FILM COATED ORAL at 19:58

## 2020-11-25 RX ADMIN — Medication 2 G: at 10:09

## 2020-11-25 RX ADMIN — ONDANSETRON 4 MG: 2 INJECTION INTRAMUSCULAR; INTRAVENOUS at 10:30

## 2020-11-25 RX ADMIN — BUPIVACAINE HYDROCHLORIDE 20 ML: 2.5 INJECTION, SOLUTION EPIDURAL; INFILTRATION; INTRACAUDAL at 11:30

## 2020-11-25 RX ADMIN — LABETALOL HYDROCHLORIDE 200 MG: 200 TABLET, FILM COATED ORAL at 19:57

## 2020-11-25 RX ADMIN — BUPIVACAINE HYDROCHLORIDE IN DEXTROSE 1.6 ML: 7.5 INJECTION, SOLUTION SUBARACHNOID at 10:04

## 2020-11-25 NOTE — PROGRESS NOTES
[facility]  Labor Progress Note    S:   Feeling ok. Not feeling contractions or cramping.    O:   Patient Vitals for the past 4 hrs:   BP Temp Temp src Resp   20 0055 118/68 98  F (36.7  C) Oral 16   20 2255 119/69 98.2  F (36.8  C) Oral 16     SVE: Deferred    FHT  Baseline: 135  Variability: Moderate with periods of minimal  Accels: 1 accel over ~4hrs  Decels: Absent  [employer]: Few contractions, Irritability    A/P:  Gabriele Rodriguez is a 35 year old  at 37w2d by by 9w2d US, here for IOL for non-reassuring FHT. Pregnancy is also notable for chronic HTN, 2 vessel umbilical cord and BMI 33.    #IOL  #Labor:  - Discussed that overall fetal status is stable with overall Cat 1 tracing, and that at this time a CS is not indicated. However, discussed concerns about fetal status with patient, specifically lack of accels and periods of minimal variability since admission. Presented 3 options including continuation of IOl with PV misoprostol, CST to assess fetal tolerance of labor, or elective CS now if patient desires. Patient opts for CST. Questions answered. CST ordered.  - SVE: c/l/h > PV miso x1 ()  - Pain: plans epidural  - GBS neg    #Fetal Well Being  #2V Cord  - Overall Cat 1 tracing and reassuring given moderate variabilty, though many periods of minimal variability. Continue close monitoring with intervention including CS as clinically indicated. This has been discussed with patient who is in agreement  - Continuous Fetal Monitoring  - NICU for delivery   - Intrauterine resuscitative measures travis Ramirez MD MPH  OB/Gyn PGY-2  20 2:02 AM

## 2020-11-25 NOTE — PROGRESS NOTES
[facility]  Strip Reivew    O:   Patient Vitals for the past 4 hrs:   BP Temp Temp src Resp   205 119/69 98.2  F (36.8  C) Oral 16   20 (!) 144/77 98.5  F (36.9  C) Oral 16   20 129/68 98  F (36.7  C) Oral 16     FHT  Baseline: 145  Variability: Minimal  Accels: 1 accel over 2-3 hours  Decels: Absent  [place]: Few contractions, Irritability    A/P:  Gabriele Rodriguez is a 35 year old  at 37w1d by by 9w2d US, here for IOL for non-reassuring FHT. Pregnancy is also notable for chronic HTN, 2 vessel umbilical cord and BMI 33. Her tracing remains Cat 2 with minimal variability since admission    #Labor:  - SVE: c/l/h () > PV miso x1 ()  - Pain: plans epidural  - GBS neg  - Plan: Continue ripening with with PV mis    #Fetal Well Being  #2V Cord  - Overall Cat 1 tracing with periods of Cat 2 tracing due to minimal variability  - Continuous Fetal Monitoring  - Intrauterine resuscitative measures prn  - NICU for delivery     Tariq Ramirez MD MPH  OB/Gyn PGY-2  20 11:44 PM

## 2020-11-25 NOTE — OP NOTE
[facility]  Operative Progress Note     Surgery Date:  2020    Surgeon:  Jojo Vaz MD    Assistants:  Jolie Del Cid MD, PGY-2    Pre-op Diagnosis:    - Intrauterine pregnancy at 37w2d  - Category 2 RFD    Post-op Diagnosis:    - Same   - Liveborn female infant  - Thick meconium amniotic fluid     Procedure:  Primary low-transverse  section with double layer uterine closure via pfannenstiel incision    Anesthesia: Spinal    EBL:  500 mL     IVF:  700 mL crystalloid    UOP:  350 mL clear urine at the end of the case    Drains: [place] Catheter     Specimens:  Placenta, cord     Complications: None apparent    Indications:   Gabriele Rodriguez is a 35 year old  at 37w2d admitted for IOL for nonreactive NST with minima variability.  The patient had a persistent category 2 FHT and  delivery was recommended.  The risks, benefits, and alternatives of  section were discussed with the patient, and she agreed to proceed.     Findings:   1. No adhesions.   2. Thick green amniotic fluid  3. Liveborn female infant in cephalic presentation. Apgars 1, 5 and 6 at 1, 5 and 10 minutes. Weight 2620 g.   4. Arterial cord pH 7.31, base deficit 2. Venous cord pH 7.36, base deficit 1.9.  5. Normal uterus, fallopian tubes, and ovaries.       Procedure Details:   The patient was brought to the OR, where adequate spinal anesthesia was administered.  She was placed in the dorsal supine position with a slight leftward tilt. She was prepped and draped in the usual sterile fashion. A surgical time out was performed. A pfannenstiel skin incision was made with the scalpel, and carried down to the underlying fascia with sharp and blunt dissection. The fascia was incised in the midline, and the incision was extended laterally with the Mckoy scissors. The superior aspect of the fascia was grasped with the Kocher clamps and dissected off of the underlying rectus muscles with blunt and sharp  dissection. Attention was then turned to the inferior aspect of the fascia, which was similarly dissected off of the underlying rectus muscles. The rectus muscles were  in the midline, and the peritoneum was entered bluntly, and the opening was extended with digital pressure. The bladder blade was placed. A transverse hysterotomy was made with the scalpel in the lower uterine segment and the incision was extended with digital pressure. Thick meconium fluid was noted. The infant was noted to be in the cephalic position, and was delivered atraumatically. The shoulders delivered easily.  No nuchal cord was noted. The cord was doubly clamped and cut, and the infant was handed off to the awaiting NICU staff. A segment of cord was cut and sent for gases. The placenta was delivered with gentle traction on the umbilical cord and uterine massage. The uterus was exteriorized and cleared of all clots and debris. Uterine tone was noted to be firm with 30 units of pitocin given through the running IV and uterine massage.  The hysterotomy was closed with a running locked suture of 0 Vicryl.  The hysterotomy was then imbricated using an 0 Vicryl suture. The hysterotomy was noted to be hemostatic. The posterior cul-de-sac was cleared of all clots and debris. The uterus was returned to the abdomen. The pericolic gutters were cleared of all clots and debris. The hysterotomy was reexamined and noted to be hemostatic. The fascia and rectus muscles were examined and areas of oozing were controlled with electrocautery. The fascia was closed with a running 0 Vicryl suture. The subcutaneous tissue was irrigated and areas of oozing were controlled with electrocautery. The subcutaneous tissue was less than 2 cm in thickness, and was therefore not closed. The skin was closed with 4-0 Monocryl and covered with a sterile dressing.    All sponge, needle, and instrument counts were correct. The patient tolerated the procedure well, and  was transferred to recovery in stable condition. Dr. Vaz was present and scrubbed for the entirety of the procedure.     Jolie Del Cid MD  Obstetrics & Gynecology, PGY-2  11/25/20 11:21 AM     Staff MD Note  I was present and scrubbed for the entire procedure noted above.  I agree with the description above and any necessary changes have been made by me.  Jojo Vaz MD

## 2020-11-25 NOTE — ANESTHESIA CARE TRANSFER NOTE
Patient: Gabriele Rodriguez    Procedure(s):   SECTION    Diagnosis: * No pre-op diagnosis entered *  Diagnosis Additional Information: No value filed.    Anesthesia Type:   MAC, Spinal, Peripheral Nerve Block, For Post-op pain in coordination with surgeon     Note:  Airway :Room Air  Patient transferred to:PACU  Handoff Report: Identifed the Patient, Identified the Reponsible Provider, Reviewed the pertinent medical history, Discussed the surgical course, Reviewed Intra-OP anesthesia mangement and issues during anesthesia, Set expectations for post-procedure period and Allowed opportunity for questions and acknowledgement of understanding      Vitals: (Last set prior to Anesthesia Care Transfer)    CRNA VITALS  2020 1102 - 2020 1139      2020             Pulse:  61    SpO2:  99 %                Electronically Signed By: Jose Bashir MD  2020  11:39 AM

## 2020-11-25 NOTE — PROGRESS NOTES
Progress Note    CST complete with equivocal results due to intermittent late decelerations with less than 50% of contractions. Discussed with patient that induction can continue, though still some concern for fetal status, and intervention such as CS may still be indicated later in induction process. Patient voices understanding. Will plan to restart PV Misoprostol 2hrs after Pitocin turned off, per policy.    Discussed with Dr. August.    Tariq Ramirez MD MPH  OB/Gyn PGY-2  11/25/20 4:04 AM

## 2020-11-25 NOTE — ANESTHESIA PREPROCEDURE EVALUATION
"Anesthesia Pre-Procedure Evaluation    Patient: Gabriele Rodriguez   MRN:     [id number] Gender:   female   Age:    35 year old :      1985        Preoperative Diagnosis: * No pre-op diagnosis entered *   Procedure(s):   SECTION     LABS:  CBC:   Lab Results   Component Value Date    WBC 10.5 2020    WBC 12.0 (H) 2020    HGB 11.8 2020    HGB 11.5 (L) 2020    HCT 34.7 (L) 2020    HCT 40.9 2020     2020     2020     BMP:   Lab Results   Component Value Date     2020     2019    POTASSIUM 3.4 2020    POTASSIUM 3.6 2019    CHLORIDE 107 2020    CHLORIDE 104 2019    CO2 21 2020    CO2 27 2019    BUN 6 (L) 2020    BUN 10 2019    CR 0.48 (L) 2020    CR 0.53 2020     (H) 2020    GLC 85 2019     COAGS: No results found for: PTT, INR, FIBR  POC: No results found for: BGM, HCG, HCGS  OTHER:   Lab Results   Component Value Date    COLT 8.4 (L) 2020    PHOS 2.8 2020    ALBUMIN 2.7 (L) 2020    PROTTOTAL 8.3 2019    ALT 13 2020    AST 13 2020    ALKPHOS 71 2019    BILITOTAL 0.7 2019    TSH 2.78 2019        Preop Vitals    BP Readings from Last 3 Encounters:   20 118/70   20 128/75   20 119/79    Pulse Readings from Last 3 Encounters:   20 82   20 87   10/21/20 91      Resp Readings from Last 3 Encounters:   20 16   20 18   20 16    SpO2 Readings from Last 3 Encounters:   20 99%   20 99%   20 95%      Temp Readings from Last 1 Encounters:   20 36.9  C (98.5  F) (Oral)    Ht Readings from Last 1 Encounters:   20 1.6 m (5' 3\")      Wt Readings from Last 1 Encounters:   20 86.7 kg (191 lb 1.6 oz)    Estimated body mass index is 33.85 kg/m  as calculated from the following:    Height as of 20: 1.6 m (5' 3\").    Weight " as of 20: 86.7 kg (191 lb 1.6 oz).     LDA:  Peripheral IV 20 Anterior;Left Hand (Active)   Site Assessment WDL 20   Line Status Saline locked 20   Phlebitis Scale 0-->no symptoms 20 07   Infiltration Scale 0 20 0205   Number of days: 1        Past Medical History:   Diagnosis Date     ADHD      Anxiety      Chronic sinusitis      Depressive disorder      Hypertension       Past Surgical History:   Procedure Laterality Date     BIOPSY NAIL (LOCATION)      left hand, 4th digit      Allergies   Allergen Reactions     Nickel Itching             JZG FV AN PHYSICAL EXAM    Assessment:   ASA SCORE: 2 emergent   H&P: History and physical reviewed and following examination; no interval change.    NPO Status: ELEVATED Aspiration Risk/Unknown     Plan:   Anes. Type:  MAC; Spinal; Peripheral Nerve Block; For Post-op pain in coordination with surgeon     Block Details: TAP; Exparel; Single Shot   Pre-Medication: None   Induction:  N/a   Airway: Native Airway   Access/Monitoring: PIV   Maintenance: N/a     Postop Plan:   Postop Pain: Regional  Postop Sedation/Airway: Not planned  Disposition: Inpatient/Admit     PONV Management: Adult Risk Factors: Female   Prevention: Ondansetron, Dexamethasone     CONSENT: Direct conversation   Plan and risks discussed with: Patient                      Pedro Lewis MD     ANESTHESIA PREOP EVALUATION    PROCEDURE: Procedure(s):   SECTION    HPI: Gabriele Rodriguez is a 35 year old female who presents for above procedure.    PAST MEDICAL HISTORY:    Past Medical History:   Diagnosis Date     ADHD      Anxiety      Chronic sinusitis      Depressive disorder      Hypertension        PAST SURGICAL HISTORY:    Past Surgical History:   Procedure Laterality Date     BIOPSY NAIL (LOCATION)      left hand, 4th digit       SOCIAL HISTORY:       Social History     Tobacco Use     Smoking status: Never Smoker     Smokeless tobacco: Never Used    Substance Use Topics     Alcohol use: No       ALLERGIES:     Allergies   Allergen Reactions     Nickel Itching       MEDICATIONS:     Medications Prior to Admission   Medication Sig Dispense Refill Last Dose     aspirin (ASA) 81 MG EC tablet Take 1 tablet (81 mg) by mouth daily , begin at 12 weeks pregnant. 90 tablet 3 11/23/2020 at Unknown time     famotidine (PEPCID) 20 MG tablet Take 20 mg by mouth 2 times daily   11/24/2020 at Unknown time     labetalol (NORMODYNE) 200 MG tablet Take 1 tablet (200 mg) by mouth 2 times daily 60 tablet 3 11/24/2020 at Unknown time     Prenatal Vit-Fe Fumarate-FA (PRENATAL MULTIVITAMIN W/IRON) 27-0.8 MG tablet Take 1 tablet by mouth daily 100 tablet 3 11/23/2020 at Unknown time       No current outpatient medications on file.       Current Facility-Administered Medications Ordered in Epic   Medication Dose Route Frequency Provider Last Rate Last Admin     acetaminophen (TYLENOL) tablet 650 mg  650 mg Oral Q4H PRN Jolie Del Cid MD         acetaminophen (TYLENOL) tablet 975 mg  975 mg Oral Once Jolie Del Cid MD         carboprost (HEMABATE) injection 250 mcg  250 mcg Intramuscular Once PRN Jolie Del Cid MD         ceFAZolin (ANCEF) 1 g vial to attach to  ml bag for ADULT or 50 ml bag for PEDS  1 g Intravenous See Admin Instructions Jolie Del Cid MD         ceFAZolin (ANCEF) intermittent infusion 2 g in 100 mL dextrose PRE-MIX  2 g Intravenous Pre-Op/Pre-procedure x 1 dose Jolie Del Cid MD         ePHEDrine injection 5 mg  5 mg Intravenous Q3 Min PRN Maurisio Medina MD         famotidine (PEPCID) tablet 20 mg  20 mg Oral BID Jolie Del Cid MD   20 mg at 11/25/20 0753     fentaNYL (SUBLIMAZE) 2 mcg/mL, bupivacaine (MARCAINE) 0.125% in NS premix for PCEA   EPIDURAL PCEA Maurisio Medina MD   Stopped at 11/25/20 0620     ibuprofen (ADVIL/MOTRIN) tablet 800 mg  800 mg Oral Once PRN Jolie Del Cid MD         IF subcutaneous (SQ) Unfractionated heparin (UFH)  "ordered for thromboprophylaxis   Does not apply See Admin Instructions Maurisio Medina MD        Or     IF intravenous (IV) Unfractionated heparin (UFH) ordered   Does not apply See Admin Instructions Maurisio Medina MD        Or     IF LOW-dose Low molecular weight heparin (LMWH) thromboprophylaxis ordered   Does not apply See Admin Instructions Maurisio Medina MD        Or     IF HIGHER-dose Low molecular weight heparin (LMWH) thromboprophylaxis ordered   Does not apply See Admin Instructions Maurisio Medina MD         labetalol (NORMODYNE) tablet 200 mg  200 mg Oral BID Jolie Del Cid MD   200 mg at 11/25/20 0753     lactated ringers BOLUS 250 mL  250 mL Intravenous Once PRN Maurisio Medina MD   Stopped at 11/24/20 2301     lactated ringers BOLUS 500 mL  500 mL Intravenous Once PRN Jolie Del Cid MD         lactated ringers infusion   Intravenous Continuous RamirezJose villalba  mL/hr at 11/25/20 0903 125 mL/hr at 11/25/20 0903     lactated ringers infusion   Intravenous Continuous Jolie Del Cid MD         lidocaine 1 % 0.1-20 mL  0.1-20 mL Subcutaneous Once PRN Jolie Del Cid MD         medication instruction   Does not apply Continuous PRN Maurisio Medina MD         Medication Instructions - cervical ripening and induction medications   Does not apply Continuous PRN Jose Ramirez MD         Medication Instructions: misoprostol (CYTOTEC)- Nurse to discuss ordering with provider, if needed. Ordered via \"OB misoprostol (CYTOTEC) Postpartum Hemorrhage PANEL\"   Does not apply Continuous PRN Jolie Del Cid MD         misoprostol (cervical ripening) (CYTOTEC) quarter-tab 25 mcg  25 mcg Vaginal Q4H PRN Jose Ramirez MD   25 mcg at 11/25/20 0540     nalbuphine (NUBAIN) injection 2.5-5 mg  2.5-5 mg Intravenous Q6H PRN Maurisio Medina MD         naloxone (NARCAN) injection 0.1-0.4 mg  0.1-0.4 mg Intravenous Q2 Min PRN Jolie Del Cid MD         ondansetron (ZOFRAN) injection 4 " mg  4 mg Intravenous Q6H PRN Jolie Del Cid MD         Opioid plan postpartum - medication instruction   Does not apply Continuous PRN Maurisio Medina MD         oxytocin (PITOCIN) 30 units in 500 mL 0.9% NaCl infusion  0.5 mariana-units/min Intravenous Continuous Jose Ramirez MD   Stopped at 11/25/20 0336     oxytocin (PITOCIN) 30 units in 500 mL 0.9% NaCl infusion  100-340 mL/hr Intravenous Continuous PRN Jolie Del Cid MD         oxytocin (PITOCIN) injection 10 Units  10 Units Intramuscular Once PRN Jolie Del Cid MD         Patient NOT a candidate for azithromycin (ZITHROMAX) antibiotic.  1 each As instructed Continuous Jolie Del Cid MD         sodium citrate-citric acid (BICITRA) 500-334 MG/5ML solution              sodium citrate-citric acid (BICITRA) solution 30 mL  30 mL Oral Pre-Op/Pre-procedure x 1 dose Jolie Del Cid MD         terbutaline (BRETHINE) injection 0.25 mg  0.25 mg Subcutaneous Once PRN Jose Ramirez MD         tranexamic acid (CYKLOKAPRON) bolus 1 g vial attach to NaCl 50 or 100 mL bag ADULT  1 g Intravenous Q30 Min PRN Jolie Del Cid MD         No current [facility]-ordered outpatient medications on file.       PHYSICAL EXAM:    Vitals: T 98.5, P Data Unavailable, /70, R 16, SpO2  , Weight   Wt Readings from Last 2 Encounters:   11/19/20 86.7 kg (191 lb 1.6 oz)   11/04/20 86.3 kg (190 lb 4.8 oz)       See doc flowsheet    NPO STATUS: see doc flowsheet    LABS:    BMP:  Recent Labs   Lab Test 11/24/20  1830 09/23/20  0850   NA  --  137   POTASSIUM  --  3.4   CHLORIDE  --  107   CO2  --  21   BUN  --  6*   CR 0.48* 0.53   GLC  --  160*   COLT  --  8.4*       LFTs:   Recent Labs   Lab Test 11/24/20  1830 09/23/20  0850 07/23/19  1509 07/23/19  1509   PROTTOTAL  --   --   --  8.3   ALBUMIN  --  2.7*  --  4.5   BILITOTAL  --   --   --  0.7   ALKPHOS  --   --   --  71   AST 13  --    < > 18   ALT 13  --    < > 22    < > = values in this interval not displayed.        CBC:   Recent Labs   Lab Test 11/24/20  1830 09/23/20  0850   WBC  --  10.5   RBC  --  3.76*   HGB 11.8 11.5*   HCT  --  34.7*   MCV  --  92   MCH  --  30.6   MCHC  --  33.1   RDW  --  11.9    180       Coags:  No results for input(s): INR, PTT, FIBR in the last 47320 hours.    Imaging:  No orders to display       Pedro Lewis MD  Anesthesiology Staff  Pager [phone]    11/25/2020  9:17 AM

## 2020-11-25 NOTE — PLAN OF CARE
Pt stable throughout PACU stay. Pain well controlled with TAP block. Lyndon ice chips. Pumped x 1. To NICU to see baby, to PP via cart. Report to Leydi ROYAL who assumed care.

## 2020-11-25 NOTE — ANESTHESIA PREPROCEDURE EVALUATION
"Anesthesia Pre-Procedure Evaluation    Patient: Gabriele Rodriguez   MRN:     [id number] Gender:   female   Age:    35 year old :      1985        Preoperative Diagnosis: * No surgery found *        LABS:  CBC:   Lab Results   Component Value Date    WBC 10.5 2020    WBC 12.0 (H) 2020    HGB 11.8 2020    HGB 11.5 (L) 2020    HCT 34.7 (L) 2020    HCT 40.9 2020     2020     2020     BMP:   Lab Results   Component Value Date     2020     2019    POTASSIUM 3.4 2020    POTASSIUM 3.6 2019    CHLORIDE 107 2020    CHLORIDE 104 2019    CO2 21 2020    CO2 27 2019    BUN 6 (L) 2020    BUN 10 2019    CR 0.48 (L) 2020    CR 0.53 2020     (H) 2020    GLC 85 2019     COAGS: No results found for: PTT, INR, FIBR  POC: No results found for: BGM, HCG, HCGS  OTHER:   Lab Results   Component Value Date    COLT 8.4 (L) 2020    PHOS 2.8 2020    ALBUMIN 2.7 (L) 2020    PROTTOTAL 8.3 2019    ALT 13 2020    AST 13 2020    ALKPHOS 71 2019    BILITOTAL 0.7 2019    TSH 2.78 2019        Preop Vitals    BP Readings from Last 3 Encounters:   20 (!) 144/77   20 128/75   20 119/79    Pulse Readings from Last 3 Encounters:   20 82   20 87   10/21/20 91      Resp Readings from Last 3 Encounters:   20 16   20 18   20 16    SpO2 Readings from Last 3 Encounters:   20 99%   20 99%   20 95%      Temp Readings from Last 1 Encounters:   20 36.9  C (98.5  F) (Oral)    Ht Readings from Last 1 Encounters:   20 1.6 m (5' 3\")      Wt Readings from Last 1 Encounters:   20 86.7 kg (191 lb 1.6 oz)    Estimated body mass index is 33.85 kg/m  as calculated from the following:    Height as of 20: 1.6 m (5' 3\").    Weight as of 20: 86.7 kg (191 lb 1.6 " oz).     LDA:  Peripheral IV 20 Anterior;Left Hand (Active)   Site Assessment WDL 20   Line Status Infusing 20   Phlebitis Scale 0-->no symptoms 20   Infiltration Scale 0 20   Number of days: 0        Past Medical History:   Diagnosis Date     ADHD      Anxiety      Chronic sinusitis      Depressive disorder      Hypertension       Past Surgical History:   Procedure Laterality Date     BIOPSY NAIL (LOCATION)      left hand, 4th digit      Allergies   Allergen Reactions     Nickel Itching        Anesthesia Evaluation     . Pt has not had prior anesthetic            ROS/MED HX    ENT/Pulmonary: Comment: COVID negative      Neurologic:       Cardiovascular:     (+) hypertension----. : . . . :. .       METS/Exercise Tolerance:     Hematologic:  - neg hematologic  ROS       Musculoskeletal:  - neg musculoskeletal ROS       GI/Hepatic:     (+) GERD       Renal/Genitourinary:  - ROS Renal section negative       Endo:     (+) Obesity, .      Psychiatric:     (+) psychiatric history depression (ADHD)      Infectious Disease:         Malignancy:         Other: Comment: 35 year old  at 37w1d by by 9w2d US, here for IOL for non-reassuring FHT                    JZG FV AN PHYSICAL EXAM    JZG FV AN PLAN NO PONV RULE          Maurisio Medina MD

## 2020-11-25 NOTE — PROGRESS NOTES
[facility]  Labor Progress Note    S: The patient's contractions have decreased in severity. She was able to get some sleep last night. Otherwise feeling well.     O:   Patient Vitals for the past 4 hrs:   BP Temp Temp src Resp   20 0751 118/70 98.5  F (36.9  C) Oral 16       Gen: NAD    FHT: Baseline 150, minimal-mod variability, no accelerations, recurrent subtle late decelerations, < 50% of contractions  [place]: 2 contractions in 10 minutes    A/P:  Ms. Gabriele Rodriguez is a 35 year old  at 37w2d by 9w2d US here for IOL for non-reassuring FHT. Pregnancy is also notable for chronic HTN, 2 vessel umbilical cord and BMI 33. Her tracing remains Cat 2 with minimal variability and no accelerations since admission. Due to this persistent pattern so remote from delivery a  delivery was recommended. The risks, benefits, and alternatives of  section were discussed, including the risks of bleeding, infection, injury to surrounding organs, fetal injury, and remote risks of hysterectomy. She consented to a blood transfusion in the event of a life threatening amount of bleeding. She had time to ask questions and agreed to proceed. Surgical consent was signed.      Jolie Del Cid MD  OBGYN PGY-2  2020 9:12 AM    Staff MD Note    I appreciate the note by Dr. Del Cid.  Any necessary changes have been made by me.  I saw and evaluated the patient and agree with the findings and plan of care as documented in the note.  Patient with persistent Category II tracing with minimal variability, occasional late decelerations and remote from delivery.  Discussed options for continued induction of labor versus consideration for moving forward with  section due to continuing concern for fetal status in the state of very early induction of labor setting.  After discussion patient and  desire to proceed with  section.  Anesthesia aware.  Plan for NICU at time of  delivery.    Jojo Vaz MD

## 2020-11-25 NOTE — PROVIDER NOTIFICATION
11/25/20 0344 11/25/20 0357   Provider Notification   Provider Name/Title Dr. Ramirez, G2 Dr. Ramirez G2, medical student   Method of Notification In Department At Bedside   Request Evaluate in Person Evaluate in Person   Notification Reason Uterine Activity;Other (Comment)  (Pitocin discontinued, CST complete) Status Update  (updating patient that CST passed)   Contraction stress test initiated at 0205. Pitocin titrated up every 15 minutes per protocol. Titrated up to 3.5 mu/min. Contractions met criteria for frequency and duration. Contraction stress test completed and pitocin turned off at 0336. Dr. Ramirez, G2 notified that CST was complete and pitocin was discontinued. Dr. Ramirez, G2, reviewed the FHR strip with Dr. August then came to patient bedside to notify the patient that the CST was passed. Plan made to continue with vaginal misoprostol at 0536, two hours post-pitocin being turned off.

## 2020-11-25 NOTE — PROGRESS NOTES
Patient arrived to unit for decreased fetal movement. Tracing noted to minimal variability with no accels, non-reactive NST. Dr. SIMONE Del Cid notified, plan is to admit to labor for IOL. Patient roomed oriented to room and call light system. Labs drawn, COVID swab sent to lab. Patient repositioned and IV fluid bolus administered, no change in variability and no accels noted thus far. Continue plan of care.

## 2020-11-25 NOTE — PLAN OF CARE
Patient's postpartum assessments WDL, vital signs stable. Fundus firm and midline, lochia WDL. Incision dressing dry and intact. Adequate urine output in mendez catheter, mendez removed at 1720. Taking tylenol and toradol for pain control. Pumping for infant in NICU. Patient got up and ambulated in room well x2. Visited infant in NICU. Will continue to monitor and assess.

## 2020-11-25 NOTE — ANESTHESIA POSTPROCEDURE EVALUATION
Anesthesia POST Procedure Evaluation    Patient: Gabriele Rodriguez   MRN:     [id number] Gender:   female   Age:    35 year old :      1985        Preoperative Diagnosis: * No pre-op diagnosis entered *   Procedure(s):   SECTION   Postop Comments: No value filed.     Anesthesia Type: MAC, Spinal, Peripheral Nerve Block, For Post-op pain in coordination with surgeon       Disposition: Admission   Postop Pain Control: Uneventful            Sign Out: Well controlled pain   PONV: No   Neuro/Psych: Uneventful            Sign Out: Acceptable/Baseline neuro status   Airway/Respiratory: Uneventful            Sign Out: Acceptable/Baseline resp. status   CV/Hemodynamics: Uneventful            Sign Out: Acceptable CV status   Other NRE: NONE   DID A NON-ROUTINE EVENT OCCUR? No         Last Anesthesia Record Vitals:  CRNA VITALS  2020 1102 - 2020 1146      2020             Pulse:  61    SpO2:  99 %          Last PACU Vitals:  No vitals data found for the desired time range.        Electronically Signed By: Pedro Lewis MD, 2020, 11:46 AM

## 2020-11-25 NOTE — ANESTHESIA PROCEDURE NOTES
Peripheral Nerve Block Procedure Note      Staff -   Anesthesiologist:  Pedro Lewis MD  Resident/Fellow: Jose Bashir MD  Performed By: resident  Procedure performed by resident/CRNA in presence of a teaching physician.    Location: OB  Procedure Start/Stop TImes:      11/25/2020 11:30 AM     11/25/2020 11:40 AM    patient identified, IV checked, site marked, risks and benefits discussed, informed consent, monitors and equipment checked, pre-op evaluation, at physician/surgeon's request and post-op pain management      Correct Patient: Yes      Correct Position: Yes      Correct Site: Yes      Correct Procedure: Yes      Correct Laterality:  Yes    Site Marked:  Yes  Procedure details:     Procedure:  TAP    ASA:  2    Diagnosis:  C/s    Laterality:  Bilateral    Position:  Supine    Sterile Prep: chloraprep, patient draped, mask and sterile gloves      Needle:  Short bevel    Needle gauge:  21    Needle length (mm):  110    Ultrasound: Yes      Ultrasound used to identify targeted nerve, plexus, or vascular structure and placed a needle adjacent to it      Permanent Image entered into patiient's record      Abnormal pain on injection: No      Blood Aspirated: No      Paresthesias:  No    Bleeding at site: No      Bolus via:  Needle    Infusion Method:  Single Shot    Complications:  None

## 2020-11-25 NOTE — ANESTHESIA PROCEDURE NOTES
Spinal/LP Procedure Note    Spinal Block      Staff -   Anesthesiologist:  Pedro Lewis MD  Resident/Fellow: Jose Bashir MD  Performed By: resident  Procedure performed by resident/CRNA in presence of a teaching physician.    Location: OB and OR  Procedure Start/Stop Times:      11/25/2020 10:00 AM     11/25/2020 10:04 AM    patient identified, IV checked, site marked, risks and benefits discussed, informed consent, monitors and equipment checked, pre-op evaluation, at physician/surgeon's request and post-op pain management      Correct Patient: Yes      Correct Position: Yes      Correct Site: Yes      Correct Procedure: Yes      Correct Laterality:  Yes    Site Marked:  Yes  Procedure:     Procedure:  Intrathecal    ASA:  2    Diagnosis:  C/s    Position:  Sitting    Sterile Prep: chloraprep, mask, sterile gloves and patient draped      Insertion site:  L4-5    Approach:  Midline    Needle Type:  Pencan    Needle gauge (G):  25    Local Skin Infiltration:  1% lidocaine    amount (ml):  3    Needle Length (in):  3.5    Introducer used: Yes      Introducer gauge:  20 G    Attempts:  1    Redirects:  1    CSF:  Clear    Paresthesias:  No    Time injected:  10:04

## 2020-11-25 NOTE — PROGRESS NOTES
[facility]  Labor Progress Note    S:   Feeling ok. Able to sleep after pit turned off. Contractions have subsided.    O:   Patient Vitals for the past 4 hrs:   BP Temp Temp src Resp   20 0505 114/69 98.5  F (36.9  C) Oral 16     SVE: 1/60/-3, PV miso placed; chaperoned by RN    FHT  Baseline: 155  Variability: Moderate with periods of minimal  Accels: Absent  Decels: Absent  [place]: ~1 contraction in 10 min, Irritability    A/P:  Gabriele Rodriguez is a 35 year old  at 37w2d by by 9w2d US, here for IOL for non-reassuring FHT. Pregnancy is also notable for chronic HTN, 2 vessel umbilical cord and BMI 33.    #IOL  #Labor:  - SVE: c/l/h () > PV miso x1 () > Equiv CST (0336) > 1/60/-3, PV Miso x1  - Pain: plans epidural  - GBS neg  - Plan: Continue induction with PV miso    #Fetal Well Being  #2V Cord  - Overall Cat 1 tracing, again with periods of minimal variability; overall reassuring given moderate variability. Again discussed that ongoing periods of minimal variability raises some concern, and if pattern becomes persistent or recurrent decels noted would recommend delivery by CS    - Continuous Fetal Monitoring  - Intrauterine resuscitative measures prn  - NICU for delivery    Tariq Ramirez MD MPH  OB/Gyn PGY-2  20 5:52 AM

## 2020-11-25 NOTE — DISCHARGE SUMMARY
DELIVERY DISCHARGE SUMMARY    Gabriele Rodriguez  : 1985  MRN: [id number]    Admit date: 2020  Discharge date: 20     Admit Dx:   - 35 year old  at 37w1d  - Chronic hypertension  - Non-reactive NST, decreased fetal movement at term  - Induction of labor  - Two vessel umbilical cord    Discharge Dx:  - Same as above, s/p primary low transverse  section  - Category II FHT remote from delivery    Procedures:  - Primary low transverse  section with double layer closure via Pfannenstiel incision  - Spinal analgesia    Admit HPI:  Ms. Gabriele Rodriguez is a 35 year old  at 37w21d who was admitted on 2020 for IOL for non-reassuring fetal status.     Please see her admit H&P for full details of her PMH, PSH, Meds, Allergies and exam on admit.    Hospital course:  After discussion of risk and and benefits and signing informed consent the patient was taken to the operating room for primary  section.    Operative Findings:  1. No adhesions.   2. Thick green amniotic fluid  3. Liveborn female infant in cephalic presentation. Apgars and weight pending.   4. Arterial cord pH 7.31, base deficit 2. Venous cord pH 7.36, base deficit 1.9.  5. Normal uterus, fallopian tubes, and ovaries.     EBL from the delivery was 500mL. Please see her  Section Operative Note for full details regarding her delivery.    Her postoperative course was complicated by baby being admitted to NICU for meconium aspriration. On POD#2, she was meeting all of her postpartum goals and deemed stable for discharge. She was voiding without difficulty, tolerating a regular diet without nausea and vomiting, her pain was well controlled on oral pain medicines and her lochia was appropriate. Her hemoglobin prior to delivery was 11.8 and after delivery was 11.7. Her Rh status was positive and Rhogam was not indicated.    Discharge Medications:  Current Discharge Medication List      START  taking these medications    Details   acetaminophen (TYLENOL) 325 MG tablet Take 2 tablets (650 mg) by mouth every 6 hours as needed for mild pain Start after Delivery.  Qty: 100 tablet, Refills: 0    Associated Diagnoses: S/P  section      ibuprofen (ADVIL/MOTRIN) 600 MG tablet Take 1 tablet (600 mg) by mouth every 6 hours as needed for moderate pain Start after delivery  Qty: 60 tablet, Refills: 0    Associated Diagnoses: S/P  section      senna-docusate (SENOKOT-S/PERICOLACE) 8.6-50 MG tablet Take 1 tablet by mouth daily Start after delivery.  Qty: 100 tablet, Refills: 0    Associated Diagnoses: S/P  section         CONTINUE these medications which have NOT CHANGED    Details   aspirin (ASA) 81 MG EC tablet Take 1 tablet (81 mg) by mouth daily , begin at 12 weeks pregnant.  Qty: 90 tablet, Refills: 3    Associated Diagnoses: Supervision of high risk pregnancy in first trimester      famotidine (PEPCID) 20 MG tablet Take 20 mg by mouth 2 times daily      labetalol (NORMODYNE) 200 MG tablet Take 1 tablet (200 mg) by mouth 2 times daily  Qty: 60 tablet, Refills: 3    Associated Diagnoses: Essential hypertension      Prenatal Vit-Fe Fumarate-FA (PRENATAL MULTIVITAMIN W/IRON) 27-0.8 MG tablet Take 1 tablet by mouth daily  Qty: 100 tablet, Refills: 3    Associated Diagnoses: Pregnancy test positive             Discharge/Disposition:  Gabriele Rodriguez was discharged to home in stable condition with the following instructions/medications:  1) Call for temperature > 100.4, bright red vaginal bleeding >1 pad an hour x 2 hours, foul smelling vaginal discharge, pain not controlled by usual oral pain meds, persistent nausea and vomiting not controlled on medications, drainage or redness from incision site  2) She was undecided for contraception.  3) For feeding she decided to breastfeed.  4) She was instructed to follow-up with her primary OB in 6 weeks for a routine postpartum visit and 1 week blood  pressure check.  5) Discharge activity:  No heavy lifting >15 lbs or strenuous activity for 6 weeks, pelvic rest for 6 weeks, no driving or operating machinery while on narcotics.    Jolie Del Cid MD  Obstetrics & Gynecology, PGY-2  11/27/20 8:13 AM     Appreciate note by Dr. Del Cid. Patient has been seen and examined by me separate from the resident, agree with above note.     Giovanna Osorio MD  10:34 AM

## 2020-11-25 NOTE — PLAN OF CARE
Patient arrived to [facility] unit via zoom cart at 1340,with belongings, accompanied by spouse/ significant other, with infant in NICU. Received report from GENNY Dash and checked bands. Unit and room orientation started. Call light given; no concerns present at this time. Continue with plan of care.

## 2020-11-25 NOTE — PLAN OF CARE
D) Pt is an IOL for non-reactive NST. Hx of chronic HTN and 2 vessel cord.   I) Pt had one dose of vaginal misoprostol placed at 2130. Due to the FHR not having accelerations, a decision was made to complete a contraction stress test. CST initiated at 0205 and completed and passed at 0336. Second dose of vaginal misoprostol placed at 0540.   A) VSS. FHR baseline 145, moderate variability (with periods of minimal), no accelerations,rare variable decelerations. Pt having irregular contractions every 1-7 minutes, mild to palpation, soft resting. Pt reports feeling cramping but declines needing interventions for pain.  P) Continue with plan of care of every 4 hour vaginal miso placement and potential placement of mendez balloon. Maintain continuous FHR and contraction monitoring.

## 2020-11-25 NOTE — BRIEF OP NOTE
[facility]  Brief Operative Progress Note     Surgery Date:  2020    Surgeon:  Jojo Vaz MD    Assistants:  Jolie Del Cid MD, PGY-2    Pre-op Diagnosis:    - Intrauterine pregnancy at 37w2d  - Category 2 RFD  -      Post-op Diagnosis:    - Same   - Liveborn female infant  - Thick meconium      Procedure:  Primary low-transverse  section with double layer uterine closure via pfannenstiel incision    Anesthesia: Spinal    EBL:  500 mL     IVF:  700 mL crystalloid    UOP:  350 mL clear urine at the end of the case    Drains: [place] Catheter     Specimens:  Placenta, cord     Complications: None apparent    Indications:   Gabriele Rodriguez is a 35 year old  at 37w2d admitted for IOL for nonreactive NST with minima variability.  The patient had a persistent category 2 FHT and  delivery was recommended.  The risks, benefits, and alternatives of  section were discussed with the patient, and she agreed to proceed.     Findings:   1. No adhesions.   2. Thick green amniotic fluid  3. Liveborn female infant in cephalic presentation. Apgars and weight pending.   4. Arterial cord pH 7.31, base deficit 2. Venous cord pH 7.36, base deficit 1.9.  5. Normal uterus, fallopian tubes, and ovaries.     Jolie Del Cid MD  Obstetrics & Gynecology, PGY-2  20 11:26 AM

## 2020-11-25 NOTE — PROGRESS NOTES
[facility]  Labor Progress Note    S:   In to meet patient. She is feeling well. No contractions    O:   Patient Vitals for the past 4 hrs:   BP Temp Temp src Resp   20 1953 129/68 98  F (36.7  C) Oral 16   20 1649 -- 98.4  F (36.9  C) Oral 16   20 1646 137/77 -- -- --       SVE: c/l/h; Chaperoned by RN    FHT  Baseline: 145  Variability: Minimal  Accels: Absent  Decels: Absent  [place]: Irritability    A/P:  Gabriele Rodriguez is a 35 year old  at 37w1d by by 9w2d US, here for IOL for non-reassuring FHT. Pregnancy is also notable for chronic HTN, 2 vessel umbilical cord and BMI 33. Her tracing remains Cat 2 with minimal variability since admission    #Labor:  - SVE: c/l/h  - Pain: plans epidurl  - GBS neg  - Plan: Discussed concerns that baby may not tolerate labor given Cat 2 tracing with patient. Discussed option of starting induction with vaginal misoprostol, and if baby does not tolerate then will move to CS. Patient voices understanding, asks appropriate questions and agrees to proceed. Will start PV miso once COVID results.    #Fetal Well Being  #2V Cord  - Cat 2 tracing, non-reactive and minimal variability  - Check fetal presentation on admission   - q4w growth ultrasounds show 42%ile (32w) > 20%ile (34w5d)  - Continuous Fetal Monitoring  - NICU for delivery   - Intrauterine resuscitative measures prn    Tariq Ramirez MD MPH  OB/Gyn PGY-2  20 7:45 PM

## 2020-11-26 LAB — HGB BLD-MCNC: 11.7 G/DL (ref 11.7–15.7)

## 2020-11-26 PROCEDURE — 120N000002 HC R&B MED SURG/OB UMMC

## 2020-11-26 PROCEDURE — 36415 COLL VENOUS BLD VENIPUNCTURE: CPT | Performed by: STUDENT IN AN ORGANIZED HEALTH CARE EDUCATION/TRAINING PROGRAM

## 2020-11-26 PROCEDURE — 250N000013 HC RX MED GY IP 250 OP 250 PS 637: Performed by: STUDENT IN AN ORGANIZED HEALTH CARE EDUCATION/TRAINING PROGRAM

## 2020-11-26 PROCEDURE — 85018 HEMOGLOBIN: CPT | Performed by: STUDENT IN AN ORGANIZED HEALTH CARE EDUCATION/TRAINING PROGRAM

## 2020-11-26 RX ORDER — AMOXICILLIN 250 MG
1 CAPSULE ORAL DAILY
Qty: 100 TABLET | Refills: 0 | Status: SHIPPED | OUTPATIENT
Start: 2020-11-26 | End: 2020-12-03

## 2020-11-26 RX ORDER — ACETAMINOPHEN 325 MG/1
650 TABLET ORAL EVERY 6 HOURS PRN
Qty: 100 TABLET | Refills: 0 | Status: SHIPPED | OUTPATIENT
Start: 2020-11-26 | End: 2021-02-18

## 2020-11-26 RX ORDER — IBUPROFEN 600 MG/1
600 TABLET, FILM COATED ORAL EVERY 6 HOURS PRN
Qty: 60 TABLET | Refills: 0 | Status: SHIPPED | OUTPATIENT
Start: 2020-11-26 | End: 2021-01-04

## 2020-11-26 RX ADMIN — LABETALOL HYDROCHLORIDE 200 MG: 200 TABLET, FILM COATED ORAL at 19:21

## 2020-11-26 RX ADMIN — IBUPROFEN 800 MG: 800 TABLET, FILM COATED ORAL at 06:12

## 2020-11-26 RX ADMIN — IBUPROFEN 800 MG: 800 TABLET, FILM COATED ORAL at 18:39

## 2020-11-26 RX ADMIN — LABETALOL HYDROCHLORIDE 200 MG: 200 TABLET, FILM COATED ORAL at 07:34

## 2020-11-26 RX ADMIN — ACETAMINOPHEN 975 MG: 325 TABLET, FILM COATED ORAL at 02:07

## 2020-11-26 RX ADMIN — IBUPROFEN 800 MG: 800 TABLET, FILM COATED ORAL at 12:32

## 2020-11-26 RX ADMIN — HYDROCORTISONE: 0.5 CREAM TOPICAL at 07:34

## 2020-11-26 RX ADMIN — DOCUSATE SODIUM AND SENNOSIDES 2 TABLET: 8.6; 5 TABLET ORAL at 07:34

## 2020-11-26 RX ADMIN — ACETAMINOPHEN 975 MG: 325 TABLET, FILM COATED ORAL at 07:34

## 2020-11-26 RX ADMIN — HYDROCORTISONE: 0.5 CREAM TOPICAL at 19:26

## 2020-11-26 RX ADMIN — ACETAMINOPHEN 975 MG: 325 TABLET, FILM COATED ORAL at 13:16

## 2020-11-26 NOTE — PLAN OF CARE
Data/Action: VSS, postpartum assessment WNL. Patient eating and drinking, up independently. Incision healing well, dressed with no visible drainage. Patient performing self cares and is pumping independently. Plans to visit NICU again in the morning.  Pt unable to void 4 hours post cath removal, bladder scan = 770mL. Straight cath was 800 mL, voided spontaneously 4 hours later.   Localized rash noticed on abdomen above incision, assessed by resident this shift. No fever or SOB. Applied hydrocortisone and pt reported reduced itching.  Patient medicated during the shift for pain and cramping. Spouse present and assisting with cares.  IV not patent, removed this shift.  Plan: Continue with POC    Sandra Wallace, SN

## 2020-11-26 NOTE — PROGRESS NOTES
[facility] Obstetrics   Postpartum Progress Note    Name: Gabriele Rodriguez  : 1985  MRN: [id number]    S: Patient is resting comfortably.  Pain is improving and well controlled.  Bleeding is light.  Passing flatus and now voiding spontaneously after an initial straight cath.  Ambulating without dizziness.  Tolerating a regular diet without nausea/vomiting.  Denies headache, CP, SOB.  She is pumping with small amount of colostrum. Baby is in the NICU being cooled.     O:  Patient Vitals for the past 24 hrs:   BP Temp Temp src Pulse Resp SpO2   20 0207 115/71 -- Oral 59 16 98 %   20 2200 114/58 97.7  F (36.5  C) Oral 71 18 99 %   20 1808 121/73 98.4  F (36.9  C) Oral 73 18 100 %   20 1705 115/74 98.4  F (36.9  C) Oral 69 17 100 %   20 1601 116/72 98.4  F (36.9  C) Oral 66 18 99 %   20 1515 129/80 98.3  F (36.8  C) Oral 67 17 99 %   20 1417 126/79 98.1  F (36.7  C) Oral 70 17 99 %   20 1350 125/76 98.1  F (36.7  C) Oral 72 18 99 %   20 1313 128/80 -- -- 73 16 99 %   20 1255 135/83 98.1  F (36.7  C) Axillary 70 16 99 %   20 1240 114/88 -- -- 67 22 100 %   20 1225 129/83 -- -- 65 18 100 %   20 1210 132/73 -- -- 67 15 99 %   20 1155 135/78 -- -- -- 16 99 %   20 1140 135/85 98.4  F (36.9  C) Axillary -- -- --   20 1120 136/86 -- -- -- 16 100 %   20 0751 118/70 98.5  F (36.9  C) Oral -- 16 --       Gen: NAD. Alert, oriented. Resting comfortably in bed.  CV: regular rate  Resp:  no increased work of breathing  Abd: Soft, appropriately tender, fundus firm at 3 cm below the umbilicus, non-distended  Incision: small amount of shadowing over left lower area of bandage   Ext: no lower extremity edema bilaterally     Labs:   HGB  Recent Labs   Lab 20  1830   HGB 11.8       A/P: Tinyjuan Rodriguez is a 35 year old  female, now POD#1 s/p PLTCS for category 2 remote from delivery.  Pregnancy was complicated by chronic  HTN and 2 vessel cord.  Stable in the postoperative period.      Postpartum Care  - Continue routine postoperative management  - Rh pos, Rubella immune, Hep BSAg   - Pain: Continue ibuprofen/Tylenol scheduled.  Roxicodone PRN for breakthrough.  - ABLA: HGB 11.8>  mL>   Hemoglobin   Date Value Ref Range Status   11/26/2020 11.7 11.7 - 15.7 g/dL Final   , asymptomatic.  - Incision: C/D/I   - FEN/GI: regular diet, stool softeners PRN  - : postoperative urinary retention now resolved   - Contraception: needs to be discussed  - Pumping  - Baby is stable in NICU, being cooled due to meconium aspiration     Gestational HTN:   - Blood pressures: normal since delivery  - Meds: continue PTA labetalol 100 mg BID  - Sxs: None  - Labs: HELLP labs WNL on admission; repeat PRN     Dispo: anticipate discharge to home on POD#2-3.    Jolie Del Cid MD  OBGYN PGY-2  11/26/2020 6:00 AM     [facility] staff:  Appreciate note by Dr. Del Cid.  I have seen and examined the patient without the resident. I have reviewed, edited, and agree with the note.      Ava Neri MD, FACOG  11/26/2020  10:22 AM

## 2020-11-26 NOTE — PLAN OF CARE
Patient's postpartum assessments WDL, vital signs stable. Fundus firm and midline, lochia WDL. Patient showered and removed dressing from incision. Incision has steri strips, no drainage.   Voiding spontaneously and is passing gas. Had BM today.  Taking tylenol and ibuprofen for pain control.   Ambulates to NICU to visit infant. Pumping every 2-3 hours.   Completed EDS and scored 1.   Will continue to monitor and assess.

## 2020-11-26 NOTE — LACTATION NOTE
"This note was copied from a baby's chart.  D:  I met with Qi and Jermaine on [facility].  She is normally in good health, takes labetalol (Koenig L2) for hypertension, and pepcid (Hale L1), and has no history of breast/chest surgery or trauma.  She has already started to pump, getting drops. She stated she is having some nipple pain, using 24mm flanges with pumping, so I switched her out to 27mm for next pumping session.  I:  I gave her a folder of introductory materials and went over pumping guidelines.  I reviewed physiology of colostrum and milk production, pumping guidelines, and I gave her a log and encouraged her to use it.   I explained how to access the videos \"Hand Expression\" and \"Maximizing Milk Production\"; as well as other helpful books and websites.   We discussed hands-on pumping techniques and usefulness of a hands-free pumping bra.  We discussed skin to skin holding in future, utilizing hand hugs currently, and how to reach your breastfeeding goals.  We talked medications during breastfeeding.  She verbalized understanding via teachback. She has a new Spectra at home for pumping.   A:  Mom has information she needs to initiate her supply.   P:  Will continue to provide lactation support.         "

## 2020-11-26 NOTE — PROVIDER NOTIFICATION
11/25/20 7321   Provider Notification   Provider Name/Title Dr. Ramirez (G2)   Method of Notification Electronic Page   Request Evaluate-Remote   Notification Reason Medication Request     Hey pt has rash all over front of abdomen (most likely from surgery prep). Are you able to order hydrocortisone cream for her? Thank you!

## 2020-11-27 VITALS
RESPIRATION RATE: 18 BRPM | OXYGEN SATURATION: 100 % | SYSTOLIC BLOOD PRESSURE: 132 MMHG | HEART RATE: 70 BPM | TEMPERATURE: 98.1 F | DIASTOLIC BLOOD PRESSURE: 85 MMHG

## 2020-11-27 PROCEDURE — 250N000013 HC RX MED GY IP 250 OP 250 PS 637: Performed by: STUDENT IN AN ORGANIZED HEALTH CARE EDUCATION/TRAINING PROGRAM

## 2020-11-27 RX ORDER — OXYCODONE HYDROCHLORIDE 5 MG/1
5 TABLET ORAL EVERY 4 HOURS PRN
Qty: 6 TABLET | Refills: 0 | Status: SHIPPED | OUTPATIENT
Start: 2020-11-27 | End: 2020-12-03

## 2020-11-27 RX ADMIN — IBUPROFEN 800 MG: 800 TABLET, FILM COATED ORAL at 00:32

## 2020-11-27 RX ADMIN — HYDROCORTISONE: 0.5 CREAM TOPICAL at 07:31

## 2020-11-27 RX ADMIN — ACETAMINOPHEN 975 MG: 325 TABLET, FILM COATED ORAL at 12:17

## 2020-11-27 RX ADMIN — LABETALOL HYDROCHLORIDE 200 MG: 200 TABLET, FILM COATED ORAL at 07:31

## 2020-11-27 RX ADMIN — IBUPROFEN 800 MG: 800 TABLET, FILM COATED ORAL at 06:32

## 2020-11-27 RX ADMIN — ACETAMINOPHEN 975 MG: 325 TABLET, FILM COATED ORAL at 06:31

## 2020-11-27 RX ADMIN — ACETAMINOPHEN 975 MG: 325 TABLET, FILM COATED ORAL at 00:32

## 2020-11-27 RX ADMIN — IBUPROFEN 800 MG: 800 TABLET, FILM COATED ORAL at 12:17

## 2020-11-27 NOTE — PLAN OF CARE
Data: Vital signs and postpartum checks WDL  Patient eating and drinking normally. Patient able to empty bladder independently and is up ambulating.  Patient performing self cares and is able to visit  infant in NICU. Pumping independently.   Action: Patient medicated during the shift for pain with Tylenol and Ibuprofen with relief after 1 hour. Patient education done see education record.  Response: Support persons  present.    Plan: Continue with the plan of care

## 2020-11-27 NOTE — PLAN OF CARE
Patient's postpartum assessment WDL, vital signs stable. Fundus firm and midline, lochia WDL. Incision has steri strips and is WDL, no drainage.   Taking tylenol and ibuprofen for pain control.   Using hydrocortisone cream for rash on abdomen.   Voiding spontaneously and had BM yesterday.   Pumping for infant in NICU and visits infant frequently.   Discharging to home today.

## 2020-11-27 NOTE — PROGRESS NOTES
[facility] Obstetrics   Postpartum Progress Note    Name: Gabriele Rodriguez  : 1985  MRN: [id number]    S: Patient is resting comfortably.  Mood is stable.  Pain is improving and well controlled.  Bleeding is light.  Passing flatus and now voiding spontaneously after an initial straight cath.  Ambulating without dizziness.  Tolerating a regular diet without nausea/vomiting.  Denies headache, CP, SOB.  She is pumping with small amount of colostrum. Baby is in the NICU being cooled.     O:  Patient Vitals for the past 24 hrs:   BP Temp Temp src Pulse Resp   20 0732 132/85 98.1  F (36.7  C) Oral 70 18   20 0032 128/81 97.8  F (36.6  C) Oral 82 18   20 1919 119/77 98.1  F (36.7  C) Oral 69 17   20 1312 134/78 97.9  F (36.6  C) Oral 76 18       Gen: NAD. Alert, oriented. Resting comfortably in bed.  CV: regular rate  Resp:  no increased work of breathing  Abd: Soft, appropriately tender, fundus firm at 3 cm below the umbilicus, non-distended  Incision: c/d/i no swelling or erythema  Ext: no lower extremity edema bilaterally     Labs:   HGB  Recent Labs   Lab 20  0923 20  1830   HGB 11.7 11.8       A/P: Gabriele Rodriguez is a 35 year old  female, now POD#2 s/p PLTCS for category 2 remote from delivery.  Pregnancy was complicated by chronic HTN and 2 vessel cord.  Stable in the postoperative period.      Postpartum Care  - Continue routine postoperative management  - Rh pos, Rubella immune, Hep BSAg   - Pain: Continue ibuprofen/Tylenol scheduled.  Roxicodone PRN for breakthrough.  - ABLA: HGB 11.8>  mL> 11.7, asymptomatic.  - Incision: C/D/I   - FEN/GI: regular diet, stool softeners PRN  - : postoperative urinary retention now resolved   - Contraception: condoms  - Pumping  - Baby is stable in NICU, being cooled due to meconium aspiration     Gestational HTN:   - Blood pressures: normal since delivery  - Meds: continue PTA labetalol 100 mg BID  - Sxs: None  - Labs:  HELLP labs WNL on admission; repeat PRN   - 1 week BP check    Dispo: Discharge to home on POD#2.    Discussed with Dr. Devon Ramirez MD MPH  OB/Gyn PGY-2  11/27/20 9:07 AM      Appreciate note by Dr. Ramirez. Patient has been seen and examined by me separate from the resident, agree with above note. Doing well. Mood stable. Ready for discharge. Would like to have a few oxy for discharge. Plan 1 week mood/incision/BP check.     Giovanna Osorio MD  10:31 AM

## 2020-11-27 NOTE — CONSULTS
"[facility]  MATERNAL CHILD HEALTH   INITIAL NICU PSYCHOSOCIAL ASSESSMENT      DATA:      Presenting Information: Mom is a  who delivered an infant female, \"Valeria\" on 2020 at 37w2d gestation in the setting of c/s due to decreased fetal movement and nonreassuring fetal heart tones. Baby was admitted to the NICU for RDS, possible sepsis, meconium aspiration, and possible  encephalopathy.  SW was consulted to meet with this family per NICU admission of infant.      Living Situation: Parents are Qi and Pedro (), who live together in [facility].     Social Support: Parents report having good social support.     Education/Employment: Mom is a resident here at the [facility]; Dad reports that his work is flexible and he has plenty of PTO. They report no concerns about work being a barrier to their presence at the hospital.     Insurance: Mom has [employer]BS of [place]; unsure if this is the policy baby will be added to.     Source of Financial Support: Parental employment      Mental Health History: Qi reports that she did experience depression during her first years in medical school for which she took medications. She has not been on medications during this pregnancy and feels her mood has been stable.     History of Postpartum Mood Disorders: NA     Chemical Health History: None indicated     Legal/Child Protection Involvement: None indicated     INTERVENTION:        Chart review    Collaboration with team: GENNY Holley    Conducted Psychosocial Assessment    Introduction to Maternal Child Health SW role and scope of practice    Orientation to the NICU (parking, lodging, meals, visitation)    Validated emotions and provided supportive listening    Provided psychoeducation on  mood disorders and indicated that SW would continue to monitor mood and support bridging to mental health resources as needed.    Provided SW contact info     ASSESSMENT:      Coping: " Parents are coping appropriately with this unexpected NICU admission of their first child.  They have been tearful throughout the past few days and feel they are still recovering from the sudden nature of everything that happened.  They plan to board at home so they can reground and finish setting up for their baby's arrival, while visiting here frequently.     Risk Factors: First time parents, unexpected hospitalization during global pandemic, maternal hx of depression.     Resiliency Factors & Strengths: Local family, stable housing and finances, strong support network, good understanding of medical system.     PLAN:      SW will continue to follow for supportive intervention.     LAURE Carter, [facility]  Clinical   Maternal Child Health  [facility]  Direct: [phone]  Pager: [phone]  After Hours SW: [phone]

## 2020-11-28 LAB — COPATH REPORT: NORMAL

## 2020-12-02 NOTE — PROGRESS NOTES
Acute Post-partum Visit Note  12/3/20    Reason for visit: BP and incision check    S: Patient is a 36 yo  who is POD#7 s/p PLTCS for Category II tracing RFD who presents today for BP and incision check.  Pregnancy was notable for chronic hypertension on labetalol 200 mg bid and two vessel umbilical cord.  Since delivery patient has been ok physically.  Feels recovery from surgery going well.  Pain well controlled.  Bleeding appropriate.  No issues with bladder or bowel function.  From a mental/emotional standpoint, has been through a lot as daughter Valeria continues to be in the NICU and is s/p cooling and HIE treatment with little improvement.  Qi verbalized today possibility for compassionate care decisions being made.  Unsure of the cause, nothing has definitively been found as cause of these issues Valeria is having, most likely a clot of some sort.  Does ask about potential for pregnancy again and how long to wait if needed moving forward.    O:  /77   Pulse 74   Wt 80.3 kg (177 lb)   LMP 2020   Breastfeeding No   BMI 31.35 kg/m       General: NAD, A&Ox3  Resp: Non-labored breathing  Incision: Pfannenstiel,C/D/I, steristrips removed today    A/P: 36 yo  POD#7 s/p PLTCS for Category II tracing RFD presents for BP check and incision check  1) BP check: Appropriate, continue current therapy  2) Incision check: Healing well, no concerns for infection  3) Mood: Daughter Valeria with continued NICU evaluation and difficult course as above.  Will continue to be here for Qi, she may reach out with any concerns.  Will plan another visit in 2 weeks to check in, can be virtual if no physical issues to check.  Full postpartum visit in 5 weeks.  4) Did discuss typical pregnancy spacing of 18 months post-.  Did discuss many times in situations like this if another pregnancy is desired, would recommend at least 6 months prior to trying to conceive.  She appreciated discussion although  clearly hard to consider this with all that is going on with Valeria right now, appropriately tearful talking about this.  5) Plan another check in 2 weeks, will reach out to her to schedule.    Jojo Vaz MD

## 2020-12-03 ENCOUNTER — OFFICE VISIT (OUTPATIENT)
Dept: OBGYN | Facility: CLINIC | Age: 35
End: 2020-12-03
Attending: OBSTETRICS & GYNECOLOGY
Payer: COMMERCIAL

## 2020-12-03 VITALS
WEIGHT: 177 LBS | SYSTOLIC BLOOD PRESSURE: 123 MMHG | DIASTOLIC BLOOD PRESSURE: 77 MMHG | BODY MASS INDEX: 31.35 KG/M2 | HEART RATE: 74 BPM

## 2020-12-03 DIAGNOSIS — Z98.891 S/P CESAREAN SECTION: Primary | ICD-10-CM

## 2020-12-03 DIAGNOSIS — O10.919 CHRONIC HYPERTENSION AFFECTING PREGNANCY: ICD-10-CM

## 2020-12-03 PROCEDURE — G0463 HOSPITAL OUTPT CLINIC VISIT: HCPCS

## 2020-12-03 PROCEDURE — 99024 POSTOP FOLLOW-UP VISIT: CPT | Performed by: OBSTETRICS & GYNECOLOGY

## 2020-12-03 NOTE — NURSING NOTE
Chief Complaint   Patient presents with     Wound Check     B/P check   Isaura German LPN  
generally intact

## 2020-12-03 NOTE — LETTER
12/3/2020       RE: Gabriele Rodriguez  [address] [facility] [address]     Dear Colleague,    Thank you for referring your patient, Gabriele Rodriguez, to the [facility] [place] at [facility]. Please see a copy of my visit note below.    Acute Post-partum Visit Note  12/3/20    Reason for visit: BP and incision check    S: Patient is a 34 yo  who is POD#7 s/p PLTCS for Category II tracing RFD who presents today for BP and incision check.  Pregnancy was notable for chronic hypertension on labetalol 200 mg bid and two vessel umbilical cord.  Since delivery patient has been ok physically.  Feels recovery from surgery going well.  Pain well controlled.  Bleeding appropriate.  No issues with bladder or bowel function.  From a mental/emotional standpoint, has been through a lot as daughter Valeria continues to be in the NICU and is s/p cooling and HIE treatment with little improvement.  Qi verbalized today possibility for compassionate care decisions being made.  Unsure of the cause, nothing has definitively been found as cause of these issues Valeria is having, most likely a clot of some sort.  Does ask about potential for pregnancy again and how long to wait if needed moving forward.    O:  /77   Pulse 74   Wt 80.3 kg (177 lb)   LMP 03/02/2020   Breastfeeding No   BMI 31.35 kg/m       General: NAD, A&Ox3  Resp: Non-labored breathing  Incision: Pfannenstiel,C/D/I, steristrips removed today    A/P: 34 yo  POD#7 s/p PLTCS for Category II tracing RFD presents for BP check and incision check  1) BP check: Appropriate, continue current therapy  2) Incision check: Healing well, no concerns for infection  3) Mood: Daughter Valeria with continued NICU evaluation and difficult course as above.  Will continue to be here for Qi, she may reach out with any concerns.  Will plan another visit in 2 weeks to check in, can be virtual if no  physical issues to check.  Full postpartum visit in 5 weeks.  4) Did discuss typical pregnancy spacing of 18 months post-.  Did discuss many times in situations like this if another pregnancy is desired, would recommend at least 6 months prior to trying to conceive.  She appreciated discussion although clearly hard to consider this with all that is going on with Valeria right now, appropriately tearful talking about this.  5) Plan another check in 2 weeks, will reach out to her to schedule.    Jojo Vaz MD

## 2020-12-07 ENCOUNTER — OFFICE VISIT (OUTPATIENT)
Dept: OBGYN | Facility: CLINIC | Age: 35
End: 2020-12-07
Attending: OBSTETRICS & GYNECOLOGY
Payer: COMMERCIAL

## 2020-12-07 VITALS — HEART RATE: 55 BPM | DIASTOLIC BLOOD PRESSURE: 90 MMHG | SYSTOLIC BLOOD PRESSURE: 150 MMHG

## 2020-12-07 DIAGNOSIS — L76.82 INCISIONAL PAIN: Primary | ICD-10-CM

## 2020-12-07 PROCEDURE — G0463 HOSPITAL OUTPT CLINIC VISIT: HCPCS

## 2020-12-07 PROCEDURE — 99024 POSTOP FOLLOW-UP VISIT: CPT | Performed by: OBSTETRICS & GYNECOLOGY

## 2020-12-07 RX ORDER — LABETALOL 200 MG/1
200 TABLET, FILM COATED ORAL 2 TIMES DAILY
COMMUNITY
End: 2020-12-15 | Stop reason: ALTCHOICE

## 2020-12-07 NOTE — PROGRESS NOTES
SUBJECTIVE:  Gabriele returns for followup of C/s surgery. In the interval of time Gabriele is doing well physically. Notes some increased pain on right most aspect of incision.  Described as stinging. Denies HA or vision changes. Using Labetalol as rx.     Emotionally very hard day as they decided to move to comfort cares for their son in NICU.        EXAM:   Wounds: well healing. Small tail of suture at right most incision was grasped and removed. Steri strip applied.     PLAN OF CARE: Patient requested to return 4 wks for routine PP visit  Watch mood closely.      Ava Neri MD, FACOG  (she/her/hers)    Department of Ob/Gyn/Women's Health  [employer]  [facility]  [address] [place] [address]  [email or web address]  p. [phone]  f. [phone]

## 2020-12-07 NOTE — NURSING NOTE
Chief Complaint   Patient presents with     Wound Check     C/S wound check       See MAGDALENA Ayers 12/7/2020

## 2020-12-07 NOTE — LETTER
12/7/2020       RE: Gabriele Rodriguez  [address] [facility] [address]     Dear Colleague,    Thank you for referring your patient, Gabriele Rodriguez, to the [facility] [place] [facility] Please see a copy of my visit note below.    SUBJECTIVE:  Gabriele returns for followup of C/s surgery. In the interval of time Gabriele is doing well physically. Notes some increased pain on right most aspect of incision.  Described as stinging. Denies HA or vision changes. Using Labetalol as rx.     Emotionally very hard day as they decided to move to comfort cares for their son in NICU.        EXAM:   Wounds: well healing. Small tail of suture at right most incision was grasped and removed. Steri strip applied.     PLAN OF CARE: Patient requested to return 4 wks for routine PP visit  Watch mood closely.      Ava Neri MD, FACOG  (she/her/hers)    Department of Ob/Gyn/Women's Health  [facility] [employer]  [facility]  [address] [place] [address]  [email or web address]  p. [phone]  f. [phone]

## 2020-12-08 DIAGNOSIS — Z84.89 FAMILY HISTORY OF GENETIC DISORDER: Primary | ICD-10-CM

## 2020-12-09 DIAGNOSIS — Z84.89 FAMILY HISTORY OF GENETIC DISORDER: ICD-10-CM

## 2020-12-09 PROCEDURE — 36415 COLL VENOUS BLD VENIPUNCTURE: CPT | Performed by: STUDENT IN AN ORGANIZED HEALTH CARE EDUCATION/TRAINING PROGRAM

## 2020-12-10 LAB — MISCELLANEOUS TEST: NORMAL

## 2020-12-15 ENCOUNTER — TELEPHONE (OUTPATIENT)
Dept: OBGYN | Facility: CLINIC | Age: 35
End: 2020-12-15

## 2020-12-15 ENCOUNTER — OFFICE VISIT (OUTPATIENT)
Dept: FAMILY MEDICINE | Facility: CLINIC | Age: 35
End: 2020-12-15
Payer: COMMERCIAL

## 2020-12-15 VITALS
SYSTOLIC BLOOD PRESSURE: 147 MMHG | WEIGHT: 177 LBS | RESPIRATION RATE: 16 BRPM | TEMPERATURE: 99.6 F | OXYGEN SATURATION: 99 % | DIASTOLIC BLOOD PRESSURE: 94 MMHG | HEIGHT: 63 IN | BODY MASS INDEX: 31.36 KG/M2 | HEART RATE: 64 BPM

## 2020-12-15 DIAGNOSIS — Z30.017 NEXPLANON INSERTION: Primary | ICD-10-CM

## 2020-12-15 DIAGNOSIS — I10 BENIGN ESSENTIAL HYPERTENSION: ICD-10-CM

## 2020-12-15 DIAGNOSIS — F43.21 GRIEF: ICD-10-CM

## 2020-12-15 ASSESSMENT — MIFFLIN-ST. JEOR: SCORE: 1477

## 2020-12-15 NOTE — NURSING NOTE
"35 year old  Chief Complaint   Patient presents with     Consult     Nexplanon insertion       Blood pressure (!) 147/94, pulse 64, temperature 99.6  F (37.6  C), temperature source Oral, resp. rate 16, height 1.6 m (5' 3\"), weight 80.3 kg (177 lb), last menstrual period 03/02/2020, SpO2 99 %, not currently breastfeeding. Body mass index is 31.35 kg/m .  BP completed using cuff size:    Patient Active Problem List   Diagnosis     Anxiety     ADHD (attention deficit hyperactivity disorder), inattentive type     Benign essential hypertension     Supervision of high risk pregnancy in first trimester     Chronic hypertension affecting pregnancy     Chronic sinusitis      proteinuria- initial was elevated but follow up was normal.      Encounter for triage in pregnant patient     Decreased fetal movement       Wt Readings from Last 2 Encounters:   12/15/20 80.3 kg (177 lb)   12/03/20 80.3 kg (177 lb)     BP Readings from Last 3 Encounters:   12/15/20 (!) 147/94   12/07/20 (!) 150/90   12/03/20 123/77       Allergies   Allergen Reactions     Nickel Itching       Current Outpatient Medications   Medication     acetaminophen (TYLENOL) 325 MG tablet     ibuprofen (ADVIL/MOTRIN) 600 MG tablet     labetalol (NORMODYNE) 200 MG tablet     No current facility-administered medications for this visit.        Social History     Tobacco Use     Smoking status: Never Smoker     Smokeless tobacco: Never Used   Substance Use Topics     Alcohol use: No     Drug use: No       Honoring Choices - Health Care Directive Guide offered to patient at time of visit.    Health Maintenance Due   Topic Date Due     INFLUENZA VACCINE (1) 09/01/2020     PREVENTIVE CARE VISIT  11/08/2020       Immunization History   Administered Date(s) Administered     Influenza Vaccine IM > 6 months Valent IIV4 09/24/2019     TDAP Vaccine (Boostrix) 09/21/2020       Lab Results   Component Value Date    PAP NIL 11/08/2019         Recent Labs   Lab Test 11/24/20  1830 " 09/23/20  0850 05/13/20  1632 07/23/19  1509 03/12/19  1627   ALT 13  --  32 22  --    CR 0.48* 0.53 0.56 0.83  --    GFRESTIMATED >90 >90 >90 >90  --    GFRESTBLACK >90 >90 >90 >90  --    ALBUMIN  --  2.7*  --  4.5  --    POTASSIUM  --  3.4  --  3.6  --    TSH  --   --   --   --  2.78       PHQ-2 ( 1999 Pfizer) 9/21/2020 6/10/2020   Q1: Little interest or pleasure in doing things 0 0   Q2: Feeling down, depressed or hopeless 0 0   PHQ-2 Score 0 0       PHQ-9 SCORE 3/12/2019 11/8/2019 6/9/2020 9/21/2020   PHQ-9 Total Score 2 0 2 1       ALVIN-7 SCORE 11/3/2020 11/18/2020 11/18/2020   Total Score 0 (minimal anxiety) - 0 (minimal anxiety)   Total Score 0 0 0       No flowsheet data found.        Grace Aviles CMA  December 15, 2020 9:36 AM

## 2020-12-15 NOTE — TELEPHONE ENCOUNTER
----- Message from Madison Chu RN sent at 12/15/2020  1:00 PM CST -----  Regarding: Returning call about FMLA form  It may have been a roomer who called her.

## 2020-12-15 NOTE — PROGRESS NOTES
"       HPI       Gabriele Rodriguez is a 35 year old  who presents for   Chief Complaint   Patient presents with     Consult     Nexplanon insertion   Nexplanon insertion. Patient recently experienced  loss. Had normal pregnancy but noted decreased movement past 1 month although parameters WNL when checked. Had  on 12/3/20. 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 She was able to be at home on palliative care prior to demise. She would like nexplanon inserted today with plans to remove within 6-12 months. She has not had menses since before pregnancy. Has previously had Nexplanon and experienced no negative outcomes. She has no risk factors for nexplanon and is aware of side effects. Discussed that  recommends insertion 21 days post delivery but there is no contraindication to insertion sooner, may result in more vaginal bleeding.     Additional problem: BP elevated. Longstanding HTN. Was on labetalol during pregnancy and prior to pregnancy. BP has been elevated since delivery but has been very stressed. Denies headache, vision, changes.  Mood: coping with loss of daughter. Receiving counseling and support services through . Is considering support systems.   Problem, Medication and Allergy Lists were reviewed and updated if needed..    Patient is an established patient of this clinic. History was reviewed and updated as appropriate.          Review of Systems:   Review of Systems  GEN: No fever  CV: Elevated BP. Monitoring.   RESP:negative  SKIN: negative  Reproductive: as per HPI  MOOD: feeling sad post death of daughter.  Coping.  Declines PHQ-9 today       Physical Exam:     Vitals:    12/15/20 0931   BP: (!) 147/94   Pulse: 64   Resp: 16   Temp: 99.6  F (37.6  C)   TempSrc: Oral   SpO2: 99%   Weight: 80.3 kg (177 lb)   Height: 1.6 m (5' 3\")     Body mass index is 31.35 kg/m .  Vital signs normal except BP " elevated    GEN:  in no acute distress  SKin:  Intact without lesions.    CV: HRRR  MOOD: weepy at times.   Informed consent reviewed.   Procedure:   Patient was placed in supine positition with left arm flexed. Area prepped and draped according to recommendations.  Insertion site marked with indelible marker at 9cm superior to medial epicondyl 1.5 inches below sulcus Left upper arm. 3 ml Lidocaine 1% used to numb planned Nexplanon track.  Nexplanon inserted at 30 degree angle and inserted by tenting skin and sliding under skin using sterile technique.  When fully inserted, the Nexplanon device was released and applicator was moved. Bandaid was applied along with pressure dressing. There was visable bruising at insertion site and small amount of bleeding. Device was palpated by provider and patient.  Student NP student observed procedure. Lot [id number]  Ekta Godinez DORA Corrigan CNP       Results:   No testing ordered today    Assessment and Plan        Gabriele was seen today for consult.    Diagnoses and all orders for this visit:    Nexplanon insertion    Benign essential hypertension    Grief    Reviewed Care of Nexplanon post insertion. Keep pressure dressing on x 24 hours, bandaid on for at least 3 days. Do not get wet for next few days. May be some bruising. Device was palpated by patient and provider. Reviewed may be in for 3 years and given information with removal date, and lot number.     Discussed elevated BP. She will follow up with her primary provider.      Discussed mental health, reinforced follow up with MATHEW and suggested community support group. Seek care if encountering difficulties.      There are no discontinued medications.    Options for treatment and follow-up care were reviewed with the patient. Gabriele Rodriguez  engaged in the decision making process and verbalized understanding of the options discussed and agreed with the final plan.    NP Student, Kelly Reyna RN was present to  observe procedure.     DORA Caldera CNP

## 2020-12-15 NOTE — TELEPHONE ENCOUNTER
Returned call to Qi regarding short term disability form. She states she left paper work at desk. Unable to locate. Apologized for the delay and requested she resend them to us and I will have them completed tomorrow. Instructed her to send via TEVIZZ or email.     She states she would like it emailed back to her when complete at email address on file.

## 2020-12-15 NOTE — PROGRESS NOTES
"Telephone Acute Postpartum Visit Note  20    SUBJECTIVE     Qi is a 35 year old female who is being evaluated via a billable telephone visit.    Patient opted to conduct today's return visit via telephone secondary to the COVID-19 pandemic vs. an in person visit to the clinic.    I spoke with: Qi Rodriguez    The patient has been notified of following:   \"This telephone visit will be conducted via a call between you and your physician/provider. We have found that certain health care needs can be provided without the need for a physical exam.  This service lets us provide the care you need with a short phone conversation.  If a prescription is necessary we can send it directly to your pharmacy.  If lab work is needed we can place an order for that and you can then stop by our lab to have the test done at a later time.  If during the course of the call the physician/provider feels a telephone visit is not appropriate, you will not be charged for this service.\"     The reason for the telephone visit: Recent  loss, Chronic HTN, s/p  section    S: Patient is a 34 yo  who is being called today for acute postpartum visit follow-up.  Since last visit in clinic, sadly Qi's daughter Valeria passes away after being placed on comfort cares.  Patient doing ok, appropriately grieving and working through things.  Does verbalize having good support around her and appreciative of the medical team that has helped them through Valeria's loss.  Planning to go visit family for the holidays which will be good to have that added support during that time.  Was able to go to primary care clinic yesterday and had Nexplanon placed.  Transitioning from Labetalol back to Metoprolol.  Continue to feel overall recovering well from surgery, incision well healed and better since suture knot removed last week.  Needs disability forms completed and sent back to her.     O:  No vitals as virtual      General: NAD, A&Ox3  Resp: " Non-labored breathing     A/P: 36 yo  who is s/p PLTCS on 2020 for Category II tracing RFD who is being called today for mood check s/p recent  loss, discuss BP control plans and incision check  1) Recent  loss: Daughter Valeria was placed on comfort cares and passed away at home with Qi and her  Jermaine.  Expressed condolences for her loss.  Offered resources as she desires for  loss and mood.  Currently doing appropriate given circumstances and does feel lots of support around her, they are planning to go spend time with family over the holidays which they believe will be a good added source of comfort for them.  2) BP check: Has had some elevated BP lately, has had difficulty remembering to take twice daily meds, is transitioning to Metoprolol now under direction of her PCP for daily dosing.  3) Incision check: Per patient doing well since knot of suture removed on 20.  4) s/p CS: previously discussed typical pregnancy spacing of 18 months post-.  Did discuss many times in situations like this if another pregnancy is desired, would recommend at least 6-12 months prior to trying to conceive.  Of note, patient did have Nexplanon placed for LARC yesterday and plans to keep in place until ready to try for another pregnancy.  Did discuss continued recommendation for pelvic rest x 6 weeks after delivery.  5) Full postpartum visit scheduled 2021, encouraged patient to reach with any concerns/questions before then.  Do have short term disability paperwork for her and will fill this out and return to her via email today.    Phone call start: 8:19 AM  Phone call end: 8:28 AM  Phone call duration:  9 minutes    Jojo Vaz MD

## 2020-12-15 NOTE — PATIENT INSTRUCTIONS
Nexplanon insert  Keep bandaid on for 3 days, pressure dressing for 24 hours  Watch for numbness, tingling, profuse bleeding, fever.  May be bruising and soreness  Nexplanon can be left in for 3 years

## 2020-12-16 ENCOUNTER — VIRTUAL VISIT (OUTPATIENT)
Dept: OBGYN | Facility: CLINIC | Age: 35
End: 2020-12-16
Attending: OBSTETRICS & GYNECOLOGY
Payer: COMMERCIAL

## 2020-12-16 DIAGNOSIS — I10 BENIGN ESSENTIAL HYPERTENSION: ICD-10-CM

## 2020-12-16 DIAGNOSIS — Z98.891 S/P CESAREAN SECTION: Primary | ICD-10-CM

## 2020-12-16 PROCEDURE — 99207 PR POST PARTUM EXAM: CPT | Mod: 95 | Performed by: OBSTETRICS & GYNECOLOGY

## 2020-12-16 NOTE — LETTER
"2020       RE: Gabriele Rodriguez  [address] [facility] [address]     Dear Colleague,    Thank you for referring your patient, Gabriele Rodriguez, to the [facility] [place] at [facility]. Please see a copy of my visit note below.    Telephone Acute Postpartum Visit Note  20    SUBJECTIVE     Qi is a 35 year old female who is being evaluated via a billable telephone visit.    Patient opted to conduct today's return visit via telephone secondary to the COVID-19 pandemic vs. an in person visit to the clinic.    I spoke with: Qi Rodriguez    The patient has been notified of following:   \"This telephone visit will be conducted via a call between you and your physician/provider. We have found that certain health care needs can be provided without the need for a physical exam.  This service lets us provide the care you need with a short phone conversation.  If a prescription is necessary we can send it directly to your pharmacy.  If lab work is needed we can place an order for that and you can then stop by our lab to have the test done at a later time.  If during the course of the call the physician/provider feels a telephone visit is not appropriate, you will not be charged for this service.\"     The reason for the telephone visit: Recent  loss, Chronic HTN, s/p  section    S: Patient is a 34 yo  who is being called today for acute postpartum visit follow-up.  Since last visit in clinic, sadly Qi's daughter Valeria passes away after being placed on comfort cares.  Patient doing ok, appropriately grieving and working through things.  Does verbalize having good support around her and appreciative of the medical team that has helped them through Valeria's loss.  Planning to go visit family for the holidays which will be good to have that added support during that time.  Was able to go to primary care clinic yesterday and " had Nexplanon placed.  Transitioning from Labetalol back to Metoprolol.  Continue to feel overall recovering well from surgery, incision well healed and better since suture knot removed last week.  Needs disability forms completed and sent back to her.     O:  No vitals as virtual      General: NAD, A&Ox3  Resp: Non-labored breathing     A/P: 34 yo  who is s/p PLTCS on 2020 for Category II tracing RFD who is being called today for mood check s/p recent  loss, discuss BP control plans and incision check  1) Recent  loss: Daughter Valeria was placed on comfort cares and passed away at home with Qi and her  Jermaine.  Expressed condolences for her loss.  Offered resources as she desires for  loss and mood.  Currently doing appropriate given circumstances and does feel lots of support around her, they are planning to go spend time with family over the holidays which they believe will be a good added source of comfort for them.  2) BP check: Has had some elevated BP lately, has had difficulty remembering to take twice daily meds, is transitioning to Metoprolol now under direction of her PCP for daily dosing.  3) Incision check: Per patient doing well since knot of suture removed on 20.  4) s/p CS: previously discussed typical pregnancy spacing of 18 months post-.  Did discuss many times in situations like this if another pregnancy is desired, would recommend at least 6-12 months prior to trying to conceive.  Of note, patient did have Nexplanon placed for LARC yesterday and plans to keep in place until ready to try for another pregnancy.  Did discuss continued recommendation for pelvic rest x 6 weeks after delivery.  5) Full postpartum visit scheduled 2021, encouraged patient to reach with any concerns/questions before then.  Do have short term disability paperwork for her and will fill this out and return to her via email today.    Phone call start: 8:19 AM  Phone  call end: 8:28 AM  Phone call duration:  9 minutes    Jojo Vaz MD

## 2021-01-01 ASSESSMENT — ANXIETY QUESTIONNAIRES
3. WORRYING TOO MUCH ABOUT DIFFERENT THINGS: SEVERAL DAYS
GAD7 TOTAL SCORE: 1
7. FEELING AFRAID AS IF SOMETHING AWFUL MIGHT HAPPEN: NOT AT ALL
GAD7 TOTAL SCORE: 1
7. FEELING AFRAID AS IF SOMETHING AWFUL MIGHT HAPPEN: NOT AT ALL
6. BECOMING EASILY ANNOYED OR IRRITABLE: NOT AT ALL
1. FEELING NERVOUS, ANXIOUS, OR ON EDGE: NOT AT ALL
2. NOT BEING ABLE TO STOP OR CONTROL WORRYING: NOT AT ALL
4. TROUBLE RELAXING: NOT AT ALL
5. BEING SO RESTLESS THAT IT IS HARD TO SIT STILL: NOT AT ALL

## 2021-01-02 ASSESSMENT — ANXIETY QUESTIONNAIRES: GAD7 TOTAL SCORE: 1

## 2021-01-03 ENCOUNTER — HEALTH MAINTENANCE LETTER (OUTPATIENT)
Age: 36
End: 2021-01-03

## 2021-01-04 ENCOUNTER — OFFICE VISIT (OUTPATIENT)
Dept: OBGYN | Facility: CLINIC | Age: 36
End: 2021-01-04
Attending: OBSTETRICS & GYNECOLOGY
Payer: COMMERCIAL

## 2021-01-04 VITALS
HEART RATE: 64 BPM | HEIGHT: 63 IN | BODY MASS INDEX: 31.43 KG/M2 | DIASTOLIC BLOOD PRESSURE: 89 MMHG | SYSTOLIC BLOOD PRESSURE: 137 MMHG | WEIGHT: 177.4 LBS

## 2021-01-04 DIAGNOSIS — Z98.891 S/P CESAREAN SECTION: ICD-10-CM

## 2021-01-04 DIAGNOSIS — O10.919 CHRONIC HYPERTENSION AFFECTING PREGNANCY: ICD-10-CM

## 2021-01-04 PROBLEM — O09.91 SUPERVISION OF HIGH RISK PREGNANCY IN FIRST TRIMESTER: Status: RESOLVED | Noted: 2020-05-13 | Resolved: 2021-01-04

## 2021-01-04 PROBLEM — Z36.89 ENCOUNTER FOR TRIAGE IN PREGNANT PATIENT: Status: RESOLVED | Noted: 2020-11-22 | Resolved: 2021-01-04

## 2021-01-04 PROBLEM — R80.8 OTHER PROTEINURIA: Status: RESOLVED | Noted: 2020-05-14 | Resolved: 2021-01-04

## 2021-01-04 PROBLEM — O36.8190 DECREASED FETAL MOVEMENT: Status: RESOLVED | Noted: 2020-11-24 | Resolved: 2021-01-04

## 2021-01-04 PROCEDURE — 86147 CARDIOLIPIN ANTIBODY EA IG: CPT | Performed by: OBSTETRICS & GYNECOLOGY

## 2021-01-04 PROCEDURE — 36415 COLL VENOUS BLD VENIPUNCTURE: CPT | Performed by: OBSTETRICS & GYNECOLOGY

## 2021-01-04 PROCEDURE — G0463 HOSPITAL OUTPT CLINIC VISIT: HCPCS

## 2021-01-04 PROCEDURE — 86146 BETA-2 GLYCOPROTEIN ANTIBODY: CPT | Performed by: OBSTETRICS & GYNECOLOGY

## 2021-01-04 PROCEDURE — 99207 PR POST PARTUM EXAM: CPT | Performed by: OBSTETRICS & GYNECOLOGY

## 2021-01-04 PROCEDURE — 999N001023 HC STATISTIC INR NC: Performed by: OBSTETRICS & GYNECOLOGY

## 2021-01-04 PROCEDURE — 999N001035 HC STATISTIC THROMBIN TIME NC: Performed by: OBSTETRICS & GYNECOLOGY

## 2021-01-04 PROCEDURE — 85730 THROMBOPLASTIN TIME PARTIAL: CPT | Performed by: OBSTETRICS & GYNECOLOGY

## 2021-01-04 PROCEDURE — 85613 RUSSELL VIPER VENOM DILUTED: CPT | Performed by: OBSTETRICS & GYNECOLOGY

## 2021-01-04 ASSESSMENT — MIFFLIN-ST. JEOR: SCORE: 1478.81

## 2021-01-04 NOTE — PROGRESS NOTES
"Postpartum Visit Note 2021    S: Patient is a 34 yo  who is being seen for postpartum visit today.  Sadly Qi's daughter Valeria passed away after being placed on comfort cares.  Patient doing as can be expected given circumstances.  Did fly to [place] for holidays and was good to be surrounded by family after this and after Valeria's passing.  No further pain.  Occasional spotting.  Tolerating regular diet.  No issues with bladder or bowel function.  Has Nexplanon in place for contraception.  Has appropriate questions about subsequent pregnancies and risks moving forward.       PHQ-9 score: 1  Hx of Abuse:  No    Delivery Date: 20.    Delivering provider:  Jojo Vaz MD.    Type of delivery:  .     Delivery complications:   Infant gender:  girl, weight 5 pounds 12 oz.  Feeding Method:  none.  Complications reported with feeding:   Loss.    Bleeding:  None.  Duration:  5 weeks.  Menses resumed:  No  Bowel/Urinary problems:  No    Contraception Planned:  Nexplanon  She has not had intercourse since delivery.      ================================================================  ROS: 10 point ROS neg other than the symptoms noted above in the HPI.     EXAM:  /89   Pulse 64   Ht 1.6 m (5' 3\")   Wt 80.5 kg (177 lb 6.4 oz)   LMP 2020   BMI 31.42 kg/m      General: healthy, alert and no distress  Psych: Positive for grief reaction  Answers for HPI/ROS submitted by the patient on 2021   ALVIN 7 TOTAL SCORE: 1  Last PHQ-9 score on record=1  Abdomen: Benign, Soft, flat, non-tender, No masses, organomegaly and Diastasis less than 1-2 FB  Incision:  well healed and dry and intact   Pelvic deferred    A/P: 34 yo  who is s/p PLTCS on 2020 for Category II tracing RFD who is being seen for postpartum visit s/p  loss  1) Has made appropriate postoperative progress, discussed no further pelvic, lifting or activity restrictions  2) Recent  loss: Daughter " Valeria was placed on comfort cares following delivery and passed away at home with Qi and her  Jermaine.  Mood calix doing appropriately based on events surrounding delivery.  Has excellent support around her and knows to reach out with any concerns moving forward.  We discussed likely sporadic event.  Would be important to get MFM consult to discuss delivery timing and  testing for future pregnancies which she is absolutely happy about and agreeable to completing.  Did offer APS testing and she is open to this, labs ordered.    3) Chronic Hypertension: Back on metoprolol now, working with PCP for appropriate dose titration.    4) s/p CS: previously discussed typical pregnancy spacing of 18 months post-.  Did discuss many times in situations like this if another pregnancy is desired, would recommend at least 6-12 months prior to trying to conceive.  Of note, patient did have Nexplanon placed for LARC on 12/15/20 and plans to keep in place until ready to try for another pregnancy.  Would plan for scheduled RCS in future pregnancy.      5) RTC as needed with any issues, Nexplanon removal as desired prior to plans for conceiving    Jojo Vaz MD

## 2021-01-04 NOTE — LETTER
"2021       RE: Gabriele Rodriguez  [address] [facility] [address]     Dear Colleague,    Thank you for referring your patient, Gabriele Rodriguez, to the [facility] [place] at [facility]. Please see a copy of my visit note below.    Postpartum Visit Note 2021    S: Patient is a 34 yo  who is being seen for postpartum visit today.  Sadly Qi's daughter Valeria passed away after being placed on comfort cares.  Patient doing as can be expected given circumstances.  Did fly to [place] for holidays and was good to be surrounded by family after this and after Valeria's passing.  No further pain.  Occasional spotting.  Tolerating regular diet.  No issues with bladder or bowel function.  Has Nexplanon in place for contraception.  Has appropriate questions about subsequent pregnancies and risks moving forward.       PHQ-9 score: 1  Hx of Abuse:  No    Delivery Date: 20.    Delivering provider:  Jojo Vaz MD.    Type of delivery:  .     Delivery complications:   Infant gender:  girl, weight 5 pounds 12 oz.  Feeding Method:  none.  Complications reported with feeding:   Loss.    Bleeding:  None.  Duration:  5 weeks.  Menses resumed:  No  Bowel/Urinary problems:  No    Contraception Planned:  Nexplanon  She has not had intercourse since delivery.      ================================================================  ROS: 10 point ROS neg other than the symptoms noted above in the HPI.     EXAM:  /89   Pulse 64   Ht 1.6 m (5' 3\")   Wt 80.5 kg (177 lb 6.4 oz)   LMP 2020   BMI 31.42 kg/m      General: healthy, alert and no distress  Psych: Positive for grief reaction  Answers for HPI/ROS submitted by the patient on 2021   ALVIN 7 TOTAL SCORE: 1  Last PHQ-9 score on record=1  Abdomen: Benign, Soft, flat, non-tender, No masses, organomegaly and Diastasis less than 1-2 FB  Incision:  well healed and dry and " intact   Pelvic deferred    A/P: 34 yo  who is s/p PLTCS on 2020 for Category II tracing RFD who is being seen for postpartum visit s/p  loss  1) Has made appropriate postoperative progress, discussed no further pelvic, lifting or activity restrictions  2) Recent  loss: Daughter Valeria was placed on comfort cares following delivery and passed away at home with Qi and her  Jermaine.  Mood calix doing appropriately based on events surrounding delivery.  Has excellent support around her and knows to reach out with any concerns moving forward.  We discussed likely sporadic event.  Would be important to get MFM consult to discuss delivery timing and  testing for future pregnancies which she is absolutely happy about and agreeable to completing.  Did offer APS testing and she is open to this, labs ordered.    3) Chronic Hypertension: Back on metoprolol now, working with PCP for appropriate dose titration.    4) s/p CS: previously discussed typical pregnancy spacing of 18 months post-.  Did discuss many times in situations like this if another pregnancy is desired, would recommend at least 6-12 months prior to trying to conceive.  Of note, patient did have Nexplanon placed for LARC on 12/15/20 and plans to keep in place until ready to try for another pregnancy.  Would plan for scheduled RCS in future pregnancy.      5) RTC as needed with any issues, Nexplanon removal as desired prior to plans for conceiving    Jojo Vaz MD

## 2021-01-04 NOTE — NURSING NOTE
SUBJECTIVE:   Gabriele Rodriguez is here for her 6-week postpartum checkup.     PHQ-9 score:   Hx of Abuse:  No    Delivery Date: 20.    Delivering provider:  Jojo Vaz MD.    Type of delivery:  .     Delivery complications:   Infant gender:  girl, weight 5 pounds 12 oz.  Feeding Method:  none.  Complications reported with feeding:   Loss.    Bleeding:  None.  Duration:  5 weeks.  Menses resumed:  No  Bowel/Urinary problems:  No    Contraception Planned:  Nexplanon  She  has not had intercourse since delivery..

## 2021-01-05 LAB
B2 GLYCOPROT1 IGG SERPL IA-ACNC: 1 U/ML
B2 GLYCOPROT1 IGM SERPL IA-ACNC: <2.9 U/ML
CARDIOLIPIN ANTIBODY IGG: <1.6 GPL-U/ML (ref 0–19.9)
CARDIOLIPIN ANTIBODY IGM: 0.7 MPL-U/ML (ref 0–19.9)

## 2021-01-06 LAB — LA PPP-IMP: NEGATIVE

## 2021-02-16 LAB
LOCATION PERFORMED: NORMAL
RESULT: NORMAL
SEND OUTS MISC TEST CODE: NORMAL
SEND OUTS MISC TEST SPECIMEN: NORMAL
TEST NAME: NORMAL

## 2021-02-18 ENCOUNTER — VIRTUAL VISIT (OUTPATIENT)
Dept: FAMILY MEDICINE | Facility: CLINIC | Age: 36
End: 2021-02-18
Payer: COMMERCIAL

## 2021-02-18 DIAGNOSIS — F90.0 ATTENTION DEFICIT HYPERACTIVITY DISORDER (ADHD), PREDOMINANTLY INATTENTIVE TYPE: ICD-10-CM

## 2021-02-18 DIAGNOSIS — I10 BENIGN ESSENTIAL HYPERTENSION: Primary | ICD-10-CM

## 2021-02-18 DIAGNOSIS — B35.1 FUNGAL NAIL INFECTION: ICD-10-CM

## 2021-02-18 PROCEDURE — 99214 OFFICE O/P EST MOD 30 MIN: CPT | Mod: 95 | Performed by: NURSE PRACTITIONER

## 2021-02-18 RX ORDER — METOPROLOL SUCCINATE 100 MG/1
100 TABLET, EXTENDED RELEASE ORAL DAILY
Qty: 30 TABLET | Refills: 0 | Status: SHIPPED | OUTPATIENT
Start: 2021-02-18 | End: 2021-03-24

## 2021-02-18 RX ORDER — LISDEXAMFETAMINE DIMESYLATE 20 MG/1
20 CAPSULE ORAL EVERY MORNING
Qty: 30 CAPSULE | Refills: 0 | Status: SHIPPED | OUTPATIENT
Start: 2021-02-18 | End: 2021-03-24

## 2021-02-18 ASSESSMENT — PAIN SCALES - GENERAL: PAINLEVEL: NO PAIN (0)

## 2021-02-18 NOTE — PROGRESS NOTES
"Qi is a 35 year old who is being evaluated via a billable video visit.      How would you like to obtain your AVS? Tweetworks[employer]  If the video visit is dropped, the invitation should be resent by: Other e-mail: Steffen  Will anyone else be joining your video visit? No      Video Start Time: 0820    Subjective   Qi is a 35 year old who presents for the follow up on her blood pressure and ADHD.     HPI   Qi had a C section in November and sadly, she brought her daughter Valeria home on comfort cares and she passed away. Qi reports that she and her  are doing \"ok.\" She reports that they have a lot of family and friend support. She denies needing any additional support at this time for her grieving.   Benign essential HTN: Qi was taking Lebetalol for her blood pressure during her pregnancy. She has since transitioned to Metoprolol so she would not have to take it twice daily. Her blood pressures have been slightly elevated at 130-140/80-90. She wonders about adjusting her medication.   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 She did have some Vyvanse left from prior to pregnancy and has been taking this. She would like to take the lowest possible dose due to considering pregnancy in the future. She will go off the Vyvanse again at that time.   Fungal nail infection: Qi noticed that one of her toenails clearly has a fungal infection present. She would like to treat this with a topical agent.     Review of Systems   Constitutional, HEENT, cardiovascular, pulmonary, gi and gu systems are negative, except as otherwise noted.      Objective    Vitals - Patient Reported  Pain Score: No Pain (0)    Physical Exam   GENERAL: Healthy, alert and no distress  EYES: Eyes grossly normal to inspection.  No discharge or erythema, or obvious scleral/conjunctival abnormalities.  RESP: No audible wheeze, cough, or visible cyanosis.  No " visible retractions or increased work of breathing.    SKIN: Visible skin clear. No significant rash, abnormal pigmentation or lesions.  NEURO: Cranial nerves grossly intact.  Mentation and speech appropriate for age.  PSYCH: Mentation appears normal, affect normal/bright, judgement and insight intact, normal speech and appearance well-groomed.    No Diagnostics ordered today.     Video-Visit Details    Type of service:  Video Visit    Video End Time:0850    Originating Location (pt. Location): Home    Distant Location (provider location):  [facility]     Platform used for Video Visit: Keren     Assessment & Plan     Benign essential hypertension    - metoprolol succinate ER (TOPROL-XL) 100 MG 24 hr tablet; Take 1 tablet (100 mg) by mouth daily    Attention deficit hyperactivity disorder (ADHD), predominantly inattentive type    - lisdexamfetamine (VYVANSE) 20 MG capsule; Take 1 capsule (20 mg) by mouth every morning    Fungal nail infection    - Efinaconazole 10 % SOLN; Externally apply topically daily    Return in about 3 months (around 5/18/2021) for using a video visit.    Kelsey Gonzalez NP  [facility]    I spent a total of 30 minutes face-to-face with Qi during today's office visit.  Over 50% of this time was spent counseling the patient and/or coordinating care regarding their care.  See note for details. First, increase your metoprolol from 50 mg daily to 100 mg daily. Next, continue to monitor your blood pressures. Next, start Vyvanse at 20 mg daily. Next, start the topical for nail fungus. Finally, update me on blood pressures and concentration status in one month. Finally, return every 3 months for follow up. She verbalizes understanding of the plan.   Kelsey Gonzalez, APRN, CNP

## 2021-02-18 NOTE — NURSING NOTE
Chief Complaint   Patient presents with     Refill Request     Requesting medication refill.     Lisa Dia, A 7:34 AM  2/18/2021

## 2021-02-18 NOTE — PATIENT INSTRUCTIONS
[facility] Medication Refill Request Information:  * Please contact your pharmacy regarding ANY request for medication refills.  ** NP Clinic Prescription Fax = [phone]  * Please allow 3 business days for routine medication refills.  * Please allow 5 business days for controlled substance medication refills.     [facility] Test Result notification information:  *You will be notified with in 7-10 days of your appointment day regarding the results of your test.  If you are on MyChart you will be notified as soon as the provider has reviewed the results and signed off on them.    [facility]: [phone]     If you have questions regarding Covid-19 and the Covid-19 vaccine, please visit this website.    [email or web address]

## 2021-02-23 ENCOUNTER — OFFICE VISIT (OUTPATIENT)
Dept: DERMATOLOGY | Facility: CLINIC | Age: 36
End: 2021-02-23
Payer: COMMERCIAL

## 2021-02-23 DIAGNOSIS — D48.5 NEOPLASM OF UNCERTAIN BEHAVIOR OF SKIN: ICD-10-CM

## 2021-02-23 DIAGNOSIS — B07.0 PLANTAR WART: Primary | ICD-10-CM

## 2021-02-23 PROCEDURE — 11900 INJECT SKIN LESIONS </W 7: CPT | Mod: 59 | Performed by: DERMATOLOGY

## 2021-02-23 PROCEDURE — 17110 DESTRUCTION B9 LES UP TO 14: CPT | Mod: GC | Performed by: DERMATOLOGY

## 2021-02-23 PROCEDURE — 99213 OFFICE O/P EST LOW 20 MIN: CPT | Mod: 25 | Performed by: DERMATOLOGY

## 2021-02-23 RX ORDER — CANDIDA ALBICANS 1000 [PNU]/ML
0.1 INJECTION, SOLUTION INTRADERMAL ONCE
Status: DISCONTINUED | OUTPATIENT
Start: 2021-02-23 | End: 2021-02-23

## 2021-02-23 RX ORDER — CANDIDA ALBICANS 1000 [PNU]/ML
0.3 INJECTION, SOLUTION INTRADERMAL ONCE
Status: DISCONTINUED | OUTPATIENT
Start: 2021-02-23 | End: 2022-03-04

## 2021-02-23 ASSESSMENT — PAIN SCALES - GENERAL: PAINLEVEL: NO PAIN (0)

## 2021-02-23 NOTE — PATIENT INSTRUCTIONS
[facility]   [address] [address]   Phone: [phone]   Fax: [phone]        Comment: Please be aware that coverage of these services is subject to the terms and limitations of your health insurance plan.  Call member services at your health plan with any benefit or coverage questions.   [employer] will call you to coordinate your care as prescribed by the provider.  If you don t hear from a representative within 2 business days, please call the number listed above.     Cryotherapy    What is it?    Use of a very cold liquid, such as liquid nitrogen, to freeze and destroy abnormal skin cells that need to be removed    What should I expect?    Tenderness and redness    A small blister that might grow and fill with dark purple blood. There may be crusting.    More than one treatment may be needed if the lesions do not go away.    How do I care for the treated area?    Gently wash the area with your hands when bathing.    Use a thin layer of Vaseline to help with healing. You may use a Band-Aid.     The area should heal within 7-10 days and may leave behind a pink or lighter color.     Do not use an antibiotic or Neosporin ointment.     You may take acetaminophen (Tylenol) for pain.     Call your Doctor if you have:    Severe pain    Signs of infection (warmth, redness, cloudy yellow drainage, and or a bad smell)    Questions or concerns    Who should I call with questions?       [facility]: [phone]       [facility]: [phone]       For urgent needs outside of business hours call the [facility] at [phone]        and ask for the dermatology resident on call

## 2021-02-23 NOTE — PROGRESS NOTES
[facility] Dermatology Note   Encounter Date: Feb 23, 2021  Office Visit     Dermatology Problem List:  * Pathology resident at [facility]  # Wart  - L heel, LN2 9/2020-11/2020; LN2 + candida 2/23/21  # Hidrocystoma (suspected)  - R lower eyelid  - Referred to oculoplastics for evaluation  ___________________________________________    Assessment & Plan:    # Wart  * chronic, active and uncomplicated  - counseled on natural history and viral etiology of this condition and possible treatments  - cryotherapy performed (see procedure note)  - candida antigen injection (see procedure note)    # Hidrocystoma (suspected), right lower lateral eyelid  - counseled on likely benign etiology  - oculoplastics referral placed for evaluation    Procedures Performed:  Cryotherapy procedure note  - location: left plantar heel  - number of lesions treated: 1  After verbal consent and discussion of risks and benefits including but no limited to dyspigmentation/scar, blister, and pain, lesions treated with 1-2mm freeze border for 2 cycles with liquid nitrogen, post cryotherapy instructions provided    Intralesional candida injection procedure note  - location(s): left plantar heel  Risks and benefits of the procedure were discussed with the patient, verbal consent was obtained, area cleansed alcohol, 0.3 ml intralesional candidal antigen injected into lesions    Follow-up: 6 weeks    Faculty: Nataliia    Staff Involved:  Resident/Staff    Bernice Way MD  Dermatology Resident  [facility]    I was present for the entire procedure. Madison William MD  I, Madison William MD, saw this patient with the resident and agree with the resident s findings and plan of care as documented in the resident s note.      ___________________________________________      CC: Derm Problem (Qi is here today for a follow up regarding the plantar wart located on her left foot. She would also like a spot checked out on her right  lower eyelid. She states that it has gotten larger over the last few weeks.l )      HPI:  Ms. Gabriele Rodriguez is a(n) 35 year old female who presents to clinic today as a return patient for wart and spot check  - wart on left heel, has been using over the counter wart remover including Dr. 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  - skin type: fair   - left plantar heel: verrucous exophytic scaly papule  - right lower eyelid mucocutaneous junction/anterior tarsal plate: translucent gray-pink papule         Medications:  Current Outpatient Medications   Medication     Efinaconazole 10 % SOLN     lisdexamfetamine (VYVANSE) 20 MG capsule     metoprolol succinate ER (TOPROL-XL) 100 MG 24 hr tablet     No current facility-administered medications for this visit.       Past Medical History:   Patient Active Problem List   Diagnosis     Anxiety     ADHD (attention deficit hyperactivity disorder), inattentive type     Benign essential hypertension     Chronic hypertension affecting pregnancy     Chronic sinusitis      death     S/P  section     Past Medical History:   Diagnosis Date     ADHD      Anxiety      Chronic sinusitis      Depressive disorder      Hypertension        CC  Shana Sharif, APRN CNP  [address] [place] [phone] on close of this encounter.

## 2021-02-23 NOTE — LETTER
2/23/2021       RE: Gabriele Rodriguez  [address] [address]     Dear Colleague,    Thank you for referring your patient, Gabriele Rodriguez, to the [facility] DERMATOLOGY CLINIC [place] [facility]. Please see a copy of my visit note below.    [facility] Dermatology Note   Encounter Date: Feb 23, 2021  Office Visit     Dermatology Problem List:  * Pathology resident at [facility]  # Wart  - L heel, LN2 9/2020-11/2020; LN2 + candida 2/23/21  # Hidrocystoma (suspected)  - R lower eyelid  - Referred to oculoplastics for evaluation  ___________________________________________    Assessment & Plan:    # Wart  * chronic, active and uncomplicated  - counseled on natural history and viral etiology of this condition and possible treatments  - cryotherapy performed (see procedure note)  - candida antigen injection (see procedure note)    # Hidrocystoma (suspected), right lower lateral eyelid  - counseled on likely benign etiology  - oculoplastics referral placed for evaluation    Procedures Performed:  Cryotherapy procedure note  - location: left plantar heel  - number of lesions treated: 1  After verbal consent and discussion of risks and benefits including but no limited to dyspigmentation/scar, blister, and pain, lesions treated with 1-2mm freeze border for 2 cycles with liquid nitrogen, post cryotherapy instructions provided    Intralesional candida injection procedure note  - location(s): left plantar heel  Risks and benefits of the procedure were discussed with the patient, verbal consent was obtained, area cleansed alcohol, 0.3 ml intralesional candidal antigen injected into lesions    Follow-up: 6 weeks    Faculty: Nataliia    Staff Involved:  Resident/Staff    Bernice Way MD  Dermatology Resident  [facility]    I was present for the entire procedure. Madison William MD  I, Madison William MD, saw this patient  with the resident and agree with the resident s findings and plan of care as documented in the resident s note.      ___________________________________________      CC: Derm Problem (Qi is here today for a follow up regarding the plantar wart located on her left foot. She would also like a spot checked out on her right lower eyelid. She states that it has gotten larger over the last few weeks.l )      HPI:  Ms. Gabriele Rodriguez is a(n) 35 year old female who presents to clinic today as a return patient for wart and spot check  - wart on left heel, has been using over the counter wart remover including Dr. 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  - skin type: fair   - left plantar heel: verrucous exophytic scaly papule  - right lower eyelid mucocutaneous junction/anterior tarsal plate: translucent gray-pink papule         Medications:  Current Outpatient Medications   Medication     Efinaconazole 10 % SOLN     lisdexamfetamine (VYVANSE) 20 MG capsule     metoprolol succinate ER (TOPROL-XL) 100 MG 24 hr tablet     No current facility-administered medications for this visit.       Past Medical History:   Patient Active Problem List   Diagnosis     Anxiety     ADHD (attention deficit  hyperactivity disorder), inattentive type     Benign essential hypertension     Chronic hypertension affecting pregnancy     Chronic sinusitis      death     S/P  section     Past Medical History:   Diagnosis Date     ADHD      Anxiety      Chronic sinusitis      Depressive disorder      Hypertension        CC DORA Box CNP  [address] [place] [id number] on close of this encounter.    Drug Administration Record    Prior to injection, verified patient identity using patient's name and date of birth.  Due to injection administration, patient instructed to remain in clinic for 15 minutes  afterwards, and to report any adverse reaction to me immediately.    Drug Name: candida antigen  Dose: 0.3 mL of candida antigen  Route administered: ID  NDC #: Candida (78095-812-10)  Amount of waste(mL): 0.7  Reason for waste: Multi dose vial    LOT #:   SITE: Bottom of left foot  : [email or web address]  EXPIRATION DATE: 2022

## 2021-02-23 NOTE — PROGRESS NOTES
Drug Administration Record    Prior to injection, verified patient identity using patient's name and date of birth.  Due to injection administration, patient instructed to remain in clinic for 15 minutes  afterwards, and to report any adverse reaction to me immediately.    Drug Name: candida antigen  Dose: 0.3 mL of candida antigen  Route administered: ID  NDC #: Candida (01276-491-39)  Amount of waste(mL): 0.7  Reason for waste: Multi dose vial    LOT #:   SITE: Bottom of left foot  : [employer]  EXPIRATION DATE: April 16, 2022

## 2021-02-25 ENCOUNTER — MYC MEDICAL ADVICE (OUTPATIENT)
Dept: DERMATOLOGY | Facility: CLINIC | Age: 36
End: 2021-02-25

## 2021-02-26 NOTE — TELEPHONE ENCOUNTER
FUTURE VISIT INFORMATION      FUTURE VISIT INFORMATION:    Date: 3.9.21    Time: 8:00 AM    Location: [facility]  REFERRAL INFORMATION:    Referring provider:  Dr Madison William     Referring providers clinic:  [facility]    Reason for visit/diagnosis: papule on right lower eyelid    RECORDS REQUESTED FROM:       Clinic name Comments Records Status Imaging Status   [facility] 2.23.21 Dr Madison William Internal

## 2021-03-01 NOTE — TELEPHONE ENCOUNTER
Madison William MD  You; Eduarda Crowell MD 3 days ago     I called the patient.  She seems to be having an excessive response to the Candida antigen for the past several days with swelling, redness, and pain.   No fever, chills, or streaking erythema.  We discussed steroids but she would like to hold at this time.  I gave her my cell number to call over the weekend if it worsens or she can call the resident on call if she is not reaching me.    Message text

## 2021-03-09 ENCOUNTER — PRE VISIT (OUTPATIENT)
Dept: OPHTHALMOLOGY | Facility: CLINIC | Age: 36
End: 2021-03-09

## 2021-03-09 ENCOUNTER — OFFICE VISIT (OUTPATIENT)
Dept: OPHTHALMOLOGY | Facility: CLINIC | Age: 36
End: 2021-03-09
Attending: DERMATOLOGY
Payer: COMMERCIAL

## 2021-03-09 DIAGNOSIS — H02.821 CYST OF RIGHT UPPER EYELID: Primary | ICD-10-CM

## 2021-03-09 DIAGNOSIS — D23.9 APOCRINE HIDROCYSTOMA: ICD-10-CM

## 2021-03-09 DIAGNOSIS — D48.5 NEOPLASM OF UNCERTAIN BEHAVIOR OF SKIN: ICD-10-CM

## 2021-03-09 PROCEDURE — 92285 EXTERNAL OCULAR PHOTOGRAPHY: CPT | Mod: 26 | Performed by: OPHTHALMOLOGY

## 2021-03-09 PROCEDURE — 99203 OFFICE O/P NEW LOW 30 MIN: CPT | Mod: 26 | Performed by: OPHTHALMOLOGY

## 2021-03-09 ASSESSMENT — VISUAL ACUITY
METHOD: SNELLEN - LINEAR
OD_SC: 20/20
OS_SC: 20/20

## 2021-03-09 ASSESSMENT — TONOMETRY
IOP_METHOD: ICARE
OS_IOP_MMHG: 13
OD_IOP_MMHG: 17

## 2021-03-09 ASSESSMENT — SLIT LAMP EXAM - LIDS: COMMENTS: NORMAL

## 2021-03-09 ASSESSMENT — CONF VISUAL FIELD
OS_NORMAL: 1
METHOD: COUNTING FINGERS
OD_NORMAL: 1

## 2021-03-09 ASSESSMENT — EXTERNAL EXAM - LEFT EYE: OS_EXAM: NORMAL

## 2021-03-09 ASSESSMENT — EXTERNAL EXAM - RIGHT EYE: OD_EXAM: NORMAL

## 2021-03-09 NOTE — LETTER
3/9/2021         RE:  :  MRN: Gabriele Rodriguez  1985  [id number]     Dear Colleagues,    Thank you for asking me to see your patient, Gabriele Rodriguez, for an oculoplastic   consultation.  My assessment and plan are below.     Oculoplastic Clinic New Patient     Patient: Gabriele Rodriguez MRN# [id number]   YOB: 1985 Age: 35 year old   Date of Visit: Mar 9, 2021     CC: Eyelid lesion       HPI:   Referred by Dr. William and Dr. Bernice Way for evaluation and treatment of an eyelid lesion.      Gabriele Rodriguez is a 35 year old female who has noted a lesion on the right lower eyelid. It has been present for about 5 years. She thinks it is possibly getting bigger in the past few weeks. Denies pain, itching, not really bothered by it but would like it evaluated to make sure it is not concerning. 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 She also has a smaller similar lesion on right upper eyelid. No lesions noted on left side.                  Assessment and Plan:           Encounter Diagnoses   Name Primary?     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 RUL ~0.5 mm middle eyelid.      Patient is not bothered by this lesion right now but would like it biopsied if recommended and if appears suspicious.      Photos taken today.     She will return if becomes more symptomatic or if the lesions enlarge.             Again, thank you for allowing me to participate in the care of your patient.      Sincerely,    Hong Solo MD  Department of Ophthalmology and Visual Neurosciences  [facility]    CC: Madison William MD  [address]  [facility] [address]  Via In Basket

## 2021-03-09 NOTE — NURSING NOTE
Chief Complaints and History of Present Illnesses   Patient presents with     Consult For     Chief Complaint(s) and History of Present Illness(es)     Consult For     Laterality: right eye    Onset: gradual    Onset: 5 years ago    Frequency: constantly    Course: stable    Pain scale: 0/10              Comments     Gabriele Rodriguez is being seen today at the request of Bernice Way for bump on the right lower lid. Pt feels it has gradually gotten bigger, no pain, no irritation.     Indu Ayers, COT COT 8:06 AM March 9, 2021

## 2021-03-09 NOTE — PROGRESS NOTES
Oculoplastic Clinic New Patient    Patient: Gabriele Rodriguez MRN# [id number]   YOB: 1985 Age: 35 year old   Date of Visit: Mar 9, 2021    CC: Eyelid lesion       HPI:   Referred by Dr. William and Dr. Bernice Way for evaluation and treatment of an eyelid lesion.     Gabriele Rodriguez is a 35 year old female who has noted a lesion on the right lower eyelid. It has been present for about 5 years. She thinks it is possibly getting bigger in the past few weeks. Denies pain, itching, not really bothered by it but would like it evaluated to make sure it is not concerning. 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 She also has a smaller similar lesion on right upper eyelid. No lesions noted on left side.                Assessment and Plan:     Encounter Diagnoses   Name Primary?     Neoplasm of uncertain behavior of skin      Cyst of right upper eyelid Yes     Apocrine hidrocystoma        RLL lesion lateral eyelid ~1-2mm diameter, transilluminates at slit lamp and appears benign likely apocrine hydrocystoma. RUL ~0.5 mm middle eyelid.     Patient is not bothered by this lesion right now but would like it biopsied if recommended and if appears suspicious.     Photos taken today.    She will return if becomes more symptomatic or if the lesions enlarge.     Liz Harper MD  Ophthalmology Resident, PGY-2  [facility]     Attending Physician Attestation: Complete documentation of historical and exam elements from today's encounter can be found in the full encounter summary report (not reduplicated in this progress note). I personally obtained the chief complaint(s) and history of present illness. I confirmed and edited as necessary the review of systems, past medical/surgical history, family history, social history, and examination findings as documented by others; and I examined the patient myself. I personally reviewed the relevant tests, images, and  reports as documented above. I formulated and edited as necessary the assessment and plan and discussed the findings and management plan with the patient and family.  -Hong Solo MD

## 2021-03-24 RX ORDER — METOPROLOL SUCCINATE 100 MG/1
100 TABLET, EXTENDED RELEASE ORAL DAILY
Qty: 30 TABLET | Refills: 3 | Status: SHIPPED | OUTPATIENT
Start: 2021-03-24 | End: 2021-05-04 | Stop reason: ALTCHOICE

## 2021-04-05 ENCOUNTER — OFFICE VISIT (OUTPATIENT)
Dept: DERMATOLOGY | Facility: CLINIC | Age: 36
End: 2021-04-05
Payer: COMMERCIAL

## 2021-04-05 DIAGNOSIS — B07.0 PLANTAR WART: Primary | ICD-10-CM

## 2021-04-05 PROCEDURE — 17110 DESTRUCTION B9 LES UP TO 14: CPT | Mod: GC | Performed by: DERMATOLOGY

## 2021-04-05 ASSESSMENT — PAIN SCALES - GENERAL: PAINLEVEL: NO PAIN (0)

## 2021-04-05 NOTE — PROGRESS NOTES
[facility] Dermatology Note  Encounter Date: Apr 5, 2021  Office Visit     Dermatology Problem List:  * Pathology resident at [facility]  # Wart  - L heel, LN2 9/2020-11/2020; LN2 + candida 2/23/21, LN2 4/5/21  # Hidrocystoma (suspected)  - R lower eyelid  - Referred to oculoplastics for evaluation    ____________________________________________    Assessment & Plan:     # Verruca plantaris  - Did not opt for candida injections today due to prior exuberant reaction and also plans for pregnancy in the near future  - Cryotherapy performed today (see procedure note(s) below).    Procedures Performed:   - Cryotherapy procedure note, location(s): left plantar heel. After verbal consent and discussion of risks and benefits including, but not limited to, dyspigmentation/scar, blister, and pain, 1 lesion(s) was(were) treated with 1-2 mm freeze border for 1-2 cycles with liquid nitrogen. Post cryotherapy instructions were provided.    Follow-up: 4 week(s) in-person, or earlier for new or changing lesions    Staff and Resident:   Trinity Javed DO (PGY-2)  Dermatology Resident  [facility]    Dr. Ntaaliia LANCASTER was present for the entire procedure. Madison BANKS I, Madison William MD, saw this patient with the resident and agree with the resident s findings and plan of care as documented in the resident s note.    ____________________________________________    CC: Wart (left foot, pt states he thinks its getting better, its hard to tel.)    HPI:  Ms. Gabriele Rodriguez is a(n) 35 year old female who presents today as a return patient for wart of the left heel. She was previously treated with intralesional candida 4 weeks ago and had an exuberant response to the candida at that time, however did not request prednisone. She reports that she is doing better now however the wart is still present.    Patient is otherwise feeling well, without additional skin concerns.    Labs  Reviewed:  N/A    Physical Exam:  Vitals: There were no vitals taken for this visit.  SKIN: Focused examination of left plantar foot was performed.  - There is a verrucous papule with thrombosed capillaries interrupting dermatoglyphics on the left heel with surrounding area of desquamation  - No other lesions of concern on areas examined.     Medications:  Current Outpatient Medications   Medication     Efinaconazole 10 % SOLN     lisdexamfetamine (VYVANSE) 20 MG capsule     metoprolol succinate ER (TOPROL-XL) 100 MG 24 hr tablet     Current Facility-Administered Medications   Medication     candida albicans skin test injection 0.3 mL      Past Medical History:   Patient Active Problem List   Diagnosis     Anxiety     ADHD (attention deficit hyperactivity disorder), inattentive type     Benign essential hypertension     Chronic hypertension affecting pregnancy     Chronic sinusitis      death     S/P  section     Past Medical History:   Diagnosis Date     ADHD      Anxiety      Chronic sinusitis      Depressive disorder      Hypertension        CC No referring provider defined for this encounter. on close of this encounter.

## 2021-04-05 NOTE — NURSING NOTE
Chief Complaint   Patient presents with     Wart     left foot, pt states he thinks its getting better, its hard to tel.        Vivienne Chu MA

## 2021-04-05 NOTE — LETTER
4/5/2021       RE: Gabriele Rodriguez  [address] [address]     Dear Colleague,    Thank you for referring your patient, Gabriele Rodriguez, to the [facility] DERMATOLOGY CLINIC [place] at [facility]. Please see a copy of my visit note below.    [facility] Dermatology Note  Encounter Date: Apr 5, 2021  Office Visit     Dermatology Problem List:  * Pathology resident at [facility]  # Wart  - L heel, LN2 9/2020-11/2020; LN2 + candida 2/23/21, LN2 4/5/21  # Hidrocystoma (suspected)  - R lower eyelid  - Referred to oculoplastics for evaluation    ____________________________________________    Assessment & Plan:     # Verruca plantaris  - Did not opt for candida injections today due to prior exuberant reaction and also plans for pregnancy in the near future  - Cryotherapy performed today (see procedure note(s) below).    Procedures Performed:   - Cryotherapy procedure note, location(s): left plantar heel. After verbal consent and discussion of risks and benefits including, but not limited to, dyspigmentation/scar, blister, and pain, 1 lesion(s) was(were) treated with 1-2 mm freeze border for 1-2 cycles with liquid nitrogen. Post cryotherapy instructions were provided.    Follow-up: 4 week(s) in-person, or earlier for new or changing lesions    Staff and Resident:   Trinity Javed DO (PGY-2)  Dermatology Resident  [facility]    Dr. Nataliia LANCASTER was present for the entire procedure. Madison BANKS I, Madison William MD, saw this patient with the resident and agree with the resident s findings and plan of care as documented in the resident s note.    ____________________________________________    CC: Wart (left foot, pt states he thinks its getting better, its hard to tel.)    HPI:  Ms. Gabriele Rodriguez is a(n) 35 year old female who presents today as a return patient for wart of the left heel. She was  previously treated with intralesional candida 4 weeks ago and had an exuberant response to the candida at that time, however did not request prednisone. She reports that she is doing better now however the wart is still present.    Patient is otherwise feeling well, without additional skin concerns.    Labs Reviewed:  N/A    Physical Exam:  Vitals: There were no vitals taken for this visit.  SKIN: Focused examination of left plantar foot was performed.  - There is a verrucous papule with thrombosed capillaries interrupting dermatoglyphics on the left heel with surrounding area of desquamation  - No other lesions of concern on areas examined.     Medications:  Current Outpatient Medications   Medication     Efinaconazole 10 % SOLN     lisdexamfetamine (VYVANSE) 20 MG capsule     metoprolol succinate ER (TOPROL-XL) 100 MG 24 hr tablet     Current Facility-Administered Medications   Medication     candida albicans skin test injection 0.3 mL      Past Medical History:   Patient Active Problem List   Diagnosis     Anxiety     ADHD (attention deficit hyperactivity disorder), inattentive type     Benign essential hypertension     Chronic hypertension affecting pregnancy     Chronic sinusitis      death     S/P  section     Past Medical History:   Diagnosis Date     ADHD      Anxiety      Chronic sinusitis      Depressive disorder      Hypertension        CC No referring provider defined for this encounter. on close of this encounter.

## 2021-04-20 ENCOUNTER — OFFICE VISIT (OUTPATIENT)
Dept: FAMILY MEDICINE | Facility: CLINIC | Age: 36
End: 2021-04-20
Payer: COMMERCIAL

## 2021-04-20 VITALS
SYSTOLIC BLOOD PRESSURE: 146 MMHG | HEART RATE: 65 BPM | OXYGEN SATURATION: 98 % | DIASTOLIC BLOOD PRESSURE: 94 MMHG | TEMPERATURE: 98.6 F

## 2021-04-20 DIAGNOSIS — Z30.46 ENCOUNTER FOR NEXPLANON REMOVAL: Primary | ICD-10-CM

## 2021-04-20 NOTE — PATIENT INSTRUCTIONS
Nexplanon removal  Keep pressure dressing on x24 hours.   Keep bandaid on next 3-5 days  Watch for signs of infection  Call if problems.   May have scar

## 2021-04-20 NOTE — PROGRESS NOTES
"The patient has been notified of following:     \"This telephone visit will be conducted via a call between you and your physician/provider. We have found that certain health care needs can be provided without the need for a physical exam.  This service lets us provide the care you need with a short phone conversation.  If a prescription is necessary we can send it directly to your pharmacy.  If lab work is needed we can place an order for that and you can then stop by our lab to have the test done at a later time.    If during the course of the call the physician/provider feels a telephone visit is not appropriate, you will not be charged for this service.\"       Subjective     CC: Gabriele Rodriguez  is a 35 year old female who presents to clinic today for the following health issues:   Chief Complaint   Patient presents with     Nexplanon Removal          History:  In the 14 days before your symptoms started, have you had close contact with a COVID-19 (Coronavirus) patient? {yes/no/unsure/blank:201543}    In the 14 days before your symptoms started, have you traveled internationally or to a state with high rates of COVID19? {COVID19 YES WITH TRAVEL/NO:950351}    Do you have a fever? {fever?:958897}    Are you having difficulty breathing? {COVID19 DIFFICULTY BREATHIN}    Do you have a cough? {COVID19 COUGH YES:384267}    Are you experiencing any of the following? {ANY OF THE FOLLOWIN}    What other symptoms have you experienced? {OTHER SX:029383}    Do you have any of the following? {ADDITIONAL SX:851041}    Have you ever been diagnosed with asthma, bronchitis, or lung disease? {yes/no/unsure/blank:706605}    Do you smoke? {COVID19 DO YOU SMOKE:102257}    Are there people you know with similar symptoms? {COVID19 DO YOU KNOW PEOPLE WITH SX:018673}    Have you recently been hospitalized? {COVID19 RECENT HOSPITALIZATIONS:581239}    Are you pregnant or breastfeeding?: {YES/NO:852731}      {HIST REVIEW/ " "LINKS 2 (Optional):288586}    {Additional problems for the provider to add (optional):064293}  Reviewed and updated as needed this visit by Provider                 Review of Systems   {ROS COMP (Optional):288220}      Objective    {phoneexam:050319::\"Gen: Patient is alert, oriented\"}    {Diagnostic Test Results (Optional):465891::\"Diagnostic Test Results:\",\"Labs reviewed in Epic\"}          Assessment/Plan:  {Diagnosis, Associated Orders and Comment:797039}    Phone call duration:  *** minutes    {signature options:018275}         "

## 2021-04-20 NOTE — NURSING NOTE
ROOM:2    35 year old  Chief Complaint   Patient presents with     Nexplanon Removal       Blood pressure (!) 146/94, pulse 65, temperature 98.6  F (37  C), temperature source Oral, last menstrual period 2021, SpO2 98 %, not currently breastfeeding. There is no height or weight on file to calculate BMI.      Patient Active Problem List   Diagnosis     Anxiety     ADHD (attention deficit hyperactivity disorder), inattentive type     Benign essential hypertension     Chronic hypertension affecting pregnancy     Chronic sinusitis      death     S/P  section       Wt Readings from Last 2 Encounters:   21 80.5 kg (177 lb 6.4 oz)   12/15/20 80.3 kg (177 lb)     BP Readings from Last 3 Encounters:   21 (!) 146/94   21 137/89   12/15/20 (!) 147/94       Allergies   Allergen Reactions     Nickel Itching       Current Outpatient Medications   Medication     Efinaconazole 10 % SOLN     lisdexamfetamine (VYVANSE) 20 MG capsule     metoprolol succinate ER (TOPROL-XL) 100 MG 24 hr tablet     Current Facility-Administered Medications   Medication     candida albicans skin test injection 0.3 mL       Social History     Tobacco Use     Smoking status: Never Smoker     Smokeless tobacco: Never Used   Substance Use Topics     Alcohol use: No     Drug use: No       Honoring Choices - Health Care Directive Guide offered to patient at time of visit.    Health Maintenance Due   Topic Date Due     ADVANCE CARE PLANNING  Never done     INFLUENZA VACCINE (1) 2020       Immunization History   Administered Date(s) Administered     Influenza Vaccine IM > 6 months Valent IIV4 2019     TDAP Vaccine (Boostrix) 2020       Lab Results   Component Value Date    PAP NIL 2019         Recent Labs   Lab Test 20  1830 20  0850 20  1632 19  1509 19  1627   ALT 13  --  32 22  --    CR 0.48* 0.53 0.56 0.83  --    GFRESTIMATED >90 >90 >90 >90  --    GFRESTBLACK >90  >90 >90 >90  --    ALBUMIN  --  2.7*  --  4.5  --    POTASSIUM  --  3.4  --  3.6  --    TSH  --   --   --   --  2.78       PHQ-2 ( 1999 Pfizer) 2/18/2021 9/21/2020   Q1: Little interest or pleasure in doing things 0 0   Q2: Feeling down, depressed or hopeless 0 0   PHQ-2 Score 0 0       PHQ-9 SCORE 3/12/2019 11/8/2019 6/9/2020 9/21/2020   PHQ-9 Total Score 2 0 2 1       ALVIN-7 SCORE 11/18/2020 11/18/2020 1/1/2021   Total Score - 0 (minimal anxiety) 1 (minimal anxiety)   Total Score 0 0 1       No flowsheet data found.      Abby Toscano [facility]  April 20, 2021 3:23 PM

## 2021-04-20 NOTE — PROGRESS NOTES
HPI       Gabriele Rodriguez is a 35 year old  who presents for   Chief Complaint   Patient presents with     Nexplanon Removal     35 year-old female here for Nexplanon removal. Was put in 12/15/2020 following loss of first . Now would like to try getting pregnant again and desires removal. Has history of HTN and is on metoprolol. Will switch to labetolol soon. BP has been variable by report. Also on Vyvanse for ADHD. No concerns with Nexplanon. This is her second one.       Problem, Medication and Allergy Lists were reviewed and updated if needed..    Patient is an established patient of this clinic. I reviewed and updated as needed.          Review of Systems:   Review of Systems  GEN: good health, some fatigue. No fever.  CV: as per HPI         Physical Exam:     Vitals:    21 1521   BP: (!) 146/94   Pulse: 65   Temp: 98.6  F (37  C)   TempSrc: Oral   SpO2: 98%     There is no height or weight on file to calculate BMI.  Vital signs normal except elevatec BP     Physical Exam  Progestin Implant Removal Note    Gabriele Rodriguez is here for removal of her Nexplanon implant. She would like it removed because of desire to become pregnant.     An informed consent was taken prior to removal and is to be scanned into the Electronic Health Record. Risks of the procedure include: bleeding, infection, difficult removal, scarring, and nerve damage. There may be bruising at the site of incision and down the arm.     Procedure Note:  Team: DORA Caldera CNP  Procedure: Progestin Implant Removal (Nexplanon)  Side: Left/Right  Position correct for procedure: Yes  Equipment for procedure available: Yes    The patient is placed in the supine position. Asceptic conditions are maintained. The alessandra is located by palpation in the left upper arm. The area is cleaned with antiseptic. 1.5 cc of 1% lidocaine with epinephrine is injected just underneath the end of the implant closest to the elbow. After  firmly pressing down on the end of the implant closer to the axilla, a 2-3 mm incision is made with a #11 scalpel. The alessandra is pushed to the incision site and grasped with a sterile forceps and gently removed. Blunt dissection was needed. There were numerous adhesions and alessandra also rolled. The patient tolerated the procedure well. The 4 cm alessandra was removed in its entirety. The incision was dressed with a small adhesive bandage closure and a pressure dressing was applied.   Lidocaine 1% Lot [id number] EXP 6/2021 NDC 87662 1605-1     Ekta Corrigan, DORA CNP      Results:   No testing ordered today    Assessment and Plan        1. Encounter for Nexplanon removal  The patient does not desire contraception at this time. She was given instructions about wound care. To watch for signs and symptoms of infection. Dsicussed there may be scarring following removal.    To follow up with primary re: BP.    30 minutes spent with patient due to extended time for removal given adhesions.      There are no discontinued medications.    Options for treatment and follow-up care were reviewed with the patient. Gabriele Rodriguez  engaged in the decision making process and verbalized understanding of the options discussed and agreed with the final plan.    DORA Caldera CNP

## 2021-05-03 ENCOUNTER — OFFICE VISIT (OUTPATIENT)
Dept: DERMATOLOGY | Facility: CLINIC | Age: 36
End: 2021-05-03
Payer: COMMERCIAL

## 2021-05-03 DIAGNOSIS — B07.0 PLANTAR WART: Primary | ICD-10-CM

## 2021-05-03 DIAGNOSIS — D48.5 NEOPLASM OF UNCERTAIN BEHAVIOR OF SKIN: ICD-10-CM

## 2021-05-03 PROCEDURE — 88305 TISSUE EXAM BY PATHOLOGIST: CPT | Performed by: PATHOLOGY

## 2021-05-03 PROCEDURE — 17110 DESTRUCTION B9 LES UP TO 14: CPT | Mod: GC | Performed by: DERMATOLOGY

## 2021-05-03 PROCEDURE — 11102 TANGNTL BX SKIN SINGLE LES: CPT | Mod: XS | Performed by: DERMATOLOGY

## 2021-05-03 ASSESSMENT — PAIN SCALES - GENERAL
PAINLEVEL: NO PAIN (0)
PAINLEVEL: NO PAIN (0)

## 2021-05-03 NOTE — PROGRESS NOTES
[facility] Dermatology Note  Encounter Date: May 3, 2021  Office Visit     Dermatology Problem List:    # NUB, Left lateral lower leg, 5 mm hyperpigmented papule with an irregular pigment network on dermoscopy, dDx: Dermatofibroma versus atypical nevus, s/p shave biopsy on 5/3/21    *Dr. Rodriguez is the Pathology chief resident at [facility] as of July 2021  1. Wart  - L heel, LN2 9/2020-11/2020; LN2 + candida 2/23/21, LN2 4/5/21, 5/3/21  2. 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 Plantar wart  - One lesion at the left heel was treated with LN2. See procedure note below.  - DESTRUCT BENIGN LESION, UP TO 14    2. Neoplasm of uncertain behavior of skin  - NUB, Left lateral lower leg, 5 mm hyperpigmented papule with an irregular pigment network on dermoscopy, dDx: Dermatofibroma versus atypical nevus, s/p shave biopsy on 5/3/21  - See shave biopsy procedure note below.  - Dermatological path order and indications  - [place] TANGENTIAL BIOPSY OF SKIN, FIRST/SINGLE LESION    Procedures Performed:   - Shave biopsy procedure note, location(s): left lateral lower leg. After discussion of benefits and risks including but not limited to bleeding, infection, scar, incomplete removal, recurrence, and non-diagnostic biopsy, written consent and photographs were obtained. The area was cleaned with isopropyl alcohol. 0.5mL of 1% lidocaine with epinephrine was injected to obtain adequate anesthesia of lesion(s). Shave biopsy at site(s) performed. Hemostasis was achieved with aluminium chloride. Petrolatum ointment and a sterile dressing were applied. The patient tolerated the procedure and no complications were noted. The patient was provided with verbal and written post care instructions.   - Cryotherapy procedure note, location(s): left heel. After verbal consent and discussion of risks and benefits  including, but not limited to, dyspigmentation/scar, blister, and pain, 1 lesion(s) was(were) treated with 1-2 mm freeze border for 1-2 cycles with liquid nitrogen. Post cryotherapy instructions were provided.    Follow-up: 1 month(s) in-person, or earlier for new or changing lesions    Staff and Resident:     Dr. Isael Nguyen (resident)  Dr. Nathalie William (staff)  I was present for key portions of the biopsy and the entire cryotherapy procedure. Madison William MD  I, Madison William MD, saw this patient with the resident and agree with the resident s findings and plan of care as documented in the resident s note.    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 Dr. Rodriguez is the incoming chief Pathology resident at [facility], starting in July 2021. She reports persistent of wart at the left heel after several sessions with LN2, including a vigorous reaction after candidal antigen injection in February 2021. She is agreeable to repeat LN2 today. At home, she soaks the foot in warm water, exfoliates with an emery board, and applies topical salicylic acid nearly daily. No constitutional symptoms.    Patient is otherwise feeling well, without additional skin concerns.    Labs Reviewed:  N/A    Physical Exam:  Vitals: LMP 04/06/2021 (Approximate)   SKIN: Focused examination of the left lower extremity was performed.  - NUB, Left lateral lower leg, 5 mm hyperpigmented papule with an irregular pigment network on dermoscopy, dDx: Dermatofibroma versus atypical nevus, s/p shave biopsy on 5/3/21  - There is a verrucous papule with thrombosed capillaries interrupting dermatoglyphics on the left heel.   - No other lesions of concern on areas examined.     Medications:  Current  Outpatient Medications   Medication     Efinaconazole 10 % SOLN     lisdexamfetamine (VYVANSE) 20 MG capsule     metoprolol succinate ER (TOPROL-XL) 100 MG 24 hr tablet     Current Facility-Administered Medications   Medication     candida albicans skin test injection 0.3 mL     lidocaine 1 % 1 mL      Past Medical History:   Patient Active Problem List   Diagnosis     Anxiety     ADHD (attention deficit hyperactivity disorder), inattentive type     Benign essential hypertension     Chronic hypertension affecting pregnancy     Chronic sinusitis      death     S/P  section     Past Medical History:   Diagnosis Date     ADHD      Anxiety      Chronic sinusitis      Depressive disorder      Hypertension        CC Kelsey Gonzalez NP  [facility] NP CLINIC [facility]  [address], [address]  [place] [address] on close of this encounter.

## 2021-05-03 NOTE — LETTER
5/3/2021       RE: Gabriele Rodriguez  [address] [id number]     Dear Colleague,    Thank you for referring your patient, Gabriele Rodriguez, to the [facility] DERMATOLOGY CLINIC [place] at [facility]. Please see a copy of my visit note below.    [facility] Dermatology Note  Encounter Date: May 3, 2021  Office Visit     Dermatology Problem List:    # NUB, Left lateral lower leg, 5 mm hyperpigmented papule with an irregular pigment network on dermoscopy, dDx: Dermatofibroma versus atypical nevus, s/p shave biopsy on 5/3/21    *Dr. Rodriguez is the Pathology chief resident at [facility] as of July 2021  1. Wart  - L heel, LN2 9/2020-11/2020; LN2 + candida 2/23/21, LN2 4/5/21, 5/3/21  2. 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 Plantar wart  - One lesion at the left heel was treated with LN2. See procedure note below.  - DESTRUCT BENIGN LESION, UP TO 14    2. Neoplasm of uncertain behavior of skin  - NUB, Left lateral lower leg, 5 mm hyperpigmented papule with an irregular pigment network on dermoscopy, dDx: Dermatofibroma versus atypical nevus, s/p shave biopsy on 5/3/21  - See shave biopsy procedure note below.  - Dermatological path order and indications  - [place] TANGENTIAL BIOPSY OF SKIN, FIRST/SINGLE LESION    Procedures Performed:   - Shave biopsy procedure note, location(s): left lateral lower leg. After discussion of benefits and risks including but not limited to bleeding, infection, scar, incomplete removal, recurrence, and non-diagnostic biopsy, written consent and photographs were obtained. The area was cleaned with isopropyl alcohol. 0.5mL of 1% lidocaine with epinephrine was injected to obtain adequate anesthesia of lesion(s). Shave biopsy at site(s) performed. Hemostasis was achieved with aluminium  chloride. Petrolatum ointment and a sterile dressing were applied. The patient tolerated the procedure and no complications were noted. The patient was provided with verbal and written post care instructions.   - Cryotherapy procedure note, location(s): left heel. After verbal consent and discussion of risks and benefits including, but not limited to, dyspigmentation/scar, blister, and pain, 1 lesion(s) was(were) treated with 1-2 mm freeze border for 1-2 cycles with liquid nitrogen. Post cryotherapy instructions were provided.    Follow-up: 1 month(s) in-person, or earlier for new or changing lesions    Staff and Resident:     Dr. Isael Nguyen (resident)  Dr. Nathalie William (staff)  I was present for key portions of the biopsy and the entire cryotherapy procedure. Madison William MD  I, Madison William MD, saw this patient with the resident and agree with the resident s findings and plan of care as documented in the resident s note.    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 Dr. Rodriguez is the incoming chief Pathology resident at [facility], starting in July 2021. She reports persistent of wart at the left heel after several sessions with LN2, including a vigorous reaction after candidal antigen injection in February 2021. She is agreeable to repeat LN2 today. At home, she soaks the foot in warm water, exfoliates with an emery board, and applies topical salicylic acid nearly daily. No constitutional symptoms.    Patient is otherwise feeling well, without additional skin concerns.    Labs Reviewed:  N/A    Physical Exam:  Vitals: LMP 04/06/2021 (Approximate)   SKIN: Focused examination of the left lower extremity was performed.  - NUB, Left lateral lower leg, 5 mm  hyperpigmented papule with an irregular pigment network on dermoscopy, dDx: Dermatofibroma versus atypical nevus, s/p shave biopsy on 5/3/21  - There is a verrucous papule with thrombosed capillaries interrupting dermatoglyphics on the left heel.   - No other lesions of concern on areas examined.     Medications:  Current Outpatient Medications   Medication     Efinaconazole 10 % SOLN     lisdexamfetamine (VYVANSE) 20 MG capsule     metoprolol succinate ER (TOPROL-XL) 100 MG 24 hr tablet     Current Facility-Administered Medications   Medication     candida albicans skin test injection 0.3 mL     lidocaine 1 % 1 mL      Past Medical History:   Patient Active Problem List   Diagnosis     Anxiety     ADHD (attention deficit hyperactivity disorder), inattentive type     Benign essential hypertension     Chronic hypertension affecting pregnancy     Chronic sinusitis      death     S/P  section     Past Medical History:   Diagnosis Date     ADHD      Anxiety      Chronic sinusitis      Depressive disorder      Hypertension        CC Kelsey Gonzalez NP  [facility] [place] [facility]  [address], [address]  [place] [address] on close of this encounter.

## 2021-05-03 NOTE — PATIENT INSTRUCTIONS

## 2021-05-03 NOTE — NURSING NOTE
Chief Complaint   Patient presents with     RECHECK     pt states she is here of wart on left foot, pt states she also having hairloss      Mole     on left foot     Vivienne Chu MA

## 2021-05-04 LAB — COPATH REPORT: NORMAL

## 2021-05-04 RX ORDER — LABETALOL 200 MG/1
TABLET, FILM COATED ORAL
Qty: 56 TABLET | Refills: 0 | Status: SHIPPED | OUTPATIENT
Start: 2021-05-04 | End: 2021-05-06

## 2021-05-05 NOTE — RESULT ENCOUNTER NOTE
The biopsy results have been reviewed and show a compound melanocytic nevus. The patient was informed of the result via [employer] message.    Isael Nguyen MD 5/4/2021  Dermatology Resident

## 2021-05-06 ENCOUNTER — VIRTUAL VISIT (OUTPATIENT)
Dept: FAMILY MEDICINE | Facility: CLINIC | Age: 36
End: 2021-05-06
Payer: COMMERCIAL

## 2021-05-06 DIAGNOSIS — F90.0 ATTENTION DEFICIT HYPERACTIVITY DISORDER (ADHD), PREDOMINANTLY INATTENTIVE TYPE: Primary | ICD-10-CM

## 2021-05-06 DIAGNOSIS — I10 ESSENTIAL HYPERTENSION: ICD-10-CM

## 2021-05-06 PROCEDURE — 99213 OFFICE O/P EST LOW 20 MIN: CPT | Mod: 95 | Performed by: NURSE PRACTITIONER

## 2021-05-06 RX ORDER — LABETALOL 200 MG/1
TABLET, FILM COATED ORAL
Qty: 56 TABLET | Refills: 0 | Status: SHIPPED | OUTPATIENT
Start: 2021-05-06 | End: 2021-06-08

## 2021-05-06 RX ORDER — LISDEXAMFETAMINE DIMESYLATE 20 MG/1
20 CAPSULE ORAL EVERY MORNING
Qty: 30 CAPSULE | Refills: 0 | Status: SHIPPED | OUTPATIENT
Start: 2021-05-31 | End: 2021-05-06

## 2021-05-06 RX ORDER — LISDEXAMFETAMINE DIMESYLATE 20 MG/1
20 CAPSULE ORAL EVERY MORNING
Qty: 30 CAPSULE | Refills: 0 | Status: SHIPPED | OUTPATIENT
Start: 2021-06-28 | End: 2021-09-22

## 2021-05-06 NOTE — PATIENT INSTRUCTIONS

## 2021-05-06 NOTE — PROGRESS NOTES
Qi is a 35 year old who is being evaluated via a billable video visit.      How would you like to obtain your AVS? MyChart  If the video visit is dropped, the invitation should be resent by: Text to cell phone: [phone]  Will anyone else be joining your video visit? No      Video Start Time:1654      Subjective   Qi is a 35 year old who presents for follow up on ADHD and HTN.      HPI   ADHD:Symptoms are managed well with Vyvanse. She is looking to get off from this when she is able to conceive.    HTN: Will transition from Metoprolol to Lebatolol for BP management as she and her  are planning on conceiving. It is noted that she gave birth to a full term baby daughter in November whom ended up passing after complications during birth.     Review of Systems   Constitutional, HEENT, cardiovascular, pulmonary, gi and gu systems are negative, except as otherwise noted.      Objective         Physical Exam   GENERAL: Healthy, alert and no distress  EYES: Eyes grossly normal to inspection.  No discharge or erythema, or obvious scleral/conjunctival abnormalities.  RESP: No audible wheeze, cough, or visible cyanosis.  No visible retractions or increased work of breathing.    SKIN: Visible skin clear. No significant rash, abnormal pigmentation or lesions.  NEURO: Cranial nerves grossly intact.  Mentation and speech appropriate for age.  PSYCH: Mentation appears normal, affect normal/bright, judgement and insight intact, normal speech and appearance well-groomed.    No diagnostics ordered today.     Video-Visit Details    Type of service:  Video Visit    Video End Time:1705    Originating Location (pt. Location): Home    Distant Location (provider location):  [facility] [place]     Platform used for Video Visit: Keren  Assessment & Plan     Attention deficit hyperactivity disorder (ADHD), predominantly inattentive type    - lisdexamfetamine (VYVANSE) 20 MG capsule; Take 1  capsule (20 mg) by mouth every morning    Essential hypertension      Return in about 3 months (around 8/6/2021) for send my chart message with BP update in 4 weeks prior to next fill .  First, continue on Vyvanse until you become pregnant, than stop taking this. Next, please transition from Metoprolol to Lebatolol for safety while she  Pregnant. Please follow up every 3 months. She verbalizes understanding of the plan.   DORA Parry, CNP

## 2021-06-05 DIAGNOSIS — I10 ESSENTIAL HYPERTENSION: Primary | ICD-10-CM

## 2021-06-07 ENCOUNTER — OFFICE VISIT (OUTPATIENT)
Dept: DERMATOLOGY | Facility: CLINIC | Age: 36
End: 2021-06-07
Payer: COMMERCIAL

## 2021-06-07 DIAGNOSIS — B07.0 PLANTAR WART: Primary | ICD-10-CM

## 2021-06-07 PROCEDURE — 17110 DESTRUCTION B9 LES UP TO 14: CPT | Mod: GC | Performed by: DERMATOLOGY

## 2021-06-07 ASSESSMENT — PAIN SCALES - GENERAL: PAINLEVEL: MILD PAIN (2)

## 2021-06-07 NOTE — PROGRESS NOTES
[facility] Dermatology Note  Encounter Date: Jun 7, 2021  Office Visit     Dermatology Problem List:  *Dr. Rodriguez is the Pathology chief resident at [facility] as of July 2021  1. Wart  - L heel, LN2 9/2020-11/2020; LN2 + candida 2/23/21, LN2 4/5/21, 5/3/21, 6/7/21  2. 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 Benign bx  -Compound melanocytic nevus; L lateral lower leg       ____________________________________________    Assessment & Plan:     # Plantar wart. L heel- She is trying to get pregnant. After pregnancy, we will have more options for wart treatment if needed at that point (ie, Efudex, which cannot be given if she is trying to get pregnant).  - Cryotherapy performed today (see procedure note(s) below).   - Continue pairing down and salicyclic acid at night  - use duct tape for occlusion      Procedures Performed:   - Cryotherapy procedure note, location(s): left heel. After verbal consent and discussion of risks and benefits including, but not limited to, dyspigmentation/scar, blister, and pain, 1 lesion(s) was(were) treated with 1-2 mm freeze border for 1-2 cycles with liquid nitrogen. Post cryotherapy instructions were provided.    Follow-up: 1 month(s) in-person, or earlier for new or changing lesions    Staff and Resident:     Joann Marcos MD  Internal Medicine-Dermatology, PGY-5  I was present for the entire procedure. Madison William MD  I, Madison William MD, saw this patient with the resident and agree with the resident s findings and plan of care as documented in the resident s note.    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  Thinks there may be slight improvement. Just pairing down and using salicyclic acid at  night.  Otherwise not painful or symptomatic otherwise, unless pairs down more aggressively or after liquid nitrogen.  Patient is otherwise feeling well, without additional skin concerns.    Labs Reviewed:  N/A    Physical Exam:  Vitals: There were no vitals taken for this visit.  SKIN: Focused examination of left heel was performed.  - verrucous circular small plaque L heel  - No other lesions of concern on areas examined.     Medications:  Current Outpatient Medications   Medication     Efinaconazole 10 % SOLN     labetalol (NORMODYNE) 200 MG tablet     [START ON 2021] lisdexamfetamine (VYVANSE) 20 MG capsule     Current Facility-Administered Medications   Medication     candida albicans skin test injection 0.3 mL     lidocaine 1 % 1 mL      Past Medical History:   Patient Active Problem List   Diagnosis     Anxiety     ADHD (attention deficit hyperactivity disorder), inattentive type     Benign essential hypertension     Chronic hypertension affecting pregnancy     Chronic sinusitis      death     S/P  section     Past Medical History:   Diagnosis Date     ADHD      Anxiety      Chronic sinusitis      Depressive disorder      Hypertension        CC Kelsey Gonzalez NP  [facility] NP CLINIC [facility]  [address], [address]  [place] [address] on close of this encounter.

## 2021-06-07 NOTE — NURSING NOTE
Chief Complaint   Patient presents with     RECHECK     pt states she is here for here wart on her bottom of her left foot      Vivienne Chu MA

## 2021-06-07 NOTE — LETTER
6/7/2021       RE: Gabriele Rodriguez  [address] [address]     Dear Colleague,    Thank you for referring your patient, Gabriele Rodriguez, to the [facility] DERMATOLOGY CLINIC [place] at [facility]. Please see a copy of my visit note below.    [facility] Dermatology Note  Encounter Date: Jun 7, 2021  Office Visit     Dermatology Problem List:  *Dr. Rodriguez is the Pathology chief resident at [facility] as of July 2021  1. Wart  - L heel, LN2 9/2020-11/2020; LN2 + candida 2/23/21, LN2 4/5/21, 5/3/21, 6/7/21  2. 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 Benign bx  -Compound melanocytic nevus; L lateral lower leg       ____________________________________________    Assessment & Plan:     # Plantar wart. L heel- She is trying to get pregnant. After pregnancy, we will have more options for wart treatment if needed at that point (ie, Efudex, which cannot be given if she is trying to get pregnant).  - Cryotherapy performed today (see procedure note(s) below).   - Continue pairing down and salicyclic acid at night  - use duct tape for occlusion      Procedures Performed:   - Cryotherapy procedure note, location(s): left heel. After verbal consent and discussion of risks and benefits including, but not limited to, dyspigmentation/scar, blister, and pain, 1 lesion(s) was(were) treated with 1-2 mm freeze border for 1-2 cycles with liquid nitrogen. Post cryotherapy instructions were provided.    Follow-up: 1 month(s) in-person, or earlier for new or changing lesions    Staff and Resident:     Joann Marcos MD  Internal Medicine-Dermatology, PGY-5  I was present for the entire procedure. Madison William MD  I, Madison William MD, saw this patient with the resident and agree with the resident s findings and plan of care as documented in the resident s  note.    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  Thinks there may be slight improvement. Just pairing down and using salicyclic acid at night.  Otherwise not painful or symptomatic otherwise, unless pairs down more aggressively or after liquid nitrogen.  Patient is otherwise feeling well, without additional skin concerns.    Labs Reviewed:  N/A    Physical Exam:  Vitals: There were no vitals taken for this visit.  SKIN: Focused examination of left heel was performed.  - verrucous circular small plaque L heel  - No other lesions of concern on areas examined.     Medications:  Current Outpatient Medications   Medication     Efinaconazole 10 % SOLN     labetalol (NORMODYNE) 200 MG tablet     [START ON 2021] lisdexamfetamine (VYVANSE) 20 MG capsule     Current Facility-Administered Medications   Medication     candida albicans skin test injection 0.3 mL     lidocaine 1 % 1 mL      Past Medical History:   Patient Active Problem List   Diagnosis     Anxiety     ADHD (attention deficit hyperactivity disorder), inattentive type     Benign essential hypertension     Chronic hypertension affecting pregnancy     Chronic sinusitis      death     S/P  section     Past Medical History:   Diagnosis Date     ADHD      Anxiety      Chronic sinusitis      Depressive disorder      Hypertension        CC Kelsey Gonzalez, [place]  [facility] NP CLINIC [facility], [address]  [place] [address] on close of this encounter.

## 2021-06-08 RX ORDER — LABETALOL 200 MG/1
TABLET, FILM COATED ORAL
Qty: 90 TABLET | Refills: 1 | Status: SHIPPED | OUTPATIENT
Start: 2021-06-08 | End: 2021-09-13

## 2021-06-08 NOTE — TELEPHONE ENCOUNTER
labetalol (NORMODYNE) 200 MG tablet   Last Written Prescription Date:  5/6/2021  Last Fill Quantity: 56,   # refills: 0  Last Office Visit : 5/6/2021  Future Office visit: None    Routing refill request to provider for review/approval because:  In last office visit 5/6/2021. Pt asked to follow up in 4 weeks for B/P check and to see how new B/P medication was working for Pt care.  No follow up visit noted.     Continue same dose?   Follow up with Pt? Refer to clinic/Provider for review.     BP Readings from Last 3 Encounters:   04/20/21 (!) 146/94   01/04/21 137/89   12/15/20 (!) 147/94        Liana Díaz RN  Central Triage Red Flags/Med Refills    5/6/2021 Note  Return in about 3 months (around 8/6/2021) for send my chart message with BP update in 4 weeks prior to next fill .  First, continue on Vyvanse until you become pregnant, than stop taking this. Next, please transition from Metoprolol to Lebatolol for safety while she  Pregnant. Please follow up every 3 months. She verbalizes understanding of the plan.   Kelsey Gonzalez, APRN, CNP

## 2021-07-12 ENCOUNTER — OFFICE VISIT (OUTPATIENT)
Dept: DERMATOLOGY | Facility: CLINIC | Age: 36
End: 2021-07-12
Payer: COMMERCIAL

## 2021-07-12 DIAGNOSIS — B07.0 PLANTAR WART: Primary | ICD-10-CM

## 2021-07-12 PROCEDURE — 17110 DESTRUCTION B9 LES UP TO 14: CPT | Performed by: DERMATOLOGY

## 2021-07-12 ASSESSMENT — PAIN SCALES - GENERAL: PAINLEVEL: NO PAIN (0)

## 2021-07-12 NOTE — PATIENT INSTRUCTIONS
Cryotherapy    What is it?    Use of a very cold liquid, such as liquid nitrogen, to freeze and destroy abnormal skin cells that need to be removed    What should I expect?    Tenderness and redness    A small blister that might grow and fill with dark purple blood. There may be crusting.    More than one treatment may be needed if the lesions do not go away.    How do I care for the treated area?    Gently wash the area with your hands when bathing.    Use a thin layer of Vaseline to help with healing. You may use a Band-Aid.     The area should heal within 7-10 days and may leave behind a pink or lighter color.     Do not use an antibiotic or Neosporin ointment.     You may take acetaminophen (Tylenol) for pain.     Call your Doctor if you have:    Severe pain    Signs of infection (warmth, redness, cloudy yellow drainage, and or a bad smell)    Questions or concerns    Who should I call with questions?       [facility]: [phone]       [facility]: [phone]       For urgent needs outside of business hours call the [facility] at [phone]        and ask for the dermatology resident on call

## 2021-07-12 NOTE — LETTER
7/12/2021       RE: Gabriele Rodriguez  [address] [address]     Dear Colleague,    Thank you for referring your patient, Gabriele Rodriguez, to the [facility] [place] at [facility]. Please see a copy of my visit note below.    [facility] Dermatology Note  Encounter Date: Jul 12, 2021  Office Visit     Dermatology Problem List:  *Dr. Rodriguez is the Pathology chief resident at [facility] as of July 2021  1. Wart  - L heel, LN2 9/2020-11/2020; LN2 + candida 2/23/21, LN2 4/5/21, 5/3/21, 6/7/21, 7/12/21  2. 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 Benign bx  -Compound melanocytic nevus; L lateral lower leg    ____________________________________________    Assessment & Plan:     # plantar wart.   - She is planning to become pregnant but will wait a couple of months before trying.  We did discuss other treatments (repeat candida, laser, bleomycin, Aldara, Efudex, cantharidin) but upon review with the question of pregnancy, the patient and I agreed upon repeat cryotherapy  - Cryotherapy performed today (see procedure note(s) below).       Procedures Performed:   - Cryotherapy procedure note, location(s): left heel. After verbal consent and discussion of risks and benefits including, but not limited to, dyspigmentation/scar, blister, and pain, one lesion(s) was(were) treated with 1-2 mm freeze border for 1-2 cycles with liquid nitrogen. Post cryotherapy instructions were provided.      Follow-up: 1 month(s) in-person, or earlier for new or changing lesions    Staff:     Madison William MD  ____________________________________________    CC: RECHECK (pt states she is here for a follow up on a wart on left foot)    HPI:  Ms. Gabriele Rodriguez is a(n) 35 year old female who presents today as a return patient for a recalcitrant wart of the left heel.  No significant  change since last visit.  Still planning on pregnancy but maybe will wait for a couple of months    Patient is otherwise feeling well, without additional skin concerns.     Labs Reviewed:  N/A    Physical Exam:  Vitals: There were no vitals taken for this visit.  SKIN: Focused examination of left heel was performed.  - There is a verrucous papule with thrombosed capillaries interrupting dermatoglyphics on the left heel (about 1 cm).  - No other lesions of concern on areas examined.     Medications:  Current Outpatient Medications   Medication     Efinaconazole 10 % SOLN     labetalol (NORMODYNE) 200 MG tablet     lisdexamfetamine (VYVANSE) 20 MG capsule     Current Facility-Administered Medications   Medication     candida albicans skin test injection 0.3 mL     lidocaine 1 % 1 mL      Past Medical History:   Patient Active Problem List   Diagnosis     Anxiety     ADHD (attention deficit hyperactivity disorder), inattentive type     Benign essential hypertension     Chronic hypertension affecting pregnancy     Chronic sinusitis      death     S/P  section     Past Medical History:   Diagnosis Date     ADHD      Anxiety      Chronic sinusitis      Depressive disorder      Hypertension        CC Kelsey Gonzalez NP  [facility] NP CLINIC [facility], [address]  [place] [address] on close of this encounter.

## 2021-07-12 NOTE — PROGRESS NOTES
[facility] Dermatology Note  Encounter Date: Jul 12, 2021  Office Visit     Dermatology Problem List:  *Dr. Rodriguez is the Pathology chief resident at [facility] as of July 2021  1. Wart  - L heel, LN2 9/2020-11/2020; LN2 + candida 2/23/21, LN2 4/5/21, 5/3/21, 6/7/21, 7/12/21  2. 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 Benign bx  -Compound melanocytic nevus; L lateral lower leg    ____________________________________________    Assessment & Plan:     # plantar wart.   - She is planning to become pregnant but will wait a couple of months before trying.  We did discuss other treatments (repeat candida, laser, bleomycin, Aldara, Efudex, cantharidin) but upon review with the question of pregnancy, the patient and I agreed upon repeat cryotherapy  - Cryotherapy performed today (see procedure note(s) below).       Procedures Performed:   - Cryotherapy procedure note, location(s): left heel. After verbal consent and discussion of risks and benefits including, but not limited to, dyspigmentation/scar, blister, and pain, one lesion(s) was(were) treated with 1-2 mm freeze border for 1-2 cycles with liquid nitrogen. Post cryotherapy instructions were provided.      Follow-up: 1 month(s) in-person, or earlier for new or changing lesions    Staff:     Madison William MD  ____________________________________________    CC: RECHECK (pt states she is here for a follow up on a wart on left foot)    HPI:  Ms. Gabriele Rodriguez is a(n) 35 year old female who presents today as a return patient for a recalcitrant wart of the left heel.  No significant change since last visit.  Still planning on pregnancy but maybe will wait for a couple of months    Patient is otherwise feeling well, without additional skin concerns.     Labs Reviewed:  N/A    Physical Exam:  Vitals: There were no vitals taken for this visit.  SKIN: Focused examination of left heel was performed.  - There  is a verrucous papule with thrombosed capillaries interrupting dermatoglyphics on the left heel (about 1 cm).  - No other lesions of concern on areas examined.     Medications:  Current Outpatient Medications   Medication     Efinaconazole 10 % SOLN     labetalol (NORMODYNE) 200 MG tablet     lisdexamfetamine (VYVANSE) 20 MG capsule     Current Facility-Administered Medications   Medication     candida albicans skin test injection 0.3 mL     lidocaine 1 % 1 mL      Past Medical History:   Patient Active Problem List   Diagnosis     Anxiety     ADHD (attention deficit hyperactivity disorder), inattentive type     Benign essential hypertension     Chronic hypertension affecting pregnancy     Chronic sinusitis      death     S/P  section     Past Medical History:   Diagnosis Date     ADHD      Anxiety      Chronic sinusitis      Depressive disorder      Hypertension        CC Kelsey Gonzalez NP  [facility] NP CLINIC [facility]  [address], [address]  [place] [address] on close of this encounter.

## 2021-08-09 ENCOUNTER — OFFICE VISIT (OUTPATIENT)
Dept: DERMATOLOGY | Facility: CLINIC | Age: 36
End: 2021-08-09
Payer: COMMERCIAL

## 2021-08-09 DIAGNOSIS — B07.0 PLANTAR WART: Primary | ICD-10-CM

## 2021-08-09 PROCEDURE — 17110 DESTRUCTION B9 LES UP TO 14: CPT | Performed by: DERMATOLOGY

## 2021-08-09 ASSESSMENT — PAIN SCALES - GENERAL: PAINLEVEL: NO PAIN (0)

## 2021-08-09 NOTE — PROGRESS NOTES
[facility] Dermatology Note  Encounter Date: Aug 9, 2021  Office Visit     Dermatology Problem List:  *Dr. Rodriguez is the Pathology chief resident at [facility] as of July 2021  1. Wart  - L heel, LN2 9/2020-11/2020; LN2 + candida 2/23/21, LN2 4/5/21, 5/3/21, 6/7/21, 7/12/21, 8/9/21  2. 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 Benign bx  -Compound melanocytic nevus; L lateral lower leg    ____________________________________________    Assessment & Plan:     # Wart left heel.   - Cryotherapy performed today (see procedure note(s) below).        Procedures Performed:   - Cryotherapy procedure note, location(s): left heel. After verbal consent and discussion of risks and benefits including, but not limited to, dyspigmentation/scar, blister, and pain, one lesion(s) was(were) treated with 1-2 mm freeze border for 1-2 cycles with liquid nitrogen. Post cryotherapy instructions were provided.      Follow-up: 1 month(s) in-person, or earlier for new or changing lesions    Staff:     Madison William MD  ____________________________________________    CC: Derm Problem (Qi states that she is here today for a plantar wart)    HPI:  Ms. Gabriele Rodriguez is a(n) 36 year old female who presents today as a return patient for a wart re treatment with cryotherapy    Patient is otherwise feeling well, without additional skin concerns.     Labs Reviewed:  N/A    Physical Exam:  Vitals: There were no vitals taken for this visit.  SKIN: Focused examination of left heel was performed.    - There is a verrucous papule with thrombosed capillaries interrupting dermatoglyphics on the left heel..   .   - No other lesions of concern on areas examined.     Medications:  Current Outpatient Medications   Medication     Efinaconazole 10 % SOLN     labetalol (NORMODYNE) 200 MG tablet     lisdexamfetamine (VYVANSE) 20 MG capsule     Current Facility-Administered Medications    Medication     candida albicans skin test injection 0.3 mL     lidocaine 1 % 1 mL      Past Medical History:   Patient Active Problem List   Diagnosis     Anxiety     ADHD (attention deficit hyperactivity disorder), inattentive type     Benign essential hypertension     Chronic hypertension affecting pregnancy     Chronic sinusitis      death     S/P  section     Past Medical History:   Diagnosis Date     ADHD      Anxiety      Chronic sinusitis      Depressive disorder      Hypertension        CC Kelsey Gonzalez, CONOR  [facility] NP CLINIC [facility]  [address], [address]  [place] [address] on close of this encounter.

## 2021-08-09 NOTE — NURSING NOTE
Dermatology Rooming Note    Gabriele Rodriguez's goals for this visit include:   Chief Complaint   Patient presents with     Derm Problem     Qi states that she is here today for a plantar wart     Julianne Da Silva, [facility]

## 2021-08-09 NOTE — LETTER
8/9/2021       RE: Gabriele Rodriguez  [address] [address]     Dear Colleague,    Thank you for referring your patient, Gabriele Rodriguez, to the [facility] DERMATOLOGY CLINIC [place] at [facility]. Please see a copy of my visit note below.    [facility] Dermatology Note  Encounter Date: Aug 9, 2021  Office Visit     Dermatology Problem List:  *Dr. Rodriguez is the Pathology chief resident at [facility] as of July 2021  1. Wart  - L heel, LN2 9/2020-11/2020; LN2 + candida 2/23/21, LN2 4/5/21, 5/3/21, 6/7/21, 7/12/21, 8/9/21  2. 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 Benign bx  -Compound melanocytic nevus; L lateral lower leg    ____________________________________________    Assessment & Plan:     # Wart left heel.   - Cryotherapy performed today (see procedure note(s) below).        Procedures Performed:   - Cryotherapy procedure note, location(s): left heel. After verbal consent and discussion of risks and benefits including, but not limited to, dyspigmentation/scar, blister, and pain, one lesion(s) was(were) treated with 1-2 mm freeze border for 1-2 cycles with liquid nitrogen. Post cryotherapy instructions were provided.      Follow-up: 1 month(s) in-person, or earlier for new or changing lesions    Staff:     Madison William MD  ____________________________________________    CC: Derm Problem (Qi states that she is here today for a plantar wart)    HPI:  Ms. Gabriele Rodriguez is a(n) 36 year old female who presents today as a return patient for a wart re treatment with cryotherapy    Patient is otherwise feeling well, without additional skin concerns.     Labs Reviewed:  N/A    Physical Exam:  Vitals: There were no vitals taken for this visit.  SKIN: Focused examination of left heel was performed.    - There is a verrucous papule with thrombosed capillaries  interrupting dermatoglyphics on the left heel..   .   - No other lesions of concern on areas examined.     Medications:  Current Outpatient Medications   Medication     Efinaconazole 10 % SOLN     labetalol (NORMODYNE) 200 MG tablet     lisdexamfetamine (VYVANSE) 20 MG capsule     Current Facility-Administered Medications   Medication     candida albicans skin test injection 0.3 mL     lidocaine 1 % 1 mL      Past Medical History:   Patient Active Problem List   Diagnosis     Anxiety     ADHD (attention deficit hyperactivity disorder), inattentive type     Benign essential hypertension     Chronic hypertension affecting pregnancy     Chronic sinusitis      death     S/P  section     Past Medical History:   Diagnosis Date     ADHD      Anxiety      Chronic sinusitis      Depressive disorder      Hypertension        CC Kelsey Gonzalez, CONOR  [facility] NP CLINIC [facility]  [address], [address]  [place] [address] on close of this encounter.

## 2021-08-11 ENCOUNTER — TELEPHONE (OUTPATIENT)
Dept: OBGYN | Facility: CLINIC | Age: 36
End: 2021-08-11

## 2021-08-11 NOTE — TELEPHONE ENCOUNTER
[facility] Call Center    Phone Message    May a detailed message be left on voicemail: yes     Reason for Call: Other: Pt calling because she is newly pregnant and needs to schedule an appt for that. She reports that she had a pregnancy before and her care was with us, and she had been told that if she was pregnant again to maybe follow up with MFM. Pt is wondering if she should see MFM or just schedule a normal OB intake appt. Please call back to discuss.     Action Taken: Message routed to:  [facility] devaughn    Travel Screening: Not Applicable

## 2021-08-11 NOTE — TELEPHONE ENCOUNTER
Scheduled obi us and CNM appointment for 9/2. Informed patient that provider would discuss referral to MFM at this appointment.    Sushila Freeman  Clinical Services Assistant

## 2021-08-26 ASSESSMENT — ANXIETY QUESTIONNAIRES
GAD7 TOTAL SCORE: 1
2. NOT BEING ABLE TO STOP OR CONTROL WORRYING: NOT AT ALL
5. BEING SO RESTLESS THAT IT IS HARD TO SIT STILL: NOT AT ALL
3. WORRYING TOO MUCH ABOUT DIFFERENT THINGS: NOT AT ALL
6. BECOMING EASILY ANNOYED OR IRRITABLE: SEVERAL DAYS
1. FEELING NERVOUS, ANXIOUS, OR ON EDGE: NOT AT ALL
7. FEELING AFRAID AS IF SOMETHING AWFUL MIGHT HAPPEN: NOT AT ALL
4. TROUBLE RELAXING: NOT AT ALL

## 2021-09-02 ENCOUNTER — TRANSCRIBE ORDERS (OUTPATIENT)
Dept: MATERNAL FETAL MEDICINE | Facility: CLINIC | Age: 36
End: 2021-09-02

## 2021-09-02 ENCOUNTER — ANCILLARY PROCEDURE (OUTPATIENT)
Dept: ULTRASOUND IMAGING | Facility: CLINIC | Age: 36
End: 2021-09-02
Attending: ADVANCED PRACTICE MIDWIFE
Payer: COMMERCIAL

## 2021-09-02 ENCOUNTER — TELEPHONE (OUTPATIENT)
Dept: OBGYN | Facility: CLINIC | Age: 36
End: 2021-09-02

## 2021-09-02 DIAGNOSIS — O26.90 PREGNANCY RELATED CONDITION, ANTEPARTUM: Primary | ICD-10-CM

## 2021-09-02 DIAGNOSIS — Z13.79 ENCOUNTER FOR GENETIC SCREENING: ICD-10-CM

## 2021-09-02 DIAGNOSIS — O26.90 PREGNANCY RELATED CONDITION, ANTEPARTUM: ICD-10-CM

## 2021-09-02 DIAGNOSIS — Z13.71 SCREENING FOR GENETIC DISEASE CARRIER STATUS: Primary | ICD-10-CM

## 2021-09-02 PROCEDURE — 76801 OB US < 14 WKS SINGLE FETUS: CPT

## 2021-09-02 PROCEDURE — 76801 OB US < 14 WKS SINGLE FETUS: CPT | Mod: 26 | Performed by: OBSTETRICS & GYNECOLOGY

## 2021-09-02 NOTE — TELEPHONE ENCOUNTER
Message received from patient requesting MFM referral in order to schedule genetic testing. Routed to CNM team to review. Referral placed.     Called and informed patient that the referral was placed and she can call MFM to schedule. Pt verbalized understanding and agreement, denied further questions/concerns.

## 2021-09-03 ASSESSMENT — ANXIETY QUESTIONNAIRES: GAD7 TOTAL SCORE: 1

## 2021-09-08 DIAGNOSIS — I10 ESSENTIAL HYPERTENSION: ICD-10-CM

## 2021-09-13 RX ORDER — LABETALOL 200 MG/1
200 TABLET, FILM COATED ORAL 2 TIMES DAILY
Qty: 180 TABLET | Refills: 3 | Status: SHIPPED | OUTPATIENT
Start: 2021-09-13 | End: 2021-09-13

## 2021-09-13 NOTE — CONFIDENTIAL NOTE
labetalol (NORMODYNE) 200 MG tablet   Last Written Prescription Date:  6/8/21  Last Fill Quantity: 90   # refills: 1  Last Office Visit :5/6/21  Future Office visit:  none    Routing refill request to provider for review/approval because: bp > 140/90

## 2021-09-19 ASSESSMENT — ANXIETY QUESTIONNAIRES
5. BEING SO RESTLESS THAT IT IS HARD TO SIT STILL: NOT AT ALL
4. TROUBLE RELAXING: NOT AT ALL
1. FEELING NERVOUS, ANXIOUS, OR ON EDGE: NOT AT ALL
7. FEELING AFRAID AS IF SOMETHING AWFUL MIGHT HAPPEN: NOT AT ALL
2. NOT BEING ABLE TO STOP OR CONTROL WORRYING: NOT AT ALL
GAD7 TOTAL SCORE: 1
GAD7 TOTAL SCORE: 1
8. IF YOU CHECKED OFF ANY PROBLEMS, HOW DIFFICULT HAVE THESE MADE IT FOR YOU TO DO YOUR WORK, TAKE CARE OF THINGS AT HOME, OR GET ALONG WITH OTHER PEOPLE?: NOT DIFFICULT AT ALL
GAD7 TOTAL SCORE: 1
3. WORRYING TOO MUCH ABOUT DIFFERENT THINGS: SEVERAL DAYS
6. BECOMING EASILY ANNOYED OR IRRITABLE: NOT AT ALL
7. FEELING AFRAID AS IF SOMETHING AWFUL MIGHT HAPPEN: NOT AT ALL

## 2021-09-19 NOTE — PROGRESS NOTES
[facility] OB Intake note  Subjective   36 year old female  presents to clinic for initiation of OB care. Patient's last menstrual period was 2021.  at 10w6d by Estimated Date of Delivery: 2022 based on US. Reviewed dating ultrasound. Pregnancy is planned.    LMP: 21, 12 weeks 4 days, EDB 22. Qi has a longer cycle ~35 days  US: 21: 8/0 weeks, EDB 22.   History of longer cycles, will go with EDB of 22  Trying for for two months. They waited to conceive d/t her hx of CS  CS , EDB 22 - 17 months apart.     Qi has chronic hypertension and is currently taking Labetalol 200mg BID  She has not had bleeding since her LMP.   She has had mild nausea. Taking Unisom, using ginger  Weight loss has not occurred.   This was a planned pregnancy.   Partner is involved,  Pedro   OTHER CONCERNS: Traveling to [place], hx of  demise, hx of CS    Last pap: 2019, HPV neg    - Genetic/Infection questionnaire completed, risks include none. Pt  does not have a recent known exposure to Parvo or CMV so IgG/IgM testing WILL NOT be ordered.   Recommended Flu Vaccine.  Flu Vaccine Given    - Current Medications    Current Outpatient Medications   Medication Sig Dispense Refill     ondansetron (ZOFRAN) 4 MG tablet Take 1 tablet (4 mg) by mouth every 8 hours as needed for nausea 10 tablet 0     Efinaconazole 10 % SOLN Externally apply topically daily 10 mL 10     labetalol (NORMODYNE) 200 MG tablet Take 1 tablet (200 mg) by mouth 2 times daily 180 tablet 3         - Co-morbids    Past Medical History:   Diagnosis Date     ADHD      Anxiety      Chronic sinusitis      Depressive disorder     also anxiety, on and off since high school, has tried prozac and lexapro, wellbutrin, now off meds, last on meds      Hypertension     diagnosed in med school, 2016     Varicella     as a child     - Risk for GDM : No known risk factors for GDMso  WILL NOT have an early GCT and Hgb A1C    -  High risk factors for Pre E-  Chronic hypertension       - Moderate risk factor for Pre E No moderate risk factors  Meets one high risk factors or none of the moderate risk facrtors  so WILL consider starting low dose aspirin (81mg) starting between 12 and 28 weeks to prevent early onset preeclampsia    - The patient  does not have a history of spontaneous  birth so  WILL NOT consider progesterone starting at 16-20 weeks and/or serial transvaginal cervical length ultrasounds from 16-24 weeks.     -The patient does not have a history of immunosuppresion or HIV so Toxoplasma IgG/IgM WILL NOT be ordered.       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 Chronic HTN and 2 vessel cord. CS for NRFHT in early labor, thick mec, female with Apgars 1, 5, and 6. Baby Valeria sent to NICU for cooling/meconium aspiration and she later  2 months after birth. Likely she suffered from hypoxic ischemic injury prior to the onset of labor.    Qi and Pedro saw a grief therapist after the loss of their baby girl Valeria. They are no longer getting counseling. Open to seeing a therapist during pregnancy in anticipation of the challenge of grieving their loss and welcoming this new life.    Objective  -VS: reviewed and within normal limits   -General appearance: no acute distress, patient is comfortable   NEUROLOGICAL/PSYCHIATRIC   - Orientated x3,   -Mood and affect: : normal     Assessment/Plan  Gabriele was seen today for prenatal care.    Diagnoses and all orders for this visit:    Supervision of high risk pregnancy in first trimester  -     ALT  -     AST  -      Creatinine clearance  -     Uric acid  -     Protein  random urine with Creat Ratio  -     ABO/Rh type and screen  -     CBC with platelets  -     HIV Antigen Antibody Combo  -     Hepatitis B surface antigen  -     Hepatitis B Surface Antibody  -     Hepatitis C antibody  -     Rubella Antibody IgG  -     Treponema Abs w Reflex to RPR and Titer  -     Vitamin D Deficiency  -     Urine Culture  -     Varicella Zoster Virus Antibody IgG    Nausea  -     ondansetron (ZOFRAN) 4 MG tablet; Take 1 tablet (4 mg) by mouth every 8 hours as needed for nausea        36 year old  10w6d weeks of pregnancy with TADEO of 2022 by US of Patient's last menstrual period was 2021..   Outpatient Encounter Medications as of 2021   Medication Sig Dispense Refill     ondansetron (ZOFRAN) 4 MG tablet Take 1 tablet (4 mg) by mouth every 8 hours as needed for nausea 10 tablet 0     Efinaconazole 10 % SOLN Externally apply topically daily 10 mL 10     labetalol (NORMODYNE) 200 MG tablet Take 1 tablet (200 mg) by mouth 2 times daily 180 tablet 3     [DISCONTINUED] lisdexamfetamine (VYVANSE) 20 MG capsule Take 1 capsule (20 mg) by mouth every morning 30 capsule 0     Facility-Administered Encounter Medications as of 2021   Medication Dose Route Frequency Provider Last Rate Last Admin     candida albicans skin test injection 0.3 mL  0.3 mL Intradermal Once Madison William MD         lidocaine 1 % 1 mL  1 mL Intradermal Once Ekta Corrigan APRN CNP          Orders Placed This Encounter   Procedures     ALT     AST     Uric acid     Protein  random urine with Creat Ratio     CBC with platelets     HIV Antigen Antibody Combo     Hepatitis B surface antigen     Hepatitis B Surface Antibody     Hepatitis C antibody     Rubella Antibody IgG     Treponema Abs w Reflex to RPR and Titer     Vitamin D Deficiency     Varicella Zoster Virus Antibody IgG     Adult Type and Screen                 Orders Placed  This Encounter   Procedures     ALT     AST     Uric acid     Protein  random urine with Creat Ratio     CBC with platelets     HIV Antigen Antibody Combo     Hepatitis B surface antigen     Hepatitis B Surface Antibody     Hepatitis C antibody     Rubella Antibody IgG     Treponema Abs w Reflex to RPR and Titer     Vitamin D Deficiency     Varicella Zoster Virus Antibody IgG     Adult Type and Screen         - Oriented to Practice, types of care, and how to reach a provider.  Recommended MD care due to chronic hypertension. Qi would like to see Dr. Vaz if possible.  - Patient received 1st trimester new OB education packet complete with aide of The Expectant Family booklet including information on genetic screening test options.  - Patient desires 1st trimester screening which was ordered.  - Educational handout on the prevention of infections diseases during pregnancy provided.  - Patient was encouraged to start prenatal vitamins as tolerated.    - Patient was sent to lab for routine OB labs including HELLP panel due to hx of chronic HTN.   - Reviewed recommendation for low dose aspirin daily to prevent pre eclampsia, pt agrees, follow up at NOB visit.   - Pregnancy concerns to be addressed by provider at new OB exam include: previous C/S x1, hx of  demise, chronic HTN  -Desires first trimester/NIPS screen, referral to MFM placed.   -17 months from Birth to EDB, will discuss option for TOLAC with Dr. Vaz  -Reviewed travel precautions including hand hygiene, masking, getting up every few hours to get the blood moving in her legs, compression socks. Qi has her flu and COVID vaccines  -Last pap 2019, NIL/HPV neg, will be due for next pap 2024.   Pt to RTO for NOB visit in 4 weeks and prn if questions or concerns    Lizzette Bustamante, JOSHUA, APRN

## 2021-09-21 ENCOUNTER — PRE VISIT (OUTPATIENT)
Dept: MATERNAL FETAL MEDICINE | Facility: CLINIC | Age: 36
End: 2021-09-21

## 2021-09-22 ENCOUNTER — OFFICE VISIT (OUTPATIENT)
Dept: MATERNAL FETAL MEDICINE | Facility: CLINIC | Age: 36
End: 2021-09-22
Attending: ADVANCED PRACTICE MIDWIFE
Payer: COMMERCIAL

## 2021-09-22 ENCOUNTER — LAB (OUTPATIENT)
Dept: LAB | Facility: CLINIC | Age: 36
End: 2021-09-22
Attending: ADVANCED PRACTICE MIDWIFE
Payer: COMMERCIAL

## 2021-09-22 ENCOUNTER — OFFICE VISIT (OUTPATIENT)
Dept: OBGYN | Facility: CLINIC | Age: 36
End: 2021-09-22
Attending: ADVANCED PRACTICE MIDWIFE
Payer: COMMERCIAL

## 2021-09-22 VITALS
HEIGHT: 63 IN | SYSTOLIC BLOOD PRESSURE: 127 MMHG | BODY MASS INDEX: 29.48 KG/M2 | DIASTOLIC BLOOD PRESSURE: 85 MMHG | HEART RATE: 73 BPM | WEIGHT: 166.4 LBS

## 2021-09-22 DIAGNOSIS — O26.90 PREGNANCY RELATED CONDITION, ANTEPARTUM: ICD-10-CM

## 2021-09-22 DIAGNOSIS — Z36.9 ENCOUNTER FOR ANTENATAL SCREENING OF MOTHER: ICD-10-CM

## 2021-09-22 DIAGNOSIS — R11.0 NAUSEA: ICD-10-CM

## 2021-09-22 DIAGNOSIS — O09.91 SUPERVISION OF HIGH RISK PREGNANCY IN FIRST TRIMESTER: Primary | ICD-10-CM

## 2021-09-22 DIAGNOSIS — Z36.9 ENCOUNTER FOR ANTENATAL SCREENING OF MOTHER: Primary | ICD-10-CM

## 2021-09-22 DIAGNOSIS — Z98.891 S/P CESAREAN SECTION: ICD-10-CM

## 2021-09-22 LAB
ABO/RH(D): NORMAL
ALT SERPL W P-5'-P-CCNC: 21 U/L (ref 0–50)
ANTIBODY SCREEN: NEGATIVE
AST SERPL W P-5'-P-CCNC: 18 U/L (ref 0–45)
CREAT UR-MCNC: 232 MG/DL
DEPRECATED CALCIDIOL+CALCIFEROL SERPL-MC: 26 UG/L (ref 20–75)
ERYTHROCYTE [DISTWIDTH] IN BLOOD BY AUTOMATED COUNT: 11.5 % (ref 10–15)
HBV SURFACE AB SERPL IA-ACNC: 109.92 M[IU]/ML
HBV SURFACE AG SERPL QL IA: NONREACTIVE
HCT VFR BLD AUTO: 40.1 % (ref 35–47)
HCV AB SERPL QL IA: NONREACTIVE
HGB BLD-MCNC: 13.5 G/DL (ref 11.7–15.7)
HIV 1+2 AB+HIV1 P24 AG SERPL QL IA: NONREACTIVE
MCH RBC QN AUTO: 28.8 PG (ref 26.5–33)
MCHC RBC AUTO-ENTMCNC: 33.7 G/DL (ref 31.5–36.5)
MCV RBC AUTO: 86 FL (ref 78–100)
PLATELET # BLD AUTO: 217 10E3/UL (ref 150–450)
PROT UR-MCNC: 0.23 G/L
PROT/CREAT 24H UR: 0.1 G/G CR (ref 0–0.2)
RBC # BLD AUTO: 4.68 10E6/UL (ref 3.8–5.2)
RUBV IGG SERPL QL IA: 6.1 INDEX
RUBV IGG SERPL QL IA: POSITIVE
SPECIMEN EXPIRATION DATE: NORMAL
T PALLIDUM AB SER QL: NONREACTIVE
URATE SERPL-MCNC: 3.6 MG/DL (ref 2.6–6)
VZV IGG SER QL IA: 1741 INDEX
VZV IGG SER QL IA: POSITIVE
WBC # BLD AUTO: 7.1 10E3/UL (ref 4–11)

## 2021-09-22 PROCEDURE — 86762 RUBELLA ANTIBODY: CPT | Performed by: ADVANCED PRACTICE MIDWIFE

## 2021-09-22 PROCEDURE — 84460 ALANINE AMINO (ALT) (SGPT): CPT | Performed by: ADVANCED PRACTICE MIDWIFE

## 2021-09-22 PROCEDURE — 99207 PR PRENATAL VISIT: CPT | Performed by: ADVANCED PRACTICE MIDWIFE

## 2021-09-22 PROCEDURE — 86803 HEPATITIS C AB TEST: CPT | Performed by: ADVANCED PRACTICE MIDWIFE

## 2021-09-22 PROCEDURE — 87340 HEPATITIS B SURFACE AG IA: CPT | Performed by: ADVANCED PRACTICE MIDWIFE

## 2021-09-22 PROCEDURE — 85027 COMPLETE CBC AUTOMATED: CPT | Performed by: ADVANCED PRACTICE MIDWIFE

## 2021-09-22 PROCEDURE — 96040 HC GENETIC COUNSELING, EACH 30 MINUTES: CPT | Performed by: GENETIC COUNSELOR, MS

## 2021-09-22 PROCEDURE — 36415 COLL VENOUS BLD VENIPUNCTURE: CPT

## 2021-09-22 PROCEDURE — 86787 VARICELLA-ZOSTER ANTIBODY: CPT | Performed by: ADVANCED PRACTICE MIDWIFE

## 2021-09-22 PROCEDURE — G0463 HOSPITAL OUTPT CLINIC VISIT: HCPCS

## 2021-09-22 PROCEDURE — 86706 HEP B SURFACE ANTIBODY: CPT | Performed by: ADVANCED PRACTICE MIDWIFE

## 2021-09-22 PROCEDURE — 36415 COLL VENOUS BLD VENIPUNCTURE: CPT | Performed by: ADVANCED PRACTICE MIDWIFE

## 2021-09-22 PROCEDURE — 87086 URINE CULTURE/COLONY COUNT: CPT | Performed by: ADVANCED PRACTICE MIDWIFE

## 2021-09-22 PROCEDURE — 86780 TREPONEMA PALLIDUM: CPT | Performed by: ADVANCED PRACTICE MIDWIFE

## 2021-09-22 PROCEDURE — 82306 VITAMIN D 25 HYDROXY: CPT | Performed by: ADVANCED PRACTICE MIDWIFE

## 2021-09-22 PROCEDURE — 87389 HIV-1 AG W/HIV-1&-2 AB AG IA: CPT | Performed by: ADVANCED PRACTICE MIDWIFE

## 2021-09-22 PROCEDURE — 84550 ASSAY OF BLOOD/URIC ACID: CPT | Performed by: ADVANCED PRACTICE MIDWIFE

## 2021-09-22 PROCEDURE — 86900 BLOOD TYPING SEROLOGIC ABO: CPT | Performed by: ADVANCED PRACTICE MIDWIFE

## 2021-09-22 PROCEDURE — 84450 TRANSFERASE (AST) (SGOT): CPT | Performed by: ADVANCED PRACTICE MIDWIFE

## 2021-09-22 PROCEDURE — 84156 ASSAY OF PROTEIN URINE: CPT | Performed by: ADVANCED PRACTICE MIDWIFE

## 2021-09-22 RX ORDER — ONDANSETRON 4 MG/1
4 TABLET, FILM COATED ORAL EVERY 8 HOURS PRN
Qty: 10 TABLET | Refills: 0 | Status: SHIPPED | OUTPATIENT
Start: 2021-09-22 | End: 2021-12-20

## 2021-09-22 ASSESSMENT — MIFFLIN-ST. JEOR: SCORE: 1423.92

## 2021-09-22 NOTE — PATIENT INSTRUCTIONS
It was a pleasure to meet you today!    For optimal nutrition/health we recommend that you:  -Take a prenatal vitamin daily  -Take 1,000mg Fish Oil or 500mg DHA daily  -Take 2,000 IU vitamin D daily  -150 mcg of Iodine daily    -Try to eat small frequent meals with a focus on protein, ideally consuming 80-100g of protein/daily.   -Aim for 1,200mg Calcium daily     -Exercise more days out of the week than not with getting your heart rate up for at least 30 minutes.     -The recommended weight gain for your pregnancy is: 15-25 lbs    Recommendations to reduce Nausea of pregnancy:    -small frequent meals, focus on protein 80-100g/day. Avoid heavy, greasy meals  -fresh air/exercise more days out of the week than not, 30 min  -Jo or peppermint tea, lozenges, gum  -Seabands  -vitamin B6 50mg three times a day, with 50mg of Unisom at night (unisom makes you sleepy)

## 2021-09-22 NOTE — PROGRESS NOTES
[facility]  Genetic Counseling Consult    Patient: Gabriele Rodriguez YOB: 1985   Date of Service: 21      Gabriele Rodriguez was seen at [facility] for genetic consultation to discuss the options for routine screening for fetal chromosome abnormalities. The patient was unaccompanied to today's visit.       Impression/Plan:   1.  Gabriele had an ultrasound and blood draw for NIPT through [facility].  Results are expected within 7-10 days, and will be available in EPIC.  We will contact her to discuss the results, and a copy will be forwarded to the office of the referring OB provider. [place] and desires to be contacted via email ([email or web address]) with a copy of the NIPT report when it becomes available and provided verbal permission for this today. She will plan to reach out with any questions. She is scheduled to return for NT ultrasound on 10/5.     2. Maternal serum AFP (single marker screen) is recommended after 15 weeks to screen for open neural tube defects. A quad screen should not be performed.    Pregnancy History:   /Parity:    Age at Delivery: 36 year old  TADEO: 2022, by Ultrasound  Gestational Age: 10w6d    No significant complications or exposures were reported in the current pregnancy.    Gabriele s pregnancy history is significant for one term c/s delivery complicated by decreased fetal movement. The couple's daughter Valeria was admitted to NICU due to respiratory distress, meconium aspiration, possible sepsis and  encephalopathy. Valeria received palliative care and passed away in infancy. Gabriele provided verbal permission for our team to enter her daughter's chart for review (MRN: [id number]). Their family met with pediatric genetics and underwent trio KIMBERLY which did not identify an explanation for Valeria's  history of hypoxic ischemic  encephalopathy. Gabriele shared she is doing well at this time but anticipates more difficulty as they near the end of the pregnancy. We reviewed availability of behavioral health clinician, Jojo Prado and Gabriele will reach out if she is interested in referral.     Medical History:   Gabriele has a history of chronic hypertension managed with labetalol.        Family History:   A three-generation pedigree was previously obtained, please see genetic counseling note from 2020. The family history was updated today and is scanned under the  Media  tab.   No new significant findings were reported by Gabriele.  Today we again reviewed Gabriele's mother's history of breast cancer diagnosed in her 60's. Gabriele's maternal grandfather was reported to have a history of pancreatic cancer diagnosed in his 70's. We discussed how most cancer seen in families occurs sporadically, but about 5-10% may be due to an underlying genetic etiology. Gabriele believes her mother underwent genetic testing for the BRCA genes which was negative. Gabriele was encouraged to share this family history information with her primary care provider to ensure appropriate screening. She was also made aware of the [facility]'s cancer risk management clinic if she or their family members are interested in more information.     Otherwise, the reported family history is negative for multiple miscarriages, stillbirths, birth defects, intellectual disability, known genetic conditions, and consanguinity.       Carrier Screening:   The patient reports that she and the father of the pregnancy have  ancestry:     Cystic fibrosis is an autosomal recessive genetic condition that occurs with increased frequency in individuals of  ancestry and carrier screening for this condition is available.  In addition,  screening in the [facility] includes cystic fibrosis.    The patient reports that she is of  Ashkenazi Adventist ancestry:     There is a group of several autosomal recessive genetic conditions that occur with increased frequency in individuals of Ashkenazi Adventist ancestry, such as Lukas Sachs disease and Canavan disease.  Carrier screening is available for a variety of these conditions.      Expanded carrier screening for mutations in a large panel of genes associated with autosomal recessive conditions including cystic fibrosis, spinal muscular atrophy, and others, is now available.      The patient has declined the carrier screening options reviewed today, however is aware that these will remain available.       Risk Assessment for Chromosome Conditions:   We explained that the risk for fetal chromosome abnormalities increases with maternal age. We discussed specific features of common chromosome abnormalities, including Down syndrome, trisomy 13, trisomy 18, and sex chromosome trisomies.      - At age 36 at midtrimester, the risk to have a baby with Down syndrome is 1 in 342.     - At age 36 at midtrimester, the risk to have a baby with any chromosome abnormality is 1 in  172.     Testing Options:   We discussed the following options:   First trimester screening    First trimester ultrasound with nuchal translucency and nasal bone assessments, maternal plasma hCG, TYSHAWN-A, and AFP measurement    Screens for fetal trisomy 21, trisomy 13, and trisomy 18    Cannot screen for open neural tube defects; maternal serum AFP after 15 weeks is recommended     Non-invasive Prenatal Testing (NIPT)    Maternal plasma cell-free DNA testing; first trimester ultrasound with nuchal translucency and nasal bone assessment is recommended, when appropriate    Screens for fetal trisomy 21, trisomy 13, trisomy 18, and sex chromosome aneuploidy    Cannot screen for open neural tube defects; maternal serum AFP after 15 weeks is recommended     Chorionic villus sampling (CVS)    Invasive procedure typically performed in the first  trimester by which placental villi are obtained for the purpose of chromosome analysis and/or other prenatal genetic analysis    Diagnostic results; >99% sensitivity for fetal chromosome abnormalities    Cannot test for open neural tube defects; maternal serum AFP after 15 weeks is recommended     Genetic Amniocentesis    Invasive procedure typically performed in the second trimester by which amniotic fluid is obtained for the purpose of chromosome analysis and/or other prenatal genetic analysis    Diagnostic results; >99% sensitivity for fetal chromosome abnormalities    AFAFP measurement tests for open neural tube defects     Comprehensive (Level II) ultrasound: Detailed ultrasound performed between 18-22 weeks gestation to screen for major birth defects and markers for aneuploidy.      We reviewed the benefits and limitations of this testing.  Screening tests provide a risk assessment specific to the pregnancy for certain fetal chromosome abnormalities, but cannot definitively diagnose or exclude a fetal chromosome abnormality.  Follow-up genetic counseling and consideration of diagnostic testing is recommended with any abnormal screening result.     Diagnostic tests carry inherent risks- including risk of miscarriage- that require careful consideration.  These tests can detect fetal chromosome abnormalities with greater than 99% certainty.  Results can be compromised by maternal cell contamination or mosaicism, and are limited by the resolution of cytogenetic G-banding technology.  There is no screening nor diagnostic test that can detect all forms of birth defects or mental disability.     It was a pleasure to be involved with Gabriele s care. Face-to-face time of the meeting was 20 minutes.    Laura Vance MS, [facility]  Licensed Genetic Counselor  [facility]  Maternal Fetal Medicine  Ph: [phone]  [email or web address]

## 2021-09-22 NOTE — LETTER
2021       RE: Gabriele Rodriguez  [address] [id number]     Dear Colleague,    Thank you for referring your patient, Gabriele Rodriguez, to the [facility] [place] at [facility]. Please see a copy of my visit note below.    [facility] OB Intake note  Subjective   36 year old female  presents to clinic for initiation of OB care. Patient's last menstrual period was 2021.  at 10w6d by Estimated Date of Delivery: 2022 based on US. Reviewed dating ultrasound. Pregnancy is planned.    LMP: 21, 12 weeks 4 days, EDB 22. Qi has a longer cycle ~35 days  US: 21: 8/0 weeks, EDB 22.   History of longer cycles, will go with EDB of 22  Trying for for two months. They waited to conceive d/t her hx of CS  CS , EDB 22 - 17 months apart.     Qi has chronic hypertension and is currently taking Labetalol 200mg BID  She has not had bleeding since her LMP.   She has had mild nausea. Taking Unisom, using ginger  Weight loss has not occurred.   This was a planned pregnancy.   Partner is involved,  Pedro   OTHER CONCERNS: Traveling to [place], hx of  demise, hx of CS    Last pap: 2019, HPV neg    - Genetic/Infection questionnaire completed, risks include none. Pt  does not have a recent known exposure to Parvo or CMV so IgG/IgM testing WILL NOT be ordered.   Recommended Flu Vaccine.  Flu Vaccine Given    - Current Medications    Current Outpatient Medications   Medication Sig Dispense Refill     ondansetron (ZOFRAN) 4 MG tablet Take 1 tablet (4 mg) by mouth every 8 hours as needed for nausea 10 tablet 0     Efinaconazole 10 % SOLN Externally apply topically daily 10 mL 10     labetalol (NORMODYNE) 200 MG tablet Take 1 tablet (200 mg) by mouth 2 times daily 180 tablet 3         - Co-morbids    Past Medical History:   Diagnosis Date     ADHD      Anxiety      Chronic sinusitis       Depressive disorder     also anxiety, on and off since high school, has tried prozac and lexapro, wellbutrin, now off meds, last on meds      Hypertension     diagnosed in med school, 2016     Varicella     as a child     - Risk for GDM : No known risk factors for GDMso  WILL NOT have an early GCT and Hgb A1C    - High risk factors for Pre E-  Chronic hypertension       - Moderate risk factor for Pre E No moderate risk factors  Meets one high risk factors or none of the moderate risk facrtors  so WILL consider starting low dose aspirin (81mg) starting between 12 and 28 weeks to prevent early onset preeclampsia    - The patient  does not have a history of spontaneous  birth so  WILL NOT consider progesterone starting at 16-20 weeks and/or serial transvaginal cervical length ultrasounds from 16-24 weeks.     -The patient does not have a history of immunosuppresion or HIV so Toxoplasma IgG/IgM WILL NOT be ordered.       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 Chronic HTN and 2 vessel cord. CS for NRFHT in early labor, thick mec, female with Apgars 1, 5, and 6. Baby Valeria sent to NICU for cooling/meconium aspiration and she later  2 months after birth. Likely she suffered from hypoxic ischemic injury prior to the onset of labor.    Qi and Pedro saw a grief therapist after the loss of their baby girl Valeria. They are no longer getting counseling. Open to seeing a therapist during pregnancy in anticipation of the challenge of grieving their loss and welcoming this new life.    Objective  -VS: reviewed and within normal limits   -General  appearance: no acute distress, patient is comfortable   NEUROLOGICAL/PSYCHIATRIC   - Orientated x3,   -Mood and affect: : normal     Assessment/Plan  Gabriele was seen today for prenatal care.    Diagnoses and all orders for this visit:    Supervision of high risk pregnancy in first trimester  -     ALT  -     AST  -     Creatinine clearance  -     Uric acid  -     Protein  random urine with Creat Ratio  -     ABO/Rh type and screen  -     CBC with platelets  -     HIV Antigen Antibody Combo  -     Hepatitis B surface antigen  -     Hepatitis B Surface Antibody  -     Hepatitis C antibody  -     Rubella Antibody IgG  -     Treponema Abs w Reflex to RPR and Titer  -     Vitamin D Deficiency  -     Urine Culture  -     Varicella Zoster Virus Antibody IgG    Nausea  -     ondansetron (ZOFRAN) 4 MG tablet; Take 1 tablet (4 mg) by mouth every 8 hours as needed for nausea        36 year old  10w6d weeks of pregnancy with TADEO of 2022 by US of Patient's last menstrual period was 2021..   Outpatient Encounter Medications as of 2021   Medication Sig Dispense Refill     ondansetron (ZOFRAN) 4 MG tablet Take 1 tablet (4 mg) by mouth every 8 hours as needed for nausea 10 tablet 0     Efinaconazole 10 % SOLN Externally apply topically daily 10 mL 10     labetalol (NORMODYNE) 200 MG tablet Take 1 tablet (200 mg) by mouth 2 times daily 180 tablet 3     [DISCONTINUED] lisdexamfetamine (VYVANSE) 20 MG capsule Take 1 capsule (20 mg) by mouth every morning 30 capsule 0     Facility-Administered Encounter Medications as of 2021   Medication Dose Route Frequency Provider Last Rate Last Admin     candida albicans skin test injection 0.3 mL  0.3 mL Intradermal Once Madison William MD         lidocaine 1 % 1 mL  1 mL Intradermal Once Ekta Corrigan, APRN CNP          Orders Placed This Encounter   Procedures     ALT     AST     Uric acid     Protein  random urine with Creat Ratio     CBC with  platelets     HIV Antigen Antibody Combo     Hepatitis B surface antigen     Hepatitis B Surface Antibody     Hepatitis C antibody     Rubella Antibody IgG     Treponema Abs w Reflex to RPR and Titer     Vitamin D Deficiency     Varicella Zoster Virus Antibody IgG     Adult Type and Screen                 Orders Placed This Encounter   Procedures     ALT     AST     Uric acid     Protein  random urine with Creat Ratio     CBC with platelets     HIV Antigen Antibody Combo     Hepatitis B surface antigen     Hepatitis B Surface Antibody     Hepatitis C antibody     Rubella Antibody IgG     Treponema Abs w Reflex to RPR and Titer     Vitamin D Deficiency     Varicella Zoster Virus Antibody IgG     Adult Type and Screen         - Oriented to Practice, types of care, and how to reach a provider.  Recommended MD care due to chronic hypertension. Qi would like to see Dr. Vaz if possible.  - Patient received 1st trimester new OB education packet complete with aide of The Expectant Family booklet including information on genetic screening test options.  - Patient desires 1st trimester screening which was ordered.  - Educational handout on the prevention of infections diseases during pregnancy provided.  - Patient was encouraged to start prenatal vitamins as tolerated.    - Patient was sent to lab for routine OB labs including HELLP panel due to hx of chronic HTN.   - Reviewed recommendation for low dose aspirin daily to prevent pre eclampsia, pt agrees, follow up at NOB visit.   - Pregnancy concerns to be addressed by provider at new OB exam include: previous C/S x1, hx of  demise, chronic HTN  -Desires first trimester/NIPS screen, referral to MFM placed.   -17 months from Birth to EDB, will discuss option for TOLAC with Dr. Vaz  -Reviewed travel precautions including hand hygiene, masking, getting up every few hours to get the blood moving in her legs, compression socks. Qi has her flu and COVID  vaccines  -Last pap 11/2019, NIL/HPV neg, will be due for next pap 11/2024.   Pt to RTO for NOB visit in 4 weeks and prn if questions or concerns    Lizzette Bustamante, JOSHUA, APRN

## 2021-09-23 LAB — BACTERIA UR CULT: NO GROWTH

## 2021-09-29 ENCOUNTER — TELEPHONE (OUTPATIENT)
Dept: MATERNAL FETAL MEDICINE | Facility: CLINIC | Age: 36
End: 2021-09-29

## 2021-09-29 LAB — SCANNED LAB RESULT: NORMAL

## 2021-09-29 NOTE — TELEPHONE ENCOUNTER
September 29, 2021  A copy of NIPT report was sent to Gabriele as requested as patient is currently out of the country and not reachable by phone.     Results indicate NO ANEUPLOIDY DETECTED for chromosomes 21, 18, 13, or sex chromosomes.    This puts her current pregnancy at low risk for Down syndrome, trisomy 18, trisomy 13 and sex chromosome abnormalities. This test is reported to have the following sensitivities: Down syndrome: 99.99%, trisomy 18: 99.99%, and trisomy 13: 99.99%. Although these results are reassuring, this does not replace a standard chromosome analysis from a chorionic villus sampling or amniocentesis.     MSAFP is the appropriate second trimester screening test for open neural tube defects; the maternal quad screen is not recommended.    Her results are available in her Epic chart for her primary OB to review.     Laura Vance MS, [facility]  Licensed Genetic Counselor  [facility]  Maternal Fetal Medicine  Ph: [phone]  [email or web address]

## 2021-10-10 ENCOUNTER — HEALTH MAINTENANCE LETTER (OUTPATIENT)
Age: 36
End: 2021-10-10

## 2021-10-12 ENCOUNTER — HOSPITAL ENCOUNTER (OUTPATIENT)
Dept: ULTRASOUND IMAGING | Facility: CLINIC | Age: 36
End: 2021-10-12
Attending: ADVANCED PRACTICE MIDWIFE
Payer: COMMERCIAL

## 2021-10-12 ENCOUNTER — OFFICE VISIT (OUTPATIENT)
Dept: MATERNAL FETAL MEDICINE | Facility: CLINIC | Age: 36
End: 2021-10-12
Attending: ADVANCED PRACTICE MIDWIFE
Payer: COMMERCIAL

## 2021-10-12 DIAGNOSIS — O09.293 PREGNANCY WITH PRIOR ADVERSE OUTCOME IN THIRD TRIMESTER: Primary | ICD-10-CM

## 2021-10-12 DIAGNOSIS — O26.90 PREGNANCY RELATED CONDITION, ANTEPARTUM: ICD-10-CM

## 2021-10-12 PROCEDURE — 76813 OB US NUCHAL MEAS 1 GEST: CPT

## 2021-10-12 PROCEDURE — 76813 OB US NUCHAL MEAS 1 GEST: CPT | Mod: 26 | Performed by: OBSTETRICS & GYNECOLOGY

## 2021-10-12 NOTE — PROGRESS NOTES
Please see full imaging report from ViewPoint program under imaging tab.    Thank-you for referring your patient for a nuchal translucency ultrasound. She previously had a genetic counseling session and underwent NIPT, which was low risk for the most common fetal aneuploidies.     Her first pregnancy resulted in a C/S in early labor at 37w2d. She was admitted after decreased fetal movement, with non-reactive NST and was delivered due to category 2 FHR tracing remote from delivery.  That infant was born with low Apgar score,thick meconium and required cooling, and ultimately passed away at 2 months of age with suspected HIE.      Anatomic ultrasound is recommended at 18 weeks.  The patient should be offered MSAFP screening at 15-20 weeks gestation to screen for open neural tube defects.     Given her history, consideration can be given to antepartum fetal surveillance to start at least one week prior to the inciting event for her first delivery (i.e. 36 weeks or sooner)-this can be via BPP or NST.     Return to primary provider for continued prenatal care.    If you have questions regarding today's evaluation or if we can be of further service, please contact the M[facility]Fetal Medicine Center.    Caitlin Chu MD  Maternal Fetal Medicine

## 2021-11-08 ASSESSMENT — ANXIETY QUESTIONNAIRES
3. WORRYING TOO MUCH ABOUT DIFFERENT THINGS: SEVERAL DAYS
7. FEELING AFRAID AS IF SOMETHING AWFUL MIGHT HAPPEN: NOT AT ALL
GAD7 TOTAL SCORE: 3
5. BEING SO RESTLESS THAT IT IS HARD TO SIT STILL: SEVERAL DAYS
GAD7 TOTAL SCORE: 3
6. BECOMING EASILY ANNOYED OR IRRITABLE: NOT AT ALL
8. IF YOU CHECKED OFF ANY PROBLEMS, HOW DIFFICULT HAVE THESE MADE IT FOR YOU TO DO YOUR WORK, TAKE CARE OF THINGS AT HOME, OR GET ALONG WITH OTHER PEOPLE?: NOT DIFFICULT AT ALL
7. FEELING AFRAID AS IF SOMETHING AWFUL MIGHT HAPPEN: NOT AT ALL
4. TROUBLE RELAXING: NOT AT ALL
2. NOT BEING ABLE TO STOP OR CONTROL WORRYING: NOT AT ALL
GAD7 TOTAL SCORE: 3
1. FEELING NERVOUS, ANXIOUS, OR ON EDGE: SEVERAL DAYS

## 2021-11-11 ENCOUNTER — HOSPITAL ENCOUNTER (OUTPATIENT)
Dept: ULTRASOUND IMAGING | Facility: CLINIC | Age: 36
End: 2021-11-11
Attending: ADVANCED PRACTICE MIDWIFE
Payer: COMMERCIAL

## 2021-11-11 ENCOUNTER — OFFICE VISIT (OUTPATIENT)
Dept: MATERNAL FETAL MEDICINE | Facility: CLINIC | Age: 36
End: 2021-11-11
Attending: ADVANCED PRACTICE MIDWIFE
Payer: COMMERCIAL

## 2021-11-11 ENCOUNTER — OFFICE VISIT (OUTPATIENT)
Dept: OBGYN | Facility: CLINIC | Age: 36
End: 2021-11-11
Attending: ADVANCED PRACTICE MIDWIFE
Payer: COMMERCIAL

## 2021-11-11 VITALS
DIASTOLIC BLOOD PRESSURE: 84 MMHG | SYSTOLIC BLOOD PRESSURE: 128 MMHG | BODY MASS INDEX: 29.95 KG/M2 | WEIGHT: 169 LBS | HEART RATE: 74 BPM | HEIGHT: 63 IN

## 2021-11-11 DIAGNOSIS — O10.919 CHRONIC HYPERTENSION AFFECTING PREGNANCY: ICD-10-CM

## 2021-11-11 DIAGNOSIS — O26.90 PREGNANCY RELATED CONDITION, ANTEPARTUM: ICD-10-CM

## 2021-11-11 DIAGNOSIS — R11.0 NAUSEA: ICD-10-CM

## 2021-11-11 DIAGNOSIS — O10.912 MATERNAL CHRONIC HYPERTENSION IN SECOND TRIMESTER: Primary | ICD-10-CM

## 2021-11-11 DIAGNOSIS — O09.92 SUPERVISION OF HIGH RISK PREGNANCY IN SECOND TRIMESTER: Primary | ICD-10-CM

## 2021-11-11 DIAGNOSIS — E55.9 VITAMIN D DEFICIENCY: ICD-10-CM

## 2021-11-11 PROCEDURE — 87591 N.GONORRHOEAE DNA AMP PROB: CPT | Performed by: ADVANCED PRACTICE MIDWIFE

## 2021-11-11 PROCEDURE — 87491 CHLMYD TRACH DNA AMP PROBE: CPT | Performed by: ADVANCED PRACTICE MIDWIFE

## 2021-11-11 PROCEDURE — G0463 HOSPITAL OUTPT CLINIC VISIT: HCPCS | Mod: 25

## 2021-11-11 PROCEDURE — 76811 OB US DETAILED SNGL FETUS: CPT

## 2021-11-11 PROCEDURE — 76811 OB US DETAILED SNGL FETUS: CPT | Mod: 26 | Performed by: OBSTETRICS & GYNECOLOGY

## 2021-11-11 PROCEDURE — 99207 PR PRENATAL VISIT: CPT | Performed by: ADVANCED PRACTICE MIDWIFE

## 2021-11-11 ASSESSMENT — ANXIETY QUESTIONNAIRES
6. BECOMING EASILY ANNOYED OR IRRITABLE: NOT AT ALL
7. FEELING AFRAID AS IF SOMETHING AWFUL MIGHT HAPPEN: NOT AT ALL
3. WORRYING TOO MUCH ABOUT DIFFERENT THINGS: SEVERAL DAYS
1. FEELING NERVOUS, ANXIOUS, OR ON EDGE: SEVERAL DAYS
2. NOT BEING ABLE TO STOP OR CONTROL WORRYING: NOT AT ALL
5. BEING SO RESTLESS THAT IT IS HARD TO SIT STILL: NOT AT ALL
GAD7 TOTAL SCORE: 2

## 2021-11-11 ASSESSMENT — MIFFLIN-ST. JEOR: SCORE: 1435.71

## 2021-11-11 ASSESSMENT — PATIENT HEALTH QUESTIONNAIRE - PHQ9: 5. POOR APPETITE OR OVEREATING: NOT AT ALL

## 2021-11-11 NOTE — PROGRESS NOTES
Please refer to ultrasound report under 'Imaging' Studies of 'Chart Review' tabs.    Mic Hightower M.D.

## 2021-11-11 NOTE — PROGRESS NOTES
SUBJECTIVE:   Gabriele is a 36 year old female who presents to clinic for a new OB visit.   at 18w0d with Estimated Date of Delivery: 2022 based on dating ultrasound. Feels well. Has started PNV.     She has not had bleeding since her LMP.   She has had mild nausea, breast tenderness. Weight loss has not occurred.   This was a planned pregnancy.   Partner is involved,   Pedro     OTHER CONCERNS:   - Hx of  death  - Hx of PLTCS   20: IOL for decreased fetal movement at 37 weeks, non-reactive NST. Chronic HTN and 2 vessel cord. CS for NRFHT in early labor, thick mec, female with Apgars 1, 5, and 6. Baby Valeria sent to NICU for cooling/meconium aspiration and she later  2 months after birth.   - CHTN on 200 labetalol BID  - vitamin d deficiency= 26  - Level 2 ultrasound today- was told that placenta was posterior but note states anterior- would like clarification    ===========================================   ROS: 10 point ROS neg other than the symptoms noted above in the HPI.      PSYCHIATRIC:   Qi has had depression and some anxiety on and off since high school, has tried prozac and lexapro, wellbutrin, now off meds, last on meds        PHQ-9 score:    PHQ-9 SCORE 2020   PHQ-9 Total Score 1           ALVIN-7 SCORE 2021   Total Score 1 (minimal anxiety) 3 (minimal anxiety) -   Total Score 1 3 2         Past History:  Her past medical history   Past Medical History:   Diagnosis Date     ADHD      Anxiety      Chronic sinusitis      Depressive disorder     also anxiety, on and off since high school, has tried prozac and lexapro, wellbutrin, now off meds, last on meds      History of  section      History of  death      Hypertension     diagnosed in med school, 2016     Varicella     as a child   .     Since her last LMP she denies use of alcohol, tobacco and street drugs.  HISTORY:  Family History   Problem Relation Age of  Onset     Hypertension Mother      Breast Cancer Mother         ductal stage 2, HER-2 negative, (+) estrogen/progesterone, BRCA negative     Skin Cancer Mother         basal cell     No Known Problems Father      No Known Problems Sister      Pancreatic Cancer Maternal Grandfather      Brain Cancer Paternal Aunt      Melanoma No family hx of      Social History     Socioeconomic History     Marital status:      Spouse name: Pedro     Number of children: Not on file     Years of education: Not on file     Highest education level: Not on file   Occupational History     Occupation: Medical resident     Comment: pathology   Tobacco Use     Smoking status: Never Smoker     Smokeless tobacco: Never Used   Substance and Sexual Activity     Alcohol use: Not Currently     Comment: Not while pregnant     Drug use: Never     Sexual activity: Yes     Partners: Male     Birth control/protection: None   Other Topics Concern     Parent/sibling w/ CABG, MI or angioplasty before 65F 55M? 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         demise 2020        Chief Resident      Social Determinants of Health     Financial Resource Strain: Not on file   Food Insecurity: Not on file   Transportation Needs: Not on file   Physical Activity: Not on file   Stress: Not on file   Social Connections: Not on file   Intimate Partner Violence: Not on file   Housing Stability: Not on file     Current Outpatient Medications   Medication Sig     ASPIRIN 81 PO      labetalol (NORMODYNE) 200 MG tablet Take 1 tablet (200 mg) by mouth 2 times daily     Prenatal Vit-Fe Fumarate-FA (PRENATAL PO)      Efinaconazole 10 % SOLN Externally apply topically daily (Patient not taking: Reported on 2021)     ondansetron (ZOFRAN) 4 MG tablet Take 1 tablet (4 mg) by mouth every 8 hours as needed for nausea (Patient not taking: Reported on 2021)     Current Facility-Administered Medications   Medication     candida  "albicans skin test injection 0.3 mL     lidocaine 1 % 1 mL     Allergies   Allergen Reactions     Nickel Itching       ============================================  MEDICAL HISTORY  Past Medical History:   Diagnosis Date     ADHD      Anxiety      Chronic sinusitis      Depressive disorder     also anxiety, on and off since high school, has tried prozac and lexapro, wellbutrin, now off meds, last on meds      History of  section      History of  death      Hypertension     diagnosed in med school, 2016     Varicella     as a child     Past Surgical History:   Procedure Laterality Date     BIOPSY NAIL (LOCATION)      left hand, 4th digit      SECTION N/A 2020    Procedure:  SECTION;  Surgeon: Jojo Vaz MD;  Location: [facility]       OB History    Para Term  AB Living   2 1 1 0 0 0   SAB IAB Ectopic Multiple Live Births   0 0 0 0 1      # Outcome Date GA Lbr Jelani/2nd Weight Sex Delivery Anes PTL Lv   2 Current            1 Term 20 37w2d  2.62 kg (5 lb 12.4 oz) F CS-LTranv   ND      Name: Valeria      Apgar1: 1  Apgar5: 5         GYN History- Last pap 19 NIL, neg HPV     Abnormal Pap Smears: none                        Cervical procedures: none                        History of STI: none    I personally reviewed the past social/family/medical and surgical history on the date of service.   I reviewed lab work done at Intake visit with patient.    EXAM:  /84 (BP Location: Left arm, Patient Position: Chair)   Pulse 74   Ht 1.6 m (5' 3\")   Wt 76.7 kg (169 lb)   LMP 2021   BMI 29.94 kg/m     EXAM:  GENERAL:  Pleasant pregnant female, alert, cooperative and well groomed.  SKIN:  Warm and dry, without lesions or rashes  HEAD: Symmetrical features.  MOUTH:  deferred  NECK:  Thyroid without enlargement and nodules.  Lymph nodes not palpable.   LUNGS:  Clear to auscultation.  BREAST: deferred     HEART:  RRR without murmur.  ABDOMEN: Soft " without masses , tenderness or organomegaly.  No CVA tenderness.  Uterus palpable at size equal to dates.  Well healed scar from  section. Fetal heart tones present.  MUSCULOSKELETAL:  Full range of motion  EXTREMITIES:  No edema. No significant varicosities.   PELVIC EXAM: deferred  WET PREP:Not done  GC/CHLAMYDIA CULTURE OBTAINED:YES      ASSESSMENT:  36 year old , 18w0d weeks of pregnancy with TADEO of 2022 by dating ultrasound  Intrauterine pregnancy 18w0d size consistent with dates  Genetic Screening: Level 2 Ultrasound     ICD-10-CM    1. Supervision of high risk pregnancy in second trimester  O09.92 Chlamydia trachomatis PCR     Gonorrhea PCR   2. Nausea  R11.0    3. Chronic hypertension affecting pregnancy  O10.919    4. Vitamin D deficiency  E55.9        PLAN:  - Reviewed use of triage nurse line and contacting the on-call provider after hours for an urgent need such as fever, vagina bleeding, bladder or vaginal infection, rupture of membranes,  or term labor.    - Reviewed best evidence for: weight gain for her weight and height for pregnancy:  Based on pre-pregnancy Body mass index is 29.94 kg/m .    - Reviewed healthy diet and foods to avoid, exercise and activity during pregnancy; avoiding exposure to toxoplasmosis; and maintenance of a generally healthy lifestyle.   - Discussed the harms, benefits, side effects and alternative therapies for current prescribed and OTC medications.    - All pt's questions discussed and answered.  Pt verbalized understanding of and agreement to plan of care.     - OBI labs reviewed.  - Recommended [id number] international unit(s)/day vitamin D supplementation.  - Pap up to date.  - GC/CT collected.  - Continue daily aspirin for pre-e prevention.  - Level 2 ultrasound today- will follow up re: placenta location.  - Reviewed  surveillance and delivery timing recommendations for CHTN and ob hx- growth [id number], weekly bpps at 32 weeks,  delivery by 39.  - Will discuss timing and route of delivery with MD.    - Continue scheduled prenatal care, RTC in 4 weeks and prn if questions or concerns    Babar Villagomez CNM          Answers for HPI/ROS submitted by the patient on 11/8/2021  ALVIN 7 TOTAL SCORE: 3  General Symptoms: No  Skin Symptoms: No  HENT Symptoms: No  EYE SYMPTOMS: No  HEART SYMPTOMS: No  LUNG SYMPTOMS: No  INTESTINAL SYMPTOMS: No  URINARY SYMPTOMS: No  GYNECOLOGIC SYMPTOMS: No  BREAST SYMPTOMS: No  SKELETAL SYMPTOMS: No  BLOOD SYMPTOMS: No  NERVOUS SYSTEM SYMPTOMS: No  MENTAL HEALTH SYMPTOMS: No

## 2021-11-11 NOTE — LETTER
2021       RE: Gabriele Rodriguez  [address] [facility] [address]     Dear Colleague,    Thank you for referring your patient, Gabriele Rodriguez, to the [facility] [place] at [facility]. Please see a copy of my visit note below.    SUBJECTIVE:   Gabriele is a 36 year old female who presents to clinic for a new OB visit.   at 18w0d with Estimated Date of Delivery: 2022 based on dating ultrasound. Feels well. Has started PNV.     She has not had bleeding since her LMP.   She has had mild nausea, breast tenderness. Weight loss has not occurred.   This was a planned pregnancy.   Partner is involved,   Pedro     OTHER CONCERNS:   - Hx of  death  - Hx of PLTCS   20: IOL for decreased fetal movement at 37 weeks, non-reactive NST. Chronic HTN and 2 vessel cord. CS for NRFHT in early labor, thick mec, female with Apgars 1, 5, and 6. Baby Valeria sent to NICU for cooling/meconium aspiration and she later  2 months after birth.   - CHTN on 200 labetalol BID  - vitamin d deficiency= 26  - Level 2 ultrasound today- was told that placenta was posterior but note states anterior- would like clarification    ===========================================   ROS: 10 point ROS neg other than the symptoms noted above in the HPI.      PSYCHIATRIC:   Qi has had depression and some anxiety on and off since high school, has tried prozac and lexapro, wellbutrin, now off meds, last on meds        PHQ-9 score:    PHQ-9 SCORE 2020   PHQ-9 Total Score 1           ALVIN-7 SCORE 2021   Total Score 1 (minimal anxiety) 3 (minimal anxiety) -   Total Score 1 3 2         Past History:  Her past medical history   Past Medical History:   Diagnosis Date     ADHD      Anxiety      Chronic sinusitis      Depressive disorder     also anxiety, on and off since high school, has tried prozac and lexapro,  wellbutrin, now off meds, last on meds 2018     History of  section      History of  death      Hypertension     diagnosed in med school, 2016     Varicella     as a child   .     Since her last LMP she denies use of alcohol, tobacco and street drugs.  HISTORY:  Family History   Problem Relation Age of Onset     Hypertension Mother      Breast Cancer Mother         ductal stage 2, HER-2 negative, (+) estrogen/progesterone, BRCA negative     Skin Cancer Mother         basal cell     No Known Problems Father      No Known Problems Sister      Pancreatic Cancer Maternal Grandfather      Brain Cancer Paternal Aunt      Melanoma No family hx of      Social History     Socioeconomic History     Marital status:      Spouse name: Pedro     Number of children: Not on file     Years of education: Not on file     Highest education level: Not on file   Occupational History     Occupation: Medical resident     Comment: pathology   Tobacco Use     Smoking status: Never Smoker     Smokeless tobacco: Never Used   Substance and Sexual Activity     Alcohol use: Not Currently     Comment: Not while pregnant     Drug use: Never     Sexual activity: Yes     Partners: Male     Birth control/protection: None   Other Topics Concern     Parent/sibling w/ CABG, MI or angioplasty before 65F 55M? 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         demise 2020        Chief Resident      Social Determinants of Health     Financial Resource Strain: Not on file   Food Insecurity: Not on file   Transportation Needs: Not on file   Physical Activity: Not on file   Stress: Not on file   Social Connections: Not on file   Intimate Partner Violence: Not on file   Housing Stability: Not on file     Current Outpatient Medications   Medication Sig     ASPIRIN 81 PO      labetalol (NORMODYNE) 200 MG tablet Take 1 tablet (200 mg) by mouth 2 times daily     Prenatal Vit-Fe Fumarate-FA (PRENATAL PO)       "Efinaconazole 10 % SOLN Externally apply topically daily (Patient not taking: Reported on 2021)     ondansetron (ZOFRAN) 4 MG tablet Take 1 tablet (4 mg) by mouth every 8 hours as needed for nausea (Patient not taking: Reported on 2021)     Current Facility-Administered Medications   Medication     candida albicans skin test injection 0.3 mL     lidocaine 1 % 1 mL     Allergies   Allergen Reactions     Nickel Itching       ============================================  MEDICAL HISTORY  Past Medical History:   Diagnosis Date     ADHD      Anxiety      Chronic sinusitis      Depressive disorder     also anxiety, on and off since high school, has tried prozac and lexapro, wellbutrin, now off meds, last on meds      History of  section      History of  death      Hypertension     diagnosed in med school, 2016     Varicella     as a child     Past Surgical History:   Procedure Laterality Date     BIOPSY NAIL (LOCATION)      left hand, 4th digit      SECTION N/A 2020    Procedure:  SECTION;  Surgeon: Jojo Vaz MD;  [facility]       OB History    Para Term  AB Living   2 1 1 0 0 0   SAB IAB Ectopic Multiple Live Births   0 0 0 0 1      # Outcome Date GA Lbr Jelani/2nd Weight Sex Delivery Anes PTL Lv   2 Current            1 Term 20 37w2d  2.62 kg (5 lb 12.4 oz) F CS-LTranv   ND      Name: Valeria      Apgar1: 1  Apgar5: 5         GYN History- Last pap 19 NIL, neg HPV     Abnormal Pap Smears: none                        Cervical procedures: none                        History of STI: none    I personally reviewed the past social/family/medical and surgical history on the date of service.   I reviewed lab work done at Intake visit with patient.    EXAM:  /84 (BP Location: Left arm, Patient Position: Chair)   Pulse 74   Ht 1.6 m (5' 3\")   Wt 76.7 kg (169 lb)   LMP 2021   BMI 29.94 kg/m     EXAM:  GENERAL:  Pleasant " pregnant female, alert, cooperative and well groomed.  SKIN:  Warm and dry, without lesions or rashes  HEAD: Symmetrical features.  MOUTH:  deferred  NECK:  Thyroid without enlargement and nodules.  Lymph nodes not palpable.   LUNGS:  Clear to auscultation.  BREAST: deferred     HEART:  RRR without murmur.  ABDOMEN: Soft without masses , tenderness or organomegaly.  No CVA tenderness.  Uterus palpable at size equal to dates.  Well healed scar from  section. Fetal heart tones present.  MUSCULOSKELETAL:  Full range of motion  EXTREMITIES:  No edema. No significant varicosities.   PELVIC EXAM: deferred  WET PREP:Not done  GC/CHLAMYDIA CULTURE OBTAINED:YES      ASSESSMENT:  36 year old , 18w0d weeks of pregnancy with TADEO of 2022 by dating ultrasound  Intrauterine pregnancy 18w0d size consistent with dates  Genetic Screening: Level 2 Ultrasound     ICD-10-CM    1. Supervision of high risk pregnancy in second trimester  O09.92 Chlamydia trachomatis PCR     Gonorrhea PCR   2. Nausea  R11.0    3. Chronic hypertension affecting pregnancy  O10.919    4. Vitamin D deficiency  E55.9        PLAN:  - Reviewed use of triage nurse line and contacting the on-call provider after hours for an urgent need such as fever, vagina bleeding, bladder or vaginal infection, rupture of membranes,  or term labor.    - Reviewed best evidence for: weight gain for her weight and height for pregnancy:  Based on pre-pregnancy Body mass index is 29.94 kg/m .    - Reviewed healthy diet and foods to avoid, exercise and activity during pregnancy; avoiding exposure to toxoplasmosis; and maintenance of a generally healthy lifestyle.   - Discussed the harms, benefits, side effects and alternative therapies for current prescribed and OTC medications.    - All pt's questions discussed and answered.  Pt verbalized understanding of and agreement to plan of care.     - OBI labs reviewed.  - Recommended [id number] international  unit(s)/day vitamin D supplementation.  - Pap up to date.  - GC/CT collected.  - Continue daily aspirin for pre-e prevention.  - Level 2 ultrasound today- will follow up re: placenta location.  - Reviewed  surveillance and delivery timing recommendations for CHTN and ob hx- growth [id number], weekly bpps at 32 weeks, delivery by 39.  - Will discuss timing and route of delivery with MD.    - Continue scheduled prenatal care, RTC in 4 weeks and prn if questions or concerns    Babar Villagomez CNM          Answers for HPI/ROS submitted by the patient on 2021  ALVIN 7 TOTAL SCORE: 3  General Symptoms: No  Skin Symptoms: No  HENT Symptoms: No  EYE SYMPTOMS: No  HEART SYMPTOMS: No  LUNG SYMPTOMS: No  INTESTINAL SYMPTOMS: No  URINARY SYMPTOMS: No  GYNECOLOGIC SYMPTOMS: No  BREAST SYMPTOMS: No  SKELETAL SYMPTOMS: No  BLOOD SYMPTOMS: No  NERVOUS SYSTEM SYMPTOMS: No  MENTAL HEALTH SYMPTOMS: No

## 2021-11-12 LAB
C TRACH DNA SPEC QL NAA+PROBE: NEGATIVE
N GONORRHOEA DNA SPEC QL NAA+PROBE: NEGATIVE

## 2021-11-15 ENCOUNTER — TELEPHONE (OUTPATIENT)
Dept: OBGYN | Facility: CLINIC | Age: 36
End: 2021-11-15
Payer: COMMERCIAL

## 2021-11-15 DIAGNOSIS — O09.92 SUPERVISION OF HIGH RISK PREGNANCY IN SECOND TRIMESTER: Primary | ICD-10-CM

## 2021-11-15 NOTE — TELEPHONE ENCOUNTER
[facility] Call Center    Phone Message    May a detailed message be left on voicemail: yes     Reason for Call: Order(s): Other:   Reason for requested: ultrasound  Date needed: in 4 weeks  Provider name: Babar    Jose Cruz had a new OB appt on 11/11/2021, she states she needs a repeat US  In 4 weeks, please call pt once order has been placed, thank you      Action Taken: Message routed to:  Other: devaughn rn    Travel Screening: Not Applicable

## 2021-11-15 NOTE — TELEPHONE ENCOUNTER
Left message for patient to call back and schedule ultrasound as noted.    Sushila Freeman  Clinical Services Assistant

## 2021-11-19 PROBLEM — O09.293: Status: RESOLVED | Noted: 2021-10-12 | Resolved: 2021-11-19

## 2021-11-19 PROBLEM — I10 BENIGN ESSENTIAL HYPERTENSION: Status: RESOLVED | Noted: 2018-08-10 | Resolved: 2021-11-19

## 2021-11-19 PROBLEM — E55.9 VITAMIN D DEFICIENCY: Status: ACTIVE | Noted: 2021-11-19

## 2021-12-17 ASSESSMENT — ANXIETY QUESTIONNAIRES
2. NOT BEING ABLE TO STOP OR CONTROL WORRYING: NOT AT ALL
3. WORRYING TOO MUCH ABOUT DIFFERENT THINGS: SEVERAL DAYS
7. FEELING AFRAID AS IF SOMETHING AWFUL MIGHT HAPPEN: NOT AT ALL
6. BECOMING EASILY ANNOYED OR IRRITABLE: NOT AT ALL
7. FEELING AFRAID AS IF SOMETHING AWFUL MIGHT HAPPEN: NOT AT ALL
4. TROUBLE RELAXING: NOT AT ALL
GAD7 TOTAL SCORE: 3
GAD7 TOTAL SCORE: 3
5. BEING SO RESTLESS THAT IT IS HARD TO SIT STILL: SEVERAL DAYS
1. FEELING NERVOUS, ANXIOUS, OR ON EDGE: SEVERAL DAYS

## 2021-12-18 ASSESSMENT — ANXIETY QUESTIONNAIRES: GAD7 TOTAL SCORE: 3

## 2021-12-20 ENCOUNTER — OFFICE VISIT (OUTPATIENT)
Dept: OBGYN | Facility: CLINIC | Age: 36
End: 2021-12-20
Attending: OBSTETRICS & GYNECOLOGY
Payer: COMMERCIAL

## 2021-12-20 ENCOUNTER — ANCILLARY PROCEDURE (OUTPATIENT)
Dept: ULTRASOUND IMAGING | Facility: CLINIC | Age: 36
End: 2021-12-20
Attending: ADVANCED PRACTICE MIDWIFE
Payer: COMMERCIAL

## 2021-12-20 VITALS
BODY MASS INDEX: 31 KG/M2 | WEIGHT: 175 LBS | SYSTOLIC BLOOD PRESSURE: 135 MMHG | HEART RATE: 73 BPM | DIASTOLIC BLOOD PRESSURE: 80 MMHG

## 2021-12-20 DIAGNOSIS — R11.0 NAUSEA: ICD-10-CM

## 2021-12-20 DIAGNOSIS — O09.92 SUPERVISION OF HIGH RISK PREGNANCY IN SECOND TRIMESTER: ICD-10-CM

## 2021-12-20 PROCEDURE — G0463 HOSPITAL OUTPT CLINIC VISIT: HCPCS

## 2021-12-20 PROCEDURE — 99207 PR PRENATAL VISIT: CPT | Performed by: OBSTETRICS & GYNECOLOGY

## 2021-12-20 PROCEDURE — 76816 OB US FOLLOW-UP PER FETUS: CPT | Mod: 26 | Performed by: OBSTETRICS & GYNECOLOGY

## 2021-12-20 PROCEDURE — 76816 OB US FOLLOW-UP PER FETUS: CPT

## 2021-12-20 ASSESSMENT — PAIN SCALES - GENERAL: PAINLEVEL: NO PAIN (0)

## 2021-12-20 NOTE — NURSING NOTE
Chief Complaint   Patient presents with     Prenatal Care     HARRY 23 weeks and 4 days   Isaura German LPN

## 2021-12-20 NOTE — LETTER
2021       RE: Gabriele Rodriguez  [address] [facility] [address]     Dear Colleague,    Thank you for referring your patient, Gabriele Rodriguez, to the [facility] [place] at [facility]. Please see a copy of my visit note below.    Feeling well. No complaints.  Wants to discuss delivery options. Leaning toward rc/s.      /80   Pulse 73   Wt 79.4 kg (175 lb)   LMP 2021   Breastfeeding No   BMI 31.00 kg/m     See flow    A/p: 37 yo  at 23+4 wks, HARRY.  H/o c/s in previous preg and  death.      1. PNC: utd.  Discussed GCT at next visit.    2. CHTN.  Baby ASA.  On labetalol 200 bid.  Plan serial growth ultrasounds and twice weekly  testing starting at 32 weeks. Plan delivery 38-39 wks.     3. H/o c/s. Planning to repeat and will schedule as date gets closer.     4. F/u 4 wks.      Ava Neri MD, FACOG  (she/her/hers)    Department of Ob/Gyn/Women's Health  [employer]  [place] [facility]  [address] [place], [place] [address]. [place] [address]  [email or web address]  p. [phone]  f. [phone]  2021  2:21 PM

## 2021-12-21 NOTE — PROGRESS NOTES
Feeling well. No complaints.  Wants to discuss delivery options. Leaning toward rc/s.      /80   Pulse 73   Wt 79.4 kg (175 lb)   LMP 2021   Breastfeeding No   BMI 31.00 kg/m     See flow    A/p: 37 yo  at 23+4 wks, HARRY.  H/o c/s in previous preg and  death.      1. PNC: utd.  Discussed GCT at next visit.    2. CHTN.  Baby ASA.  On labetalol 200 bid.  Plan serial growth ultrasounds and twice weekly  testing starting at 32 weeks. Plan delivery 38-39 wks.     3. H/o c/s. Planning to repeat and will schedule as date gets closer.     4. F/u 4 wks.      Ava Neri MD, FACOG  (she/her/hers)    Department of Ob/Gyn/Women's Health  [employer]  [place] [facility]  [address] [place], [place] [address]. [place] [address]  [email or web address]  p. [phone]  f. [phone]  2021  2:21 PM

## 2022-01-18 DIAGNOSIS — O09.91 SUPERVISION OF HIGH RISK PREGNANCY IN FIRST TRIMESTER: Primary | ICD-10-CM

## 2022-01-19 ASSESSMENT — ANXIETY QUESTIONNAIRES
7. FEELING AFRAID AS IF SOMETHING AWFUL MIGHT HAPPEN: NOT AT ALL
GAD7 TOTAL SCORE: 2
7. FEELING AFRAID AS IF SOMETHING AWFUL MIGHT HAPPEN: NOT AT ALL
1. FEELING NERVOUS, ANXIOUS, OR ON EDGE: SEVERAL DAYS
4. TROUBLE RELAXING: NOT AT ALL
2. NOT BEING ABLE TO STOP OR CONTROL WORRYING: NOT AT ALL
3. WORRYING TOO MUCH ABOUT DIFFERENT THINGS: SEVERAL DAYS
5. BEING SO RESTLESS THAT IT IS HARD TO SIT STILL: NOT AT ALL
6. BECOMING EASILY ANNOYED OR IRRITABLE: NOT AT ALL
GAD7 TOTAL SCORE: 2

## 2022-01-20 ASSESSMENT — ANXIETY QUESTIONNAIRES: GAD7 TOTAL SCORE: 2

## 2022-01-21 ENCOUNTER — OFFICE VISIT (OUTPATIENT)
Dept: OBGYN | Facility: CLINIC | Age: 37
End: 2022-01-21
Attending: ADVANCED PRACTICE MIDWIFE
Payer: COMMERCIAL

## 2022-01-21 ENCOUNTER — ANCILLARY PROCEDURE (OUTPATIENT)
Dept: ULTRASOUND IMAGING | Facility: CLINIC | Age: 37
End: 2022-01-21
Attending: ADVANCED PRACTICE MIDWIFE
Payer: COMMERCIAL

## 2022-01-21 VITALS
HEART RATE: 82 BPM | BODY MASS INDEX: 32.07 KG/M2 | SYSTOLIC BLOOD PRESSURE: 121 MMHG | HEIGHT: 63 IN | DIASTOLIC BLOOD PRESSURE: 77 MMHG | WEIGHT: 181 LBS

## 2022-01-21 DIAGNOSIS — O09.93 SUPERVISION OF HIGH RISK PREGNANCY IN THIRD TRIMESTER: Primary | ICD-10-CM

## 2022-01-21 DIAGNOSIS — O09.91 SUPERVISION OF HIGH RISK PREGNANCY IN FIRST TRIMESTER: ICD-10-CM

## 2022-01-21 PROBLEM — O99.810 ABNORMAL O'SULLIVAN GLUCOSE CHALLENGE TEST, ANTEPARTUM: Status: ACTIVE | Noted: 2022-01-21

## 2022-01-21 PROBLEM — R11.0 NAUSEA: Status: RESOLVED | Noted: 2021-09-22 | Resolved: 2022-01-21

## 2022-01-21 PROCEDURE — 90715 TDAP VACCINE 7 YRS/> IM: CPT

## 2022-01-21 PROCEDURE — G0463 HOSPITAL OUTPT CLINIC VISIT: HCPCS

## 2022-01-21 PROCEDURE — 99207 PR PRENATAL VISIT: CPT | Performed by: ADVANCED PRACTICE MIDWIFE

## 2022-01-21 PROCEDURE — 90471 IMMUNIZATION ADMIN: CPT

## 2022-01-21 PROCEDURE — 76816 OB US FOLLOW-UP PER FETUS: CPT

## 2022-01-21 PROCEDURE — 250N000011 HC RX IP 250 OP 636

## 2022-01-21 PROCEDURE — 76816 OB US FOLLOW-UP PER FETUS: CPT | Mod: 26 | Performed by: OBSTETRICS & GYNECOLOGY

## 2022-01-21 ASSESSMENT — PATIENT HEALTH QUESTIONNAIRE - PHQ9
5. POOR APPETITE OR OVEREATING: NOT AT ALL
SUM OF ALL RESPONSES TO PHQ QUESTIONS 1-9: 0

## 2022-01-21 ASSESSMENT — ANXIETY QUESTIONNAIRES
GAD7 TOTAL SCORE: 2
2. NOT BEING ABLE TO STOP OR CONTROL WORRYING: NOT AT ALL
3. WORRYING TOO MUCH ABOUT DIFFERENT THINGS: SEVERAL DAYS
6. BECOMING EASILY ANNOYED OR IRRITABLE: NOT AT ALL
7. FEELING AFRAID AS IF SOMETHING AWFUL MIGHT HAPPEN: NOT AT ALL
1. FEELING NERVOUS, ANXIOUS, OR ON EDGE: SEVERAL DAYS
5. BEING SO RESTLESS THAT IT IS HARD TO SIT STILL: NOT AT ALL

## 2022-01-21 ASSESSMENT — PAIN SCALES - GENERAL: PAINLEVEL: NO PAIN (0)

## 2022-01-21 ASSESSMENT — MIFFLIN-ST. JEOR: SCORE: 1490.14

## 2022-01-21 NOTE — PROGRESS NOTES
Chief Complaint   Patient presents with     Prenatal Care     HARRY 28 weeks and 1 day   Isaura German LPN  Answers for HPI/ROS submitted by the patient on 1/19/2022  ALVIN 7 TOTAL SCORE: 2

## 2022-01-21 NOTE — LETTER
"2022       RE: Gabriele Rodriguez  [address] [facility] [address]     Dear Colleague,    Thank you for referring your patient, Gabriele Rodriguez, to the [facility] [place] at [facility]. Please see a copy of my visit note below.    Chief Complaint   Patient presents with     Prenatal Care     HARRY 28 weeks and 1 day   Isaura German LPN  Answers for HPI/ROS submitted by the patient on 2022  ALVIN 7 TOTAL SCORE: 2         36 year old, , 28w1d, presents for OB visit. Did not get to complete EOB teaching today d/t scheduling.     Patient concerns: Feeling well overall. Questions re: a \"once in a lifetime\" work trip in 1.5 weeks. Really wants to go, debating safety with COVID.     Needs FMLA paperwork completed.    Blood pressures normal. CHTN on meds.    Hx of  demise. Plans repeat  section. Will continue to discuss delivery timing with OB team. 3/28/22 would be 37 weeks.   To have growth ultrasound today.     Denies cramping/contractions, vaginal bleeding, discharge or leakage of fluid. Reports +fetal movement.  No HA, vision changes, ruq/epigastric pain.      PHQ-9 SCORE 2020   PHQ-9 Total Score 2 1 0     /77   Pulse 82   Ht 1.6 m (5' 3\")   Wt 82.1 kg (181 lb)   LMP 2021   Breastfeeding No   BMI 32.06 kg/m      The following labs were ordered today:        GCT, CBC w platelets, Vitamin d,  Anti-treponema  Water birth education form was not given- not a candidate.    Blood type:   ABO   Date Value Ref Range Status   2020 O  Final     RH(D)   Date Value Ref Range Status   2020 Pos  Final     Antibody Screen   Date Value Ref Range Status   2021 Negative Negative Final   2020 Neg  Final   , Rhogam  was not given.  TDAP  was given.    A/P:  Encounter Diagnosis   Name Primary?     Supervision of high risk pregnancy in third trimester Yes "     Orders Placed This Encounter   Procedures     US OB Biophys Single Gestation Measure     TDAP VACCINE (Adacel, Boostrix)  [[id number]]     Glucose 1 Hour     CBC with Platelets     Treponema Abs w Reflex to RPR and Titer     25- OH-Vitamin D     AST     ALT     Uric acid     Creatinine     Protein  random urine     Glucose 3 Hour     - Reviewed travel precautions and infection prevention.  - EOB labs ordered- 1 hour glucose, cbc with plts, anti-trep, vitamin D.  - Tdap given.  - To have growth ultrasound now.  Continue q4 week growth ultrasounds. Start weekly BPPs at 32 weeks.   - Continue to discuss repeat c/s timing with OB MD team.   - Needs EOB education visit.     Continue scheduled prenatal care, RTC in 2 weeks for EOB education and prn if questions or concerns.    Scheduled for EOB on 2/11. Scheduled for BPP with growth and HARRY on 2/18.    DORA Gomez, CNM    Answers for HPI/ROS submitted by the patient on 1/19/2022  ALVIN 7 TOTAL SCORE: 2    Addendum:    Ultrasound:  Dating (mm/dd/yyyy):   LMP: Patient's last menstrual period was 06/28/2021.               EDC:  Estimated Date of Delivery: Apr 14, 2022   GA by LMP:   28w1d  Current Scan On (mm/dd/yyyy):  1/21/2022                       EDC:   04/19/2022        GA by Current Scan:      27w3d  The calculation of the gestational age by current scan was based on BPD, HC, AC and FL.     Anatomy Scan:  Bradley gestation.  Visualized: 4 Chamber Heart, Stomach, Kidneys, and Bladder.  Biometry:  BPD 6.50 cm 26w2d 2.3%   HC 24.42 cm 26w4d 1%   AC 23.56 cm 27w6d 34%   FL 5.51 cm 29w0d 62%   EFW (lbs/oz) 2 lbs               9ozs       EFW (g) 1,164 g 33%        Fetal heart rate: 140 bpm  Fetal presentation: Cephalic  Amniotic fluid: 5.7cm MVP  Placenta: Posterior , no previa, > 2 cm from internal os  Maternal Anatomy:  Right adnexa:  not imaged  Left adnexa:  not imaged  Impression: Appropriate for gestational age growth, continue surveillance with repeat  growth ultrasound in 4 weeks.     Ekta Hubbard MD     Reviewed [facility] head circumference guidelines. This measurement is within 2 standard deviations and not a concerning finding. Will repeat in 4 weeks.     DORA Gomez, NITISHM

## 2022-01-21 NOTE — PROGRESS NOTES
" 36 year old, , 28w1d, presents for OB visit. Did not get to complete EOB teaching today d/t scheduling.     Patient concerns: Feeling well overall. Questions re: a \"once in a lifetime\" work trip in 1.5 weeks. Really wants to go, debating safety with COVID.     Needs FMLA paperwork completed.    Blood pressures normal. CHTN on meds.    Hx of  demise. Plans repeat  section. Will continue to discuss delivery timing with OB team. 3/28/22 would be 37 weeks.   To have growth ultrasound today.     Denies cramping/contractions, vaginal bleeding, discharge or leakage of fluid. Reports +fetal movement.  No HA, vision changes, ruq/epigastric pain.      PHQ-9 SCORE 2020   PHQ-9 Total Score 2 1 0     /77   Pulse 82   Ht 1.6 m (5' 3\")   Wt 82.1 kg (181 lb)   LMP 2021   Breastfeeding No   BMI 32.06 kg/m      The following labs were ordered today:        GCT, CBC w platelets, Vitamin d,  Anti-treponema  Water birth education form was not given- not a candidate.    Blood type:   ABO   Date Value Ref Range Status   2020 O  Final     RH(D)   Date Value Ref Range Status   2020 Pos  Final     Antibody Screen   Date Value Ref Range Status   2021 Negative Negative Final   2020 Neg  Final   , Rhogam  was not given.  TDAP  was given.    A/P:  Encounter Diagnosis   Name Primary?     Supervision of high risk pregnancy in third trimester Yes     Orders Placed This Encounter   Procedures     US OB Biophys Single Gestation Measure     TDAP VACCINE (Adacel, Boostrix)  [[id number]]     Glucose 1 Hour     CBC with Platelets     Treponema Abs w Reflex to RPR and Titer     25- OH-Vitamin D     AST     ALT     Uric acid     Creatinine     Protein  random urine     Glucose 3 Hour     - Reviewed travel precautions and infection prevention.  - EOB labs ordered- 1 hour glucose, cbc with plts, anti-trep, vitamin D.  - Tdap given.  - To have growth ultrasound " now.  Continue q4 week growth ultrasounds. Start weekly BPPs at 32 weeks.   - Continue to discuss repeat c/s timing with OB MD team.   - Needs EOB education visit.     Continue scheduled prenatal care, RTC in 2 weeks for EOB education and prn if questions or concerns.    Scheduled for EOB on 2/11. Scheduled for BPP with growth and HARRY on 2/18.    DORA Gomez CNM    Answers for HPI/ROS submitted by the patient on 1/19/2022  ALVIN 7 TOTAL SCORE: 2    Addendum:    Ultrasound:  Dating (mm/dd/yyyy):   LMP: Patient's last menstrual period was 06/28/2021.               EDC:  Estimated Date of Delivery: Apr 14, 2022   GA by LMP:   28w1d  Current Scan On (mm/dd/yyyy):  1/21/2022                       EDC:   04/19/2022        GA by Current Scan:      27w3d  The calculation of the gestational age by current scan was based on BPD, HC, AC and FL.     Anatomy Scan:  Bradley gestation.  Visualized: 4 Chamber Heart, Stomach, Kidneys, and Bladder.  Biometry:  BPD 6.50 cm 26w2d 2.3%   HC 24.42 cm 26w4d 1%   AC 23.56 cm 27w6d 34%   FL 5.51 cm 29w0d 62%   EFW (lbs/oz) 2 lbs               9ozs       EFW (g) 1,164 g 33%        Fetal heart rate: 140 bpm  Fetal presentation: Cephalic  Amniotic fluid: 5.7cm MVP  Placenta: Posterior , no previa, > 2 cm from internal os  Maternal Anatomy:  Right adnexa:  not imaged  Left adnexa:  not imaged  Impression: Appropriate for gestational age growth, continue surveillance with repeat growth ultrasound in 4 weeks.     Ekta Hubbard MD     Reviewed [facility] head circumference guidelines. This measurement is within 2 standard deviations and not a concerning finding. Will repeat in 4 weeks.     DORA Gomez CNM

## 2022-01-27 ENCOUNTER — LAB (OUTPATIENT)
Dept: LAB | Facility: CLINIC | Age: 37
End: 2022-01-27
Payer: COMMERCIAL

## 2022-01-27 DIAGNOSIS — O09.93 SUPERVISION OF HIGH RISK PREGNANCY IN THIRD TRIMESTER: ICD-10-CM

## 2022-01-27 DIAGNOSIS — O99.810 ABNORMAL O'SULLIVAN GLUCOSE CHALLENGE TEST, ANTEPARTUM: ICD-10-CM

## 2022-01-27 LAB
GESTATIONAL GTT 1 HR POST DOSE: 102 MG/DL (ref 60–179)
GESTATIONAL GTT 2 HR POST DOSE: 104 MG/DL (ref 60–154)
GESTATIONAL GTT 3 HR POST DOSE: 98 MG/DL (ref 60–139)
GLUCOSE P FAST SERPL-MCNC: 77 MG/DL (ref 60–94)

## 2022-01-27 PROCEDURE — 36415 COLL VENOUS BLD VENIPUNCTURE: CPT

## 2022-01-27 PROCEDURE — 82951 GLUCOSE TOLERANCE TEST (GTT): CPT

## 2022-01-27 PROCEDURE — 82950 GLUCOSE TEST: CPT

## 2022-01-27 PROCEDURE — 82947 ASSAY GLUCOSE BLOOD QUANT: CPT

## 2022-01-30 PROBLEM — O09.91 SUPERVISION OF HIGH RISK PREGNANCY IN FIRST TRIMESTER: Status: ACTIVE | Noted: 2020-05-13

## 2022-02-08 PROBLEM — O09.93 SUPERVISION OF HIGH RISK PREGNANCY IN THIRD TRIMESTER: Status: ACTIVE | Noted: 2020-05-13

## 2022-02-09 NOTE — PROGRESS NOTES
"36 year old, , 31/0d, here for a routine OB visit with her mother Amanda    Reviewed results: 1 hour GCT: 144  3 hour: F: 77; 1:102; 2: 104; 3: 98  Hgb 11.5, negative treponema, vitamin D 34  Taking vitamin D supplementation, she is not sure how much she is taking, she will double her intake     AST 13, ALT 13, Uric acid: 3.5, creatinine: 0.58, p/c ratio 0.06    Hx of  demise at 2 months, \"Valeria\". Has done grief therapy, no longer has a therapist. Currently does not feel like she needs one, but will access this service should she change her mind.     HTN on meds 200mg BID on Labetalol, BP is normal today. Denies HA, visual changes, RUQ pain    Last growth 22: EFW 33%, posterior placenta.   Has next growth US scheduled 22  Will plan weekly BPP starting at next week, appointment scheduled     The patient presents with the following concerns: Back pain, concern of sleeping position. Has tried alternating sides. Gets back and hip pain. Is concerned that her sleeping position may threaten the health of the fetus. Discussed alternative sleep positions and supporting hip and shoulder with a maternity pillow for comfort.     PHQ-9 SCORE 2020   PHQ-9 Total Score 2 1 0     Education completed today includes breast feeding, contraception, counting movements, signs of pre-term labor, post partum depression, GBS, getting enough iron, vitamin K and hypertension disorders.  Birth preferences reviewed: repeat  section would like scheduled in first few days of 37th week. Family centered CS with clear drape  Labor support:  , Pedro   [place] Feeding plans :    Contraception planned: Nexplanon insertion at 6 week PP visit.  The following labs were ordered today: none.    Blood type:   ABO   Date Value Ref Range Status   2020 O  Final     RH(D)   Date Value Ref Range Status   2020 Pos  Final     Antibody Screen   Date Value Ref Range Status   2021 " Negative Negative Final   2020 Neg  Final    Rhogam  was not given.  TDAP  Was given 22.  A/P:  Encounter Diagnosis   Name Primary?     Supervision of high risk pregnancy in first trimester Yes        Orders Placed This Encounter   Procedures     US OB Fetal Biophys Prf wo NonStrs Singls Sgl     -Discussed the importance of mental health support as she continues with this pregnancy due to history of  loss. Will access mental health care if she feels this is needed.     Continue scheduled prenatal care. Growth ultrasound and BPP 22. Weekly BPPs for hx of  demise.    Would like to schedule her CS next visit.     BONNIE Johnson, RN, DNP student    Patient was seen with student who was present for learning. I agree with the history and ROS as completed by BONNIE Johnson, RN, DNP student except for changes made by me. The remainder of the encounter was performed by me and scribed by BONNIE Johnson, RN, DNP student. The scribed note accurately reflects my personal assessment, examination and clinical decisions.     DORA Long, JOSHUA

## 2022-02-11 ENCOUNTER — OFFICE VISIT (OUTPATIENT)
Dept: OBGYN | Facility: CLINIC | Age: 37
End: 2022-02-11
Attending: ADVANCED PRACTICE MIDWIFE
Payer: COMMERCIAL

## 2022-02-11 VITALS
HEART RATE: 89 BPM | BODY MASS INDEX: 32.27 KG/M2 | SYSTOLIC BLOOD PRESSURE: 117 MMHG | WEIGHT: 182.1 LBS | DIASTOLIC BLOOD PRESSURE: 73 MMHG | HEIGHT: 63 IN

## 2022-02-11 DIAGNOSIS — O09.91 SUPERVISION OF HIGH RISK PREGNANCY IN FIRST TRIMESTER: Primary | ICD-10-CM

## 2022-02-11 PROCEDURE — 99207 PR PRENATAL VISIT: CPT | Performed by: ADVANCED PRACTICE MIDWIFE

## 2022-02-11 PROCEDURE — G0463 HOSPITAL OUTPT CLINIC VISIT: HCPCS

## 2022-02-11 ASSESSMENT — MIFFLIN-ST. JEOR: SCORE: 1495

## 2022-02-11 ASSESSMENT — PAIN SCALES - GENERAL: PAINLEVEL: NO PAIN (0)

## 2022-02-11 NOTE — LETTER
"2022       RE: Gabriele Rodriguez  [address] [address]     Dear Colleague,    Thank you for referring your patient, Gabriele Rodriguez, to the [facility] [place] at [facility]. Please see a copy of my visit note below.    36 year old, , 31/0d, here for a routine OB visit with her mother Amanda    Reviewed results: 1 hour GCT: 144  3 hour: F: 77; 1:102; 2: 104; 3: 98  Hgb 11.5, negative treponema, vitamin D 34  Taking vitamin D supplementation, she is not sure how much she is taking, she will double her intake     AST 13, ALT 13, Uric acid: 3.5, creatinine: 0.58, p/c ratio 0.06    Hx of  demise at 2 months, \"Valeria\". Has done grief therapy, no longer has a therapist. Currently does not feel like she needs one, but will access this service should she change her mind.     HTN on meds 200mg BID on Labetalol, BP is normal today. Denies HA, visual changes, RUQ pain    Last growth 22: EFW 33%, posterior placenta.   Has next growth US scheduled 22  Will plan weekly BPP starting at next week, appointment scheduled     The patient presents with the following concerns: Back pain, concern of sleeping position. Has tried alternating sides. Gets back and hip pain. Is concerned that her sleeping position may threaten the health of the fetus. Discussed alternative sleep positions and supporting hip and shoulder with a maternity pillow for comfort.     PHQ-9 SCORE 2020   PHQ-9 Total Score 2 1 0     Education completed today includes breast feeding, contraception, counting movements, signs of pre-term labor, post partum depression, GBS, getting enough iron, vitamin K and hypertension disorders.  Birth preferences reviewed: repeat  section would like scheduled in first few days of 37th week. Family centered CS with clear drape  Labor support:  , Pedro    Feeding plans : "    Contraception planned: Nexplanon insertion at 6 week PP visit.  The following labs were ordered today: none.    Blood type:   ABO   Date Value Ref Range Status   2020 O  Final     RH(D)   Date Value Ref Range Status   2020 Pos  Final     Antibody Screen   Date Value Ref Range Status   2021 Negative Negative Final   2020 Neg  Final    Rhogam  was not given.  TDAP  Was given 22.  A/P:  Encounter Diagnosis   Name Primary?     Supervision of high risk pregnancy in first trimester Yes        Orders Placed This Encounter   Procedures     US OB Fetal Biophys Prf wo NonStrs Singls Sgl     -Discussed the importance of mental health support as she continues with this pregnancy due to history of  loss. Will access mental health care if she feels this is needed.     Continue scheduled prenatal care. Growth ultrasound and BPP 22. Weekly BPPs for hx of  demise.    Would like to schedule her CS next visit.     BONNIE Johnson, RN, DNP student    Patient was seen with student who was present for learning. I agree with the history and ROS as completed by BONNIE Johnson, RN, DNP student except for changes made by me. The remainder of the encounter was performed by me and scribed by BONNIE Johnson, RN, DNP student. The scribed note accurately reflects my personal assessment, examination and clinical decisions.     Lizzette Bustamante, DORA, JOSHUA

## 2022-02-17 NOTE — PROGRESS NOTES
"Subjective:      36 year old  at 32w1d presentst for a routine prenatal appointment.     Denies cramping/contractions, vaginal bleeding, discharge or leakage of fluid. Reports +fetal movement.  No HA, vision changes, ruq/epigastric pain.      Patient concerns: Feeling well overall. Having acid reflux and wondering if she could have pepcid. Currently taking 3 extra strength TUMS daily. Has questions about 4th covid vaccine.    Pt has CHTN on labetolol. BP today 99/66. Pt reports she just took her morning dose. BPs at home are in normal range. Denies any dizziness or lightheadedness.    Would like to get RLTCS at 37 weeks scheduled.     Has BPP and growth ultrasound scheduled for today.  Objective:  Vitals:    22 0809   BP: 99/66   Pulse: 103   Weight: 83.9 kg (185 lb)   Height: 1.575 m (5' 2\")     See ob flowsheet    Assessment/Plan     Encounter Diagnoses   Name Primary?     Supervision of high risk pregnancy in third trimester Yes      death      Orders Placed This Encounter   Procedures     US OB Fetal Biophys Prf wo NonStrs Singls Sgl     US OB Fetal Biophys Prf wo NonStrs Singls Sgl     US OB Fetal Biophys Prf wo NonStrs Singls Sgl     US OB Fetal Biophys Prf wo NonStrs Singls Sgl     - Reviewed total weight gain, encouraged continued healthy diet and exercise. Reviewed importance of daily fetal kick count and why/how to contact provider.    - Reviewed why/how to contact provider if headache/visual changes/RUQ or epigastric pain, decreased fetal movement, vaginal bleeding, leakage of fluid or more than 4 contractions in an hour.     Patient education/orders or handouts today:  PTL signs/symptoms    - Will contact MD for c/s orders and then notify surgery scheduling. Date of 3/24/22.    - To BPP and growth ultrasound now.    Continue q1 week BPPs, q4 week growth.     Scheduled for BPP and BP check with RN next week followed by weekly BPP and HARRY visits.    I, Zoila Dickey, completed the PFSH " and ROS. I then acted as a scribe for DORA Gomez CNM for the remainder of the visit.  Zoila NICOLE, RN, PHN, SANE, [facility] DNP, WHNP student     The encounter was performed by me and scribed by the SNM. The scribed note accurately reflects my personal services and decisions made by me.     DORA Gomez CNM      Addendum:    Ultrasound report normal.    Current Scan On (mm/dd/yyyy):   2022  EDC:   04/15/2022 GA by Current Scan: 32w0d   The calculation of the gestational age by current scan was based on BPD, HC, AC and FL.     Anatomy Scan:   Bradley gestation.   Visualized: 4 Chamber Heart, Stomach, Kidneys, and Bladder.   Biometry:   BPD 7.77 cm 31w1d 15%   HC 28.91 cm 31w6d 9%   AC 28.66 cm 32w5d 65%   FL 6.26 cm 32w3d 44%   EFW (lbs/oz) 4 lbs 5ozs     EFW (g) 1,960 g 46%       Fetal heart rate: 145 bpm   Fetal presentation: Cephalic   Amniotic fluid: 5.4cm MVP   Placenta: Posterior, no previa, > 2 cm from internal os   Maternal Anatomy:   Right adnexa:  not imaged   Left adnexa:  not imaged   Biophysical Profile:   Fetal body movements: Normal (2)   Fetal tone: Normal (2)   Fetal breathing movements: Normal (2)   Amniotic fluid volume: Normal (2)   BPP Score: 8/8   Impression:      Reassuring  testing today. Appropriate interval growth. Continue interval growth assessments and anenatal testing as planned for history of  demise.    DORA Gomez CNM

## 2022-02-18 ENCOUNTER — OFFICE VISIT (OUTPATIENT)
Dept: OBGYN | Facility: CLINIC | Age: 37
End: 2022-02-18
Attending: ADVANCED PRACTICE MIDWIFE
Payer: COMMERCIAL

## 2022-02-18 ENCOUNTER — ANCILLARY PROCEDURE (OUTPATIENT)
Dept: ULTRASOUND IMAGING | Facility: CLINIC | Age: 37
End: 2022-02-18
Attending: ADVANCED PRACTICE MIDWIFE
Payer: COMMERCIAL

## 2022-02-18 VITALS
DIASTOLIC BLOOD PRESSURE: 66 MMHG | SYSTOLIC BLOOD PRESSURE: 99 MMHG | HEIGHT: 62 IN | BODY MASS INDEX: 34.04 KG/M2 | HEART RATE: 103 BPM | WEIGHT: 185 LBS

## 2022-02-18 DIAGNOSIS — O09.93 SUPERVISION OF HIGH RISK PREGNANCY IN THIRD TRIMESTER: ICD-10-CM

## 2022-02-18 DIAGNOSIS — O09.93 SUPERVISION OF HIGH RISK PREGNANCY IN THIRD TRIMESTER: Primary | ICD-10-CM

## 2022-02-18 PROCEDURE — 99207 PR PRENATAL VISIT: CPT | Performed by: ADVANCED PRACTICE MIDWIFE

## 2022-02-18 PROCEDURE — 76819 FETAL BIOPHYS PROFIL W/O NST: CPT

## 2022-02-18 PROCEDURE — 76816 OB US FOLLOW-UP PER FETUS: CPT

## 2022-02-18 PROCEDURE — G0463 HOSPITAL OUTPT CLINIC VISIT: HCPCS | Mod: 25

## 2022-02-18 PROCEDURE — 76819 FETAL BIOPHYS PROFIL W/O NST: CPT | Mod: 26 | Performed by: OBSTETRICS & GYNECOLOGY

## 2022-02-18 PROCEDURE — 76816 OB US FOLLOW-UP PER FETUS: CPT | Mod: 26 | Performed by: OBSTETRICS & GYNECOLOGY

## 2022-02-18 RX ORDER — FAMOTIDINE 20 MG/1
20 TABLET, FILM COATED ORAL 2 TIMES DAILY
Qty: 60 TABLET | Refills: 1 | Status: CANCELLED | OUTPATIENT
Start: 2022-02-18

## 2022-02-18 ASSESSMENT — PAIN SCALES - GENERAL: PAINLEVEL: NO PAIN (0)

## 2022-02-18 NOTE — LETTER
"2022       RE: Gabriele Rodriguez  [address] [address]     Dear Colleague,    Thank you for referring your patient, Gabriele Rodriguez, to the [facility] [place] at [facility]. Please see a copy of my visit note below.    Subjective:      36 year old  at 32w1d presentst for a routine prenatal appointment.     Denies cramping/contractions, vaginal bleeding, discharge or leakage of fluid. Reports +fetal movement.  No HA, vision changes, ruq/epigastric pain.      Patient concerns: Feeling well overall. Having acid reflux and wondering if she could have pepcid. Currently taking 3 extra strength TUMS daily. Has questions about 4th covid vaccine.    Pt has CHTN on labetolol. BP today 99/66. Pt reports she just took her morning dose. BPs at home are in normal range. Denies any dizziness or lightheadedness.    Would like to get RLTCS at 37 weeks scheduled.     Has BPP and growth ultrasound scheduled for today.  Objective:  Vitals:    22 0809   BP: 99/66   Pulse: 103   Weight: 83.9 kg (185 lb)   Height: 1.575 m (5' 2\")     See ob flowsheet    Assessment/Plan     Encounter Diagnoses   Name Primary?     Supervision of high risk pregnancy in third trimester Yes      death      Orders Placed This Encounter   Procedures     US OB Fetal Biophys Prf wo NonStrs Singls Sgl     US OB Fetal Biophys Prf wo NonStrs Singls Sgl     US OB Fetal Biophys Prf wo NonStrs Singls Sgl     US OB Fetal Biophys Prf wo NonStrs Singls Sgl     - Reviewed total weight gain, encouraged continued healthy diet and exercise. Reviewed importance of daily fetal kick count and why/how to contact provider.    - Reviewed why/how to contact provider if headache/visual changes/RUQ or epigastric pain, decreased fetal movement, vaginal bleeding, leakage of fluid or more than 4 contractions in an hour.     Patient education/orders or handouts " today:  PTL signs/symptoms    - Will contact MD for c/s orders and then notify surgery scheduling. Date of 3/24/22.    - To BPP and growth ultrasound now.    Continue q1 week BPPs, q4 week growth.     Scheduled for BPP and BP check with RN next week followed by weekly BPP and HARRY visits.    I, Zoila Dickey, completed the PFSH and ROS. I then acted as a scribe for DORA Gomez CNM for the remainder of the visit.  Zoila Dickey BSN, RN, PHN, SANE, [facility] DNP, WHNP student     The encounter was performed by me and scribed by the SNM. The scribed note accurately reflects my personal services and decisions made by me.     DORA Gomez CNM      Addendum:    Ultrasound report normal.    Current Scan On (mm/dd/yyyy):   2022  EDC:   04/15/2022 GA by Current Scan: 32w0d   The calculation of the gestational age by current scan was based on BPD, HC, AC and FL.     Anatomy Scan:   Bradley gestation.   Visualized: 4 Chamber Heart, Stomach, Kidneys, and Bladder.   Biometry:   BPD 7.77 cm 31w1d 15%   HC 28.91 cm 31w6d 9%   AC 28.66 cm 32w5d 65%   FL 6.26 cm 32w3d 44%   EFW (lbs/oz) 4 lbs 5ozs     EFW (g) 1,960 g 46%       Fetal heart rate: 145 bpm   Fetal presentation: Cephalic   Amniotic fluid: 5.4cm MVP   Placenta: Posterior, no previa, > 2 cm from internal os   Maternal Anatomy:   Right adnexa:  not imaged   Left adnexa:  not imaged   Biophysical Profile:   Fetal body movements: Normal (2)   Fetal tone: Normal (2)   Fetal breathing movements: Normal (2)   Amniotic fluid volume: Normal (2)   BPP Score: 8/8   Impression:      Reassuring  testing today. Appropriate interval growth. Continue interval growth assessments and anenatal testing as planned for history of  demise.    DORA Gomez CNM

## 2022-02-21 ENCOUNTER — PREP FOR PROCEDURE (OUTPATIENT)
Dept: OBGYN | Facility: CLINIC | Age: 37
End: 2022-02-21
Payer: COMMERCIAL

## 2022-02-21 DIAGNOSIS — Z98.891 HISTORY OF CESAREAN DELIVERY: Primary | ICD-10-CM

## 2022-02-21 RX ORDER — OXYTOCIN 10 [USP'U]/ML
10 INJECTION, SOLUTION INTRAMUSCULAR; INTRAVENOUS
Status: CANCELLED | OUTPATIENT
Start: 2022-02-21

## 2022-02-21 RX ORDER — SODIUM CHLORIDE, SODIUM LACTATE, POTASSIUM CHLORIDE, CALCIUM CHLORIDE 600; 310; 30; 20 MG/100ML; MG/100ML; MG/100ML; MG/100ML
INJECTION, SOLUTION INTRAVENOUS CONTINUOUS
Status: CANCELLED | OUTPATIENT
Start: 2022-02-21

## 2022-02-21 RX ORDER — MISOPROSTOL 200 UG/1
400 TABLET ORAL
Status: CANCELLED | OUTPATIENT
Start: 2022-02-21

## 2022-02-21 RX ORDER — ACETAMINOPHEN 325 MG/1
975 TABLET ORAL ONCE
Status: CANCELLED | OUTPATIENT
Start: 2022-02-21 | End: 2022-02-21

## 2022-02-21 RX ORDER — OXYTOCIN/0.9 % SODIUM CHLORIDE 30/500 ML
100-340 PLASTIC BAG, INJECTION (ML) INTRAVENOUS CONTINUOUS PRN
Status: CANCELLED | OUTPATIENT
Start: 2022-02-21

## 2022-02-21 RX ORDER — CITRIC ACID/SODIUM CITRATE 334-500MG
30 SOLUTION, ORAL ORAL
Status: CANCELLED | OUTPATIENT
Start: 2022-02-21

## 2022-02-21 RX ORDER — CEFAZOLIN SODIUM 2 G/100ML
2 INJECTION, SOLUTION INTRAVENOUS SEE ADMIN INSTRUCTIONS
Status: CANCELLED | OUTPATIENT
Start: 2022-02-21

## 2022-02-21 RX ORDER — METHYLERGONOVINE MALEATE 0.2 MG/ML
200 INJECTION INTRAVENOUS
Status: CANCELLED | OUTPATIENT
Start: 2022-02-21

## 2022-02-21 RX ORDER — CEFAZOLIN SODIUM 2 G/100ML
2 INJECTION, SOLUTION INTRAVENOUS
Status: CANCELLED | OUTPATIENT
Start: 2022-02-21

## 2022-02-21 RX ORDER — CARBOPROST TROMETHAMINE 250 UG/ML
250 INJECTION, SOLUTION INTRAMUSCULAR
Status: CANCELLED | OUTPATIENT
Start: 2022-02-21

## 2022-02-21 RX ORDER — TRANEXAMIC ACID 10 MG/ML
1 INJECTION, SOLUTION INTRAVENOUS EVERY 30 MIN PRN
Status: CANCELLED | OUTPATIENT
Start: 2022-02-21

## 2022-02-21 RX ORDER — OXYTOCIN/0.9 % SODIUM CHLORIDE 30/500 ML
340 PLASTIC BAG, INJECTION (ML) INTRAVENOUS CONTINUOUS PRN
Status: CANCELLED | OUTPATIENT
Start: 2022-02-21

## 2022-02-21 RX ORDER — LIDOCAINE 40 MG/G
CREAM TOPICAL
Status: CANCELLED | OUTPATIENT
Start: 2022-02-21

## 2022-02-21 RX ORDER — MISOPROSTOL 200 UG/1
800 TABLET ORAL
Status: CANCELLED | OUTPATIENT
Start: 2022-02-21

## 2022-02-22 ENCOUNTER — TRANSCRIBE ORDERS (OUTPATIENT)
Dept: OBGYN | Facility: CLINIC | Age: 37
End: 2022-02-22
Payer: COMMERCIAL

## 2022-02-22 ENCOUNTER — TELEPHONE (OUTPATIENT)
Dept: OBGYN | Facility: CLINIC | Age: 37
End: 2022-02-22
Payer: COMMERCIAL

## 2022-02-22 DIAGNOSIS — Z01.812 ENCOUNTER FOR PRE-OPERATIVE LABORATORY TESTING: Primary | ICD-10-CM

## 2022-02-22 NOTE — TELEPHONE ENCOUNTER
Spoke with patient, scheduled surgery. Patient is aware of date, time, location, prep instructions,  and covid test within 96 hours of surgery.     Type of surgery: REPEAT  SECTION  Location of surgery: [facility]  Date and time of surgery: 3/24/22 at 12:30pm  Surgeon: Giovanna Osorio MD  Pre-Op Appt Date: DOS  Post-Op Appt Date: 22 and 22  Packet sent out: Yes   Covid ordered: yes  Pre-cert/Authorization completed:  Yes  Date: 22    Sushila Freeman  Clinical Services Assistant

## 2022-02-22 NOTE — LETTER
2022    Gabriele oRdriguez   [address] [facility] [address] [facility] [address]       Dear Gabriele Rodriguez,    Thank you for choosing [facility] for your surgical procedure.  You will enter the hospital as a morning admission which means that you will spend at least one night in the hospital.    You procedure is scheduled on:    Date:     Time: 12:30pm    Please arrive at: 10:30am    Procedure: REPEAT  SECTION    MD: Giovanna Osorio MD    Go to:  The main hospital entrance (EAST building entrance) is located on [address] ([address] [address])  There is signage in the lobby directing you to check in on 4th floor of the [facility].       parking is available (no charge to park your car, regular parking rates do apply). You may also self park your car in the Green garage - the entrance is located on [address].     Please keep your ticket with you as your ticket will be validated to give you a reduced rate for the parking garage.     NOTE: The times noted above may change.  A nurse from the hospital pre-admission department will call to confirm your procedure.  He or she will answer any questions you have about the procedure and will provide you with instructions for taking medications the day of the procedure.  If you have not received a call, or if you have more questions please call us one working day before your procedure.  Call pre-admission department at [phone].  If you need to cancel or reschedule your surgery please call [phone].    FOLLOW-UP/POST OPERATIVE VISIT    We scheduled a postoperative/postpartum check for 22 at 10:30am with Dr. Barbara August.    We also scheduled a 6 week postpartum visit for 22 at 4:15pm with Dr. Jojo Vaz.    GETTING READY FOR SURGERY    You must have a preoperative COVID-19 test done within 4 days (96 hours) of the procedure. You will  receive a call from a nurse to schedule, otherwise you may call [phone] to schedule.    *If you get a fever, cold, or rash please call the [facility] Triage Nurses at [phone] - we may need to postpone your procedure.    TEN DAYS BEFORE SURGERY    [place] with the hospital 10 days before your procedure date.     Have your insurance card ready.     To register online, go to [email or web address].     You may also call the hospital Pre Registration Department at [phone].    THE DAY BEFORE SURGERY    If you smoke - quit or at least cut down.    Stop drinking alcohol (liquor, beer, and wine) at least 24 hours before your procedure.    Shower or bathe the night before and the morning of your procedure.  Use an antiseptic surgical soap such as Hibiclens, Scrub Care or Exidine. You can find it at your local pharmacy..  If your doctor does not give you a special soap, buy Hibiclens or Ale-Star at the drug store.  You can also ask your pharmacist to recommend an antiseptic alternative soap.    Do not put on lotion, powder, perfume, deodorant, or make-up after bathing.    Remove all nail polish.    THE DAY OF SURGERY    Have nothing to eat or drink starting eight hours before your procedure.    You may drink water up until two hours before your procedure, nothing by mouth within two hours of your procedure.    Please consume the electrolyte drink we have provided for you at 2 hours before your procedure.    Please also go to the outpatient lab the day before your procedure for your preoperative labs to be drawn.    Take prescription medicines as instructed by the hospital preadmissions nursing staff.    Alternatively you should follow any directives you receive from the MD doing your procedure.     Do not take insulin or oral diabetes medicine on the day of the surgery.     Take off jewelry, including rings and body piercings.    Leave valuables at home.    Bring these items to  the hospital:  1. Insurance cards.  2. Forms your doctor asked you to bring.  3. Health care directive or living will, if you have one.    If you are under 18, a parent or legal guardian must come with you to the hospital.      Sincerely,    [facility]      If you are hard of hearing, please let us know. We provide many free services including sign language and oral interpreters, TTYs, telephone amplifiers, note takers, and written materials.

## 2022-02-25 ENCOUNTER — ALLIED HEALTH/NURSE VISIT (OUTPATIENT)
Dept: OBGYN | Facility: CLINIC | Age: 37
End: 2022-02-25
Payer: COMMERCIAL

## 2022-02-25 ENCOUNTER — ANCILLARY PROCEDURE (OUTPATIENT)
Dept: ULTRASOUND IMAGING | Facility: CLINIC | Age: 37
End: 2022-02-25
Attending: ADVANCED PRACTICE MIDWIFE
Payer: COMMERCIAL

## 2022-02-25 VITALS — SYSTOLIC BLOOD PRESSURE: 105 MMHG | DIASTOLIC BLOOD PRESSURE: 69 MMHG | HEART RATE: 96 BPM

## 2022-02-25 DIAGNOSIS — O09.91 SUPERVISION OF HIGH RISK PREGNANCY IN FIRST TRIMESTER: ICD-10-CM

## 2022-02-25 DIAGNOSIS — O10.919 CHRONIC HYPERTENSION AFFECTING PREGNANCY: Primary | ICD-10-CM

## 2022-02-25 PROCEDURE — 76819 FETAL BIOPHYS PROFIL W/O NST: CPT | Mod: 26 | Performed by: OBSTETRICS & GYNECOLOGY

## 2022-02-25 PROCEDURE — 76819 FETAL BIOPHYS PROFIL W/O NST: CPT

## 2022-03-01 ASSESSMENT — ANXIETY QUESTIONNAIRES
7. FEELING AFRAID AS IF SOMETHING AWFUL MIGHT HAPPEN: NOT AT ALL
7. FEELING AFRAID AS IF SOMETHING AWFUL MIGHT HAPPEN: NOT AT ALL
6. BECOMING EASILY ANNOYED OR IRRITABLE: NOT AT ALL
GAD7 TOTAL SCORE: 4
2. NOT BEING ABLE TO STOP OR CONTROL WORRYING: NOT AT ALL
5. BEING SO RESTLESS THAT IT IS HARD TO SIT STILL: NOT AT ALL
7. FEELING AFRAID AS IF SOMETHING AWFUL MIGHT HAPPEN: NOT AT ALL
3. WORRYING TOO MUCH ABOUT DIFFERENT THINGS: MORE THAN HALF THE DAYS
3. WORRYING TOO MUCH ABOUT DIFFERENT THINGS: MORE THAN HALF THE DAYS
6. BECOMING EASILY ANNOYED OR IRRITABLE: NOT AT ALL
4. TROUBLE RELAXING: NOT AT ALL
1. FEELING NERVOUS, ANXIOUS, OR ON EDGE: MORE THAN HALF THE DAYS
1. FEELING NERVOUS, ANXIOUS, OR ON EDGE: MORE THAN HALF THE DAYS
2. NOT BEING ABLE TO STOP OR CONTROL WORRYING: NOT AT ALL
GAD7 TOTAL SCORE: 4
GAD7 TOTAL SCORE: 4
5. BEING SO RESTLESS THAT IT IS HARD TO SIT STILL: NOT AT ALL
1. FEELING NERVOUS, ANXIOUS, OR ON EDGE: MORE THAN HALF THE DAYS
4. TROUBLE RELAXING: NOT AT ALL
5. BEING SO RESTLESS THAT IT IS HARD TO SIT STILL: NOT AT ALL
7. FEELING AFRAID AS IF SOMETHING AWFUL MIGHT HAPPEN: NOT AT ALL
GAD7 TOTAL SCORE: 4
6. BECOMING EASILY ANNOYED OR IRRITABLE: NOT AT ALL
4. TROUBLE RELAXING: NOT AT ALL
7. FEELING AFRAID AS IF SOMETHING AWFUL MIGHT HAPPEN: NOT AT ALL
2. NOT BEING ABLE TO STOP OR CONTROL WORRYING: NOT AT ALL
3. WORRYING TOO MUCH ABOUT DIFFERENT THINGS: MORE THAN HALF THE DAYS
GAD7 TOTAL SCORE: 4
7. FEELING AFRAID AS IF SOMETHING AWFUL MIGHT HAPPEN: NOT AT ALL
GAD7 TOTAL SCORE: 4

## 2022-03-02 ASSESSMENT — ANXIETY QUESTIONNAIRES
GAD7 TOTAL SCORE: 4

## 2022-03-04 ENCOUNTER — ANCILLARY PROCEDURE (OUTPATIENT)
Dept: ULTRASOUND IMAGING | Facility: CLINIC | Age: 37
End: 2022-03-04
Attending: ADVANCED PRACTICE MIDWIFE
Payer: COMMERCIAL

## 2022-03-04 ENCOUNTER — OFFICE VISIT (OUTPATIENT)
Dept: OBGYN | Facility: CLINIC | Age: 37
End: 2022-03-04
Attending: ADVANCED PRACTICE MIDWIFE
Payer: COMMERCIAL

## 2022-03-04 VITALS
HEIGHT: 63 IN | BODY MASS INDEX: 33.66 KG/M2 | WEIGHT: 190 LBS | SYSTOLIC BLOOD PRESSURE: 125 MMHG | DIASTOLIC BLOOD PRESSURE: 80 MMHG | HEART RATE: 99 BPM

## 2022-03-04 DIAGNOSIS — O09.93 HIGH-RISK PREGNANCY IN THIRD TRIMESTER: Primary | ICD-10-CM

## 2022-03-04 DIAGNOSIS — O09.93 SUPERVISION OF HIGH RISK PREGNANCY IN THIRD TRIMESTER: ICD-10-CM

## 2022-03-04 PROCEDURE — 99207 PR PRENATAL VISIT: CPT | Performed by: ADVANCED PRACTICE MIDWIFE

## 2022-03-04 PROCEDURE — 76819 FETAL BIOPHYS PROFIL W/O NST: CPT | Mod: 26 | Performed by: OBSTETRICS & GYNECOLOGY

## 2022-03-04 PROCEDURE — G0463 HOSPITAL OUTPT CLINIC VISIT: HCPCS | Mod: 25

## 2022-03-04 PROCEDURE — 76819 FETAL BIOPHYS PROFIL W/O NST: CPT

## 2022-03-04 RX ORDER — FAMOTIDINE 10 MG
10 TABLET ORAL AT BEDTIME
COMMUNITY
End: 2022-04-29

## 2022-03-04 RX ORDER — CHOLECALCIFEROL (VITAMIN D3) 50 MCG
1 TABLET ORAL DAILY
COMMUNITY
End: 2022-04-29

## 2022-03-04 ASSESSMENT — PAIN SCALES - GENERAL: PAINLEVEL: NO PAIN (0)

## 2022-03-04 NOTE — PROGRESS NOTES
"Subjective:    Gabriele Rodriguez is a 36 year old  at 34w1d by Estimated Date of Delivery: 2022 by Patient's last menstrual period was 2021. presents for a routine prenatal appointment. No vaginal bleeding or leakage of fluid. + fetal movement.       No HA, visual changes, RUQ or epigastric pain.     Occasional cramping, nothing consistent and goes away with rest and hydration.     Left wrist pain- she has been sleeping with a wrist brace on and that has helped relieve the pain. No numbness in her hand/fingers and no weakness in her .      Post-partum contraception discussed- planning Nexplanon.     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     Objective:  Vitals:    22 1405   BP: 125/80   BP Location: Left arm   Patient Position: Chair   Pulse: 99   Weight: 86.2 kg (190 lb)   Height: 1.6 m (5' 3\")       Assessment/Plan  (O09.93) High-risk pregnancy in third trimester  (primary encounter diagnosis)    Plan: US OB Fetal Biophys Prf wo NonStrs Singls Sgl     - BPP today     - Left wrist pain- continue using brace as needed, and can ice the wrist before putting the brace on and then during the day as needed to help reduce inflammation.     - Reviewed total weight gain, encouraged continued healthy diet and exercise.  Encouraged minimum 80oz water daily.  Reviewed importance of daily fetal kick count and why/how to contact provider.    - Reviewed why/how to contact provider if headache/visual changes/RUQ or epigastric pain, decreased fetal movement, vaginal bleeding, leakage of fluid or more than 4 contractions in an hour.       Return to clinic in 1 weeks and prn if questions or concerns.        I, Rob MCWILLIAMS, am serving as a scribe to document services personally performed by CNM based on the provider's statements to me. - Rob MCWILLIAMS     I agree with the PFSH and ROS as completed by Rob MCWILLIAMS except " for changes made by me. The remainder of the encounter was performed by me and scribed by Rob MCWILLIAMS. The scribed note accurately reflects my personal services and decisions made by me.   DORA Coker CNM

## 2022-03-04 NOTE — LETTER
"3/4/2022       RE: Gabriele Rodriguez  [address] [facility] [address]     Dear Colleague,    Thank you for referring your patient, Gabriele Rodriguez, to the [facility] [place] at [facility]. Please see a copy of my visit note below.    Subjective:    Gabriele Rodriguez is a 36 year old  at 34w1d by Estimated Date of Delivery: 2022 by Patient's last menstrual period was 2021. presents for a routine prenatal appointment. No vaginal bleeding or leakage of fluid. + fetal movement.       No HA, visual changes, RUQ or epigastric pain.     Occasional cramping, nothing consistent and goes away with rest and hydration.     Left wrist pain- she has been sleeping with a wrist brace on and that has helped relieve the pain. No numbness in her hand/fingers and no weakness in her .      Post-partum contraception discussed- planning Nexplanon.     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     Objective:  Vitals:    22 1405   BP: 125/80   BP Location: Left arm   Patient Position: Chair   Pulse: 99   Weight: 86.2 kg (190 lb)   Height: 1.6 m (5' 3\")       Assessment/Plan  (O09.93) High-risk pregnancy in third trimester  (primary encounter diagnosis)    Plan: US OB Fetal Biophys Prf wo NonStrs Singls Sgl     - BPP today     - Left wrist pain- continue using brace as needed, and can ice the wrist before putting the brace on and then during the day as needed to help reduce inflammation.     - Reviewed total weight gain, encouraged continued healthy diet and exercise.  Encouraged minimum 80oz water daily.  Reviewed importance of daily fetal kick count and why/how to contact provider.    - Reviewed why/how to contact provider if headache/visual changes/RUQ or epigastric pain, decreased fetal movement, vaginal bleeding, leakage of fluid or more than 4 contractions " in an hour.       Return to clinic in 1 weeks and prn if questions or concerns.        I, Rob MCWILLIAMS, am serving as a scribe to document services personally performed by CNM based on the provider's statements to me. - Rob MCWILLIAMS     I agree with the PFSH and ROS as completed by Rob MCWILLIAMS except for changes made by me. The remainder of the encounter was performed by me and scribed by Rob MCWILLIAMS. The scribed note accurately reflects my personal services and decisions made by me.   DORA Coker CNM

## 2022-03-10 ENCOUNTER — OFFICE VISIT (OUTPATIENT)
Dept: OBGYN | Facility: CLINIC | Age: 37
End: 2022-03-10
Attending: ADVANCED PRACTICE MIDWIFE
Payer: COMMERCIAL

## 2022-03-10 ENCOUNTER — ANCILLARY PROCEDURE (OUTPATIENT)
Dept: ULTRASOUND IMAGING | Facility: CLINIC | Age: 37
End: 2022-03-10
Attending: ADVANCED PRACTICE MIDWIFE
Payer: COMMERCIAL

## 2022-03-10 VITALS
HEIGHT: 63 IN | BODY MASS INDEX: 33.66 KG/M2 | DIASTOLIC BLOOD PRESSURE: 71 MMHG | SYSTOLIC BLOOD PRESSURE: 114 MMHG | WEIGHT: 190 LBS | HEART RATE: 101 BPM

## 2022-03-10 DIAGNOSIS — O09.93 SUPERVISION OF HIGH RISK PREGNANCY IN THIRD TRIMESTER: ICD-10-CM

## 2022-03-10 DIAGNOSIS — O09.93 SUPERVISION OF HIGH RISK PREGNANCY IN THIRD TRIMESTER: Primary | ICD-10-CM

## 2022-03-10 LAB
ERYTHROCYTE [DISTWIDTH] IN BLOOD BY AUTOMATED COUNT: 12 % (ref 10–15)
HCT VFR BLD AUTO: 34.7 % (ref 35–47)
HGB BLD-MCNC: 11.4 G/DL (ref 11.7–15.7)
MCH RBC QN AUTO: 29.7 PG (ref 26.5–33)
MCHC RBC AUTO-ENTMCNC: 32.9 G/DL (ref 31.5–36.5)
MCV RBC AUTO: 90 FL (ref 78–100)
PLATELET # BLD AUTO: 249 10E3/UL (ref 150–450)
RBC # BLD AUTO: 3.84 10E6/UL (ref 3.8–5.2)
WBC # BLD AUTO: 14 10E3/UL (ref 4–11)

## 2022-03-10 PROCEDURE — 99207 PR PRENATAL VISIT: CPT | Performed by: ADVANCED PRACTICE MIDWIFE

## 2022-03-10 PROCEDURE — 76819 FETAL BIOPHYS PROFIL W/O NST: CPT | Mod: 26 | Performed by: OBSTETRICS & GYNECOLOGY

## 2022-03-10 PROCEDURE — G0463 HOSPITAL OUTPT CLINIC VISIT: HCPCS | Mod: 25

## 2022-03-10 PROCEDURE — 85027 COMPLETE CBC AUTOMATED: CPT | Performed by: ADVANCED PRACTICE MIDWIFE

## 2022-03-10 PROCEDURE — 36415 COLL VENOUS BLD VENIPUNCTURE: CPT | Performed by: ADVANCED PRACTICE MIDWIFE

## 2022-03-10 PROCEDURE — 76819 FETAL BIOPHYS PROFIL W/O NST: CPT

## 2022-03-10 ASSESSMENT — PAIN SCALES - GENERAL: PAINLEVEL: NO PAIN (0)

## 2022-03-10 NOTE — LETTER
"3/10/2022       RE: Gabriele Rodriguez  [address] [facility] [address]     Dear Colleague,    Thank you for referring your patient, Gabriele Rodriguez, to the [facility] [place] at [facility]. Please see a copy of my visit note below.    Subjective:   Gabriele Rodriguez is a 36 year old  at 35w0d by Estimated Date of Delivery: 2022 by Patient's last menstrual period was 2021.  presents for a routine prenatal appointment.        No vaginal bleeding or leakage of fluid.  No contractions.   + fetal movement.  No HA, visual changes, RUQ or epigastric pain.     On Saturday night she had cramping the resolved with rest and hydration. She had positive fetal movement and no leaking of fluid, or vaginal bleeding.     Patient concerns: Feeling well overall. Is starting to get everything ready for her C/S on 3/24. Wrist pain has improved since she has been wearing wrist brace at night.       Objective:  Vitals:    03/10/22 1114   BP: 114/71   Pulse: 101   Weight: 86.2 kg (190 lb)   Height: 1.6 m (5' 2.99\")     BPP 8/8      Assessment/Plan  Supervision of high risk pregnancy in third trimester   Comment: BPP 8/  Blood pressure stable   Planned C/S on 3/24    - Hgb collected today.     - Reviewed why/how to contact provider if headache/visual changes/RUQ or epigastric pain, decreased fetal movement, vaginal bleeding, leakage of fluid or more than 4 contractions in an hour.    -GBS at next visit    Return to clinic in 1 week and prn if questions or concerns.         I, Rob MCWILLIAMS, am serving as a scribe to document services personally performed by CN based on the provider's statements to me. - Rob MCWILLIAMS     Answers for HPI/ROS submitted by the patient on 3/1/2022  ALVIN 7 TOTAL SCORE: 4  I agree with the PFSH and ROS as completed by the student, except for changes made by me. The remainder of the encounter was " performed by me and scribed by the student. The scribed note accurately reflects my personal services and decisions made by me.  Dulce Martins, DNP, CNM, APRN

## 2022-03-11 NOTE — PROGRESS NOTES
"Subjective:   Gabriele Rodriguez is a 36 year old  at 35w0d by Estimated Date of Delivery: 2022 by Patient's last menstrual period was 2021.  presents for a routine prenatal appointment.        No vaginal bleeding or leakage of fluid.  No contractions.   + fetal movement.  No HA, visual changes, RUQ or epigastric pain.     On Saturday night she had cramping the resolved with rest and hydration. She had positive fetal movement and no leaking of fluid, or vaginal bleeding.     Patient concerns: Feeling well overall. Is starting to get everything ready for her C/S on 3/24. Wrist pain has improved since she has been wearing wrist brace at night.       Objective:  Vitals:    03/10/22 1114   BP: 114/71   Pulse: 101   Weight: 86.2 kg (190 lb)   Height: 1.6 m (5' 2.99\")     BPP 8/8      Assessment/Plan  Supervision of high risk pregnancy in third trimester   Comment: BPP 8/8  Blood pressure stable   Planned C/S on 3/24    - Hgb collected today.     - Reviewed why/how to contact provider if headache/visual changes/RUQ or epigastric pain, decreased fetal movement, vaginal bleeding, leakage of fluid or more than 4 contractions in an hour.    -GBS at next visit    Return to clinic in 1 week and prn if questions or concerns.         I, Rob MCWILLIAMS, am serving as a scribe to document services personally performed by CNM based on the provider's statements to me. - Rob MCWILLIAMS     Answers for HPI/ROS submitted by the patient on 3/1/2022  ALVIN 7 TOTAL SCORE: 4  I agree with the PFSH and ROS as completed by the student, except for changes made by me. The remainder of the encounter was performed by me and scribed by the student. The scribed note accurately reflects my personal services and decisions made by me.  Dulce Martins, OLIVER, CNM, APRN      "

## 2022-03-12 NOTE — PROGRESS NOTES
"Subjective:      36 year old  at 35w5d presents for a routine prenatal appointment.    Accepts GBS today. Offered clinician collect or self collect. Qi prefers clinician collect.   Recently had CBC assessment last week. Will place orders for CBC and Type and screen to complete 72 hours prior to her CS  Baby is cephalic by Leopolds    CBC collected last visit, Hgb 11.4    Qi has a history of IUFD and is getting weekly BPPs.  BPP today: , cephalic, , normal ISAIAS  Repeat  section scheduled for 3/24/22 at 12:30pm  Qi is feeling excited to meet her baby, reassured by how active she is lately    Taking Labetolol for chronic HTN, BP today: 127/78, needs Rx refill today    Qi accepts prescription for OTC postpartum meds           Denies vaginal bleeding,  leakage of fluid, or change in vaginal discharge.  No contractions.  Good fetal movement.     No HA, visual changes, RUQ or epigastric pain.       Objective:  Vitals:    03/15/22 1037   BP: 127/78   Pulse: 99   Weight: 86.2 kg (190 lb)   Height: 1.6 m (5' 3\")    See OB flowsheet    Assessment/Plan     Encounter Diagnoses   Name Primary?     Supervision of high risk pregnancy in third trimester Yes     Essential hypertension      No orders of the defined types were placed in this encounter.    Orders Placed This Encounter   Medications     acetaminophen (TYLENOL) 325 MG tablet     Sig: Take 2 tablets (650 mg) by mouth every 6 hours as needed for mild pain Start after Delivery.     Dispense:  100 tablet     Refill:  0     ibuprofen (ADVIL/MOTRIN) 600 MG tablet     Sig: Take 1 tablet (600 mg) by mouth every 6 hours as needed for moderate pain Start after delivery     Dispense:  60 tablet     Refill:  0     senna-docusate (SENOKOT-S/PERICOLACE) 8.6-50 MG tablet     Sig: Take 1 tablet by mouth daily Start after delivery.     Dispense:  100 tablet     Refill:  0     labetalol (NORMODYNE) 200 MG tablet     Sig: Take 1 tablet (200 mg) by mouth 2 " times daily     Dispense:  180 tablet     Refill:  3     Offered clinician collect or self Answers for HPI/ROS submitted by the patient on 3/1/2022  ALVIN 7 TOTAL SCORE: 4  PHQ-9 SCORE 6/9/2020 9/21/2020 1/21/2022   PHQ-9 Total Score 2 1 0       GBS screening: Obtained.  Birth preferences reviewed: planned CS 37 wks   Reinforced daily fetal movement counts, encouraged to call or come in with any concerns  -plan to do pre-op labs 72 hours before her surgery  -Given electrolyte drink and wash with ERAS packet.  Reviewed why/how to contact provider if headache/visual changes/RUQ or epigastric pain, decreased fetal movement, vaginal bleeding, leakage of fluid.   Return to clinic in 1 week and prn if questions or concerns.     Lizzette Bustamante, JOSHUA, APRN

## 2022-03-15 ENCOUNTER — OFFICE VISIT (OUTPATIENT)
Dept: OBGYN | Facility: CLINIC | Age: 37
End: 2022-03-15
Attending: ADVANCED PRACTICE MIDWIFE
Payer: COMMERCIAL

## 2022-03-15 ENCOUNTER — ANCILLARY PROCEDURE (OUTPATIENT)
Dept: ULTRASOUND IMAGING | Facility: CLINIC | Age: 37
End: 2022-03-15
Attending: ADVANCED PRACTICE MIDWIFE
Payer: COMMERCIAL

## 2022-03-15 VITALS
HEIGHT: 63 IN | DIASTOLIC BLOOD PRESSURE: 78 MMHG | WEIGHT: 190 LBS | HEART RATE: 99 BPM | BODY MASS INDEX: 33.66 KG/M2 | SYSTOLIC BLOOD PRESSURE: 127 MMHG

## 2022-03-15 DIAGNOSIS — O09.93 SUPERVISION OF HIGH RISK PREGNANCY IN THIRD TRIMESTER: ICD-10-CM

## 2022-03-15 DIAGNOSIS — I10 ESSENTIAL HYPERTENSION: ICD-10-CM

## 2022-03-15 DIAGNOSIS — O09.93 SUPERVISION OF HIGH RISK PREGNANCY IN THIRD TRIMESTER: Primary | ICD-10-CM

## 2022-03-15 PROCEDURE — G0463 HOSPITAL OUTPT CLINIC VISIT: HCPCS

## 2022-03-15 PROCEDURE — 76819 FETAL BIOPHYS PROFIL W/O NST: CPT

## 2022-03-15 PROCEDURE — 99207 PR PRENATAL VISIT: CPT | Performed by: ADVANCED PRACTICE MIDWIFE

## 2022-03-15 PROCEDURE — 76819 FETAL BIOPHYS PROFIL W/O NST: CPT | Mod: 26 | Performed by: OBSTETRICS & GYNECOLOGY

## 2022-03-15 PROCEDURE — 87653 STREP B DNA AMP PROBE: CPT | Performed by: ADVANCED PRACTICE MIDWIFE

## 2022-03-15 RX ORDER — LABETALOL 200 MG/1
200 TABLET, FILM COATED ORAL 2 TIMES DAILY
Qty: 180 TABLET | Refills: 3 | Status: SHIPPED | OUTPATIENT
Start: 2022-03-15 | End: 2022-04-29

## 2022-03-15 RX ORDER — AMOXICILLIN 250 MG
1 CAPSULE ORAL DAILY
Qty: 100 TABLET | Refills: 0 | Status: SHIPPED | OUTPATIENT
Start: 2022-03-15 | End: 2022-04-29

## 2022-03-15 RX ORDER — ACETAMINOPHEN 325 MG/1
650 TABLET ORAL EVERY 6 HOURS PRN
Qty: 100 TABLET | Refills: 0 | Status: SHIPPED | OUTPATIENT
Start: 2022-03-15 | End: 2022-04-29

## 2022-03-15 RX ORDER — IBUPROFEN 600 MG/1
600 TABLET, FILM COATED ORAL EVERY 6 HOURS PRN
Qty: 60 TABLET | Refills: 0 | Status: SHIPPED | OUTPATIENT
Start: 2022-03-15 | End: 2022-04-29

## 2022-03-15 NOTE — LETTER
"3/15/2022       RE: Gabriele Rodriguez  [address] [facility] [address]     Dear Colleague,    Thank you for referring your patient, Gabriele Rodriguez, to the [facility] [place] at [facility]. Please see a copy of my visit note below.    Subjective:      36 year old  at 35w5d presents for a routine prenatal appointment.    Accepts GBS today. Offered clinician collect or self collect. Qi prefers clinician collect.   Recently had CBC assessment last week. Will place orders for CBC and Type and screen to complete 72 hours prior to her CS  Baby is cephalic by Leopolds    CBC collected last visit, Hgb 11.4    Qi has a history of IUFD and is getting weekly BPPs.  BPP today: , cephalic, , normal ISAIAS  Repeat  section scheduled for 3/24/22 at 12:30pm  Qi is feeling excited to meet her baby, reassured by how active she is lately    Taking Labetolol for chronic HTN, BP today: 127/78, needs Rx refill today    Qi accepts prescription for OTC postpartum meds           Denies vaginal bleeding,  leakage of fluid, or change in vaginal discharge.  No contractions.  Good fetal movement.     No HA, visual changes, RUQ or epigastric pain.       Objective:  Vitals:    03/15/22 1037   BP: 127/78   Pulse: 99   Weight: 86.2 kg (190 lb)   Height: 1.6 m (5' 3\")    See OB flowsheet    Assessment/Plan     Encounter Diagnoses   Name Primary?     Supervision of high risk pregnancy in third trimester Yes     Essential hypertension      No orders of the defined types were placed in this encounter.    Orders Placed This Encounter   Medications     acetaminophen (TYLENOL) 325 MG tablet     Sig: Take 2 tablets (650 mg) by mouth every 6 hours as needed for mild pain Start after Delivery.     Dispense:  100 tablet     Refill:  0     ibuprofen (ADVIL/MOTRIN) 600 MG tablet     Sig: Take 1 tablet (600 mg) by mouth every 6 hours as needed for " moderate pain Start after delivery     Dispense:  60 tablet     Refill:  0     senna-docusate (SENOKOT-S/PERICOLACE) 8.6-50 MG tablet     Sig: Take 1 tablet by mouth daily Start after delivery.     Dispense:  100 tablet     Refill:  0     labetalol (NORMODYNE) 200 MG tablet     Sig: Take 1 tablet (200 mg) by mouth 2 times daily     Dispense:  180 tablet     Refill:  3     Offered clinician collect or self Answers for HPI/ROS submitted by the patient on 3/1/2022  ALVIN 7 TOTAL SCORE: 4  PHQ-9 SCORE 6/9/2020 9/21/2020 1/21/2022   PHQ-9 Total Score 2 1 0       GBS screening: Obtained.  Birth preferences reviewed: planned CS 37 wks   Reinforced daily fetal movement counts, encouraged to call or come in with any concerns  -plan to do pre-op labs 72 hours before her surgery  -Given electrolyte drink and wash with ERAS packet.  Reviewed why/how to contact provider if headache/visual changes/RUQ or epigastric pain, decreased fetal movement, vaginal bleeding, leakage of fluid.   Return to clinic in 1 week and prn if questions or concerns.     Lizzette Bustamante, JOSHUA, APRN

## 2022-03-16 LAB — GP B STREP DNA SPEC QL NAA+PROBE: NEGATIVE

## 2022-03-16 ASSESSMENT — ANXIETY QUESTIONNAIRES
4. TROUBLE RELAXING: NOT AT ALL
6. BECOMING EASILY ANNOYED OR IRRITABLE: NOT AT ALL
7. FEELING AFRAID AS IF SOMETHING AWFUL MIGHT HAPPEN: NOT AT ALL
GAD7 TOTAL SCORE: 2
3. WORRYING TOO MUCH ABOUT DIFFERENT THINGS: SEVERAL DAYS
5. BEING SO RESTLESS THAT IT IS HARD TO SIT STILL: NOT AT ALL
7. FEELING AFRAID AS IF SOMETHING AWFUL MIGHT HAPPEN: NOT AT ALL
2. NOT BEING ABLE TO STOP OR CONTROL WORRYING: NOT AT ALL
GAD7 TOTAL SCORE: 2
1. FEELING NERVOUS, ANXIOUS, OR ON EDGE: SEVERAL DAYS

## 2022-03-17 ASSESSMENT — ANXIETY QUESTIONNAIRES: GAD7 TOTAL SCORE: 2

## 2022-03-21 ENCOUNTER — LAB (OUTPATIENT)
Dept: LAB | Facility: CLINIC | Age: 37
End: 2022-03-21
Attending: OBSTETRICS & GYNECOLOGY

## 2022-03-21 ENCOUNTER — HOSPITAL ENCOUNTER (OUTPATIENT)
Dept: ULTRASOUND IMAGING | Facility: CLINIC | Age: 37
Discharge: HOME | End: 2022-03-21
Attending: ADVANCED PRACTICE MIDWIFE
Payer: COMMERCIAL

## 2022-03-21 ENCOUNTER — OFFICE VISIT (OUTPATIENT)
Dept: OBGYN | Facility: CLINIC | Age: 37
End: 2022-03-21
Attending: REGISTERED NURSE
Payer: COMMERCIAL

## 2022-03-21 VITALS
DIASTOLIC BLOOD PRESSURE: 79 MMHG | HEART RATE: 89 BPM | WEIGHT: 191.3 LBS | BODY MASS INDEX: 33.89 KG/M2 | SYSTOLIC BLOOD PRESSURE: 120 MMHG | HEIGHT: 63 IN

## 2022-03-21 DIAGNOSIS — O09.93 HIGH-RISK PREGNANCY IN THIRD TRIMESTER: ICD-10-CM

## 2022-03-21 DIAGNOSIS — Z01.812 ENCOUNTER FOR PRE-OPERATIVE LABORATORY TESTING: ICD-10-CM

## 2022-03-21 DIAGNOSIS — O10.919 CHRONIC HYPERTENSION AFFECTING PREGNANCY: ICD-10-CM

## 2022-03-21 DIAGNOSIS — O09.93 SUPERVISION OF HIGH RISK PREGNANCY IN THIRD TRIMESTER: Primary | ICD-10-CM

## 2022-03-21 DIAGNOSIS — O09.93 SUPERVISION OF HIGH RISK PREGNANCY IN THIRD TRIMESTER: ICD-10-CM

## 2022-03-21 DIAGNOSIS — Z98.891 S/P CESAREAN SECTION: ICD-10-CM

## 2022-03-21 LAB — SARS-COV-2 RNA RESP QL NAA+PROBE: NEGATIVE

## 2022-03-21 PROCEDURE — U0003 INFECTIOUS AGENT DETECTION BY NUCLEIC ACID (DNA OR RNA); SEVERE ACUTE RESPIRATORY SYNDROME CORONAVIRUS 2 (SARS-COV-2) (CORONAVIRUS DISEASE [COVID-19]), AMPLIFIED PROBE TECHNIQUE, MAKING USE OF HIGH THROUGHPUT TECHNOLOGIES AS DESCRIBED BY CMS-2020-01-R: HCPCS

## 2022-03-21 PROCEDURE — G0463 HOSPITAL OUTPT CLINIC VISIT: HCPCS

## 2022-03-21 PROCEDURE — U0005 INFEC AGEN DETEC AMPLI PROBE: HCPCS

## 2022-03-21 PROCEDURE — 59426 ANTEPARTUM CARE ONLY: CPT | Performed by: REGISTERED NURSE

## 2022-03-21 PROCEDURE — 76819 FETAL BIOPHYS PROFIL W/O NST: CPT

## 2022-03-21 PROCEDURE — 76819 FETAL BIOPHYS PROFIL W/O NST: CPT | Mod: GC | Performed by: RADIOLOGY

## 2022-03-21 NOTE — LETTER
"3/21/2022       RE: Gabriele Rodriguez  [address] [facility] [address]     Dear Colleague,    Thank you for referring your patient, Gabriele Rodriguez, to the [facility] [place] at [facility]. Please see a copy of my visit note below.    Subjective:      36 year old  at 36w4d presents for a routine prenatal appointment.         no vaginal bleeding,  leakage of fluid, or change in vaginal discharge.  no contractions.  Reports good fetal movement.       No HA, visual changes, RUQ or epigastric pain.   Patient concerns:  Feeling well overall.     chtn, stable on 200mg labetalol BID.  Reviewed BPP  today.   C/S scheduled 3/24 at 1230. Has reviewed ERAS packet. Wondering about when to stop low dose ASA.       Objective:  Vitals:    22 0940   BP: 120/79   Pulse: 89   Weight: 86.8 kg (191 lb 4.8 oz)   Height: 1.6 m (5' 3\")    See OB flowsheet    Assessment/Plan     Encounter Diagnoses   Name Primary?     Supervision of high risk pregnancy in third trimester Yes     S/P  section       death- now schedueld for rltcs 3/24      Chronic hypertension affecting pregnancy- on meds      No orders of the defined types were placed in this encounter.    No orders of the defined types were placed in this encounter.      PHQ-9 SCORE 2020   PHQ-9 Total Score 2 1 0     -Reviewed stopping low dose ASA the day before. She takes it at night, so discussed not taking evening dose 3/23/22.   GBS screening: reviewed GBS negative.   Birth preferences reviewed: C/S scheduled on Thursday.   Labor signs discussed. Reinforced daily fetal movement counts.  Reviewed why/how to contact provider if headache/visual changes/RUQ or epigastric pain, decreased fetal movement, vaginal bleeding, leakage of fluid.  Return to clinic in 1 week and prn if questions or concerns.     DORA Metz CNM    "

## 2022-03-21 NOTE — PROGRESS NOTES
"Subjective:      36 year old  at 36w4d presents for a routine prenatal appointment.         no vaginal bleeding,  leakage of fluid, or change in vaginal discharge.  no contractions.  Reports good fetal movement.       No HA, visual changes, RUQ or epigastric pain.   Patient concerns:  Feeling well overall.     chtn, stable on 200mg labetalol BID.  Reviewed BPP  today.   C/S scheduled 3/24 at 1230. Has reviewed ERAS packet. Wondering about when to stop low dose ASA.       Objective:  Vitals:    22 0940   BP: 120/79   Pulse: 89   Weight: 86.8 kg (191 lb 4.8 oz)   Height: 1.6 m (5' 3\")    See OB flowsheet    Assessment/Plan     Encounter Diagnoses   Name Primary?     Supervision of high risk pregnancy in third trimester Yes     S/P  section       death- now schedueld for rltcs 3/24      Chronic hypertension affecting pregnancy- on meds      No orders of the defined types were placed in this encounter.    No orders of the defined types were placed in this encounter.      PHQ-9 SCORE 2020   PHQ-9 Total Score 2 1 0     -Reviewed stopping low dose ASA the day before. She takes it at night, so discussed not taking evening dose 3/23/22.   GBS screening: reviewed GBS negative.   Birth preferences reviewed: C/S scheduled on Thursday.   Labor signs discussed. Reinforced daily fetal movement counts.  Reviewed why/how to contact provider if headache/visual changes/RUQ or epigastric pain, decreased fetal movement, vaginal bleeding, leakage of fluid.  Return to clinic in 1 week and prn if questions or concerns.     DORA Metz CNM  "

## 2022-03-23 ENCOUNTER — ANESTHESIA EVENT (OUTPATIENT)
Dept: OBGYN | Facility: CLINIC | Age: 37
End: 2022-03-23
Payer: COMMERCIAL

## 2022-03-23 ENCOUNTER — LAB (OUTPATIENT)
Dept: LAB | Facility: CLINIC | Age: 37
End: 2022-03-23
Payer: COMMERCIAL

## 2022-03-23 DIAGNOSIS — Z01.812 ENCOUNTER FOR PRE-OPERATIVE LABORATORY TESTING: ICD-10-CM

## 2022-03-23 DIAGNOSIS — O09.93 SUPERVISION OF HIGH RISK PREGNANCY IN THIRD TRIMESTER: ICD-10-CM

## 2022-03-23 LAB
ABO/RH(D): NORMAL
ANTIBODY SCREEN: NEGATIVE
ERYTHROCYTE [DISTWIDTH] IN BLOOD BY AUTOMATED COUNT: 12.4 % (ref 10–15)
HCT VFR BLD AUTO: 34.5 % (ref 35–47)
HGB BLD-MCNC: 11.2 G/DL (ref 11.7–15.7)
MCH RBC QN AUTO: 29.6 PG (ref 26.5–33)
MCHC RBC AUTO-ENTMCNC: 32.5 G/DL (ref 31.5–36.5)
MCV RBC AUTO: 91 FL (ref 78–100)
PLATELET # BLD AUTO: 251 10E3/UL (ref 150–450)
RBC # BLD AUTO: 3.78 10E6/UL (ref 3.8–5.2)
SPECIMEN EXPIRATION DATE: NORMAL
WBC # BLD AUTO: 13.4 10E3/UL (ref 4–11)

## 2022-03-23 PROCEDURE — 36415 COLL VENOUS BLD VENIPUNCTURE: CPT

## 2022-03-23 PROCEDURE — 86901 BLOOD TYPING SEROLOGIC RH(D): CPT

## 2022-03-23 PROCEDURE — 86850 RBC ANTIBODY SCREEN: CPT

## 2022-03-23 PROCEDURE — 85027 COMPLETE CBC AUTOMATED: CPT

## 2022-03-23 PROCEDURE — 86900 BLOOD TYPING SEROLOGIC ABO: CPT

## 2022-03-23 ASSESSMENT — LIFESTYLE VARIABLES: TOBACCO_USE: 0

## 2022-03-23 NOTE — H&P
OB History and Physical     Gabriele Rodriguez    MRN# [id number]  YOB: 1985      HPI: Gabriele Rodriguez is a 36 year old  at 37w0d by LMP c/w 9w2d US who presents today for repeat low transverse  section. She is feeling well this morning.    Pregnancy notable for:   - History of  section x1  - History of  demise   - Anxiety  - ADHD  - Chronic hypertension     Her previous pregnancies were notable for  demise following a NICU stay with cooling transferred to comfort cares. Her delivery was uncomplicated. Denies history of postpartum hemorrhage, shoulder dystocia, pre-eclampsia. No history of asthma.    She reports good fetal movement. Denies LOF, vaginal bleeding, or contractions.  She denies fever, chills, SOB, chest pain, palpitations, N/V, LE swelling/tenderness.  No concerns for headache, vision changes, RUQ or epigastric pain.    Prenatal Labs:   Lab Results   Component Value Date    ABO O 2020    RH Pos 2020    AS Negative 2021    HEPBANG Nonreactive 2021    CHPCRT Negative 2021    GCPCRT Negative 2021    HGB 11.4 (L) 03/10/2022       GBS Status:   Lab Results   Component Value Date    GBS Negative 2020         OBHX:   OB History    Para Term  AB Living   2 1 1 0 0 0   SAB IAB Ectopic Multiple Live Births   0 0 0 0 1      # Outcome Date GA Lbr Jelani/2nd Weight Sex Delivery Anes PTL Lv   2 Current            1 Term 20 37w2d  2.62 kg (5 lb 12.4 oz) F CS-LTranv   ND      Name: Valeria      Apgar1: 1  Apgar5: 5       MedicalHX:   Past Medical History:   Diagnosis Date     ADHD      Anxiety      Chronic sinusitis      Depressive disorder     also anxiety, on and off since high school, has tried prozac and lexapro, wellbutrin, now off meds, last on meds      History of  section      History of  death      Hypertension     diagnosed in med school, 2016     Varicella     as a child  "      SurgicalHX:   Past Surgical History:   Procedure Laterality Date     BIOPSY NAIL (LOCATION)      left hand, 4th digit      SECTION N/A 2020    Procedure:  SECTION;  Surgeon: Jojo Vaz MD;  Location: [facility] L+D       Medications:   No current facility-administered medications on file prior to encounter.  ASPIRIN 81 PO,   Prenatal Vit-Fe Fumarate-FA (PRENATAL PO),         Allergies:  Allergies   Allergen Reactions     Nickel Itching       FamilyHX:  Family History   Problem Relation Age of Onset     Hypertension Mother      Breast Cancer Mother         ductal stage 2, HER-2 negative, (+) estrogen/progesterone, BRCA negative     Skin Cancer Mother         basal cell     No Known Problems Father      No Known Problems Sister      Pancreatic Cancer Maternal Grandfather      Brain Cancer Paternal Aunt      Melanoma No family hx of        SocialHX:   Social History     Socioeconomic History     Marital status:      Spouse name: Pedro     Number of children: Not on file     Years of education: Not on file     Highest education level: Not on file   Occupational History     Occupation: Medical resident     Comment: pathology   Tobacco Use     Smoking status: Never Smoker     Smokeless tobacco: Never Used   Substance and Sexual Activity     Alcohol use: Not Currently     Comment: Not while pregnant     Drug use: Never     Sexual activity: Yes     Partners: Male     Birth control/protection: None   Other Topics Concern     Parent/sibling w/ CABG, MI or angioplasty before 65F 55M? 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         demise 2020        Chief Resident        ROS: 10 point ROS negative other than above    Physical Exam:  Patient Vitals for the past 24 hrs:   Temp Temp src Resp Height Weight   22 1053 98.5  F (36.9  C) Oral 16 1.6 m (5' 3\") 86.6 kg (191 lb)     General: AAOx3, appropriately interactive, NAD, appears generally well  CV: " RRR, normal S1/S2, no m/r/g  Lungs: CTAB, non-labored breathing, no wheezes, rales, or rhonchi  Abdomen: soft, gravid, non-tender, EFW 7#  Extremities: Non-tender with 1+ edema bilaterally in LE    FHT: 125 baseline, moderate variability, accels present, no decels   [place]: 0 contractions in ten minutes    Assessment & Plan: 36 year old  at 37w0d by 9w2d US, here for RLTCS. Pregnancy is notable for history of  section x1, history of  demise, anxiety, ADHD, and chronic hypertension.     # Anx  # ADHD  - No pta medications  - Mood stable at this time  - Recommend social work consult postpartum and 1 week postpartum mood check    # cHTN  - Serial BP monitoring   - IV Antihypertensives prn for sustained severe range blood pressures (>160/>110)  - Continue PTA labetalol 200 mg bid  - Labs: HELLP labs last wnl 21. Will repeat today    # Scheduled Repeat  Section  Indicated due to history of  section. Prior (s) for category II FHT remote from delivery resulting in  demise. Previous op notes reported no complications or adhesive disease. Will plan for a Pfannenstiel skin incision with low transverse hysterotomy  - Labs: CBC, T&S, RPR   - Pre-op Hgb 11.2, Plts 251  - Placenta: posterior  - Anesthesia: spinal  - 2g Ancef  - PPH Meds/Ppx: Avoid methergine due to cHTN  - Diet: NPO  - PPx: SCDs   - Consent: Discussed risks and benefits of procedure, including but not limited to bleeding, infection, injury to surrounding organs, injury to infant, and the potential need for another surgery should some injury go unrecognized or patient were to have continued bleeding. Patient had time to ask questions and expressed understanding of procedure and associated risks. Agreed to blood transfusion if necessary. Consent signed.     # PNC  - Rh pos, Rubella immune, early GCT abnml, GCT and GTT wnl, GBS negative   - BPP 8/8 3/21/22  - Other prenatal labs wnl  - Imaging: see imaging  tab, EFW 1960 (46%) at 32w1d     # FWB:   Cat I tracing, reactive and reassuring; EFW 7#  - Continuous Fetal Monitoring  - NICU for delivery   - Intrauterine resuscitative measures prn    Patient discussed with Dr. Devon Dash MD  OBGYN PGY-2  March 24, 2022 11:38 AM      Appreciate note by Dr. Dash. Patient has been seen and examined by me separate from the resident, agree with above note.     Giovanna Osorio MD  11:57 AM

## 2022-03-23 NOTE — ANESTHESIA PREPROCEDURE EVALUATION
Anesthesia Pre-Procedure Evaluation    Patient: Gabriele Rodriguez   MRN: [id number] : 1985        Procedure : Procedure(s):  REPEAT  SECTION          Past Medical History:   Diagnosis Date     ADHD      Anxiety      Chronic sinusitis      Depressive disorder     also anxiety, on and off since high school, has tried prozac and lexapro, wellbutrin, now off meds, last on meds 2018     History of  section      History of  death      Hypertension     diagnosed in med school, 2016     Varicella     as a child      Past Surgical History:   Procedure Laterality Date     BIOPSY NAIL (LOCATION)      left hand, 4th digit      SECTION N/A 2020    Procedure:  SECTION;  Surgeon: Jojo Vaz MD;  Location: [facility] L[facility]      Allergies   Allergen Reactions     Nickel Itching      Social History     Tobacco Use     Smoking status: Never Smoker     Smokeless tobacco: Never Used   Substance Use Topics     Alcohol use: Not Currently     Comment: Not while pregnant      Wt Readings from Last 1 Encounters:   22 86.8 kg (191 lb 4.8 oz)        Anesthesia Evaluation   Pt has had prior anesthetic. Type: Regional (Spinal for her 1st C/s, uneventful from the anethetic standpoint. ).    No history of anesthetic complications       ROS/MED HX  ENT/Pulmonary: Comment: - Hx chronic sinusitis   (-) tobacco use and asthma   Neurologic:  - neg neurologic ROS     Cardiovascular:     (+) hypertension-range: -127, DBP: 70's/ ---- (-) murmur and wheezes   METS/Exercise Tolerance: 3 - Able to walk 1-2 blocks without stopping    Hematologic:     (+) anemia (11.2, 3/23/2022),     Musculoskeletal:  - neg musculoskeletal ROS     GI/Hepatic:  - neg GI/hepatic ROS     Renal/Genitourinary:  - neg Renal ROS     Endo:     (+) Obesity (BMI ~34),     Psychiatric/Substance Use:     (+) psychiatric history anxiety and depression (ADHD)     Infectious Disease:  - neg infectious disease ROS      Malignancy:  - neg malignancy ROS     Other: Comment: - Hx  demise      (+) , previous ,         Physical Exam    Airway        Mallampati: II   TM distance: > 3 FB   Neck ROM: full   Mouth opening: > 3 cm    Respiratory Devices and Support         Dental  no notable dental history         Cardiovascular   cardiovascular exam normal      (-) no murmur    Pulmonary   pulmonary exam normal        (-) no wheezes        OUTSIDE LABS:  CBC:   Lab Results   Component Value Date    WBC 13.4 (H) 2022    WBC 14.0 (H) 03/10/2022    HGB 11.2 (L) 2022    HGB 11.4 (L) 03/10/2022    HCT 34.5 (L) 2022    HCT 34.7 (L) 03/10/2022     2022     03/10/2022     BMP:   Lab Results   Component Value Date     2020     2019    POTASSIUM 3.4 2020    POTASSIUM 3.6 2019    CHLORIDE 107 2020    CHLORIDE 104 2019    CO2 21 2020    CO2 27 2019    BUN 6 (L) 2020    BUN 10 2019    CR 0.58 2022    CR 0.48 (L) 2020     (H) 2020    GLC 85 2019     COAGS: No results found for: PTT, INR, FIBR  POC: No results found for: BGM, HCG, HCGS  HEPATIC:   Lab Results   Component Value Date    ALBUMIN 2.7 (L) 2020    PROTTOTAL 8.3 2019    ALT 13 2022    AST 13 2022    ALKPHOS 71 2019    BILITOTAL 0.7 2019     OTHER:   Lab Results   Component Value Date    COLT 8.4 (L) 2020    PHOS 2.8 2020    TSH 2.78 2019       Anesthesia Plan    ASA Status:  3   NPO Status:  NPO Appropriate    Anesthesia Type: Spinal.      Maintenance: N/A.        Consents    Anesthesia Plan(s) and associated risks, benefits, and realistic alternatives discussed. Questions answered and patient/representative(s) expressed understanding.    - Discussed:     - Discussed with:  Patient      - Extended Intubation/Ventilatory Support Discussed: No.      - Patient is DNR/DNI Status: No    Use  of blood products discussed: Yes.     - Consented: consented to blood products            Reason for refusal: other.     Postoperative Care    Pain management: Peripheral nerve block (Single Shot), Oral pain medications, Multi-modal analgesia.   PONV prophylaxis: Ondansetron (or other 5HT-3), Dexamethasone or Solumedrol     Comments:                Hakeem Reina MD

## 2022-03-23 NOTE — OP NOTE
[facility]   OPERATIVE NOTE:  SECTION     Surgery Date:  2022  Surgeon(s): Giovanna Osorio MD  Assistants:  Maurisio Dash MD, PGY-2  Izzy Weinberg MS3    Preoperative Diagnoses:  1.  at 37w0d   2. History of  section x1  3. Chronic hypertension  4. Anxiety  5. ADHD    Postoperative diagnoses:  1.  at 37w0d, now delivered    Procedure performed:  Repeat low segment transverse  section via Pfannenstiel skin incision with double layer uterine closure    Anesthesia:  Spinal with duramorph  Est Blood Loss (mL): 566 mL, s/p TXA administration   Fluid replacement: 1.3 L crystalloid  UOP: 400 ml clear yellow urine   Specimens: Cord gasses  Complications: None      Operative findings:   1. Single, viable female infant at 1330 hours on 3/23/2022. Apgars of 7/7 at 1/5min  Birth weight:3030g.  Fetal presentation: vertex, OP. Amniotic fluid: clear.    2. Placenta intact with 3 vessel cord.     3. Normal appearing uterus, fallopian tubes, ovaries.   4. No intraabdominal adhesions.  No abdominal wall adhesions  5. Lower uterine segment hematoma (approximately 3 cm x 2 cm) noted to not be expanding at the time of surgery  6. 1.5 cm x1 cm firm, dark purple/black mass in the epiploica of the large intestine consistent with epiploic infarct. No involvement of bowel.     Indication: Gabriele Rodriguez is a 36 year old, , who was admitted at 37w0d by LMP c/w 9w2d ultrasound for RLTCS. The risks, benefits, and alternatives of  delivery were explained and the patient agreed to proceed.     Procedure details:  After obtaining informed consent the patient was taken to the operating room. She received ancef prior to the skin incision. She was placed in the dorsal supine position with a leftward tilt and prepped and draped in the usual sterile fashion.     Following test of adequate spinal anesthesia, the abdomen was entered through a transverse skin  incision through her previous scar. The skin incision was made sharply and carried through the subcutaneous tissue to the fascia.  Fascia was incised in the midline and extended laterally with the Mckoy scissors.  The superior margin of the fascial incision was grasped with Kocher clamps and dissected from the underlying muscle with sharp and blunt dissecton, which was then repeated at the lower margin of the fascial incision.  The muscle was  in the midline.      The peritoneum was entered bluntly and the opening extended by digital dissection with care to avoid the bladder. A bladder blade was placed. The lower segment of the uterus was opened sharply in a transverse fashion and extended with digital pressure. The infant's head was noted to be in the OP position. It was elevated to the level of the hysterotomy and was delivered atraumatically, shoulders delivered easily thereafter. The cord was doubly clamped after 60 seconds and cut and the infant was handed off to the waiting [facility] staff. A segment of the cord was cut and set aside for cord gases if needed.     The placenta was expressed.  The uterus was exteriorized from the abdomen and cleared of all clots and debris.  The uterus was massaged and was noted to be firm.  Pitocin was given through the running IV.  With vigorous massage as well as administration of pitocin, good uterine tone was achieved. The hysterotomy was repaired with 0-vicryl suture in a running locked fashion. A 2nd layer of 0-monocryl was used to imbricate the incision and good hemostasis was achieved. A lower uterine segment hematoma was noted and found to be posterior to the bladder. Care was taken to imbricate both before and after the hematoma without lancing the collection of blood. The hematoma was not extending at the time of the procedure.  At this time the mass noted in the findings was identified. The posterior cul-de-sac was suctioned and the uterus was returned to the  abdomen.      The bilateral pericolic gutters were suctioned.  The hysterotomy was again inspected and found to be hemostatic.  The abdominal wall was examined and also found to be hemostatic.  The fascia was closed with a running suture of 0-Vicryl.  Subcutaneous tissue was irrigated. Areas that were not hemostatic were controlled with cautery. The subcutaneous tissue was less than 3cm in depth and did not require reapproximation. The skin was closed with 4-0 vicryl. The patient tolerated the procedure well and was taken to the recovery room in stable condition. All sponge, needle and instrument counts were correct x2.     Dr. Osorio was present for the entire procedure.     Maurisio Dash MD  OBGYN PGY-2    I was present and scrubbed for entire procedure.   Giovanna Osorio MD

## 2022-03-24 ENCOUNTER — HOSPITAL ENCOUNTER (INPATIENT)
Facility: CLINIC | Age: 37
LOS: 2 days | Discharge: HOME | End: 2022-03-26
Attending: OBSTETRICS & GYNECOLOGY | Admitting: OBSTETRICS & GYNECOLOGY
Payer: COMMERCIAL

## 2022-03-24 ENCOUNTER — ANESTHESIA (OUTPATIENT)
Dept: OBGYN | Facility: CLINIC | Age: 37
End: 2022-03-24
Payer: COMMERCIAL

## 2022-03-24 DIAGNOSIS — Z98.891 S/P CESAREAN SECTION: Primary | ICD-10-CM

## 2022-03-24 DIAGNOSIS — Z98.891 HISTORY OF CESAREAN DELIVERY: ICD-10-CM

## 2022-03-24 LAB
ALT SERPL W P-5'-P-CCNC: <6 U/L (ref 0–50)
AST SERPL W P-5'-P-CCNC: 13 U/L (ref 0–45)
CREAT SERPL-MCNC: 0.5 MG/DL (ref 0.52–1.04)
ERYTHROCYTE [DISTWIDTH] IN BLOOD BY AUTOMATED COUNT: 11.9 % (ref 10–15)
GFR SERPL CREATININE-BSD FRML MDRD: >90 ML/MIN/1.73M2
HCT VFR BLD AUTO: 34 % (ref 35–47)
HGB BLD-MCNC: 11.2 G/DL (ref 11.7–15.7)
MCH RBC QN AUTO: 30 PG (ref 26.5–33)
MCHC RBC AUTO-ENTMCNC: 32.9 G/DL (ref 31.5–36.5)
MCV RBC AUTO: 91 FL (ref 78–100)
PLATELET # BLD AUTO: 237 10E3/UL (ref 150–450)
RBC # BLD AUTO: 3.73 10E6/UL (ref 3.8–5.2)
WBC # BLD AUTO: 19 10E3/UL (ref 4–11)

## 2022-03-24 PROCEDURE — 258N000003 HC RX IP 258 OP 636: Performed by: STUDENT IN AN ORGANIZED HEALTH CARE EDUCATION/TRAINING PROGRAM

## 2022-03-24 PROCEDURE — 84460 ALANINE AMINO (ALT) (SGPT): CPT | Performed by: STUDENT IN AN ORGANIZED HEALTH CARE EDUCATION/TRAINING PROGRAM

## 2022-03-24 PROCEDURE — 82565 ASSAY OF CREATININE: CPT | Performed by: STUDENT IN AN ORGANIZED HEALTH CARE EDUCATION/TRAINING PROGRAM

## 2022-03-24 PROCEDURE — 36415 COLL VENOUS BLD VENIPUNCTURE: CPT | Performed by: STUDENT IN AN ORGANIZED HEALTH CARE EDUCATION/TRAINING PROGRAM

## 2022-03-24 PROCEDURE — 85014 HEMATOCRIT: CPT | Performed by: STUDENT IN AN ORGANIZED HEALTH CARE EDUCATION/TRAINING PROGRAM

## 2022-03-24 PROCEDURE — 120N000002 HC R&B MED SURG/OB UMMC

## 2022-03-24 PROCEDURE — 272N000001 HC OR GENERAL SUPPLY STERILE: Performed by: OBSTETRICS & GYNECOLOGY

## 2022-03-24 PROCEDURE — 250N000009 HC RX 250: Performed by: OBSTETRICS & GYNECOLOGY

## 2022-03-24 PROCEDURE — 250N000011 HC RX IP 250 OP 636: Performed by: STUDENT IN AN ORGANIZED HEALTH CARE EDUCATION/TRAINING PROGRAM

## 2022-03-24 PROCEDURE — 258N000003 HC RX IP 258 OP 636: Performed by: OBSTETRICS & GYNECOLOGY

## 2022-03-24 PROCEDURE — 370N000017 HC ANESTHESIA TECHNICAL FEE, PER MIN: Performed by: OBSTETRICS & GYNECOLOGY

## 2022-03-24 PROCEDURE — 999N000141 HC STATISTIC PRE-PROCEDURE NURSING ASSESSMENT: Performed by: OBSTETRICS & GYNECOLOGY

## 2022-03-24 PROCEDURE — 271N000001 HC OR GENERAL SUPPLY NON-STERILE: Performed by: OBSTETRICS & GYNECOLOGY

## 2022-03-24 PROCEDURE — 710N000010 HC RECOVERY PHASE 1, LEVEL 2, PER MIN: Performed by: OBSTETRICS & GYNECOLOGY

## 2022-03-24 PROCEDURE — 250N000011 HC RX IP 250 OP 636: Performed by: OBSTETRICS & GYNECOLOGY

## 2022-03-24 PROCEDURE — 59514 CESAREAN DELIVERY ONLY: CPT | Mod: GC | Performed by: OBSTETRICS & GYNECOLOGY

## 2022-03-24 PROCEDURE — 360N000076 HC SURGERY LEVEL 3, PER MIN: Performed by: OBSTETRICS & GYNECOLOGY

## 2022-03-24 PROCEDURE — 250N000013 HC RX MED GY IP 250 OP 250 PS 637: Performed by: OBSTETRICS & GYNECOLOGY

## 2022-03-24 PROCEDURE — C9290 INJ, BUPIVACAINE LIPOSOME: HCPCS | Performed by: STUDENT IN AN ORGANIZED HEALTH CARE EDUCATION/TRAINING PROGRAM

## 2022-03-24 PROCEDURE — 84450 TRANSFERASE (AST) (SGOT): CPT | Performed by: STUDENT IN AN ORGANIZED HEALTH CARE EDUCATION/TRAINING PROGRAM

## 2022-03-24 RX ORDER — CITRIC ACID/SODIUM CITRATE 334-500MG
30 SOLUTION, ORAL ORAL ONCE
Status: DISCONTINUED | OUTPATIENT
Start: 2022-03-24 | End: 2022-03-24 | Stop reason: HOSPADM

## 2022-03-24 RX ORDER — ONDANSETRON 2 MG/ML
4 INJECTION INTRAMUSCULAR; INTRAVENOUS EVERY 6 HOURS PRN
Status: DISCONTINUED | OUTPATIENT
Start: 2022-03-24 | End: 2022-03-24 | Stop reason: HOSPADM

## 2022-03-24 RX ORDER — NALBUPHINE HYDROCHLORIDE 10 MG/ML
2.5-5 INJECTION, SOLUTION INTRAMUSCULAR; INTRAVENOUS; SUBCUTANEOUS EVERY 6 HOURS PRN
Status: DISCONTINUED | OUTPATIENT
Start: 2022-03-24 | End: 2022-03-24

## 2022-03-24 RX ORDER — FENTANYL CITRATE-0.9 % NACL/PF 10 MCG/ML
100 PLASTIC BAG, INJECTION (ML) INTRAVENOUS EVERY 5 MIN PRN
Status: DISCONTINUED | OUTPATIENT
Start: 2022-03-24 | End: 2022-03-24 | Stop reason: HOSPADM

## 2022-03-24 RX ORDER — OXYCODONE HYDROCHLORIDE 5 MG/1
5 TABLET ORAL EVERY 4 HOURS PRN
Status: DISCONTINUED | OUTPATIENT
Start: 2022-03-24 | End: 2022-03-26 | Stop reason: HOSPADM

## 2022-03-24 RX ORDER — OXYTOCIN/0.9 % SODIUM CHLORIDE 30/500 ML
340 PLASTIC BAG, INJECTION (ML) INTRAVENOUS CONTINUOUS PRN
Status: COMPLETED | OUTPATIENT
Start: 2022-03-24 | End: 2022-03-24

## 2022-03-24 RX ORDER — NALOXONE HYDROCHLORIDE 0.4 MG/ML
0.2 INJECTION, SOLUTION INTRAMUSCULAR; INTRAVENOUS; SUBCUTANEOUS
Status: DISCONTINUED | OUTPATIENT
Start: 2022-03-24 | End: 2022-03-26 | Stop reason: HOSPADM

## 2022-03-24 RX ORDER — KETOROLAC TROMETHAMINE 30 MG/ML
30 INJECTION, SOLUTION INTRAMUSCULAR; INTRAVENOUS EVERY 6 HOURS
Status: COMPLETED | OUTPATIENT
Start: 2022-03-24 | End: 2022-03-25

## 2022-03-24 RX ORDER — BUPIVACAINE HYDROCHLORIDE 7.5 MG/ML
INJECTION, SOLUTION INTRASPINAL
Status: COMPLETED | OUTPATIENT
Start: 2022-03-24 | End: 2022-03-24

## 2022-03-24 RX ORDER — HYDROCORTISONE 2.5 %
CREAM (GRAM) TOPICAL 3 TIMES DAILY PRN
Status: DISCONTINUED | OUTPATIENT
Start: 2022-03-24 | End: 2022-03-26 | Stop reason: HOSPADM

## 2022-03-24 RX ORDER — METOCLOPRAMIDE HYDROCHLORIDE 5 MG/ML
10 INJECTION INTRAMUSCULAR; INTRAVENOUS EVERY 6 HOURS PRN
Status: DISCONTINUED | OUTPATIENT
Start: 2022-03-24 | End: 2022-03-26 | Stop reason: HOSPADM

## 2022-03-24 RX ORDER — OXYTOCIN 10 [USP'U]/ML
10 INJECTION, SOLUTION INTRAMUSCULAR; INTRAVENOUS
Status: DISCONTINUED | OUTPATIENT
Start: 2022-03-24 | End: 2022-03-26 | Stop reason: HOSPADM

## 2022-03-24 RX ORDER — DEXTROSE, SODIUM CHLORIDE, SODIUM LACTATE, POTASSIUM CHLORIDE, AND CALCIUM CHLORIDE 5; .6; .31; .03; .02 G/100ML; G/100ML; G/100ML; G/100ML; G/100ML
INJECTION, SOLUTION INTRAVENOUS CONTINUOUS
Status: DISCONTINUED | OUTPATIENT
Start: 2022-03-24 | End: 2022-03-26 | Stop reason: HOSPADM

## 2022-03-24 RX ORDER — OXYTOCIN/0.9 % SODIUM CHLORIDE 30/500 ML
100-340 PLASTIC BAG, INJECTION (ML) INTRAVENOUS CONTINUOUS PRN
Status: DISCONTINUED | OUTPATIENT
Start: 2022-03-24 | End: 2022-03-26 | Stop reason: HOSPADM

## 2022-03-24 RX ORDER — MISOPROSTOL 200 UG/1
800 TABLET ORAL
Status: DISCONTINUED | OUTPATIENT
Start: 2022-03-24 | End: 2022-03-24 | Stop reason: HOSPADM

## 2022-03-24 RX ORDER — ACETAMINOPHEN 325 MG/1
975 TABLET ORAL ONCE
Status: COMPLETED | OUTPATIENT
Start: 2022-03-24 | End: 2022-03-24

## 2022-03-24 RX ORDER — ONDANSETRON 4 MG/1
4 TABLET, ORALLY DISINTEGRATING ORAL EVERY 6 HOURS PRN
Status: DISCONTINUED | OUTPATIENT
Start: 2022-03-24 | End: 2022-03-26 | Stop reason: HOSPADM

## 2022-03-24 RX ORDER — MORPHINE SULFATE 1 MG/ML
INJECTION, SOLUTION EPIDURAL; INTRATHECAL; INTRAVENOUS
Status: COMPLETED | OUTPATIENT
Start: 2022-03-24 | End: 2022-03-24

## 2022-03-24 RX ORDER — FENTANYL CITRATE 50 UG/ML
INJECTION, SOLUTION INTRAMUSCULAR; INTRAVENOUS
Status: COMPLETED | OUTPATIENT
Start: 2022-03-24 | End: 2022-03-24

## 2022-03-24 RX ORDER — IBUPROFEN 800 MG/1
800 TABLET, FILM COATED ORAL EVERY 6 HOURS
Status: DISCONTINUED | OUTPATIENT
Start: 2022-03-25 | End: 2022-03-26 | Stop reason: HOSPADM

## 2022-03-24 RX ORDER — AMOXICILLIN 250 MG
2 CAPSULE ORAL 2 TIMES DAILY
Status: DISCONTINUED | OUTPATIENT
Start: 2022-03-24 | End: 2022-03-26 | Stop reason: HOSPADM

## 2022-03-24 RX ORDER — OXYTOCIN 10 [USP'U]/ML
10 INJECTION, SOLUTION INTRAMUSCULAR; INTRAVENOUS
Status: DISCONTINUED | OUTPATIENT
Start: 2022-03-24 | End: 2022-03-24 | Stop reason: HOSPADM

## 2022-03-24 RX ORDER — MISOPROSTOL 200 UG/1
800 TABLET ORAL
Status: DISCONTINUED | OUTPATIENT
Start: 2022-03-24 | End: 2022-03-26 | Stop reason: HOSPADM

## 2022-03-24 RX ORDER — BENZOCAINE/MENTHOL 6 MG-10 MG
LOZENGE MUCOUS MEMBRANE 2 TIMES DAILY
Status: DISCONTINUED | OUTPATIENT
Start: 2022-03-25 | End: 2022-03-26 | Stop reason: HOSPADM

## 2022-03-24 RX ORDER — AMOXICILLIN 250 MG
1 CAPSULE ORAL 2 TIMES DAILY
Status: DISCONTINUED | OUTPATIENT
Start: 2022-03-24 | End: 2022-03-26 | Stop reason: HOSPADM

## 2022-03-24 RX ORDER — MORPHINE SULFATE 1 MG/ML
150 INJECTION, SOLUTION EPIDURAL; INTRATHECAL; INTRAVENOUS ONCE
Status: DISCONTINUED | OUTPATIENT
Start: 2022-03-24 | End: 2022-03-24 | Stop reason: HOSPADM

## 2022-03-24 RX ORDER — BUPIVACAINE HYDROCHLORIDE 7.5 MG/ML
1.8 INJECTION, SOLUTION EPIDURAL; RETROBULBAR ONCE
Status: DISCONTINUED | OUTPATIENT
Start: 2022-03-24 | End: 2022-03-24 | Stop reason: HOSPADM

## 2022-03-24 RX ORDER — NALOXONE HYDROCHLORIDE 0.4 MG/ML
0.4 INJECTION, SOLUTION INTRAMUSCULAR; INTRAVENOUS; SUBCUTANEOUS
Status: DISCONTINUED | OUTPATIENT
Start: 2022-03-24 | End: 2022-03-26 | Stop reason: HOSPADM

## 2022-03-24 RX ORDER — MODIFIED LANOLIN
OINTMENT (GRAM) TOPICAL
Status: DISCONTINUED | OUTPATIENT
Start: 2022-03-24 | End: 2022-03-26 | Stop reason: HOSPADM

## 2022-03-24 RX ORDER — MISOPROSTOL 200 UG/1
TABLET ORAL
Status: DISCONTINUED
Start: 2022-03-24 | End: 2022-03-24 | Stop reason: WASHOUT

## 2022-03-24 RX ORDER — METHYLERGONOVINE MALEATE 0.2 MG/ML
200 INJECTION INTRAVENOUS
Status: DISCONTINUED | OUTPATIENT
Start: 2022-03-24 | End: 2022-03-24 | Stop reason: HOSPADM

## 2022-03-24 RX ORDER — LIDOCAINE 40 MG/G
CREAM TOPICAL
Status: DISCONTINUED | OUTPATIENT
Start: 2022-03-24 | End: 2022-03-26 | Stop reason: HOSPADM

## 2022-03-24 RX ORDER — METHYLERGONOVINE MALEATE 0.2 MG/ML
200 INJECTION INTRAVENOUS
Status: DISCONTINUED | OUTPATIENT
Start: 2022-03-24 | End: 2022-03-26 | Stop reason: HOSPADM

## 2022-03-24 RX ORDER — MISOPROSTOL 200 UG/1
400 TABLET ORAL
Status: DISCONTINUED | OUTPATIENT
Start: 2022-03-24 | End: 2022-03-26 | Stop reason: HOSPADM

## 2022-03-24 RX ORDER — BISACODYL 10 MG
10 SUPPOSITORY, RECTAL RECTAL DAILY PRN
Status: DISCONTINUED | OUTPATIENT
Start: 2022-03-26 | End: 2022-03-26 | Stop reason: HOSPADM

## 2022-03-24 RX ORDER — OXYTOCIN/0.9 % SODIUM CHLORIDE 30/500 ML
340 PLASTIC BAG, INJECTION (ML) INTRAVENOUS CONTINUOUS PRN
Status: DISCONTINUED | OUTPATIENT
Start: 2022-03-24 | End: 2022-03-26 | Stop reason: HOSPADM

## 2022-03-24 RX ORDER — ONDANSETRON 2 MG/ML
4 INJECTION INTRAMUSCULAR; INTRAVENOUS EVERY 30 MIN PRN
Status: DISCONTINUED | OUTPATIENT
Start: 2022-03-24 | End: 2022-03-24 | Stop reason: HOSPADM

## 2022-03-24 RX ORDER — CARBOPROST TROMETHAMINE 250 UG/ML
250 INJECTION, SOLUTION INTRAMUSCULAR
Status: DISCONTINUED | OUTPATIENT
Start: 2022-03-24 | End: 2022-03-26 | Stop reason: HOSPADM

## 2022-03-24 RX ORDER — METOCLOPRAMIDE 10 MG/1
10 TABLET ORAL EVERY 6 HOURS PRN
Status: DISCONTINUED | OUTPATIENT
Start: 2022-03-24 | End: 2022-03-26 | Stop reason: HOSPADM

## 2022-03-24 RX ORDER — CEFAZOLIN SODIUM 2 G/100ML
2 INJECTION, SOLUTION INTRAVENOUS SEE ADMIN INSTRUCTIONS
Status: DISCONTINUED | OUTPATIENT
Start: 2022-03-24 | End: 2022-03-24 | Stop reason: HOSPADM

## 2022-03-24 RX ORDER — ONDANSETRON 2 MG/ML
4 INJECTION INTRAMUSCULAR; INTRAVENOUS EVERY 6 HOURS PRN
Status: DISCONTINUED | OUTPATIENT
Start: 2022-03-24 | End: 2022-03-26 | Stop reason: HOSPADM

## 2022-03-24 RX ORDER — CARBOPROST TROMETHAMINE 250 UG/ML
250 INJECTION, SOLUTION INTRAMUSCULAR
Status: DISCONTINUED | OUTPATIENT
Start: 2022-03-24 | End: 2022-03-24 | Stop reason: HOSPADM

## 2022-03-24 RX ORDER — TRANEXAMIC ACID 10 MG/ML
1 INJECTION, SOLUTION INTRAVENOUS EVERY 30 MIN PRN
Status: DISCONTINUED | OUTPATIENT
Start: 2022-03-24 | End: 2022-03-26 | Stop reason: HOSPADM

## 2022-03-24 RX ORDER — PROCHLORPERAZINE 25 MG
25 SUPPOSITORY, RECTAL RECTAL EVERY 12 HOURS PRN
Status: DISCONTINUED | OUTPATIENT
Start: 2022-03-24 | End: 2022-03-26 | Stop reason: HOSPADM

## 2022-03-24 RX ORDER — SIMETHICONE 80 MG
80 TABLET,CHEWABLE ORAL 4 TIMES DAILY PRN
Status: DISCONTINUED | OUTPATIENT
Start: 2022-03-24 | End: 2022-03-26 | Stop reason: HOSPADM

## 2022-03-24 RX ORDER — CITRIC ACID/SODIUM CITRATE 334-500MG
30 SOLUTION, ORAL ORAL
Status: COMPLETED | OUTPATIENT
Start: 2022-03-24 | End: 2022-03-24

## 2022-03-24 RX ORDER — ACETAMINOPHEN 325 MG/1
975 TABLET ORAL EVERY 6 HOURS
Status: DISCONTINUED | OUTPATIENT
Start: 2022-03-24 | End: 2022-03-26 | Stop reason: HOSPADM

## 2022-03-24 RX ORDER — ONDANSETRON 4 MG/1
4 TABLET, ORALLY DISINTEGRATING ORAL EVERY 6 HOURS PRN
Status: DISCONTINUED | OUTPATIENT
Start: 2022-03-24 | End: 2022-03-24 | Stop reason: HOSPADM

## 2022-03-24 RX ORDER — LIDOCAINE 40 MG/G
CREAM TOPICAL
Status: DISCONTINUED | OUTPATIENT
Start: 2022-03-24 | End: 2022-03-24 | Stop reason: HOSPADM

## 2022-03-24 RX ORDER — OXYCODONE HYDROCHLORIDE 5 MG/1
5 TABLET ORAL EVERY 4 HOURS PRN
Status: DISCONTINUED | OUTPATIENT
Start: 2022-03-24 | End: 2022-03-24 | Stop reason: CLARIF

## 2022-03-24 RX ORDER — POLYETHYLENE GLYCOL 3350 17 G/17G
17 POWDER, FOR SOLUTION ORAL DAILY
Status: DISCONTINUED | OUTPATIENT
Start: 2022-03-25 | End: 2022-03-26 | Stop reason: HOSPADM

## 2022-03-24 RX ORDER — DIMENHYDRINATE 50 MG/ML
25 INJECTION, SOLUTION INTRAMUSCULAR; INTRAVENOUS
Status: DISCONTINUED | OUTPATIENT
Start: 2022-03-24 | End: 2022-03-24 | Stop reason: HOSPADM

## 2022-03-24 RX ORDER — PROCHLORPERAZINE MALEATE 10 MG
10 TABLET ORAL EVERY 6 HOURS PRN
Status: DISCONTINUED | OUTPATIENT
Start: 2022-03-24 | End: 2022-03-26 | Stop reason: HOSPADM

## 2022-03-24 RX ORDER — SODIUM CHLORIDE, SODIUM LACTATE, POTASSIUM CHLORIDE, CALCIUM CHLORIDE 600; 310; 30; 20 MG/100ML; MG/100ML; MG/100ML; MG/100ML
INJECTION, SOLUTION INTRAVENOUS CONTINUOUS
Status: DISCONTINUED | OUTPATIENT
Start: 2022-03-24 | End: 2022-03-24 | Stop reason: HOSPADM

## 2022-03-24 RX ORDER — FAMOTIDINE 10 MG
10 TABLET ORAL AT BEDTIME
Status: DISCONTINUED | OUTPATIENT
Start: 2022-03-24 | End: 2022-03-26 | Stop reason: HOSPADM

## 2022-03-24 RX ORDER — TRANEXAMIC ACID 10 MG/ML
1 INJECTION, SOLUTION INTRAVENOUS EVERY 30 MIN PRN
Status: DISCONTINUED | OUTPATIENT
Start: 2022-03-24 | End: 2022-03-24 | Stop reason: HOSPADM

## 2022-03-24 RX ORDER — KETOROLAC TROMETHAMINE 30 MG/ML
INJECTION, SOLUTION INTRAMUSCULAR; INTRAVENOUS PRN
Status: DISCONTINUED | OUTPATIENT
Start: 2022-03-24 | End: 2022-03-24

## 2022-03-24 RX ORDER — CEFAZOLIN SODIUM 2 G/100ML
2 INJECTION, SOLUTION INTRAVENOUS
Status: COMPLETED | OUTPATIENT
Start: 2022-03-24 | End: 2022-03-24

## 2022-03-24 RX ORDER — ONDANSETRON 4 MG/1
4 TABLET, ORALLY DISINTEGRATING ORAL EVERY 30 MIN PRN
Status: DISCONTINUED | OUTPATIENT
Start: 2022-03-24 | End: 2022-03-24 | Stop reason: HOSPADM

## 2022-03-24 RX ORDER — FENTANYL CITRATE 50 UG/ML
15 INJECTION, SOLUTION INTRAMUSCULAR; INTRAVENOUS ONCE
Status: DISCONTINUED | OUTPATIENT
Start: 2022-03-24 | End: 2022-03-24 | Stop reason: HOSPADM

## 2022-03-24 RX ORDER — MISOPROSTOL 200 UG/1
400 TABLET ORAL
Status: DISCONTINUED | OUTPATIENT
Start: 2022-03-24 | End: 2022-03-24 | Stop reason: HOSPADM

## 2022-03-24 RX ORDER — BUPIVACAINE HYDROCHLORIDE 2.5 MG/ML
INJECTION, SOLUTION EPIDURAL; INFILTRATION; INTRACAUDAL
Status: COMPLETED | OUTPATIENT
Start: 2022-03-24 | End: 2022-03-24

## 2022-03-24 RX ORDER — LABETALOL 200 MG/1
200 TABLET, FILM COATED ORAL 2 TIMES DAILY
Status: DISCONTINUED | OUTPATIENT
Start: 2022-03-24 | End: 2022-03-26 | Stop reason: HOSPADM

## 2022-03-24 RX ADMIN — KETOROLAC TROMETHAMINE 30 MG: 30 INJECTION, SOLUTION INTRAMUSCULAR; INTRAVENOUS at 20:26

## 2022-03-24 RX ADMIN — KETOROLAC TROMETHAMINE 30 MG: 30 INJECTION, SOLUTION INTRAMUSCULAR at 14:07

## 2022-03-24 RX ADMIN — BUPIVACAINE HYDROCHLORIDE IN DEXTROSE 1.8 ML: 7.5 INJECTION, SOLUTION SUBARACHNOID at 13:11

## 2022-03-24 RX ADMIN — SODIUM CHLORIDE, POTASSIUM CHLORIDE, SODIUM LACTATE AND CALCIUM CHLORIDE: 600; 310; 30; 20 INJECTION, SOLUTION INTRAVENOUS at 11:20

## 2022-03-24 RX ADMIN — METOCLOPRAMIDE HYDROCHLORIDE 10 MG: 5 INJECTION INTRAMUSCULAR; INTRAVENOUS at 18:20

## 2022-03-24 RX ADMIN — TRANEXAMIC ACID 1 G: 10 INJECTION, SOLUTION INTRAVENOUS at 13:56

## 2022-03-24 RX ADMIN — ONDANSETRON 4 MG: 2 INJECTION INTRAMUSCULAR; INTRAVENOUS at 13:42

## 2022-03-24 RX ADMIN — BUPIVACAINE HYDROCHLORIDE 20 ML: 2.5 INJECTION, SOLUTION EPIDURAL; INFILTRATION; INTRACAUDAL at 14:20

## 2022-03-24 RX ADMIN — ONDANSETRON 4 MG: 2 INJECTION INTRAMUSCULAR; INTRAVENOUS at 20:21

## 2022-03-24 RX ADMIN — FENTANYL CITRATE 15 MCG: 50 INJECTION, SOLUTION INTRAMUSCULAR; INTRAVENOUS at 13:11

## 2022-03-24 RX ADMIN — Medication 75 MCG/MIN: at 13:12

## 2022-03-24 RX ADMIN — OXYTOCIN-SODIUM CHLORIDE 0.9% IV SOLN 30 UNIT/500ML 600 ML/HR: 30-0.9/5 SOLUTION at 13:32

## 2022-03-24 RX ADMIN — Medication 2 G: at 13:16

## 2022-03-24 RX ADMIN — ACETAMINOPHEN 975 MG: 325 TABLET, FILM COATED ORAL at 11:50

## 2022-03-24 RX ADMIN — SODIUM CHLORIDE, POTASSIUM CHLORIDE, SODIUM LACTATE AND CALCIUM CHLORIDE 500 ML: 600; 310; 30; 20 INJECTION, SOLUTION INTRAVENOUS at 22:38

## 2022-03-24 RX ADMIN — BUPIVACAINE 20 ML: 13.3 INJECTION, SUSPENSION, LIPOSOMAL INFILTRATION at 14:20

## 2022-03-24 RX ADMIN — SODIUM CHLORIDE, POTASSIUM CHLORIDE, SODIUM LACTATE AND CALCIUM CHLORIDE: 600; 310; 30; 20 INJECTION, SOLUTION INTRAVENOUS at 12:25

## 2022-03-24 RX ADMIN — SODIUM CHLORIDE, POTASSIUM CHLORIDE, SODIUM LACTATE AND CALCIUM CHLORIDE: 600; 310; 30; 20 INJECTION, SOLUTION INTRAVENOUS at 13:47

## 2022-03-24 RX ADMIN — MORPHINE SULFATE 0.15 MG: 1 INJECTION EPIDURAL; INTRATHECAL; INTRAVENOUS at 13:11

## 2022-03-24 RX ADMIN — SODIUM CITRATE AND CITRIC ACID MONOHYDRATE 30 ML: 500; 334 SOLUTION ORAL at 12:56

## 2022-03-24 NOTE — DISCHARGE SUMMARY
[facility] Discharge Summary    Gabriele Rodriguez MRN# [id number]   Age: 36 year old YOB: 1985     Date of Admission:  3/24/2022  Date of Discharge:  3/26/2022  Admitting Physician:  Giovanna Osorio MD  Discharge Physician: Same    Admit Dx:   - Intrauterine pregnancy at 37w0d  - History of  section x1  - History of  demise   - Anxiety  - ADHD  - Chronic hypertension     Discharge Dx:  - Same as above, s/p repeat low transverse  section  - Proteinuria    Procedures:  - Repeat low transverse  section with double layer uterine closure via Pfannenstiel incision  - Spinal analgesia    Admit HPI:    Gabriele Rodriguez is a 36 year old  at 37w0d by LMP c/w 9w2d US who presents today for repeat low transverse  section. She is feeling well this morning.     Pregnancy notable for:   - History of  section x1  - History of  demise   - Anxiety  - ADHD  - Chronic hypertension      Her previous pregnancies were notable for  demise following a NICU stay with cooling transferred to [facility]. Her delivery was uncomplicated. Denies history of postpartum hemorrhage, shoulder dystocia, pre-eclampsia. No history of asthma.     She reports good fetal movement. Denies LOF, vaginal bleeding, or contractions.  She denies fever, chills, SOB, chest pain, palpitations, N/V, LE swelling/tenderness.  No concerns for headache, vision changes, RUQ or epigastric pain.    Please see her admit H&P for full details of her PMH, PSH, Meds, Allergies and exam on admit.    Operative Course:  Surgery was uncomplicated. EBL from the delivery was 566. Please see her  Section Operative Note for full details regarding her delivery.    Operative Findings:   1. Single, viable female infant at 1330 hours on 3/23/2022. Apgars of 7 at one minute.  Birth weight: pending.  Fetal presentation: vertex, OP. Amniotic fluid: clear.    2.  Placenta intact with 3 vessel cord.     3. Normal appearing uterus, fallopian tubes, ovaries.   4. No intraabdominal adhesions.  No abdominal wall adhesions  5. Lower uterine segment hematoma (approximately 3 cm x 2 cm) noted to not be expanding at the time of surgery  6. 1.5 cm x1 cm firm, dark purple/black mass in the epiploica of the large intestine. Appearance somewhat similar to endometriosis lesion.    Postoperative Course:  Her postoperative course was uncomplicated . Her labetalol for CHTN was held postpartum due to low HR and normal BP. She had UPC drawn on POD#1 and was incidentally noted to be elevated at 0.6. She otherwise had normal BP and was asymptomatic.     On POD#2, she was meeting all of her postpartum goals and deemed stable for discharge. She was voiding without difficulty, tolerating a regular diet without nausea and vomiting, her pain was well controlled on oral pain medicines and her lochia was appropriate. Her hemoglobin prior to delivery was 11.2 and after delivery was 10.5. Her Rh status was positive and Rhogam was not indicated.      Discharge Medications:     Review of your medicines        UNREVIEWED medicines. Ask your doctor about these medicines        Dose / Directions   acetaminophen 325 MG tablet  Commonly known as: TYLENOL  Used for: Supervision of high risk pregnancy in third trimester      Dose: 650 mg  Take 2 tablets (650 mg) by mouth every 6 hours as needed for mild pain Start after Delivery.  Quantity: 100 tablet  Refills: 0     ASPIRIN 81 PO      Refills: 0     famotidine 10 MG tablet  Commonly known as: PEPCID      Dose: 10 mg  Take 10 mg by mouth At Bedtime  Refills: 0     ibuprofen 600 MG tablet  Commonly known as: ADVIL/MOTRIN  Used for: Supervision of high risk pregnancy in third trimester      Dose: 600 mg  Take 1 tablet (600 mg) by mouth every 6 hours as needed for moderate pain Start after delivery  Quantity: 60 tablet  Refills: 0     labetalol 200 MG  tablet  Commonly known as: NORMODYNE  Used for: Essential hypertension      Dose: 200 mg  Take 1 tablet (200 mg) by mouth 2 times daily  Quantity: 180 tablet  Refills: 3     PRENATAL PO      Refills: 0     senna-docusate 8.6-50 MG tablet  Commonly known as: SENOKOT-S/PERICOLACE  Used for: Supervision of high risk pregnancy in third trimester      Dose: 1 tablet  Take 1 tablet by mouth daily Start after delivery.  Quantity: 100 tablet  Refills: 0     vitamin D3 50 mcg (2000 units) tablet  Commonly known as: CHOLECALCIFEROL      Dose: 1 tablet  Take 1 tablet by mouth daily  Refills: 0                Discharge/Disposition:  Gabriele Rodriguez was discharged to home in stable condition with the following instructions/medications:  1) Call for temperature > 100.4, bright red vaginal bleeding >1 pad an hour x 2 hours, foul smelling vaginal discharge, pain not controlled by usual oral pain meds, persistent nausea and vomiting not controlled on medications, drainage or redness from incision site  2) She desired Nexplanon for contraception.  3) For feeding she decided to breastfeed.  4) She was instructed to follow-up with her primary OB in 1 week for a blood pressure check 6 weeks for a routine postpartum visit.  5) Discharge activity:  No heavy lifting >15 lbs or strenuous activity for 6 weeks, pelvic rest for 6 weeks, no driving or operating machinery while on narcotics.    Cristy Cruz MD PGY3  Obstetrics & Gynecology  03/26/22     Appreciate note by Dr. Cruz. Patient has been seen and examined by me separate from the resident, agree with above note.     Giovanna Osorio MD

## 2022-03-24 NOTE — ANESTHESIA PROCEDURE NOTES
TAP Procedure Note    Pre-Procedure   Staff -        Anesthesiologist:  Reina Fay MD       Resident/Fellow: Hakeem Reina MD       Performed By: resident       Location: OR       Pre-Anesthestic Checklist: patient identified, IV checked, site marked, risks and benefits discussed, informed consent, monitors and equipment checked, pre-op evaluation, at physician/surgeon's request and post-op pain management  Timeout:       Correct Patient: Yes        Correct Procedure: Yes        Correct Site: Yes        Correct Position: Yes        Correct Laterality: Yes        Site Marked: Yes  Procedure Documentation  Procedure: TAP       Diagnosis: POST-OPERATIVE PAIN CONTROL       Laterality: bilateral       Patient Position: sitting       Skin prep: Chloraprep       Local skin infiltrated with 3 mL of 1% lidocaine.        Needle Gauge: 21.        Needle Length (millimeters): 110        Ultrasound guided       1. Ultrasound was used to identify targeted nerve, plexus, vascular marker, or fascial plane and place a needle adjacent to it in real-time.       2. Ultrasound was used to visualize the spread of anesthetic in close proximity to the above referenced structure.       4. The visualized anatomic structures appeared normal.       5. There were no apparent abnormal pathologic findings.    Assessment/Narrative         The placement was negative for: blood aspirated, painful injection and site bleeding       Paresthesias: No.      Bolus given via needle. No blood aspirated via catheter.        Secured via.        Insertion/Infusion Method: Single Shot       Complications: none    Medication(s) Administered   Bupivacaine 0.25% PF (Infiltration), 20 mL  Bupivacaine liposome (Exparel) 1.3% LA inj susp (Infiltration), 20 mL  Medication Administration Time: 3/24/2022 2:20 PM

## 2022-03-24 NOTE — ANESTHESIA PROCEDURE NOTES
Intrathecal injection Procedure Note    Pre-Procedure   Staff -        Anesthesiologist:  Reina Fay MD       Resident/Fellow: Hakeem Reina MD       Performed By: resident       Location: OR       Procedure Start/Stop Times: 3/24/2022 1:06 PM and 3/24/2022 1:11 PM       Pre-Anesthestic Checklist: patient identified, IV checked, risks and benefits discussed, informed consent, monitors and equipment checked, pre-op evaluation, at physician/surgeon's request and post-op pain management  Timeout:       Correct Patient: Yes        Correct Procedure: Yes        Correct Site: Yes        Correct Position: Yes   Procedure Documentation  Procedure: intrathecal injection       Diagnosis: c/section       Patient Position: sitting       Skin prep: Chloraprep       Insertion Site: L2-3. (midline approach).       Needle Gauge: 25.        Needle Length (Inches): 3.5        Spinal Needle Type: Pencan       Introducer used       Introducer: 20 G       # of attempts: 1 and  # of redirects:  0    Assessment/Narrative         Paresthesias: No.       Sensory Level: T4       CSF fluid: clear.      Opening pressure was cmH2O while  Sitting.      Medication(s) Administered   0.75% Hyperbaric Bupivacaine (Intrathecal), 1.8 mL  Fentanyl PF (Intrathecal), 15 mcg  Morphine PF 1 mg/mL (Intrathecal), 0.15 mg  Medication Administration Time: 3/24/2022 1:11 PM

## 2022-03-24 NOTE — OR NURSING
Pt stable through pre-op, intra-op, and post-op PACU recovery.  Bradycardia noted and reported to anesthesia, other VS wdl.  Denies pain, nausea.  Tolerated ice chips.  Breast pumping initiated at 1455, several drops out.  [place] left in place per MD order until tomorrow morning.  Out of PACU at 1617 to see baby in NICU, then arrived to unit at 1640.  Report given to Lidia ROYAL.  Plan to continue with routine postop postpartum cares.

## 2022-03-24 NOTE — PROVIDER NOTIFICATION
Dr Reina notified via phone that patient pulse continuing to be in the low 50s since arrival to PACU.  Other VS wdl, pt feeling fine.  Per MDA, no intervention needed, continue to watch.

## 2022-03-24 NOTE — ANESTHESIA CARE TRANSFER NOTE
Patient: Gabriele Rodriguez    Procedure: Procedure(s):  REPEAT  SECTION       Diagnosis: History of  delivery [Z98.891]  Diagnosis Additional Information: No value filed.    Anesthesia Type:   Spinal     Note:    Oropharynx: oropharynx clear of all foreign objects  Level of Consciousness: awake  Oxygen Supplementation: room air    Independent Airway: airway patency satisfactory and stable  Dentition: dentition unchanged  Vital Signs Stable: post-procedure vital signs reviewed and stable  Report to RN Given: handoff report given  Patient transferred to: PACU  Comments: VSS. Breathing spontaneously at a regular rate with adequate tidal volumes and maintaining O2 sats. Denies nausea or pain. No apparent complications from anesthesia.      Hakeem Reina MD  Anesthesia CA-1    Handoff Report: Identifed the Patient, Identified the Reponsible Provider, Reviewed the pertinent medical history, Discussed the surgical course, Reviewed Intra-OP anesthesia mangement and issues during anesthesia, Set expectations for post-procedure period and Allowed opportunity for questions and acknowledgement of understanding      Vitals:  Vitals Value Taken Time   BP 99/61 22 1427   Temp     Pulse 60 22 1430   Resp     SpO2 98 % 22 1430   Vitals shown include unvalidated device data.    Electronically Signed By: Hakeem Reina MD  2022  2:32 PM

## 2022-03-24 NOTE — ANESTHESIA POSTPROCEDURE EVALUATION
Patient: Gabriele Rodriguez    Procedure: Procedure(s):  REPEAT  SECTION       Anesthesia Type:  Spinal    Note:  Disposition: Inpatient   Postop Pain Control: Uneventful            Sign Out: Well controlled pain   PONV: No   Neuro/Psych: Uneventful            Sign Out: Acceptable/Baseline neuro status   Airway/Respiratory: Uneventful            Sign Out: Acceptable/Baseline resp. status   CV/Hemodynamics: Uneventful            Sign Out: Acceptable CV status; No obvious hypovolemia; No obvious fluid overload   Other NRE: NONE   DID A NON-ROUTINE EVENT OCCUR? No           Last vitals:  Vitals Value Taken Time   /59 22 1500   Temp     Pulse 57 22 1501   Resp     SpO2 97 % 22 1501   Vitals shown include unvalidated device data.    Electronically Signed By: Reina Grewal MD  2022  3:02 PM

## 2022-03-25 VITALS
RESPIRATION RATE: 16 BRPM | HEART RATE: 67 BPM | DIASTOLIC BLOOD PRESSURE: 70 MMHG | HEIGHT: 63 IN | BODY MASS INDEX: 32.88 KG/M2 | TEMPERATURE: 97.7 F | OXYGEN SATURATION: 100 % | SYSTOLIC BLOOD PRESSURE: 125 MMHG | WEIGHT: 185.56 LBS

## 2022-03-25 LAB
ALT SERPL W P-5'-P-CCNC: 7 U/L (ref 0–50)
AST SERPL W P-5'-P-CCNC: 19 U/L (ref 0–45)
CREAT SERPL-MCNC: 0.6 MG/DL (ref 0.52–1.04)
CREAT UR-MCNC: 54 MG/DL
GFR SERPL CREATININE-BSD FRML MDRD: >90 ML/MIN/1.73M2
HGB BLD-MCNC: 10.5 G/DL (ref 11.7–15.7)
PLATELET # BLD AUTO: 237 10E3/UL (ref 150–450)
PROT UR-MCNC: 0.34 G/L
PROT/CREAT 24H UR: 0.63 G/G CR (ref 0–0.2)

## 2022-03-25 PROCEDURE — 250N000013 HC RX MED GY IP 250 OP 250 PS 637: Performed by: STUDENT IN AN ORGANIZED HEALTH CARE EDUCATION/TRAINING PROGRAM

## 2022-03-25 PROCEDURE — 84156 ASSAY OF PROTEIN URINE: CPT | Performed by: STUDENT IN AN ORGANIZED HEALTH CARE EDUCATION/TRAINING PROGRAM

## 2022-03-25 PROCEDURE — 84460 ALANINE AMINO (ALT) (SGPT): CPT | Performed by: STUDENT IN AN ORGANIZED HEALTH CARE EDUCATION/TRAINING PROGRAM

## 2022-03-25 PROCEDURE — 36415 COLL VENOUS BLD VENIPUNCTURE: CPT | Performed by: STUDENT IN AN ORGANIZED HEALTH CARE EDUCATION/TRAINING PROGRAM

## 2022-03-25 PROCEDURE — 250N000011 HC RX IP 250 OP 636: Performed by: STUDENT IN AN ORGANIZED HEALTH CARE EDUCATION/TRAINING PROGRAM

## 2022-03-25 PROCEDURE — 85049 AUTOMATED PLATELET COUNT: CPT | Performed by: STUDENT IN AN ORGANIZED HEALTH CARE EDUCATION/TRAINING PROGRAM

## 2022-03-25 PROCEDURE — 84450 TRANSFERASE (AST) (SGOT): CPT | Performed by: STUDENT IN AN ORGANIZED HEALTH CARE EDUCATION/TRAINING PROGRAM

## 2022-03-25 PROCEDURE — 85018 HEMOGLOBIN: CPT | Performed by: STUDENT IN AN ORGANIZED HEALTH CARE EDUCATION/TRAINING PROGRAM

## 2022-03-25 PROCEDURE — 82565 ASSAY OF CREATININE: CPT | Performed by: STUDENT IN AN ORGANIZED HEALTH CARE EDUCATION/TRAINING PROGRAM

## 2022-03-25 PROCEDURE — 120N000002 HC R&B MED SURG/OB UMMC

## 2022-03-25 RX ADMIN — HYDROCORTISONE: 1 CREAM TOPICAL at 07:38

## 2022-03-25 RX ADMIN — IBUPROFEN 800 MG: 800 TABLET, FILM COATED ORAL at 20:18

## 2022-03-25 RX ADMIN — KETOROLAC TROMETHAMINE 30 MG: 30 INJECTION, SOLUTION INTRAMUSCULAR; INTRAVENOUS at 07:37

## 2022-03-25 RX ADMIN — KETOROLAC TROMETHAMINE 30 MG: 30 INJECTION, SOLUTION INTRAMUSCULAR; INTRAVENOUS at 01:59

## 2022-03-25 RX ADMIN — ACETAMINOPHEN 975 MG: 325 TABLET, FILM COATED ORAL at 07:30

## 2022-03-25 RX ADMIN — DOCUSATE SODIUM AND SENNOSIDES 2 TABLET: 8.6; 5 TABLET ORAL at 07:30

## 2022-03-25 RX ADMIN — POLYETHYLENE GLYCOL 3350 17 G: 17 POWDER, FOR SOLUTION ORAL at 07:29

## 2022-03-25 RX ADMIN — IBUPROFEN 800 MG: 800 TABLET, FILM COATED ORAL at 14:06

## 2022-03-25 RX ADMIN — HYDROCORTISONE: 1 CREAM TOPICAL at 20:17

## 2022-03-25 RX ADMIN — ACETAMINOPHEN 975 MG: 325 TABLET, FILM COATED ORAL at 20:17

## 2022-03-25 RX ADMIN — DOCUSATE SODIUM AND SENNOSIDES 2 TABLET: 8.6; 5 TABLET ORAL at 20:17

## 2022-03-25 RX ADMIN — ACETAMINOPHEN 975 MG: 325 TABLET, FILM COATED ORAL at 14:06

## 2022-03-25 NOTE — PROGRESS NOTES
Received order for SW to see related to baby's NICU admission and previous loss of infant.      Baby is doing well in NICU and NICU providers are hopeful baby will be able to transition back to CC and parent's care later today.    Brief visit with Latasha at baby's bedside in NICU.  They are appreciative of support but state they are coping well and deny needs.  They do give voice to how scary the moments were after Ceci's birth but find reassurance from the updates they are receiving about her stable status now.      No further SW intervention is anticipated.  I did provide family with my business card/contact information should they have questions or needs beyond discharge.     LAURE [facility]  Clinical   Maternal Child Health  Phone:  [phone]  Pager:  [phone]

## 2022-03-25 NOTE — PLAN OF CARE
Patient arrived to [facility] unit via zoom cart at 1505,with belongings, accompanied by spouse/ significant other, with infant in NICU. Received report from GENNY Giordano and checked bands. Unit and room orientation completd. Call light given; no concerns present at this time. Continue with plan of care.

## 2022-03-25 NOTE — PROVIDER NOTIFICATION
03/24/22 2034   Provider Notification   Provider Name/Title Dr. oSto   Method of Notification Electronic Page   Request Evaluate-Remote   Notification Reason Status Update   pt has hr in the low 50s; labatolol scheduled for 2030. BPs have been WNL. what would you like to do about med? also have question about urine output, NV.

## 2022-03-25 NOTE — PLAN OF CARE
Data: Vital signs within normal limits. Postpartum checks within normal limits - see flow record. Patient eating and drinking normally. Patient had mendez catheter removed at 0645 and is due to void. No apparent signs of infection. Incision healing well. Patient performing self cares with assistance from nurse.   Action: Patient medicated during the shift for pain. See MAR. Patient reassessed within 1 hour after each medication and pain was improved - patient stated she was comfortable. Patient education done about pain control. See flow record.  Response: Positive attachment behaviors observed with infant in NICU. Support persons were present.   Plan: Continue with plan of care and anticipate discharge.

## 2022-03-25 NOTE — LACTATION NOTE
This note was copied from a baby's chart.  D:  Called to assist in a breastfeeding.  I:  When I arrived, babe skin to skin with mom, minimal cues.  We tried to rouse babe, have mom express gtts of colostrum to tempt babe, etc, but babe content to snuggle.  Mom asked about use of nipple shield; we discussed pros/ cons and benefit of patience at this point.  We discussed accomplishing our goals (feeding babe, stimulating supply) with finger feeding/ pumping/ hand expression/ PDHM, and trying again in a few hours.  We talked about these sleepy behaviors being normal in a 18 hour old baby, as well as a  baby, and it will be easier when they are together.  I did not give her a NICU folder nor do full NICU admission as babe is hopefully heading back to [facility] today.  P:  Will continue to provide lactation support.    Charissa Casanova, RNC, IBCLC

## 2022-03-25 NOTE — PROGRESS NOTES
OB/GYN Postpartum Note    S: Gabriele Rodriguez is feeling good this morning. Pain is moderate, well controlled. She had some water and crackers yesterday, was nauseous but feeling hungry this morning. Tried breastfeeding but baby didn't latch well so far. Baby in NICU but hoping she will come up today. Mendez in place.     Denies chest pain, shortness of breath, headaches, visual changes, swelling.       O:   Vitals:    22 1800 22 1900 22 2000 22 2100   BP: 110/71 113/73 117/69 119/71   BP Location:       Patient Position:       Pulse: (!) 49 56 54 64   Resp: 16 16 16 16   Temp: 97.9  F (36.6  C)   98.1  F (36.7  C)   TempSrc: Oral   Oral   SpO2:   100% 100%   Weight:       Height:         General: Lying in bed, in NAD  CV: regular rate  Resp: nonlabored breathing  Abdomen: soft, nontender, nondistended. Fundus firm at 1cm belowumbilicus  Incision: Dressing in place, no shadowing  Extremities: Trace edema in BLE    UOP 675cc since midnight, ~400cc clear urine in mendez bag      A: Ms. Gabriele Rodriguez is a 36 year old  POD#1 s/p RLTCS for history of CS. She is recovering appropriately postpartum.     Plan:    #Postoperative Care  Heme: Hgb 11.2> > AM pending  Pain: Tylenol, toradol 24 hours > ibuprofen, oxycodone prn   : Mendez in place, remove this AM  GI: Bowel regimen scheduled   Feeding: Breast pumping  Contraception: Did not discuss  PNC: Rh pos, rubella immune    #CHTN  - PTA labetalol 200mg BID held in setting of low HR and normal BP overnight  - BP normal overnight  - UPC ordered at admission not collected until 0200 and elevated at 0.63  - At this time, no other features of pre-eclampsia present including normal BP, asympomatic, other HELLP labs normal  - Repeat HELLP today    #Anxiety  - 1 week mood check      Disposition: When meeting post operative goals POD#2-3     Cristy Cruz MD PGY3  Obstetrics & Gynecology  22     OBGYN Attending Addendum     Attempted  to see her around 0830 this morning, was in NICU, will attempt to round later today.     Janice Hayes MD, MSCI, FACOG    [facility]/OBGYN    OBGYN Attending Addendum     Attempted to see her a second time at around 100PM, was in NICU, will attempt to round later today.     Janice Hayes MD, MSCI, FACOG    [facility]/OBGYN

## 2022-03-25 NOTE — PLAN OF CARE
Data: Vital signs within normal limits. Postpartum checks within normal limits - see flow record. Patient eating and drinking normally. Patient able to empty bladder independently and is up ambulating. No apparent signs of infection. Incision healing well. Pt pumping & taking EBM to baby in NICU. Patient performing self cares and is able to visit baby.  Action: Patient medicated during the shift for pain. See MAR. Patient reassessed within 1 hour after each medication and pain was improved - patient stated she was comfortable. Patient education done about need to walk & shower. See flow record.Response: Positive attachment behaviors observed with infant in NICU.    Support persons  present.   Plan: Continue on supportive cares.    Per Resident, Held Pt's Labetalol this shift for low Pulse.

## 2022-03-25 NOTE — PROGRESS NOTES
"Post  Anesthesia Follow Up Note    Patient: Gabriele Rodriguez    Patient location: Postpartum floor.    Chief complaint: Acute postoperative pain management s/p intrathecal morphine administration     Procedure(s) Performed:  Procedure(s):  REPEAT  SECTION    Anesthesia type: Spinal Block    Subjective:     Pain Control: 1/10 at rest and 2/10 with ambulation    Additional ROS:  She does complain of pruritis at this time located on her abdomen where the drape was placed. She denies weakness, denies paresthesia, denies difficulties breathing. She recently had her mendez removed and has not voided yet on her own. She denies nausea or vomiting. She is able to ambulate and tolerates regular diet.    Objective:    Respiratory Function (RR / SpO2 / Airway Patency): Satisfactory    Cardiac Function (HR / Rhythm / BP): Satisfactory    Strength and sensation lower extremities: Normal    Site of spinal/epidural insertion: No signs of infection or inflammation.     Last Vitals: /66 (BP Location: Right arm, Patient Position: Semi-Kim's)   Pulse 67   Temp 36.9  C (98.5  F) (Tympanic)   Resp 16   Ht 1.6 m (5' 3\")   Wt 84.2 kg (185 lb 9 oz)   LMP 2021   SpO2 100%   Breastfeeding Unknown   BMI 32.87 kg/m      Assessment and plan:   Gabriele Rodriguez is a 36 year old female  POD #1 s/p   with IT bupivacaine (1.8ml), fentanyl (15mcg) and morphine (150mcg); and single shot TAP nerve block injections with 20 mL bupivacaine 0.25% and 20mL liposome bupivacaine (Exparel) long-acting 1.3% given in the PACU for postoperative analgesia.  Pt is ambulating without difficulty, no weakness or paresthesias.  There is no evidence of adverse side effects associated with spinal and nerve block injections.  The patient is receiving adequate incisional pain control at this time and anticipate up to 72 hours of incisional pain control.  However, we further anticipate that the patient will require " opioid/nonopioid analgesics for visceral and muscle pain that is not controlled with local anesthetic.      In brief summary, her post-operative analgesia is well controlled today. Further interventional analgesic strategies would be of little utility at this time. Thus, we recommend proceeding with PO analgesics including staggered dosing of NSAIDs (ibuprofen) and acetaminophen, with a taper of oxycodone.     Thank you for including us in the care for this patient.    Jared Ford DO  Anesthesia Resident, PGY4

## 2022-03-25 NOTE — PLAN OF CARE
Goal Outcome Evaluation:  Data: VSS, postpartum assessments WNL. She currently has a mendez in place, minimal, but adequate output. She has had nausea and vomiting since coming to postpartum, so not eating and drinking very much. She is able to stand and bear weight. Incision is covered and unable to assess, lochia WNL, no s/s infection. She is pumping regularly. Taking toradol, but reports no pain. Reports good pain management.  Was able to visit NICU on way up to postpartum and then finally feeling able to around 9:30 pm tonight.   Action: Education provided on plan of care  Response: Pt is agreeable with her plan of care. Support person present. Anticipate discharge

## 2022-03-26 ENCOUNTER — HEALTH MAINTENANCE LETTER (OUTPATIENT)
Age: 37
End: 2022-03-26

## 2022-03-26 PROCEDURE — 250N000013 HC RX MED GY IP 250 OP 250 PS 637: Performed by: STUDENT IN AN ORGANIZED HEALTH CARE EDUCATION/TRAINING PROGRAM

## 2022-03-26 RX ADMIN — IBUPROFEN 800 MG: 800 TABLET, FILM COATED ORAL at 13:07

## 2022-03-26 RX ADMIN — ACETAMINOPHEN 975 MG: 325 TABLET, FILM COATED ORAL at 04:10

## 2022-03-26 RX ADMIN — IBUPROFEN 800 MG: 800 TABLET, FILM COATED ORAL at 04:10

## 2022-03-26 RX ADMIN — ACETAMINOPHEN 975 MG: 325 TABLET, FILM COATED ORAL at 13:07

## 2022-03-26 NOTE — PROVIDER NOTIFICATION
03/25/22 2100   Provider Notification   Provider Name/Title Anthony Chilel    Method of Notification Electronic Page   Request Evaluate-Remote   Notification Reason Status Update     Pt is is requesting if she can withhold or discontinue the scheduled labetalol this evening. VSS, see flow.

## 2022-03-26 NOTE — PLAN OF CARE
Vital signs within normal limits. Postpartum checks within normal limits. Patient eating and drinking normally. Patient able to empty bladder independently and is up ambulating. Adequate pain control.  Patient performing self cares and is able to care for infant. Breastfeeding on cue, requires assist with deeper latch and positioning.Positive attachment behaviors observed with infant. Support person present and very supportive. Will continue to assess/assist as needed.

## 2022-03-26 NOTE — PROGRESS NOTES
"OB/GYN Postpartum Note    S: Gabriele Rodriguez is feeling good this morning. Had some crmaping pain overnight, improved with tylenol and ibuprofen.  Eating and drinking well. Voiding independently. Has been passing gas, no stool yet. Some bleeding, but feels that it is decreasing. Is breastfeeding, has had some challenges and would like to meet with lactation consultant today.  Would like to discharge to home.     Denies chest pain, shortness of breath, headaches, visual changes, swelling.     O:   /70   Pulse 67   Temp 97.7  F (36.5  C) (Oral)   Resp 16   Ht 1.6 m (5' 3\")   Wt 84.2 kg (185 lb 9 oz)   LMP 2021   SpO2 100%   Breastfeeding Unknown   BMI 32.87 kg/m    General: Lying in bed, in NAD  CV: regular rate  Resp: nonlabored breathing  Abdomen: soft, nontender, nondistended. Fundus firm at 1cm below umbilicus  Incision: healing incision with no erythema, redness, or drainage  Extremities: Trace edema in BLE    UOP - Voiding independently. 800mL since midnight.     A: Ms. Gabriele Rodriguez is a 36 year old  POD#2 s/p RLTCS for history of CS. She is recovering appropriately postpartum.     Plan:    #Postoperative Care  Heme: Hgb 10.5 >  Pain: Tylenol, toradol 24 hours > ibuprofen, oxycodone prn   : s/p mendez, voiding independently  GI: Bowel regimen scheduled   Feeding: Breast pumping, will see lactation today   Contraception: Nexplanon requested at 6wk PP check  PNC: Rh pos, rubella immune    #CHTN  - PTA labetalol 200mg BID held in setting of normal BP overnight  - UPC ordered at admission not collected until 0200 and elevated at 0.63  - At this time, no other features of pre-eclampsia present, other HELLP labs wnl   - Plan to check BP at home upon discharge and restart labetalol when needed.   - 1 week BP check in clinic, has home BP cuff    #Anxiety  - 1 week mood check      Disposition: Discharge to home today     Patient seen and discussed with resident physician  Mireille " Melissa   MS3, [facility]      Resident/Fellow Attestation   I, Cristy Cruz, was present with the medical/TOYA student who participated in the service and in the documentation of the note.  I have verified the history and personally performed the physical exam and medical decision making.  I agree with the assessment and plan of care as documented in the note.      Cristy Cruz MD PGY3  Obstetrics & Gynecology  03/26/22     Appreciate note by Dr. Cruz. Patient has been seen and examined by me separate from the resident, agree with above note. Reviewed discharge instructions. Baby may need to stay.     Giovanna Osorio MD  11:17 AM

## 2022-03-26 NOTE — PLAN OF CARE
Data: Vital signs within normal limits. Postpartum checks within normal limits - see flow record. Patient eating and drinking normally. Patient able to empty bladder independently and is up ambulating. No apparent signs of infection. Incision healing well. Patient performing self cares and is able to care for infant.  Patient has been breastfeeding when infant will latch.  Otherwise infant has been finger feeding and mom has been pumping.  Action: Patient medicated during the shift for pain and cramping. See MAR. Patient reassessed within 1 hour after each medication and pain was improved - patient stated she was comfortable. Patient education done about self care. See flow record.  Response: Positive attachment behaviors observed with infant. Support persons was present.   Plan: Anticipate discharge today.

## 2022-03-31 ENCOUNTER — TELEPHONE (OUTPATIENT)
Dept: OBGYN | Facility: CLINIC | Age: 37
End: 2022-03-31
Payer: COMMERCIAL

## 2022-03-31 ENCOUNTER — OFFICE VISIT (OUTPATIENT)
Dept: OBGYN | Facility: CLINIC | Age: 37
End: 2022-03-31
Attending: OBSTETRICS & GYNECOLOGY
Payer: COMMERCIAL

## 2022-03-31 VITALS
WEIGHT: 182 LBS | BODY MASS INDEX: 32.25 KG/M2 | HEIGHT: 63 IN | DIASTOLIC BLOOD PRESSURE: 82 MMHG | HEART RATE: 50 BPM | SYSTOLIC BLOOD PRESSURE: 135 MMHG

## 2022-03-31 DIAGNOSIS — Z98.891 S/P CESAREAN SECTION: ICD-10-CM

## 2022-03-31 PROCEDURE — 99207 PR POST PARTUM EXAM: CPT | Mod: GE | Performed by: OBSTETRICS & GYNECOLOGY

## 2022-03-31 PROCEDURE — G0463 HOSPITAL OUTPT CLINIC VISIT: HCPCS

## 2022-03-31 NOTE — TELEPHONE ENCOUNTER
Call received from patient reporting incisional pain and redness. Repeat C/S 3/24/22. Denies fever, chills, systemic symptoms of infection. States she had granuloma reaction to sutures previous  section and this feels similar.     Offered appointment today at 1345. Pt accepted and confirmed availability.

## 2022-03-31 NOTE — PROGRESS NOTES
"[facility]  Postpartum Visit Note  DOS: 3/31/2022    S:  Ms. Gabriele Rodriguez is a 36 year old  here for her postpartum checkup.     - Had a female on 3/24/22  by RLTCS. Apgars 7/7, weight 3030g  - Feeding Method:  .  Working on latching  - Bleeding:  None for at least a few days  - Bowel/Urinary problems:  No  - Mood: stable    - Contraception Planned:  Nexplanon    Concerns: wound concerns. Not itching. Not bleeding from incision.     ROS: 10 point ROS neg other than the symptoms noted above in the HPI. Has stinging pain when it rubs against things    O:  LMP 2021    Vitals: /82 (BP Location: Right arm, Patient Position: Chair)   Pulse 50   Ht 1.6 m (5' 3\")   Wt 82.6 kg (182 lb)   LMP 2021   BMI 32.24 kg/m        Gen: Well-appearing, NAD  CV: Well perfused  Resp:  Normal inspiratory effort  Psych:  Normal affect  Abd:  Benign and Soft, flat, non-tender  Inc:   well healed, intact overall. Small area at left lateral edge injected with 3 mL 0.5% bupivicaine. Surgical knot retrieved using sterile forceps and removed with scissors. Steri strips placed over the area. Other older steri strips removed. No fluctuance or erythema around incision.      Assessment and Plan:  Ms. Gabriele Rodriguez is a 36 year old  here for 1 week postpartum visit after RLTCS. Doing well overall. Has pain from left lateral edge of incision and had knot removed with last c/s with significant relief. Desired this repeated today and we did successfully remove the knot. Pregnancy c/b history of prior  section, chronic hypertension, anxiety, ADHD, history of  demise.   - Contraception: nexplanon at 6 week visit. Has had it twice before. Tolerates it well  - Feeding: breast  - Mood: stable   - Follow-up: at 6 week visit for nexplanon  - Chronic HTN: BP today wnl, on PTA labetalol. No concern for SI preeclampsia based on BP and symptoms  - Other: will make appt to establish primary care " with Dr Morse to manage her ADHD and cHTN     Patient seen and discussed with Dr. Osorio who is in agreement with plan    Avi Duckworth MD  Obstetrics & Gynecology PGY-3  3/31/2022    The Patient was seen in [facility] by AVI DUCKWORTH.  I reviewed the history & exam. Assessment and plan were jointly made.    Giovanna Osorio MD

## 2022-03-31 NOTE — LETTER
"3/31/2022       RE: Gabriele Rodriguez  [address] [address]     Dear Colleague,    Thank you for referring your patient, Gabriele Rodriguez, to the [facility] [place] at [facility]. Please see a copy of my visit note below.    [facility]  Postpartum Visit Note  DOS: 3/31/2022    S:  Ms. Gabriele Rodriguez is a 36 year old  here for her postpartum checkup.     - Had a female on 3/24/22  by RLTCS. Apgars 7/7, weight 3030g  - Feeding Method:  .  Working on latching  - Bleeding:  None for at least a few days  - Bowel/Urinary problems:  No  - Mood: stable    - Contraception Planned:  Nexplanon    Concerns: wound concerns. Not itching. Not bleeding from incision.     ROS: 10 point ROS neg other than the symptoms noted above in the HPI. Has stinging pain when it rubs against things    O:  LMP 2021    Vitals: /82 (BP Location: Right arm, Patient Position: Chair)   Pulse 50   Ht 1.6 m (5' 3\")   Wt 82.6 kg (182 lb)   LMP 2021   BMI 32.24 kg/m        Gen: Well-appearing, NAD  CV: Well perfused  Resp:  Normal inspiratory effort  Psych:  Normal affect  Abd:  Benign and Soft, flat, non-tender  Inc:   well healed, intact overall. Small area at left lateral edge injected with 3 mL 0.5% bupivicaine. Surgical knot retrieved using sterile forceps and removed with scissors. Steri strips placed over the area. Other older steri strips removed. No fluctuance or erythema around incision.      Assessment and Plan:  Ms. Gabriele Rodriguez is a 36 year old  here for 1 week postpartum visit after RLTCS. Doing well overall. Has pain from left lateral edge of incision and had knot removed with last c/s with significant relief. Desired this repeated today and we did successfully remove the knot. Pregnancy c/b history of prior  section, chronic hypertension, anxiety, ADHD, history of  demise.   - " Contraception: nexplanon at 6 week visit. Has had it twice before. Tolerates it well  - Feeding: breast  - Mood: stable   - Follow-up: at 6 week visit for nexplanon  - Chronic HTN: BP today wnl, on PTA labetalol. No concern for SI preeclampsia based on BP and symptoms  - Other: will make appt to establish primary care with Dr Morse to manage her ADHD and cHTN     Patient seen and discussed with Dr. Osorio who is in agreement with plan    Randa Duckworth MD  Obstetrics & Gynecology PGY-3  3/31/2022

## 2022-04-03 ENCOUNTER — HOSPITAL ENCOUNTER (INPATIENT)
Facility: CLINIC | Age: 37
LOS: 1 days | Discharge: HOME | End: 2022-04-05
Attending: EMERGENCY MEDICINE | Admitting: OBSTETRICS & GYNECOLOGY
Payer: COMMERCIAL

## 2022-04-03 DIAGNOSIS — O14.10 SEVERE PRE-ECLAMPSIA, ANTEPARTUM: ICD-10-CM

## 2022-04-03 DIAGNOSIS — R51.9 NONINTRACTABLE HEADACHE, UNSPECIFIED CHRONICITY PATTERN, UNSPECIFIED HEADACHE TYPE: ICD-10-CM

## 2022-04-03 DIAGNOSIS — Z20.822 LAB TEST NEGATIVE FOR COVID-19 VIRUS: ICD-10-CM

## 2022-04-03 LAB
ABO/RH(D): NORMAL
ANTIBODY SCREEN: NEGATIVE
APTT PPP: 34 SECONDS (ref 22–38)
BASOPHILS # BLD AUTO: 0 10E3/UL (ref 0–0.2)
BASOPHILS NFR BLD AUTO: 1 %
EOSINOPHIL # BLD AUTO: 0.2 10E3/UL (ref 0–0.7)
EOSINOPHIL NFR BLD AUTO: 2 %
ERYTHROCYTE [DISTWIDTH] IN BLOOD BY AUTOMATED COUNT: 11.9 % (ref 10–15)
HCT VFR BLD AUTO: 34.4 % (ref 35–47)
HGB BLD-MCNC: 11.3 G/DL (ref 11.7–15.7)
IMM GRANULOCYTES # BLD: 0 10E3/UL
IMM GRANULOCYTES NFR BLD: 1 %
INR PPP: 0.96 (ref 0.86–1.14)
LYMPHOCYTES # BLD AUTO: 3.3 10E3/UL (ref 0.8–5.3)
LYMPHOCYTES NFR BLD AUTO: 39 %
MCH RBC QN AUTO: 29.7 PG (ref 26.5–33)
MCHC RBC AUTO-ENTMCNC: 32.8 G/DL (ref 31.5–36.5)
MCV RBC AUTO: 90 FL (ref 78–100)
MONOCYTES # BLD AUTO: 0.5 10E3/UL (ref 0–1.3)
MONOCYTES NFR BLD AUTO: 5 %
NEUTROPHILS # BLD AUTO: 4.5 10E3/UL (ref 1.6–8.3)
NEUTROPHILS NFR BLD AUTO: 52 %
NRBC # BLD AUTO: 0 10E3/UL
NRBC BLD AUTO-RTO: 0 /100
PLATELET # BLD AUTO: 359 10E3/UL (ref 150–450)
RBC # BLD AUTO: 3.81 10E6/UL (ref 3.8–5.2)
SPECIMEN EXPIRATION DATE: NORMAL
WBC # BLD AUTO: 8.6 10E3/UL (ref 4–11)

## 2022-04-03 PROCEDURE — 250N000011 HC RX IP 250 OP 636: Performed by: EMERGENCY MEDICINE

## 2022-04-03 PROCEDURE — 36415 COLL VENOUS BLD VENIPUNCTURE: CPT | Performed by: EMERGENCY MEDICINE

## 2022-04-03 PROCEDURE — 85610 PROTHROMBIN TIME: CPT | Performed by: EMERGENCY MEDICINE

## 2022-04-03 PROCEDURE — 85730 THROMBOPLASTIN TIME PARTIAL: CPT | Performed by: EMERGENCY MEDICINE

## 2022-04-03 PROCEDURE — 85004 AUTOMATED DIFF WBC COUNT: CPT | Performed by: EMERGENCY MEDICINE

## 2022-04-03 PROCEDURE — 80053 COMPREHEN METABOLIC PANEL: CPT | Performed by: EMERGENCY MEDICINE

## 2022-04-03 PROCEDURE — 86901 BLOOD TYPING SEROLOGIC RH(D): CPT | Performed by: EMERGENCY MEDICINE

## 2022-04-03 PROCEDURE — 96375 TX/PRO/DX INJ NEW DRUG ADDON: CPT | Performed by: EMERGENCY MEDICINE

## 2022-04-03 PROCEDURE — 99291 CRITICAL CARE FIRST HOUR: CPT | Performed by: EMERGENCY MEDICINE

## 2022-04-03 PROCEDURE — 99285 EMERGENCY DEPT VISIT HI MDM: CPT | Mod: 25 | Performed by: EMERGENCY MEDICINE

## 2022-04-03 RX ORDER — CALCIUM GLUCONATE 94 MG/ML
1 INJECTION, SOLUTION INTRAVENOUS
Status: DISCONTINUED | OUTPATIENT
Start: 2022-04-03 | End: 2022-04-05 | Stop reason: HOSPADM

## 2022-04-03 RX ORDER — LORAZEPAM 2 MG/ML
2 INJECTION INTRAMUSCULAR
Status: DISCONTINUED | OUTPATIENT
Start: 2022-04-03 | End: 2022-04-05 | Stop reason: HOSPADM

## 2022-04-03 RX ORDER — MAGNESIUM SULFATE IN WATER 40 MG/ML
2 INJECTION, SOLUTION INTRAVENOUS CONTINUOUS
Status: DISCONTINUED | OUTPATIENT
Start: 2022-04-03 | End: 2022-04-05

## 2022-04-03 RX ORDER — HYDRALAZINE HYDROCHLORIDE 20 MG/ML
10 INJECTION INTRAMUSCULAR; INTRAVENOUS
Status: DISCONTINUED | OUTPATIENT
Start: 2022-04-03 | End: 2022-04-05 | Stop reason: HOSPADM

## 2022-04-03 RX ORDER — MAGNESIUM SULFATE HEPTAHYDRATE 500 MG/ML
10 INJECTION, SOLUTION INTRAMUSCULAR; INTRAVENOUS
Status: DISCONTINUED | OUTPATIENT
Start: 2022-04-03 | End: 2022-04-05 | Stop reason: HOSPADM

## 2022-04-03 RX ORDER — MAGNESIUM SULFATE HEPTAHYDRATE 40 MG/ML
4 INJECTION, SOLUTION INTRAVENOUS ONCE
Status: COMPLETED | OUTPATIENT
Start: 2022-04-03 | End: 2022-04-04

## 2022-04-03 RX ORDER — MAGNESIUM SULFATE HEPTAHYDRATE 40 MG/ML
4 INJECTION, SOLUTION INTRAVENOUS
Status: DISCONTINUED | OUTPATIENT
Start: 2022-04-03 | End: 2022-04-05 | Stop reason: HOSPADM

## 2022-04-03 RX ORDER — MAGNESIUM SULFATE HEPTAHYDRATE 40 MG/ML
2 INJECTION, SOLUTION INTRAVENOUS
Status: DISCONTINUED | OUTPATIENT
Start: 2022-04-03 | End: 2022-04-05 | Stop reason: HOSPADM

## 2022-04-03 RX ORDER — SODIUM CHLORIDE, SODIUM LACTATE, POTASSIUM CHLORIDE, CALCIUM CHLORIDE 600; 310; 30; 20 MG/100ML; MG/100ML; MG/100ML; MG/100ML
10-125 INJECTION, SOLUTION INTRAVENOUS CONTINUOUS
Status: DISCONTINUED | OUTPATIENT
Start: 2022-04-03 | End: 2022-04-05 | Stop reason: HOSPADM

## 2022-04-03 RX ORDER — LABETALOL HYDROCHLORIDE 5 MG/ML
20-80 INJECTION, SOLUTION INTRAVENOUS EVERY 10 MIN PRN
Status: DISCONTINUED | OUTPATIENT
Start: 2022-04-03 | End: 2022-04-03

## 2022-04-03 RX ADMIN — HYDRALAZINE HYDROCHLORIDE 10 MG: 20 INJECTION INTRAMUSCULAR; INTRAVENOUS at 23:54

## 2022-04-04 PROBLEM — O14.10 PREECLAMPSIA, SEVERE: Status: ACTIVE | Noted: 2022-04-04

## 2022-04-04 LAB
ALBUMIN SERPL-MCNC: 3 G/DL (ref 3.4–5)
ALBUMIN UR-MCNC: NEGATIVE MG/DL
ALP SERPL-CCNC: 90 U/L (ref 40–150)
ALT SERPL W P-5'-P-CCNC: 16 U/L (ref 0–50)
ANION GAP SERPL CALCULATED.3IONS-SCNC: 9 MMOL/L (ref 3–14)
APPEARANCE UR: ABNORMAL
AST SERPL W P-5'-P-CCNC: 19 U/L (ref 0–45)
BACTERIA #/AREA URNS HPF: ABNORMAL /HPF
BILIRUB SERPL-MCNC: 0.2 MG/DL (ref 0.2–1.3)
BILIRUB UR QL STRIP: NEGATIVE
BUN SERPL-MCNC: 19 MG/DL (ref 7–30)
CALCIUM SERPL-MCNC: 8.4 MG/DL (ref 8.5–10.1)
CHLORIDE BLD-SCNC: 111 MMOL/L (ref 94–109)
CO2 SERPL-SCNC: 21 MMOL/L (ref 20–32)
COLOR UR AUTO: ABNORMAL
CREAT SERPL-MCNC: 1.04 MG/DL (ref 0.52–1.04)
CREAT SERPL-MCNC: 1.06 MG/DL (ref 0.52–1.04)
CREAT SERPL-MCNC: 1.07 MG/DL (ref 0.52–1.04)
CREAT SERPL-MCNC: 1.07 MG/DL (ref 0.52–1.04)
CREAT UR-MCNC: 20 MG/DL
GFR SERPL CREATININE-BSD FRML MDRD: 70 ML/MIN/1.73M2
GFR SERPL CREATININE-BSD FRML MDRD: 72 ML/MIN/1.73M2
GLUCOSE BLD-MCNC: 94 MG/DL (ref 70–99)
GLUCOSE UR STRIP-MCNC: NEGATIVE MG/DL
HGB UR QL STRIP: NEGATIVE
HOLD SPECIMEN: NORMAL
KETONES UR STRIP-MCNC: NEGATIVE MG/DL
LEUKOCYTE ESTERASE UR QL STRIP: ABNORMAL
MAGNESIUM SERPL-MCNC: 6.6 MG/DL (ref 1.6–2.3)
MAGNESIUM SERPL-MCNC: 7.5 MG/DL (ref 1.6–2.3)
MAGNESIUM SERPL-MCNC: 9.3 MG/DL (ref 1.6–2.3)
NITRATE UR QL: NEGATIVE
PH UR STRIP: 6.5 [PH] (ref 5–7)
POTASSIUM BLD-SCNC: 4 MMOL/L (ref 3.4–5.3)
PROT SERPL-MCNC: 6.5 G/DL (ref 6.8–8.8)
PROT UR-MCNC: <0.05 G/L
PROT/CREAT 24H UR: NORMAL MG/G{CREAT}
RBC URINE: 3 /HPF
SARS-COV-2 RNA RESP QL NAA+PROBE: NEGATIVE
SODIUM SERPL-SCNC: 141 MMOL/L (ref 133–144)
SP GR UR STRIP: 1.01 (ref 1–1.03)
SQUAMOUS EPITHELIAL: 14 /HPF
TRANSITIONAL EPI: <1 /HPF
UROBILINOGEN UR STRIP-MCNC: NORMAL MG/DL
WBC URINE: 16 /HPF

## 2022-04-04 PROCEDURE — 250N000013 HC RX MED GY IP 250 OP 250 PS 637: Performed by: STUDENT IN AN ORGANIZED HEALTH CARE EDUCATION/TRAINING PROGRAM

## 2022-04-04 PROCEDURE — 250N000013 HC RX MED GY IP 250 OP 250 PS 637

## 2022-04-04 PROCEDURE — 250N000013 HC RX MED GY IP 250 OP 250 PS 637: Performed by: EMERGENCY MEDICINE

## 2022-04-04 PROCEDURE — 36415 COLL VENOUS BLD VENIPUNCTURE: CPT | Performed by: STUDENT IN AN ORGANIZED HEALTH CARE EDUCATION/TRAINING PROGRAM

## 2022-04-04 PROCEDURE — 250N000011 HC RX IP 250 OP 636: Performed by: EMERGENCY MEDICINE

## 2022-04-04 PROCEDURE — 83735 ASSAY OF MAGNESIUM: CPT | Performed by: STUDENT IN AN ORGANIZED HEALTH CARE EDUCATION/TRAINING PROGRAM

## 2022-04-04 PROCEDURE — 120N000002 HC R&B MED SURG/OB UMMC

## 2022-04-04 PROCEDURE — 84156 ASSAY OF PROTEIN URINE: CPT | Performed by: EMERGENCY MEDICINE

## 2022-04-04 PROCEDURE — 82565 ASSAY OF CREATININE: CPT | Performed by: STUDENT IN AN ORGANIZED HEALTH CARE EDUCATION/TRAINING PROGRAM

## 2022-04-04 PROCEDURE — 258N000003 HC RX IP 258 OP 636: Performed by: EMERGENCY MEDICINE

## 2022-04-04 PROCEDURE — 87635 SARS-COV-2 COVID-19 AMP PRB: CPT | Performed by: EMERGENCY MEDICINE

## 2022-04-04 PROCEDURE — 96366 THER/PROPH/DIAG IV INF ADDON: CPT | Performed by: EMERGENCY MEDICINE

## 2022-04-04 PROCEDURE — 96365 THER/PROPH/DIAG IV INF INIT: CPT | Performed by: EMERGENCY MEDICINE

## 2022-04-04 PROCEDURE — 81001 URINALYSIS AUTO W/SCOPE: CPT | Performed by: EMERGENCY MEDICINE

## 2022-04-04 RX ORDER — IBUPROFEN 600 MG/1
600 TABLET, FILM COATED ORAL EVERY 6 HOURS PRN
Status: DISCONTINUED | OUTPATIENT
Start: 2022-04-04 | End: 2022-04-05 | Stop reason: HOSPADM

## 2022-04-04 RX ORDER — ACETAMINOPHEN 325 MG/1
650 TABLET ORAL EVERY 6 HOURS PRN
Status: DISCONTINUED | OUTPATIENT
Start: 2022-04-04 | End: 2022-04-05 | Stop reason: HOSPADM

## 2022-04-04 RX ORDER — LABETALOL 200 MG/1
200 TABLET, FILM COATED ORAL EVERY 12 HOURS SCHEDULED
Status: DISCONTINUED | OUTPATIENT
Start: 2022-04-04 | End: 2022-04-05 | Stop reason: HOSPADM

## 2022-04-04 RX ORDER — ACETAMINOPHEN 500 MG
1000 TABLET ORAL ONCE
Status: COMPLETED | OUTPATIENT
Start: 2022-04-04 | End: 2022-04-04

## 2022-04-04 RX ORDER — METOCLOPRAMIDE 10 MG/1
10 TABLET ORAL
Status: DISCONTINUED | OUTPATIENT
Start: 2022-04-04 | End: 2022-04-05 | Stop reason: HOSPADM

## 2022-04-04 RX ORDER — DIPHENHYDRAMINE HCL 25 MG
25 CAPSULE ORAL EVERY 6 HOURS PRN
Status: DISCONTINUED | OUTPATIENT
Start: 2022-04-04 | End: 2022-04-05 | Stop reason: HOSPADM

## 2022-04-04 RX ADMIN — MAGNESIUM SULFATE HEPTAHYDRATE 4 G: 40 INJECTION, SOLUTION INTRAVENOUS at 00:26

## 2022-04-04 RX ADMIN — METOCLOPRAMIDE 10 MG: 10 TABLET ORAL at 20:28

## 2022-04-04 RX ADMIN — LABETALOL HYDROCHLORIDE 200 MG: 200 TABLET, FILM COATED ORAL at 20:09

## 2022-04-04 RX ADMIN — ACETAMINOPHEN 650 MG: 325 TABLET, FILM COATED ORAL at 23:53

## 2022-04-04 RX ADMIN — SODIUM CHLORIDE, POTASSIUM CHLORIDE, SODIUM LACTATE AND CALCIUM CHLORIDE 125 ML/HR: 600; 310; 30; 20 INJECTION, SOLUTION INTRAVENOUS at 00:27

## 2022-04-04 RX ADMIN — MAGNESIUM SULFATE HEPTAHYDRATE 2 G/HR: 40 INJECTION, SOLUTION INTRAVENOUS at 01:15

## 2022-04-04 RX ADMIN — ACETAMINOPHEN 650 MG: 325 TABLET, FILM COATED ORAL at 10:11

## 2022-04-04 RX ADMIN — SODIUM CHLORIDE, POTASSIUM CHLORIDE, SODIUM LACTATE AND CALCIUM CHLORIDE 75 ML/HR: 600; 310; 30; 20 INJECTION, SOLUTION INTRAVENOUS at 13:36

## 2022-04-04 RX ADMIN — IBUPROFEN 600 MG: 600 TABLET ORAL at 10:11

## 2022-04-04 RX ADMIN — LABETALOL HYDROCHLORIDE 200 MG: 200 TABLET, FILM COATED ORAL at 10:11

## 2022-04-04 RX ADMIN — ACETAMINOPHEN 1000 MG: 500 TABLET ORAL at 01:18

## 2022-04-04 RX ADMIN — ACETAMINOPHEN 650 MG: 325 TABLET, FILM COATED ORAL at 18:17

## 2022-04-04 ASSESSMENT — ACTIVITIES OF DAILY LIVING (ADL)
DOING_ERRANDS_INDEPENDENTLY_DIFFICULTY: NO
WEAR_GLASSES_OR_BLIND: NO
CONCENTRATING,_REMEMBERING_OR_MAKING_DECISIONS_DIFFICULTY: NO
ADLS_ACUITY_SCORE: 7
WALKING_OR_CLIMBING_STAIRS_DIFFICULTY: NO
TOILETING_ISSUES: NO
DRESSING/BATHING_DIFFICULTY: NO
DIFFICULTY_EATING/SWALLOWING: NO
ADLS_ACUITY_SCORE: 7
FALL_HISTORY_WITHIN_LAST_SIX_MONTHS: NO
CHANGE_IN_FUNCTIONAL_STATUS_SINCE_ONSET_OF_CURRENT_ILLNESS/INJURY: NO

## 2022-04-04 ASSESSMENT — ENCOUNTER SYMPTOMS
BACK PAIN: 0
VOMITING: 0
NECK PAIN: 0
COUGH: 0
WEAKNESS: 0
NUMBNESS: 0
COLOR CHANGE: 0
BRUISES/BLEEDS EASILY: 0
SHORTNESS OF BREATH: 0
WOUND: 0
HEADACHES: 1
HEMATURIA: 0
FEVER: 0
CONSTIPATION: 0
CONFUSION: 0
DIARRHEA: 0
FATIGUE: 0
RHINORRHEA: 0
ABDOMINAL PAIN: 0
FLANK PAIN: 0
CHILLS: 0
NAUSEA: 0
LIGHT-HEADEDNESS: 0
DYSURIA: 0
PALPITATIONS: 0
FREQUENCY: 0

## 2022-04-04 NOTE — PROVIDER NOTIFICATION
04/04/22 1000   Provider Notification   Provider Name/Title OB/GYN Resident   Method of Notification Electronic Page   Request Evaluate-Remote   Notification Reason Lab Results;Status Update  (7121 AP - FYI  Mag lvl is 7.5. Looks like the admission note)   7121 AP - FYI  Mag lvl is 7.5. Looks like the admission note commented on possibly increasing labetalol to 400mg, thoughts? Thanks.

## 2022-04-04 NOTE — PLAN OF CARE
Pt arrived to [facility] from the ED via zoom cart at 0215 accompanied by her sister. Pt came to unit with Mag running at 50 ml/hr and LR at 125/hr. Med check, assessment, and vitals were completed upon arrival. No new concerns at this time; will continue with plan of care.

## 2022-04-04 NOTE — PROGRESS NOTES
Magnesium Postpartum Progress Note    S: Headache is the same.  Denies visual changes, chest pain, shortness of breath, RUQ/epigastric pain, nausea, or vomiting.     O:  Patient Vitals for the past 12 hrs:   BP Temp Temp src Pulse Resp SpO2 Weight   22 0556 121/82 97.5  F (36.4  C) Oral 70 18 -- 80.7 kg (178 lb)   22 0230 136/82 97.7  F (36.5  C) Oral 80 18 99 % --   22 0140 -- -- -- 70 12 98 % --   22 0135 -- -- -- 72 16 98 % --   22 0130 131/81 -- -- 66 11 99 % --   22 0125 -- -- -- 70 19 98 % --   22 0120 139/84 -- -- 85 30 -- --   22 0050 128/80 -- -- 69 18 99 % --   22 0045 137/80 -- -- 75 13 98 % --   22 0040 133/77 -- -- 70 19 98 % --   22 0035 136/85 -- -- 64 13 98 % --   22 0030 (!) 143/86 -- -- 55 18 -- --   22 0025 (!) 148/93 -- -- 65 16 -- --   22 2320 (!) 165/101 -- -- -- -- -- --   22 2318 (!) 165/101 -- -- -- -- -- --   22 2315 (!) 187/94 -- -- 69 -- -- --   22 2249 (!) 159/83 98.8  F (37.1  C) Oral 65 16 98 % 83 kg (183 lb)     Gen: Well-appearing   CV: RRR, no murmurs   Pulm: CTAB, no wheezes   Abd: Soft, non-tender to palpation   Ext: trace LE edema, 2+ patellar reflexes b/l, no clonus b/l       Labs:   Lab Results   Component Value Date    HGB 11.3 2022    HGB 11.7 2020     2022     2020    AST 19 2022    AST 13 2020    ALT 16 2022    ALT 13 2020    CR 1.07 2022    CR 0.48 2020        A/P:  Gabriele Rodriguez is a 36 year old  on PPD#10 s/p RLTCS now HD#2 re-admitted for SI Pre-E w/ Severe Features.    # Superimposed Pre-eclampsia w/ SF  - Serial BP monitoring  - IV Antihypertensives prn for sustained severe range blood pressures (>160/>110)  - PTA labetalol 200 BID>> will increase to 400 BID in AM  - Labs: Cr 1.07, o/w wnl, UPC 0.63 (3/25). Will obtain mag level and Cr q6h  - Daily weights, strict I&Os  - Magnesium:  4g loading dose, followed by 2g per hour               - Mg checks q4h               - Will continue for 24hrs     # Postpartum/Postop  - Pain: PRN tylenol/ibuprofen  - GI: PRN senna  - PPx: Encourage ambulation, IS, SCDs while confined to bed  - breast pump available    Nayely Romero MD MSc  OBGYN Resident, PGY3  April 4, 2022, 5:32 AM    The patient was seen this morning by Dr. Hayes who also admitted the patient.  Please see her admit H&P for details.     Barbara August MD, FACOG

## 2022-04-04 NOTE — ED TRIAGE NOTES
Pt. had  1 week ago.  Now BP elevated at home and pt. has H/A.  No blurry vision.  Has underlying hypertension.  No issues during pregnancy.  Pt. took extra 200 mg of labetalol tonight.

## 2022-04-04 NOTE — PLAN OF CARE
Readmit on 040/4/22 for PreE during postpartum period. Afebrile. VSS, except 1-4/10 HA. PreE symptoms of HA and blurred/double vision. Fundus U1, scant. LS clear on RA. Good PO intake, good appetite. Incision site is open to air with a steri strip and bandaid on left edge. Reflexed +3 then +2, Clonus absent. Voiding independently with good UOP, passing gas, BMs per normal routine. Mag/LR infusion and tolerated well. Taking IBU and Tylenol as needed for HA discomfort. Patient is breast feeding and pumping independently. Plan to continue monitoring. Hourly monitoring completed, will continue to monitor.

## 2022-04-04 NOTE — H&P
History and Physical     Gabriele Rodriguez MRN# [id number]   YOB: 1985 Age: 36 year old   Date of Admission: 4/3/2022      Assessment and Plan:   36 year old  POD#10 s/p RLTCS, hx CHTN on labetalol, presenting for sustained severe range BP at home, new headache, symptoms and BP c/w superimposed preE w/ SF vs. Exacerbation of chronic HTN.     # Superimposed Pre-eclampsia w/ SF  - Serial BP monitoring  - IV Antihypertensives prn for sustained severe range blood pressures (>160/>110)  - PTA labetalol 200 BID>> will increase to 400 BID in AM  - Labs: HELLP labs pending, UPC not indicated  - Daily weights, strict I&Os  - Magnesium: 4g loading dose, followed by 2g per hour   - Mg checks q4h   - Will continue for 24hrs    # Postpartum/Postop  - Pain: PRN tylenol/ibuprofen  - GI: PRN senna  - PPx: Encourage ambulation, IS, SCDs while confined to bed  - breast pump available    Patient discussed with Dr. Freddy Romero MD MSc  OBGYN Resident, PGY3  April 3, 2022, 11:30 PM         HPI:   Gabriele Rodriguez is a 36 year old  POD#10 s/p RLTCS who presents for elevated BP and headache.     She has hx CHTN for which she was on labetalol 200 BID at the time of delivery. This was held for a few days due to low-normal BP and lower heart rate. She never met criteria for PreE w/ SF while inpatient. After discharge from hospital she was checking her BP at least daily, was usually 130s/80s. Resumed labetalol about 1 week after discharge as her BP had started to increase slightly. She has had a mild headache over the past few days, unsure of exactly when it started as she has not been sleeping much, similar to sinus headaches that she has had in the past.  Took Tylenol ibuprofen with minimal improvement today.  This evening started noticing her blood pressures were increasing, with systolics in 180s and diastolics 110s. She took an additional dose of labetalol 200mg around 9pm (total of 600mg  today).      She denies fever, chills, SOB, chest pain, palpitations, N/V, LE swelling/tenderness.  No concerns for vision changes, RUQ or epigastric pain.     OB History:    OB History    Para Term  AB Living   2 2 2 0 0 1   SAB IAB Ectopic Multiple Live Births   0 0 0 0 2      # Outcome Date GA Lbr Jelani/2nd Weight Sex Delivery Anes PTL Lv   2 Term 22 37w0d   F CS-LTranv Spinal N YARI      Name: ALANNAH,FEMALE-ANNALIISA      Apgar1: 7  Apgar5: 7   1 Term 20 37w2d  2.62 kg (5 lb 12.4 oz) F CS-LTranv   ND      Name: Valeria      Apgar1: 1  Apgar5: 5        Prenatal Lab Results:  Lab Results   Component Value Date    ABO O 2020    RH Pos 2020    AS Negative 2022    HEPBANG Nonreactive 2021    CHPCRT Negative 2021    GCPCRT Negative 2021    HGB 11.3 (L) 2022          Past Medical History:     Past Medical History:   Diagnosis Date     ADHD      Anxiety      Chronic sinusitis      Depressive disorder     also anxiety, on and off since high school, has tried prozac and lexapro, wellbutrin, now off meds, last on meds      History of  section      History of  death      Hypertension     diagnosed in med school, 2016     Varicella     as a child             Past Surgical History:     Past Surgical History:   Procedure Laterality Date     BIOPSY NAIL (LOCATION)      left hand, 4th digit      SECTION N/A 2020    Procedure:  SECTION;  Surgeon: Jojo Vaz MD;  Location: [facility]      SECTION N/A 3/24/2022    Procedure: REPEAT  SECTION;  Surgeon: Giovanna Osorio MD;  Location: [facility] L[facility]             Social History:     Social History     Tobacco Use     Smoking status: Never Smoker     Smokeless tobacco: Never Used   Substance Use Topics     Alcohol use: Not Currently     Comment: Not while pregnant             Family History:     Family History   Problem Relation Age of Onset     Hypertension  Mother      Breast Cancer Mother         ductal stage 2, HER-2 negative, (+) estrogen/progesterone, BRCA negative     Skin Cancer Mother         basal cell     No Known Problems Father      No Known Problems Sister      Pancreatic Cancer Maternal Grandfather      Brain Cancer Paternal Aunt      Melanoma No family hx of              Immunizations:     Immunization History   Administered Date(s) Administered     COVID-19,PF,Moderna 03/05/2022     COVID-19,PF,Pfizer (12+ Yrs) 01/04/2021, 01/25/2021, 10/15/2021     Influenza Vaccine IM > 6 months Valent IIV4 (Alfuria,Fluzone) 09/24/2019, 09/21/2021     TDAP Vaccine (Boostrix) 09/21/2020     Tdap (Adacel,Boostrix) 01/21/2022            Allergies:     Allergies   Allergen Reactions     Nickel Itching             Medications:   (Not in a hospital admission)            Review of Systems & Physical Exam:     The Review of Systems is negative other than noted in the HPI      Patient Vitals for the past 4 hrs:   BP Temp Temp src Pulse Resp SpO2 Weight   04/03/22 2318 (!) 165/101 -- -- -- -- -- --   04/03/22 2315 (!) 187/94 -- -- 69 -- -- --   04/03/22 2249 (!) 159/83 98.8  F (37.1  C) Oral 65 16 98 % 83 kg (183 lb)     Gen: resting comfortably in bed  CV: RRR, nl S1/S2, no murmurs/clicks/gallops  Lungs: CTAB, non-labored breathing  Abd: non-tender, non-distended, well-healing pfannenstiel insicion  Ext: trace peripheral extremity edema; 2+ DTRs, no clonus         Data:     Results for orders placed or performed during the hospital encounter of 04/03/22   INR     Status: Normal   Result Value Ref Range    INR 0.96 0.86 - 1.14   Partial thromboplastin time     Status: Normal   Result Value Ref Range    aPTT 34 22 - 38 Seconds   Comprehensive metabolic panel     Status: Abnormal   Result Value Ref Range    Sodium 141 133 - 144 mmol/L    Potassium 4.0 3.4 - 5.3 mmol/L    Chloride 111 (H) 94 - 109 mmol/L    Carbon Dioxide (CO2) 21 20 - 32 mmol/L    Anion Gap 9 3 - 14 mmol/L    Urea  Nitrogen 19 7 - 30 mg/dL    Creatinine 1.07 (H) 0.52 - 1.04 mg/dL    Calcium 8.4 (L) 8.5 - 10.1 mg/dL    Glucose 94 70 - 99 mg/dL    Alkaline Phosphatase 90 40 - 150 U/L    AST 19 0 - 45 U/L    ALT 16 0 - 50 U/L    Protein Total 6.5 (L) 6.8 - 8.8 g/dL    Albumin 3.0 (L) 3.4 - 5.0 g/dL    Bilirubin Total 0.2 0.2 - 1.3 mg/dL    GFR Estimate 70 >60 mL/min/1.73m2   CBC with platelets and differential     Status: Abnormal   Result Value Ref Range    WBC Count 8.6 4.0 - 11.0 10e3/uL    RBC Count 3.81 3.80 - 5.20 10e6/uL    Hemoglobin 11.3 (L) 11.7 - 15.7 g/dL    Hematocrit 34.4 (L) 35.0 - 47.0 %    MCV 90 78 - 100 fL    MCH 29.7 26.5 - 33.0 pg    MCHC 32.8 31.5 - 36.5 g/dL    RDW 11.9 10.0 - 15.0 %    Platelet Count 359 150 - 450 10e3/uL    % Neutrophils 52 %    % Lymphocytes 39 %    % Monocytes 5 %    % Eosinophils 2 %    % Basophils 1 %    % Immature Granulocytes 1 %    NRBCs per 100 WBC 0 <1 /100    Absolute Neutrophils 4.5 1.6 - 8.3 10e3/uL    Absolute Lymphocytes 3.3 0.8 - 5.3 10e3/uL    Absolute Monocytes 0.5 0.0 - 1.3 10e3/uL    Absolute Eosinophils 0.2 0.0 - 0.7 10e3/uL    Absolute Basophils 0.0 0.0 - 0.2 10e3/uL    Absolute Immature Granulocytes 0.0 <=0.4 10e3/uL    Absolute NRBCs 0.0 10e3/uL   CBC with platelets differential     Status: Abnormal    Narrative    The following orders were created for panel order CBC with platelets differential.  Procedure                               Abnormality         Status                     ---------                               -----------         ------                     CBC with platelets and d...[[id number]]  Abnormal            Final result                 Please view results for these tests on the individual orders.   ABO/Rh type and screen     Status: None (In process)    Narrative    The following orders were created for panel order ABO/Rh type and screen.  Procedure                               Abnormality         Status                     ---------                                -----------         ------                     Adult Type and Screen[[id number]]                            In process                   Please view results for these tests on the individual orders.

## 2022-04-04 NOTE — DISCHARGE SUMMARY
[facility] Discharge Summary    Gabriele Rodriguez MRN# [id number]   Age: 36 year old YOB: 1985     Date of Admission:  4/3/2022  Date of Discharge:  2022  Admitting Physician:  Janice Hayes MD  Discharge Physician:  Barbara August MD    Admit Dx:   - POD#10 s/p RLTCS  - Superimposed Preeclampsia w/ SF  - Postpartum state    Discharge Dx:  - Same as above    Procedures:  - None    Admit HPI:  36 year old  POD#10 s/p RLTCS, hx CHTN on labetalol, presenting for sustained severe range BP at home, new headache, symptoms and BP c/w superimposed preE w/ SF vs. Exacerbation of chronic HTN. She was admitted for blood pressure control and magnesium for seizure prophylaxis.     Please see her admit H&P for full details of her PMH, PSH, Meds, Allergies and exam on admit.    Hospital Course:  Patient received serial BP monitoring and better control of her blood pressures with increased labetalol dose. Her HELLP labs were normal. She was noted to have an increase in creatinine during her admission, but increase was mild and she agreed to have a short interval follow up in clinic for this. She denied headache, vision changes, shortness of breath, RUQ pain. She received 24 hours of magnesium for seizure prophylaxis.     Discharge Medications:  Discharge Medication List as of 2022 11:09 AM      START taking these medications    Details   hydrochlorothiazide (HYDRODIURIL) 25 MG tablet Take 1 tablet (25 mg) by mouth daily, Disp-10 tablet, R-0, E-Prescribe         CONTINUE these medications which have NOT CHANGED    Details   acetaminophen (TYLENOL) 325 MG tablet Take 2 tablets (650 mg) by mouth every 6 hours as needed for mild pain Start after Delivery., Disp-100 tablet, R-0, E-Prescribe      famotidine (PEPCID) 10 MG tablet Take 10 mg by mouth At Bedtime , Historical      ibuprofen (ADVIL/MOTRIN) 600 MG tablet Take 1 tablet (600 mg) by mouth every 6 hours as needed for  moderate pain Start after delivery, Disp-60 tablet, R-0, E-Prescribe      labetalol (NORMODYNE) 200 MG tablet Take 1 tablet (200 mg) by mouth 2 times daily, Disp-180 tablet, R-3, E-Prescribe      Prenatal Vit-Fe Fumarate-FA (PRENATAL PO) Historical      senna-docusate (SENOKOT-S/PERICOLACE) 8.6-50 MG tablet Take 1 tablet by mouth daily Start after delivery., Disp-100 tablet, R-0, E-Prescribe      vitamin D3 (CHOLECALCIFEROL) 50 mcg (2000 units) tablet Take 1 tablet by mouth daily, Historical               Discharge/Disposition:  Gabriele Rodriguez was discharged to home in stable condition with the following instructions/medications:  1) Call for temperature > 100.4, bright red vaginal bleeding >1 pad an hour x 2 hours, foul smelling vaginal discharge, pain not controlled by usual oral pain meds, persistent nausea and vomiting not controlled on medications, drainage or redness from incision site  2) Call for headaches, changes in vision, SOB, RUQ pain.  3) Present to clinic in 3 days for observation of Cr.    Rob Staton MD  Obstetrics, Gynecology, and Women's Health  PGY2  10:21 AM 04/06/2022     The patient was seen and examined by me on the day of discharge.  I have reviewed and agree with the resident's above note.    Barbara August MD, FACOG

## 2022-04-04 NOTE — PROGRESS NOTES
Magnesium Postpartum Progress Note    S: Headache is the same. States she is having some double vision from the magnesium. Denies spots or blurriness, chest pain, shortness of breath, RUQ/epigastric pain, nausea, or vomiting.     O:  Patient Vitals for the past 12 hrs:   BP Temp Temp src Pulse Resp SpO2 Weight   22 0940 131/86 98.8  F (37.1  C) Oral 71 18 98 % --   22 0556 121/82 97.5  F (36.4  C) Oral 70 18 -- 80.7 kg (178 lb)   22 0230 136/82 97.7  F (36.5  C) Oral 80 18 99 % --   22 0140 -- -- -- 70 12 98 % --   22 0135 -- -- -- 72 16 98 % --   22 0130 131/81 -- -- 66 11 99 % --   22 0125 -- -- -- 70 19 98 % --   22 0120 139/84 -- -- 85 30 -- --   22 0050 128/80 -- -- 69 18 99 % --   22 0045 137/80 -- -- 75 13 98 % --   22 0040 133/77 -- -- 70 19 98 % --   22 0035 136/85 -- -- 64 13 98 % --   22 0030 (!) 143/86 -- -- 55 18 -- --   22 0025 (!) 148/93 -- -- 65 16 -- --   22 2320 (!) 165/101 -- -- -- -- -- --   22 2318 (!) 165/101 -- -- -- -- -- --   22 231 (!) 187/94 -- -- 69 -- -- --     Gen: Well-appearing   CV: RRR, no murmurs   Pulm: CTAB, no wheezes   Abd: Soft, non-tender to palpation   Ext: trace LE edema, 2+ patellar reflexes b/l, no clonus b/l     Labs:   Lab Results   Component Value Date    HGB 11.3 2022    HGB 11.7 2020     2022     2020    AST 19 2022    AST 13 2020    ALT 16 2022    ALT 13 2020    CR 1.07 2022    CR 0.48 2020        A/P:  Gabriele Rodriguez is a 36 year old  on PPD#10 s/p RLTCS now HD#2 re-admitted for SI Pre-E w/ Severe Features.    # Superimposed Pre-eclampsia w/ SF  - Serial BP monitoring  - IV Antihypertensives prn for sustained severe range blood pressures (>160/>110)  - PTA labetalol 200 BID. Pressures normal now, will continue this dose. Can increase to 400mg this PM as needed.   - Labs: Cr 1.07,  o/w wnl, UPC 0.63 (3/25). Will obtain mag level and Cr q6h  - Daily weights, strict I&Os  - Magnesium: 4g loading dose, followed by 2g per hour               - Mg checks q4h               - Will continue for 24hrs     # Postpartum/Postop  - Pain: PRN tylenol/ibuprofen  - GI: PRN senna  - PPx: Encourage ambulation, IS, SCDs while confined to bed  - breast pump available    Rob Staton MD  Obstetrics, Gynecology, and Women's Health  PGY2  10:53 AM 04/04/2022

## 2022-04-04 NOTE — ED PROVIDER NOTES
[facility] [place] EMERGENCY DEPARTMENT ([facility] [place])    22     ED for  History     Chief Complaint   Patient presents with     Hypertension      1 week ago     Headache     HPI  Gabriele Rodriguez is a 36 year old female 1 week postpartum  who presents with elevated blood pressures.  She had delivered by  1 week ago on 3/24/22.  She notes that she had gradual onset of headache today and she has been tracking her blood pressure and noticed it has been higher, in the 170s systolic at home. She took an additional dose of labetalol today but her blood pressure has continued to be elevated, prompting her to come in for further evaluation.  In regards her headache, again this was gradual in onset.  She denies any vision changes, neck pain, neck stiffness, numbness, weakness, or tingling.  Denies any recent head trauma or injury.  Denies any fevers, chills, cough, chest pain, shortness of breath, or lower extremity edema.  Regards her , she notes that her incision has been healing well, denies any significant abdominal pain, denies any vaginal bleeding. The patient denies any other physical concerns today.     Patient is a pathology resident.  She has a history of  loss at 2 months of age; her baby was delivered at 37 weeks gestation due to fetal decels, baby had meconium aspiration and respiratory distress and was in NICU prior to passing away at home.     Past Medical History  Past Medical History:   Diagnosis Date     ADHD      Anxiety      Chronic sinusitis      Depressive disorder     also anxiety, on and off since high school, has tried prozac and lexapro, wellbutrin, now off meds, last on meds 2018     History of  section      History of  death      Hypertension     diagnosed in med school, 2016     Varicella     as a child     Past Surgical History:   Procedure Laterality Date     BIOPSY NAIL (LOCATION)      left hand, 4th digit      SECTION N/A  2020    Procedure:  SECTION;  Surgeon: Jojo Vaz MD;  Location: [facility]      SECTION N/A 3/24/2022    Procedure: REPEAT  SECTION;  Surgeon: Giovanna Osorio MD;  Location: [facility] L[facility]     acetaminophen (TYLENOL) 325 MG tablet  famotidine (PEPCID) 10 MG tablet  ibuprofen (ADVIL/MOTRIN) 600 MG tablet  labetalol (NORMODYNE) 200 MG tablet  Prenatal Vit-Fe Fumarate-FA (PRENATAL PO)  senna-docusate (SENOKOT-S/PERICOLACE) 8.6-50 MG tablet  vitamin D3 (CHOLECALCIFEROL) 50 mcg (2000 units) tablet      Allergies   Allergen Reactions     Nickel Itching     Family History  Family History   Problem Relation Age of Onset     Hypertension Mother      Breast Cancer Mother         ductal stage 2, HER-2 negative, (+) estrogen/progesterone, BRCA negative     Skin Cancer Mother         basal cell     No Known Problems Father      No Known Problems Sister      Pancreatic Cancer Maternal Grandfather      Brain Cancer Paternal Aunt      Melanoma No family hx of      Social History   Social History     Tobacco Use     Smoking status: Never Smoker     Smokeless tobacco: Never Used   Substance Use Topics     Alcohol use: Not Currently     Comment: Not while pregnant     Drug use: Never      Past medical history, past surgical history, medications, allergies, family history, and social history were reviewed with the patient. No additional pertinent items.       Review of Systems   Constitutional: Negative for chills, fatigue and fever.   HENT: Negative for congestion, ear pain and rhinorrhea.    Eyes: Negative for visual disturbance.   Respiratory: Negative for cough and shortness of breath.    Cardiovascular: Negative for chest pain and palpitations.   Gastrointestinal: Negative for abdominal pain, constipation, diarrhea, nausea and vomiting.   Genitourinary: Negative for dysuria, flank pain, frequency, hematuria, vaginal bleeding and vaginal discharge.   Musculoskeletal: Negative for back pain and  neck pain.   Skin: Negative for color change, rash and wound.   Allergic/Immunologic: Negative for immunocompromised state.   Neurological: Positive for headaches. Negative for syncope, weakness, light-headedness and numbness.   Hematological: Does not bruise/bleed easily.   Psychiatric/Behavioral: Negative for confusion.   All other systems reviewed and are negative.    A complete review of systems was performed with pertinent positives and negatives noted in the HPI, and all other systems negative.    Physical Exam   BP: (!) 159/83  Pulse: 65  Temp: 98.8  F (37.1  C)  Resp: 16  Weight: 83 kg (183 lb)  SpO2: 98 %     Physical Exam  Vitals and nursing note reviewed.   Constitutional:       General: She is not in acute distress.     Appearance: She is normal weight. She is not ill-appearing, toxic-appearing or diaphoretic.   HENT:      Head: Normocephalic and atraumatic.      Right Ear: External ear normal.      Left Ear: External ear normal.      Nose: Nose normal.      Mouth/Throat:      Mouth: Mucous membranes are moist.   Eyes:      Extraocular Movements: Extraocular movements intact.      Conjunctiva/sclera: Conjunctivae normal.      Pupils: Pupils are equal, round, and reactive to light.   Cardiovascular:      Rate and Rhythm: Normal rate and regular rhythm.      Pulses: Normal pulses.      Heart sounds: Normal heart sounds. No murmur heard.    No friction rub. No gallop.   Pulmonary:      Effort: Pulmonary effort is normal. No respiratory distress.      Breath sounds: Normal breath sounds. No stridor. No wheezing, rhonchi or rales.   Abdominal:      General: Bowel sounds are normal. There is no distension.      Palpations: Abdomen is soft.      Tenderness: There is no abdominal tenderness. There is no right CVA tenderness, left CVA tenderness, guarding or rebound.   Musculoskeletal:         General: Normal range of motion.      Cervical back: Normal range of motion and neck supple. No rigidity or tenderness.       Right lower leg: No edema.      Left lower leg: No edema.   Skin:     General: Skin is warm.      Capillary Refill: Capillary refill takes less than 2 seconds.   Neurological:      General: No focal deficit present.      Mental Status: She is alert.      Comments: GCS 15.  Cranial nerves II through XII intact.  Strength 5/5 in all all distributions of the upper and lower extremities.  Intact sensation in all distributions of the bilateral upper and lower extremities.    Psychiatric:         Mood and Affect: Mood normal.         Behavior: Behavior normal.         ED Course     ED Course as of 04/04/22 0042   Mon Apr 04, 2022   0037 WBC: 8.6   0037 Hemoglobin(!): 11.3  On chart review, this is at baseline.    0037 Platelet Count: 359   0037 INR: 0.96   0037 PTT: 34   0037 Sodium: 141   0037 Potassium: 4.0   0037 Creatinine(!): 1.07  0.60 1 week ago.   0037 GFR Estimate: 70   0037 Urea Nitrogen: 19   0037 Alkaline Phosphatase: 90   0037 AST: 19   0037 ALT: 16   0037 Bilirubin Total: 0.2     Procedures: none.        Critical Care Addendum    My initial assessment, based on my review of nursing observations, review of vital signs, focused history, physical exam and discussion with OB/GYN, established that Gabriele Rodriguez has severe hypertension in the setting of postpartum concerning for postpartum preeclampsia, which requires immediate intervention, and therefore she is critically ill.     After the initial assessment, the care team initiated multiple lab tests, initiated medication therapy with IV magnesium and consulted with OB/GYN to provide stabilization care. Due to the critical nature of this patient, I reassessed nursing observations and vital signs multiple times prior to her disposition.     Time also spent performing documentation, reviewing test results, discussion with consultants and coordination of care.     Critical care time (excluding teaching time and procedures): 35 minutes.    _______________________________________________     The medical record was reviewed and interpreted.  Current labs reviewed and interpreted.  Previous labs reviewed and interpreted.      Medications   lactated ringers infusion (has no administration in time range)   magnesium sulfate 2 g in water intermittent infusion (has no administration in time range)   magnesium sulfate 4 g in 100 mL sterile water (premade) (has no administration in time range)   magnesium sulfate injection 10 g (has no administration in time range)   LORazepam (ATIVAN) injection 2 mg (has no administration in time range)   magnesium sulfate 4 g in 100 mL sterile water (premade) (has no administration in time range)   magnesium sulfate infusion (has no administration in time range)   calcium gluconate 10 % injection 1 g (has no administration in time range)   hydrALAZINE (APRESOLINE) injection 10 mg (has no administration in time range)        Assessments & Plan (with Medical Decision Making)   Nursing notes and vital signs reviewed.     Presentation, exam, and workup is most consistent with postpartum preeclampsia.    Please see HPI, ROS, exam, and ED course above for pertinent history and findings. In short, the patient presented to the ED for evaluation of elevated blood pressures in the setting of headache in the setting of 1 week postpartum from  section.    Initial blood pressure 159/83, I went and evaluate the patient, exam as above without any focal abnormalities.  Recheck blood pressures in the severe range.  She was started on IV magnesium and OB/GYN was urgently consulted.  She was also given IV hydralazine, recheck blood pressure is normalized at 136/85.    In regards to her headache, was gradual in onset, no focal neurological deficits, no indication for CT or MRI imaging at this time.  She is given Tylenol for headache.    On review of her labs her creatinine is 1.07, baseline 0.6.  Otherwise remainder of labs  unremarkable, no protein creatinine ratio is pending at the time of admission.    Discussed the patient with OB/GYN, who will admit the patient for further monitoring, evaluation, and treatment. Rechecked the patient, findings and plan explained to the patient, who consents to admission.     Disposition: The patient was admitted to the OBGYN service in stable condition.      Final diagnoses:   Preeclampsia in postpartum period       --  Joanne Alvarez MD   [facility] EMERGENCY DEPARTMENT  4/3/2022     Joanne Alvarez MD  04/04/22 0047

## 2022-04-04 NOTE — PLAN OF CARE
Pt VSS and assessment WNL aside from +2 edema in feet and hands. Pt is voiding adequately and is pumping. Pt c/o headache, but has had no other symptoms. Mag running at 50 ml/hour and LR at 125 ml/hr. No new concerns to report at this time; continue with plan of care.

## 2022-04-04 NOTE — PROVIDER NOTIFICATION
04/04/22 0953   Provider Notification   Provider Name/Title OB/GYN Resident   Method of Notification Electronic Page   Request Evaluate-Remote   Notification Reason Medication Request;Status Update  (7121 AP - Pt was re-admit for PreE, Normally takes Labetalol)   7121 AP - Pt was re-admit for PreE, Normally takes Labetalol 200mg BID (not currently ordered), would like IBU and Tylenol ordered. Has persistent HA and now blurred/double vision. Reflexes +3. Thanks.

## 2022-04-04 NOTE — PROVIDER NOTIFICATION
04/04/22 0959   Provider Notification   Provider Name/Title Lab   Method of Notification Phone   Request Evaluate-Remote   Notification Reason Lab Results  (Mag Lvl 7.5)

## 2022-04-04 NOTE — PROVIDER NOTIFICATION
04/04/22 1624   Provider Notification   Provider Name/Title Archie   Method of Notification Electronic Page   Request Evaluate-Remote   Notification Reason Lab Results;Status Update     FYI    Mag level is 9.3, pt is c/o double vision and lightheadedness upon standing x1. Lightheadedness improved with fluids per pt.

## 2022-04-04 NOTE — PROGRESS NOTES
OBGYN Progress Note    Notified by RN of elevated mag level. In to see pt.    S: Feeling better now that the magnesium is off. Had noticed double vision since the mag was started early this morning, but then this afternoon was feeling very weak, lightheaded, flushed. These symptoms are currently improving. Endorses a mild HA. Otherwise doing ok. No CP, SOB, RUQ pain.    O:  Patient Vitals for the past 12 hrs:   BP Temp Temp src Pulse Resp SpO2   22 1742 121/82 97.9  F (36.6  C) Oral 64 18 --   22 1342 110/72 97.9  F (36.6  C) Oral 70 18 95 %   22 0940 131/86 98.8  F (37.1  C) Oral 71 18 98 %     Gen: NAD, sitting up in bed  CV: RRR, no murmurs   Pulm: CTAB, no wheezes   Abd: Soft, non-tender to palpation   Ext: trace LE edema, 2+ patellar reflexes b/l    Labs:   Results for EJ RODRIGUEZ (MRN [id number]) as of 2022 18:33   2022 07:27 2022 13:59   Creatinine 1.04 1.06 (H)   GFR Estimate 72 70   Magnesium 7.5 (HH) 9.3 (HH)       A/P:  Gabriele Rodriguez is a 36 year old  on PPD#10 s/p RLTCS now HD#2 re-admitted for SI Pre-E w/ Severe Features.    # Superimposed Pre-eclampsia w/ SF  - Serial BP monitoring  - IV Antihypertensives prn for sustained severe range blood pressures (>160/>110)  - PTA labetalol 200 BID. Pressures normal now, will continue this dose. Can increase to 400mg BID as needed.   - Labs: Cr 1.07>>1.06, o/w wnl, UPC 0.63 (3/25).  - Daily weights, strict I&Os  - Magnesium: s/p 4g loading dose > 2g per hour > last mag level supratherapeutic at 9.3. Mag turned off immediately. Will continue q6h labs, consider restarting mag at reduced dose if level falls below 4, otherwise will keep it paused.     # Postpartum/Postop  - Pain: PRN tylenol (holding NSAIDS in the setting of elevated Cr)  - GI: PRN senna  - PPx: Encourage ambulation, IS, SCDs while confined to bed  - breast pump available    Khushbu Cleveland MD  ObGyn, PGY-2  22 6:35 PM

## 2022-04-05 ENCOUNTER — HOSPITAL ENCOUNTER (INPATIENT)
Facility: CLINIC | Age: 37
End: 2022-04-05
Admitting: OBSTETRICS & GYNECOLOGY
Payer: COMMERCIAL

## 2022-04-05 VITALS
OXYGEN SATURATION: 98 % | DIASTOLIC BLOOD PRESSURE: 87 MMHG | WEIGHT: 176 LBS | SYSTOLIC BLOOD PRESSURE: 136 MMHG | BODY MASS INDEX: 31.18 KG/M2 | HEART RATE: 61 BPM | RESPIRATION RATE: 16 BRPM | TEMPERATURE: 98.3 F

## 2022-04-05 LAB
CREAT SERPL-MCNC: 1.07 MG/DL (ref 0.52–1.04)
CREAT SERPL-MCNC: 1.11 MG/DL (ref 0.52–1.04)
CREAT SERPL-MCNC: 1.14 MG/DL (ref 0.52–1.04)
GFR SERPL CREATININE-BSD FRML MDRD: 64 ML/MIN/1.73M2
GFR SERPL CREATININE-BSD FRML MDRD: 67 ML/MIN/1.73M2
GFR SERPL CREATININE-BSD FRML MDRD: 70 ML/MIN/1.73M2
MAGNESIUM SERPL-MCNC: 3.8 MG/DL (ref 1.6–2.3)
MAGNESIUM SERPL-MCNC: 4 MG/DL (ref 1.6–2.3)
MAGNESIUM SERPL-MCNC: 5 MG/DL (ref 1.6–2.3)

## 2022-04-05 PROCEDURE — 82565 ASSAY OF CREATININE: CPT | Performed by: STUDENT IN AN ORGANIZED HEALTH CARE EDUCATION/TRAINING PROGRAM

## 2022-04-05 PROCEDURE — 83735 ASSAY OF MAGNESIUM: CPT | Performed by: STUDENT IN AN ORGANIZED HEALTH CARE EDUCATION/TRAINING PROGRAM

## 2022-04-05 PROCEDURE — 250N000013 HC RX MED GY IP 250 OP 250 PS 637

## 2022-04-05 PROCEDURE — 250N000013 HC RX MED GY IP 250 OP 250 PS 637: Performed by: OBSTETRICS & GYNECOLOGY

## 2022-04-05 PROCEDURE — 36415 COLL VENOUS BLD VENIPUNCTURE: CPT | Performed by: STUDENT IN AN ORGANIZED HEALTH CARE EDUCATION/TRAINING PROGRAM

## 2022-04-05 PROCEDURE — 250N000013 HC RX MED GY IP 250 OP 250 PS 637: Performed by: STUDENT IN AN ORGANIZED HEALTH CARE EDUCATION/TRAINING PROGRAM

## 2022-04-05 RX ORDER — HYDROCHLOROTHIAZIDE 25 MG/1
25 TABLET ORAL DAILY
Qty: 10 TABLET | Refills: 0 | Status: SHIPPED | OUTPATIENT
Start: 2022-04-05 | End: 2022-04-29

## 2022-04-05 RX ORDER — HYDROCHLOROTHIAZIDE 25 MG/1
25 TABLET ORAL DAILY
Status: DISCONTINUED | OUTPATIENT
Start: 2022-04-05 | End: 2022-04-05 | Stop reason: HOSPADM

## 2022-04-05 RX ORDER — DOCUSATE SODIUM 100 MG/1
100 CAPSULE, LIQUID FILLED ORAL 2 TIMES DAILY PRN
Status: DISCONTINUED | OUTPATIENT
Start: 2022-04-05 | End: 2022-04-05 | Stop reason: HOSPADM

## 2022-04-05 RX ADMIN — ACETAMINOPHEN 650 MG: 325 TABLET, FILM COATED ORAL at 06:14

## 2022-04-05 RX ADMIN — HYDROCHLOROTHIAZIDE 25 MG: 25 TABLET ORAL at 10:31

## 2022-04-05 RX ADMIN — LABETALOL HYDROCHLORIDE 200 MG: 200 TABLET, FILM COATED ORAL at 07:42

## 2022-04-05 RX ADMIN — DOCUSATE SODIUM 100 MG: 100 CAPSULE, LIQUID FILLED ORAL at 09:40

## 2022-04-05 NOTE — PROVIDER NOTIFICATION
04/05/22 0811   Provider Notification   Provider Name/Title OB/GYN resident   Method of Notification Electronic Page   Request Evaluate-Remote   Notification Reason Other  (7121 - AP Pt would like to restart Colace. Please order when)   7121 - AP Pt would like to restart Colace. Please order when able and pt would like to discuss Creatine lvl and your thoughts. Thanks.

## 2022-04-05 NOTE — PROGRESS NOTES
MELISSA Progress Note    S: Feeling so much better now that the magnesium is off. Headache and double vision improved.  No CP, SOB, RUQ pain. Pain well controlled, no bleeding, ambulating, tolerating PO.     O:  Patient Vitals for the past 12 hrs:   BP Temp Temp src Pulse Resp SpO2 Weight   22 0742 130/87 98.3  F (36.8  C) Oral 64 16 98 % --   22 0446 -- -- -- -- -- -- 79.8 kg (176 lb)   22 0354 119/81 98.1  F (36.7  C) Oral 82 16 -- --   22 2345 118/79 98  F (36.7  C) Oral 63 16 99 % --     Gen: NAD, sitting up in bed  CV: RRR, no murmurs   Pulm: CTAB, no wheezes   Abd: Soft, non-tender to palpation. Uterus well below umbilicus.    Ext: trace LE edema, 2+ patellar reflexes b/l    Labs:   Results for EJ RODRIGUEZ (MRN [id number]) as of 2022 18:33   2022 07:27 2022 13:59   Creatinine 1.04 1.06 (H)   GFR Estimate 72 70   Magnesium 7.5 (HH) 9.3 (HH)       A/P:  Gabriele Rodriguez is a 36 year old  on PPD#12 s/p RLTCS now HD#3 re-admitted for SI Pre-E w/ Severe Features.    # Superimposed Pre-eclampsia w/ SF  - Serial BP monitoring  - IV Antihypertensives prn for sustained severe range blood pressures (>160/>110)  - PTA labetalol 200 BID. Pressures normal now, will continue this dose. Can increase to 400mg BID as needed.   - Labs: Cr 1.07>>1.06 > 1.11 > pending this AM, o/w wnl  - Daily weights, strict I&Os  - S/p magnesium     # Postpartum/Postop  - Pain: PRN tylenol (holding NSAIDS in the setting of elevated Cr)  - GI: PRN senna  - PPx: Encourage ambulation, IS, SCDs while confined to bed  - breast pump available    Appropriate for discharge today if Cr improving. Pending at this time.     Rob Staton MD  Obstetrics, Gynecology, and Women's Health  PGY2  8:22 AM 2022    /87   Pulse 64   Temp 98.3  F (36.8  C) (Oral)   Resp 16   Wt 79.8 kg (176 lb)   LMP 2021   SpO2 98%   BMI 31.18 kg/m       The patient was seen and examined by me separately from  the team.  I have reviewed and agree with the above note.  She is feeling well this morning, much better off the magnesium, hoping to go home today.  Her Cr. This am is slightly increased from midnight value at 1.14.  Discussed starting hydrochlorothiazide 25 mg to augment her labetalol and assist with diuresis.  Offered repeat Cr this evening and late discharge vs repeat Cr tomorrow evening or Thursday morning before her 1030 am visit with me.  She feels comfortable with discharge today and will follow up in clinic with a pre-visit lab draw.  Encouraged hydration and to limit NSAIDs as much as possible until then.      Barbara August MD, FACOG

## 2022-04-05 NOTE — PLAN OF CARE
Afebrile. VSS, denies pain. PreE symptoms have resolved. LS clear on RA. Good PO intake, good appetite. Incision site is open to air and healing well. Reflexed +1, Clonus absent. Voiding independently with Good UOP, passing gas. Taking tylenol as needed, IBU held d/t creatine level. Bonding well with infant in room with spouce. Patient is breast feeding and/or pumping every 2-4 hours. Plan to follow up in clinic as scheduled on Thursday for BP check and will start a low dose of hydrochlorothiazide for 7 days. Hourly monitoring completed. Discharge education completed. Discharged at 1146.

## 2022-04-05 NOTE — PROVIDER NOTIFICATION
04/04/22 2247   Provider Notification   Provider Name/Title G2/Megha   Method of Notification Electronic Page   Request Evaluate-Remote   Notification Reason Lab Results

## 2022-04-05 NOTE — PLAN OF CARE
Vital signs within normal limits. Adequate I/o. Ambulatory. Pt was c/o weakness, lightheadedness and double vision, mag stopped at 1704 with critical result of 9.3. Slight HA, denies RUQ pain/discomfort and SOB. 2+ reflexes and no clonus. Will continue to monitor per plan.

## 2022-04-05 NOTE — PROVIDER NOTIFICATION
04/04/22 6262   Provider Notification   Provider Name/Title Dr. Cleveland   Method of Notification Phone   Request Evaluate-Remote   Notification Reason Other   Asked Dr. Cleveland if patient still needs IV LR at 125/hr. Dr. Cleveland states ok to saline lock.

## 2022-04-05 NOTE — PLAN OF CARE
Goal Outcome Evaluation:    Plan of Care Reviewed With: patient     Overall Patient Progress: improving    AFVSS. Patient reported slight headache at beginning of shift. Taking tylenol for HA, now resolved. Denies visual changes or epigastric pain at this time. Drinking fluids and voiding without difficulty. She ambulating in room and performing self cares independently. Baby at bedside with spouse. Patient breastfeeding and pumping for baby. Patient to have labs at 0630. Continue to provide support and education as needed. Continue present cares.

## 2022-04-05 NOTE — DISCHARGE INSTRUCTIONS
Eclampsia After Childbirth  Eclampsia is a condition that causes seizures in pregnancy. But it can also cause them during or after childbirth. Eclampsia is a medical emergency. After childbirth, your risk is highest in the week after delivery of your baby. It may occur within 1 to 2 days after delivery. But you are still at risk up to 6 weeks after you give birth.  How to say it  ee-CLAMP-see-   Who is at risk for eclampsia after childbirth?  It s most common in women who are in their teens or 20s, and in women over age 35. It can happen to a woman who had high blood pressure in pregnancy, preeclampsia, or HELLP syndrome. But in some cases, eclampsia can occur without any of these. If you had any of these, your healthcare team will watch your health after delivery very closely. You will be given medicine to lower your blood pressure. You may be given anti-seizure medicine such as magnesium sulfate. If you re breastfeeding, ask your provider about the safety of any medicines you re given.    What causes eclampsia after childbirth?  Healthcare providers aren t sure what exactly causes it. It may be related to how high blood pressure from pregnancy affects blood vessels in the brain.  Symptoms of eclampsia  In the hours before a seizure, you may have signs and symptoms such as:    High blood pressure    Headache    Blurry vision    Double vision    Other problems with vision    Confusion    Pain in the upper right part of the belly    Nausea and vomiting    Rhythmic twitching of your ankle when you flex your foot (ankle clonus)  During a seizure, you may have:    Sudden loss of consciousness    Stiffening of the body for about 1 minute    A blue tint to the skin    Jerking of the muscles of your body for 1 to 2 minutes (full-body seizure)    Froth and bloody spit coming out of your mouth    Biting of your tongue  After the seizure, you will sleep for a few minutes. You will wake up within 10 to 20 minutes. You may  have a headache, feel confused, and have vision changes. You may not remember what happened.  Diagnosing eclampsia after childbirth  You may be diagnosed with eclampsia after childbirth if you have a seizure but no history of a seizure disorder or signs of another cause. You may have tests to check for signs of another cause. These can include stroke, a growth (tumor), infection, low electrolytes, or other problem.  Treatment for eclampsia  If someone can help you during a seizure, they should roll you on your left side. This helps prevent breathing problems during the seizure. In the hospital, you will be given anti-seizure medicine to help prevent more seizures. This medicine is sent into a vein in your arm or hand (IV). Or it may be given as shots in your muscles. You may also be given medicine for high blood pressure for 3 weeks or longer.  Possible complications of eclampsia  Eclampsia may cause a blood clot in the brain (stroke). It may cause bleeding in the brain (hemorrhage). You may need to be put on a breathing machine (ventilator) or need a blood transfusion. Eclampsia may lead to heart failure or kidney failure. In some cases, it can lead to death.  You may have high blood pressure ongoing after your pregnancy. You may also have a higher risk of heart disease, diabetes, and problems with blood vessels in the brain in the future.  In a future pregnancy, you are still at risk for eclampsia. Your healthcare team will watch your health closely, especially if you have preeclampsia. You are also at higher risk for:    The placenta detaching from the uterus (placental abruption)     birth    Intrauterine growth restriction (IUGR)    Stillbirth  When to call your healthcare provider  Call your healthcare provider if you have any of the following:    Fever of 100.4 F (38 C) or higher, or as advised    Pain that gets worse    Symptoms that don t get better, or get worse    New symptoms   [employer] last  reviewed this educational content on 8/1/2020 2000-2021 [employer]. All rights reserved. This information is not intended as a substitute for professional medical care. Always follow your healthcare professional's instructions.

## 2022-04-06 ENCOUNTER — LAB (OUTPATIENT)
Dept: LAB | Facility: CLINIC | Age: 37
End: 2022-04-06
Payer: COMMERCIAL

## 2022-04-06 LAB
ALT SERPL W P-5'-P-CCNC: 20 U/L (ref 0–50)
AST SERPL W P-5'-P-CCNC: 17 U/L (ref 0–45)
CREAT SERPL-MCNC: 1.21 MG/DL (ref 0.52–1.04)
ERYTHROCYTE [DISTWIDTH] IN BLOOD BY AUTOMATED COUNT: 11.9 % (ref 10–15)
GFR SERPL CREATININE-BSD FRML MDRD: 60 ML/MIN/1.73M2
HCT VFR BLD AUTO: 41.9 % (ref 35–47)
HGB BLD-MCNC: 13.5 G/DL (ref 11.7–15.7)
MCH RBC QN AUTO: 29.1 PG (ref 26.5–33)
MCHC RBC AUTO-ENTMCNC: 32.2 G/DL (ref 31.5–36.5)
MCV RBC AUTO: 90 FL (ref 78–100)
PLATELET # BLD AUTO: 454 10E3/UL (ref 150–450)
RBC # BLD AUTO: 4.64 10E6/UL (ref 3.8–5.2)
WBC # BLD AUTO: 9.9 10E3/UL (ref 4–11)

## 2022-04-06 PROCEDURE — 36415 COLL VENOUS BLD VENIPUNCTURE: CPT

## 2022-04-06 PROCEDURE — 82565 ASSAY OF CREATININE: CPT

## 2022-04-06 PROCEDURE — 85027 COMPLETE CBC AUTOMATED: CPT

## 2022-04-06 PROCEDURE — 84450 TRANSFERASE (AST) (SGOT): CPT

## 2022-04-06 PROCEDURE — 84460 ALANINE AMINO (ALT) (SGPT): CPT

## 2022-04-07 ENCOUNTER — LAB (OUTPATIENT)
Dept: LAB | Facility: CLINIC | Age: 37
End: 2022-04-07
Attending: OBSTETRICS & GYNECOLOGY
Payer: COMMERCIAL

## 2022-04-07 ENCOUNTER — OFFICE VISIT (OUTPATIENT)
Dept: OBGYN | Facility: CLINIC | Age: 37
End: 2022-04-07
Attending: OBSTETRICS & GYNECOLOGY
Payer: COMMERCIAL

## 2022-04-07 VITALS
SYSTOLIC BLOOD PRESSURE: 133 MMHG | BODY MASS INDEX: 30.48 KG/M2 | WEIGHT: 172 LBS | HEART RATE: 88 BPM | DIASTOLIC BLOOD PRESSURE: 88 MMHG | HEIGHT: 63 IN

## 2022-04-07 DIAGNOSIS — O10.019: ICD-10-CM

## 2022-04-07 DIAGNOSIS — F43.22 ADJUSTMENT DISORDER WITH ANXIOUS MOOD: ICD-10-CM

## 2022-04-07 LAB
CREAT SERPL-MCNC: 1.19 MG/DL (ref 0.52–1.04)
CREAT UR-MCNC: 53 MG/DL
CREAT UR-MCNC: 53 MG/DL
FRACT EXCRET NA UR+SERPL-RTO: 1.2 %
GFR SERPL CREATININE-BSD FRML MDRD: 61 ML/MIN/1.73M2
PROT UR-MCNC: 0.09 G/L
PROT/CREAT 24H UR: 0.17 G/G CR (ref 0–0.2)
SODIUM SERPL-SCNC: 137 MMOL/L (ref 133–144)
SODIUM UR-SCNC: 71 MMOL/L

## 2022-04-07 PROCEDURE — 82565 ASSAY OF CREATININE: CPT

## 2022-04-07 PROCEDURE — 36415 COLL VENOUS BLD VENIPUNCTURE: CPT

## 2022-04-07 PROCEDURE — G0463 HOSPITAL OUTPT CLINIC VISIT: HCPCS

## 2022-04-07 PROCEDURE — 84295 ASSAY OF SERUM SODIUM: CPT | Performed by: OBSTETRICS & GYNECOLOGY

## 2022-04-07 PROCEDURE — 84156 ASSAY OF PROTEIN URINE: CPT | Performed by: OBSTETRICS & GYNECOLOGY

## 2022-04-07 PROCEDURE — 99212 OFFICE O/P EST SF 10 MIN: CPT | Mod: 24 | Performed by: OBSTETRICS & GYNECOLOGY

## 2022-04-07 PROCEDURE — 84300 ASSAY OF URINE SODIUM: CPT | Performed by: OBSTETRICS & GYNECOLOGY

## 2022-04-07 RX ORDER — LABETALOL 200 MG/1
400 TABLET, FILM COATED ORAL 2 TIMES DAILY
Qty: 360 TABLET | Refills: 0 | Status: SHIPPED | OUTPATIENT
Start: 2022-04-07 | End: 2022-05-10

## 2022-04-07 ASSESSMENT — ANXIETY QUESTIONNAIRES
3. WORRYING TOO MUCH ABOUT DIFFERENT THINGS: MORE THAN HALF THE DAYS
2. NOT BEING ABLE TO STOP OR CONTROL WORRYING: MORE THAN HALF THE DAYS
GAD7 TOTAL SCORE: 13
6. BECOMING EASILY ANNOYED OR IRRITABLE: SEVERAL DAYS
5. BEING SO RESTLESS THAT IT IS HARD TO SIT STILL: SEVERAL DAYS
1. FEELING NERVOUS, ANXIOUS, OR ON EDGE: MORE THAN HALF THE DAYS
7. FEELING AFRAID AS IF SOMETHING AWFUL MIGHT HAPPEN: NEARLY EVERY DAY

## 2022-04-07 ASSESSMENT — PATIENT HEALTH QUESTIONNAIRE - PHQ9: 5. POOR APPETITE OR OVEREATING: MORE THAN HALF THE DAYS

## 2022-04-07 NOTE — PROGRESS NOTES
"S:  Pt is a 35 y/o now  s/p repeat c/s on 3/24 who presents today for a blood pressure check/lab follow up.  She has a history of chronic HTN and had a recent readmit from 4/3-4/5 for pre-eclampsia with severe features.  She is taking labetalol 200 mg BID and hydrochlorothiazide 25 mg that was started on 4/5.  Her Cr at discharge with slightly elevated, all other labs were normal.  She has not taken any NSAIDs since discharge this week.  At home her blood pressures have been in the  diastolic but she is overall feeling well.    O: /88 (BP Location: Left arm, Patient Position: Chair)   Pulse 88   Ht 1.6 m (5' 3\")   Wt 78 kg (172 lb)   LMP 06/28/2021   BMI 30.47 kg/m      gen-pleasant, NAD    Cr 1.21 4/6, increased from 1.14 the day prior    A:  35 y/o  14 days s/p repeat c/s complicated by recent re-admission for superimposed pre-eclampsia with severe features.  Her blood pressure today is well controlled and she is asymptomatic.  Her elevated Cr has been causing her anxiety to worsen-she is sleeping well currently but when she is awake is noticing more symptoms. She does have a h/o depression and has been on medication in the past.      P:  Likely her mild elevation in Cr is medication related.  Discussed stopping the hydrochlorothiazide but she feels like it is helping her edema and BP.  She would like to increase her labetalol given her BP readings at home on a manual cuff-will ncrease to 300 mg BID.  Will repeat cr, FENA and urine prot/creat today.  She has seen a nephrologist in the past with some transient proteinuria and will message their team.  Offered referral to Dr. Mcclain-she was open to this.  Offered Rx for Zoloft-she would like the prescription sent, not sure if she will start it right away or wait for visit with Dr. Mcclain.  I will contact her later today with her lab results.      Barbara August MD, FACOG      "

## 2022-04-07 NOTE — LETTER
"4/7/2022     RE: Gabriele Rodriguez  [address] [id number]     Dear Colleague,    Thank you for referring your patient, Gabriele Rodriguez, to the [facility] [place] [facility]. Please see a copy of my visit note below.    S:  Pt is a 35 y/o now  s/p repeat c/s on 3/24 who presents today for a blood pressure check/lab follow up.  She has a history of chronic HTN and had a recent readmit from 4/3-4/5 for pre-eclampsia with severe features.  She is taking labetalol 200 mg BID and hydrochlorothiazide 25 mg that was started on 4/5.  Her Cr at discharge with slightly elevated, all other labs were normal.  She has not taken any NSAIDs since discharge this week.  At home her blood pressures have been in the  diastolic but she is overall feeling well.    O: /88 (BP Location: Left arm, Patient Position: Chair)   Pulse 88   Ht 1.6 m (5' 3\")   Wt 78 kg (172 lb)   LMP 06/28/2021   BMI 30.47 kg/m      gen-pleasant, NAD    Cr 1.21 4/6, increased from 1.14 the day prior    A:  35 y/o  14 days s/p repeat c/s complicated by recent re-admission for superimposed pre-eclampsia with severe features.  Her blood pressure today is well controlled and she is asymptomatic.  Her elevated Cr has been causing her anxiety to worsen-she is sleeping well currently but when she is awake is noticing more symptoms. She does have a h/o depression and has been on medication in the past.      P:  Likely her mild elevation in Cr is medication related.  Discussed stopping the hydrochlorothiazide but she feels like it is helping her edema and BP.  She would like to increase her labetalol given her BP readings at home on a manual cuff-will ncrease to 300 mg BID.  Will repeat cr, FENA and urine prot/creat today.  She has seen a nephrologist in the past with some transient proteinuria and will message their team.  Offered referral to Dr." Johny-she was open to this.  Offered Rx for Zoloft-she would like the prescription sent, not sure if she will start it right away or wait for visit with Dr. Mcclain.  I will contact her later today with her lab results.    Barbara August MD, FACOG

## 2022-04-08 ASSESSMENT — ANXIETY QUESTIONNAIRES: GAD7 TOTAL SCORE: 13

## 2022-04-11 ENCOUNTER — LAB (OUTPATIENT)
Dept: LAB | Facility: CLINIC | Age: 37
End: 2022-04-11
Payer: COMMERCIAL

## 2022-04-11 DIAGNOSIS — O10.019: ICD-10-CM

## 2022-04-11 LAB
CREAT SERPL-MCNC: 1.2 MG/DL (ref 0.52–1.04)
GFR SERPL CREATININE-BSD FRML MDRD: 61 ML/MIN/1.73M2

## 2022-04-11 PROCEDURE — 82565 ASSAY OF CREATININE: CPT

## 2022-04-11 PROCEDURE — 36415 COLL VENOUS BLD VENIPUNCTURE: CPT

## 2022-04-12 ENCOUNTER — OFFICE VISIT (OUTPATIENT)
Dept: NEPHROLOGY | Facility: CLINIC | Age: 37
End: 2022-04-12
Attending: INTERNAL MEDICINE
Payer: COMMERCIAL

## 2022-04-12 VITALS
BODY MASS INDEX: 30.96 KG/M2 | SYSTOLIC BLOOD PRESSURE: 138 MMHG | HEART RATE: 70 BPM | WEIGHT: 174.8 LBS | OXYGEN SATURATION: 97 % | DIASTOLIC BLOOD PRESSURE: 90 MMHG

## 2022-04-12 DIAGNOSIS — I10 ESSENTIAL HYPERTENSION: ICD-10-CM

## 2022-04-12 DIAGNOSIS — N17.9 ACUTE KIDNEY INJURY (H): ICD-10-CM

## 2022-04-12 PROCEDURE — 99214 OFFICE O/P EST MOD 30 MIN: CPT | Performed by: INTERNAL MEDICINE

## 2022-04-12 RX ORDER — NIFEDIPINE 30 MG/1
30 TABLET, EXTENDED RELEASE ORAL DAILY
Qty: 90 TABLET | Refills: 3 | Status: SHIPPED | OUTPATIENT
Start: 2022-04-12 | End: 2023-09-18

## 2022-04-12 ASSESSMENT — PAIN SCALES - GENERAL: PAINLEVEL: NO PAIN (0)

## 2022-04-12 NOTE — PROGRESS NOTES
Nephrology Clinic    Gabriele Rodriguez MRN:[id number] YOB: 1985  Date of Service: 04/12/2022  Primary care provider: Shana Sharif  Requesting physician: Rob Nieves       REASON FOR CONSULT: Proteinuria and hypertension in pregnancy    HISTORY OF PRESENT ILLNESS:   Gabriele Rodriguez is a 34 year old female who presents for evaluation of proteinuria and hypertension in pregnancy.     Ms Rodriguez is currently 15 weeks pregnant, on her first pregnancy.  She reports feeling fair other than for some am nausea, without vomiting.  The nausea is is getting better.  She has had HTN for at least 4 years, and was first diagnosed during Med School. She does not recall exactly what work up for HTN she has had while she was at [facility]. She does not recall undergoing a renal ultrasound.  (She does not currently have her med records).  She was initially treated with metoprolol, then switched to nifedipine which was stopped because of L Ext swelling.  She was then switched to lisinopril + hydrochlorothiazide.  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 She has since been switched to labetalol.  On review of systems, she reports occasional mild epistaxis, but the ROS is otherwise negative for symptoms of systemic disease.  She has had HgbA1c testing in the past which were reportedly normal    Interestingly, the first UPCR was obtained after she drinking a large volume of water.      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  She is now 15w3d into her pregnancy. Reports that morning sickness is resolved.   She is not checking her BP at home.  Her last check was on 6/9/2020.  Reports no acute complaints.     October 1, 2020  Qi is now 29w3d into her first pregnancy.   She has been feeling well. Sleeping OK, although is feeling a bit tired sometimes.  Denies L Ext swelling.  No rash, oral ulcers, joint pains.  No SOB or CP  She had  glucosuria on the last UA, but she gave the urine sample shortly after taking the dose of Glucose for the GTT.    2022  Dr Rodriguez's first pregnancy in  was carried to 37 w, and she underwent an emergent c section then.  Unfortunately the baby had a complicated course with severe brain damage and  within the first 2 weeks of life.   Dr Rodriguez is now s/p delivery with repeat c/s on 3/24, with a readmission from 4/3- for pre-eclampsia with severe features.  She is seen today urgently because of significant loss of GFR.  The  and delivery at 37 weeks were elective.  She delivered a healthy baby girl, named Ceci, who is thriving and growing well.    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  She noted significant increase in BP to 150->170/100->110 on 4/3 which prompted the  Admission.  She had some HA, but in retrospect, feels that this was relatively mild and probably related to sinus pain.  On admission, she was treated with IV magnesium and labetalol.   She was prescribed hydrochlorothiazide upon discharge, but this was stopped after 2-3 days because of a small rise in Cr.  The labetalol was then increased to 300 mg bid.  Her BP was under acceptable control on 22 at 133/88.  She states that her BP at home is generally around 145/95 in the evening.  On ROS,   She denies HA, Blurred vision. NO SOB, Cough, WATERS, CP, GI symptoms.  She feels that she has no swelling and is back to her baseline from that stand point.   She is getting some rest.  Her  has returned to work.  Her mother is at home helping with the baby.  She is breastfeeding, through a combination of pumping and breastfeeding.        PAST MEDICAL HISTORY:  Reviewed with the patient  Past Medical History:   Diagnosis Date     ADHD      Anxiety      Chronic sinusitis      Depressive disorder     also anxiety, on and off since high school, has tried prozac and  lexapro, wellbutrin, now off meds, last on meds 2018     History of  section      History of  death      Hypertension     diagnosed in med school, 2016     Varicella     as a child     PAST SURGICAL HISTORY:  Past Surgical History:   Procedure Laterality Date     BIOPSY NAIL (LOCATION)      left hand, 4th digit      SECTION N/A 2020    Procedure:  SECTION;  Surgeon: Jojo Vaz MD;  Location: [facility] L+[facility]      SECTION N/A 3/24/2022    Procedure: REPEAT  SECTION;  Surgeon: Giovanna Osorio MD;  Location: [facility] L[facility]     MEDICATIONS:    Current Outpatient Medications   Medication Sig Dispense Refill     acetaminophen (TYLENOL) 325 MG tablet Take 2 tablets (650 mg) by mouth every 6 hours as needed for mild pain Start after Delivery. (Patient not taking: Reported on 2022) 100 tablet 0     famotidine (PEPCID) 10 MG tablet Take 10 mg by mouth At Bedtime  (Patient not taking: Reported on 2022)              labetalol (NORMODYNE) 200 MG tablet Take 1 tablet (200 mg) by mouth 2 times daily 180 tablet 3     Prenatal Vit-Fe Fumarate-FA (PRENATAL PO)  (Patient not taking: Reported on 2022)       senna-docusate (SENOKOT-S/PERICOLACE) 8.6-50 MG tablet Take 1 tablet by mouth daily Start after delivery. 100 tablet 0     sertraline (ZOLOFT) 50 MG tablet Take 1 tablet (50 mg) by mouth daily 90 tablet 1     vitamin D3 (CHOLECALCIFEROL) 50 mcg (2000 units) tablet Take 1 tablet by mouth daily (Patient not taking: Reported on 2022)       ALLERGIES:    Allergies   Allergen Reactions     Nickel Itching     REVIEW OF SYSTEMS:  Review Of Systems  A comprehensive review of systems was performed and found to be negative except as described here or above.  SOCIAL HISTORY:   Social History     Socioeconomic History     Marital status:      Spouse name: Pedro     Number of children: Not on file     Years of education: Not on file     Highest education level: Not on file    Occupational History     Occupation: Medical resident     Comment: pathology   Tobacco Use     Smoking status: Never Smoker     Smokeless tobacco: Never Used   Substance and Sexual Activity     Alcohol use: Not Currently     Comment: Not while pregnant     Drug use: Never     Sexual activity: Yes     Partners: Male     Birth control/protection: None   Other Topics Concern     Parent/sibling w/ CABG, MI or angioplasty before 65F 55M? 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         demise 2020        Chief Resident      Social Determinants of Health     Financial Resource Strain: Not on file   Food Insecurity: Not on file   Transportation Needs: Not on file   Physical Activity: Not on file   Stress: Not on file   Social Connections: Not on file   Intimate Partner Violence: Not on file   Housing Stability: Not on file     FAMILY MEDICAL HISTORY:   Family History   Problem Relation Age of Onset     Hypertension Mother      Breast Cancer Mother         ductal stage 2, HER-2 negative, (+) estrogen/progesterone, BRCA negative     Skin Cancer Mother         basal cell     No Known Problems Father      No Known Problems Sister      Pancreatic Cancer Maternal Grandfather      Brain Cancer Paternal Aunt      Melanoma No family hx of      PHYSICAL EXAM:   BP (!) 138/90   Pulse 70   Wt 79.3 kg (174 lb 12.8 oz)   LMP 2021   SpO2 97%   BMI 30.96 kg/m     BP (!) 138/90   Pulse 70   Wt 79.3 kg (174 lb 12.8 oz)   LMP 2021   SpO2 97%   BMI 30.96 kg/m      GENERAL APPEARANCE: alert and no distress  EYES: nonicteric  OP mucosa pink without lesions  RESP: Lungs clear bilat  COR: RRR, no r/g/m  Abd: S, NT  Ext: no edema   SKIN: no facial rash  NEURO: mentation intact and speech normal.  Gait normal  PSYCH: affect normal/bright   LABS:   Recent Results (from the past 1008 hour(s))   CBC with Platelets    Collection Time: 03/10/22 11:56 AM   Result Value Ref Range    WBC Count  14.0 (H) 4.0 - 11.0 10e3/uL    RBC Count 3.84 3.80 - 5.20 10e6/uL    Hemoglobin 11.4 (L) 11.7 - 15.7 g/dL    Hematocrit 34.7 (L) 35.0 - 47.0 %    MCV 90 78 - 100 fL    MCH 29.7 26.5 - 33.0 pg    MCHC 32.9 31.5 - 36.5 g/dL    RDW 12.0 10.0 - 15.0 %    Platelet Count 249 150 - 450 10e3/uL   Group B strep PCR    Collection Time: 03/15/22 10:54 AM    Specimen: Rectovaginal; Swab   Result Value Ref Range    Group B Strep PCR Negative Negative   Asymptomatic COVID-19 Virus (Coronavirus) by PCR Nose    Collection Time: 03/21/22 10:37 AM    Specimen: Nose; Swab   Result Value Ref Range    SARS CoV2 PCR Negative Negative, Testing sent to reference lab. Results will be returned via unsolicited result   CBC with platelets    Collection Time: 03/23/22 10:06 AM   Result Value Ref Range    WBC Count 13.4 (H) 4.0 - 11.0 10e3/uL    RBC Count 3.78 (L) 3.80 - 5.20 10e6/uL    Hemoglobin 11.2 (L) 11.7 - 15.7 g/dL    Hematocrit 34.5 (L) 35.0 - 47.0 %    MCV 91 78 - 100 fL    MCH 29.6 26.5 - 33.0 pg    MCHC 32.5 31.5 - 36.5 g/dL    RDW 12.4 10.0 - 15.0 %    Platelet Count 251 150 - 450 10e3/uL   Adult Type and Screen    Collection Time: 03/23/22 10:06 AM   Result Value Ref Range    ABO/RH(D) O POS     Antibody Screen Negative Negative    SPECIMEN EXPIRATION DATE [id number]    CBC with platelets    Collection Time: 03/24/22  5:42 PM   Result Value Ref Range    WBC Count 19.0 (H) 4.0 - 11.0 10e3/uL    RBC Count 3.73 (L) 3.80 - 5.20 10e6/uL    Hemoglobin 11.2 (L) 11.7 - 15.7 g/dL    Hematocrit 34.0 (L) 35.0 - 47.0 %    MCV 91 78 - 100 fL    MCH 30.0 26.5 - 33.0 pg    MCHC 32.9 31.5 - 36.5 g/dL    RDW 11.9 10.0 - 15.0 %    Platelet Count 237 150 - 450 10e3/uL   AST    Collection Time: 03/24/22  5:42 PM   Result Value Ref Range    AST 13 0 - 45 U/L   ALT    Collection Time: 03/24/22  5:42 PM   Result Value Ref Range    ALT <6 0 - 50 U/L   Creatinine    Collection Time: 03/24/22  5:42 PM   Result Value Ref Range    Creatinine 0.50 (L) 0.52  - 1.04 mg/dL    GFR Estimate >90 >60 mL/min/1.73m2   Protein  random urine    Collection Time: 03/25/22  2:24 AM   Result Value Ref Range    Total Protein Random Urine g/L 0.34 g/L    Total Protein Urine g/gr Creatinine 0.63 (H) 0.00 - 0.20 g/g Cr    Creatinine Urine mg/dL 54 mg/dL   Hemoglobin    Collection Time: 03/25/22  6:44 AM   Result Value Ref Range    Hemoglobin 10.5 (L) 11.7 - 15.7 g/dL   ALT    Collection Time: 03/25/22  6:44 AM   Result Value Ref Range    ALT 7 0 - 50 U/L   AST    Collection Time: 03/25/22  6:44 AM   Result Value Ref Range    AST 19 0 - 45 U/L   Creatinine    Collection Time: 03/25/22  6:44 AM   Result Value Ref Range    Creatinine 0.60 0.52 - 1.04 mg/dL    GFR Estimate >90 >60 mL/min/1.73m2   Platelet count    Collection Time: 03/25/22  6:44 AM   Result Value Ref Range    Platelet Count 237 150 - 450 10e3/uL   INR    Collection Time: 04/03/22 11:29 PM   Result Value Ref Range    INR 0.96 0.86 - 1.14   Partial thromboplastin time    Collection Time: 04/03/22 11:29 PM   Result Value Ref Range    aPTT 34 22 - 38 Seconds   Comprehensive metabolic panel    Collection Time: 04/03/22 11:29 PM   Result Value Ref Range    Sodium 141 133 - 144 mmol/L    Potassium 4.0 3.4 - 5.3 mmol/L    Chloride 111 (H) 94 - 109 mmol/L    Carbon Dioxide (CO2) 21 20 - 32 mmol/L    Anion Gap 9 3 - 14 mmol/L    Urea Nitrogen 19 7 - 30 mg/dL    Creatinine 1.07 (H) 0.52 - 1.04 mg/dL    Calcium 8.4 (L) 8.5 - 10.1 mg/dL    Glucose 94 70 - 99 mg/dL    Alkaline Phosphatase 90 40 - 150 U/L    AST 19 0 - 45 U/L    ALT 16 0 - 50 U/L    Protein Total 6.5 (L) 6.8 - 8.8 g/dL    Albumin 3.0 (L) 3.4 - 5.0 g/dL    Bilirubin Total 0.2 0.2 - 1.3 mg/dL    GFR Estimate 70 >60 mL/min/1.73m2   CBC with platelets and differential    Collection Time: 04/03/22 11:29 PM   Result Value Ref Range    WBC Count 8.6 4.0 - 11.0 10e3/uL    RBC Count 3.81 3.80 - 5.20 10e6/uL    Hemoglobin 11.3 (L) 11.7 - 15.7 g/dL    Hematocrit 34.4 (L) 35.0 - 47.0  %    MCV 90 78 - 100 fL    MCH 29.7 26.5 - 33.0 pg    MCHC 32.8 31.5 - 36.5 g/dL    RDW 11.9 10.0 - 15.0 %    Platelet Count 359 150 - 450 10e3/uL    % Neutrophils 52 %    % Lymphocytes 39 %    % Monocytes 5 %    % Eosinophils 2 %    % Basophils 1 %    % Immature Granulocytes 1 %    NRBCs per 100 WBC 0 <1 /100    Absolute Neutrophils 4.5 1.6 - 8.3 10e3/uL    Absolute Lymphocytes 3.3 0.8 - 5.3 10e3/uL    Absolute Monocytes 0.5 0.0 - 1.3 10e3/uL    Absolute Eosinophils 0.2 0.0 - 0.7 10e3/uL    Absolute Basophils 0.0 0.0 - 0.2 10e3/uL    Absolute Immature Granulocytes 0.0 <=0.4 10e3/uL    Absolute NRBCs 0.0 10e3/uL   Adult Type and Screen    Collection Time: 04/03/22 11:56 PM   Result Value Ref Range    ABO/RH(D) O POS     Antibody Screen Negative Negative    SPECIMEN EXPIRATION DATE [id number]    Protein  random urine    Collection Time: 04/04/22  1:13 AM   Result Value Ref Range    Total Protein Random Urine g/L <0.05 g/L    Total Protein Urine g/gr Creatinine      Creatinine Urine mg/dL 20 mg/dL   UA reflex to Microscopic    Collection Time: 04/04/22  1:13 AM   Result Value Ref Range    Color Urine Straw Colorless, Straw, Light Yellow, Yellow    Appearance Urine Slightly Cloudy (A) Clear    Glucose Urine Negative Negative mg/dL    Bilirubin Urine Negative Negative    Ketones Urine Negative Negative mg/dL    Specific Gravity Urine 1.007 1.003 - 1.035    Blood Urine Negative Negative    pH Urine 6.5 5.0 - 7.0    Protein Albumin Urine Negative Negative mg/dL    Urobilinogen Urine Normal Normal, 2.0 mg/dL    Nitrite Urine Negative Negative    Leukocyte Esterase Urine Moderate (A) Negative    Bacteria Urine Moderate (A) None Seen /HPF    RBC Urine 3 (H) <=2 /HPF    WBC Urine 16 (H) <=5 /HPF    Squamous Epithelials Urine 14 (H) <=1 /HPF    Transitional Epithelials Urine <1 <=1 /HPF   Asymptomatic COVID-19 Virus (Coronavirus) by PCR Nasopharyngeal    Collection Time: 04/04/22  1:13 AM    Specimen: Nasopharyngeal; Swab    Result Value Ref Range    SARS CoV2 PCR Negative Negative   Magnesium    Collection Time: 04/04/22  7:27 AM   Result Value Ref Range    Magnesium 7.5 (HH) 1.6 - 2.3 mg/dL   Creatinine    Collection Time: 04/04/22  7:27 AM   Result Value Ref Range    Creatinine 1.04 0.52 - 1.04 mg/dL    GFR Estimate 72 >60 mL/min/1.73m2   Extra Purple Top Tube    Collection Time: 04/04/22  7:27 AM   Result Value Ref Range    Hold Specimen JIC    Magnesium    Collection Time: 04/04/22  1:59 PM   Result Value Ref Range    Magnesium 9.3 (HH) 1.6 - 2.3 mg/dL   Creatinine    Collection Time: 04/04/22  1:59 PM   Result Value Ref Range    Creatinine 1.06 (H) 0.52 - 1.04 mg/dL    GFR Estimate 70 >60 mL/min/1.73m2   Creatinine    Collection Time: 04/04/22  8:18 PM   Result Value Ref Range    Creatinine 1.07 (H) 0.52 - 1.04 mg/dL    GFR Estimate 70 >60 mL/min/1.73m2   Magnesium    Collection Time: 04/04/22  8:18 PM   Result Value Ref Range    Magnesium 6.6 (H) 1.6 - 2.3 mg/dL   Creatinine    Collection Time: 04/05/22 12:30 AM   Result Value Ref Range    Creatinine 1.11 (H) 0.52 - 1.04 mg/dL    GFR Estimate 67 >60 mL/min/1.73m2   Magnesium    Collection Time: 04/05/22 12:30 AM   Result Value Ref Range    Magnesium 5.0 (H) 1.6 - 2.3 mg/dL   Creatinine    Collection Time: 04/05/22  7:50 AM   Result Value Ref Range    Creatinine 1.14 (H) 0.52 - 1.04 mg/dL    GFR Estimate 64 >60 mL/min/1.73m2   Magnesium    Collection Time: 04/05/22  7:50 AM   Result Value Ref Range    Magnesium 4.0 (H) 1.6 - 2.3 mg/dL   Magnesium    Collection Time: 04/05/22 12:08 PM   Result Value Ref Range    Magnesium 3.8 (H) 1.6 - 2.3 mg/dL   Creatinine    Collection Time: 04/05/22 12:08 PM   Result Value Ref Range    Creatinine 1.07 (H) 0.52 - 1.04 mg/dL    GFR Estimate 70 >60 mL/min/1.73m2   CBC with Platelets    Collection Time: 04/06/22  3:28 PM   Result Value Ref Range    WBC Count 9.9 4.0 - 11.0 10e3/uL    RBC Count 4.64 3.80 - 5.20 10e6/uL    Hemoglobin 13.5 11.7 -  15.7 g/dL    Hematocrit 41.9 35.0 - 47.0 %    MCV 90 78 - 100 fL    MCH 29.1 26.5 - 33.0 pg    MCHC 32.2 31.5 - 36.5 g/dL    RDW 11.9 10.0 - 15.0 %    Platelet Count 454 (H) 150 - 450 10e3/uL   AST    Collection Time: 04/06/22  3:28 PM   Result Value Ref Range    AST 17 0 - 45 U/L   ALT    Collection Time: 04/06/22  3:28 PM   Result Value Ref Range    ALT 20 0 - 50 U/L   Creatinine    Collection Time: 04/06/22  3:28 PM   Result Value Ref Range    Creatinine 1.21 (H) 0.52 - 1.04 mg/dL    GFR Estimate 60 (L) >60 mL/min/1.73m2   Creatinine    Collection Time: 04/07/22 11:12 AM   Result Value Ref Range    Creatinine 1.19 (H) 0.52 - 1.04 mg/dL    GFR Estimate 61 >60 mL/min/1.73m2   Sodium    Collection Time: 04/07/22 11:12 AM   Result Value Ref Range    Sodium 137 133 - 144 mmol/L   Protein  random urine    Collection Time: 04/07/22 11:15 AM   Result Value Ref Range    Total Protein Random Urine g/L 0.09 g/L    Total Protein Urine g/gr Creatinine 0.17 0.00 - 0.20 g/g Cr    Creatinine Urine mg/dL 53 mg/dL   Fractional Excretion of Sodium    Collection Time: 04/07/22 11:15 AM   Result Value Ref Range    %FENA 1.2 %    Sodium Urine mmol/L 71 mmol/L    Creatinine Urine mg/dL 53 mg/dL   Creatinine    Collection Time: 04/11/22  1:25 PM   Result Value Ref Range    Creatinine 1.20 (H) 0.52 - 1.04 mg/dL    GFR Estimate 61 >60 mL/min/1.73m2     CMP  Recent Labs   Lab Test 04/11/22  1325 04/07/22  1112 04/06/22  1528 04/05/22  1208 04/05/22  0750 04/05/22  0030 04/04/22  2018 04/04/22  0727 04/03/22  2329 03/25/22  0644 03/24/22  1742 09/22/21  0920 11/24/20  1830 09/23/20  0850 05/13/20  1632 07/23/19  1509 09/14/18  1609   NA  --  137  --   --   --   --   --   --  141  --   --   --   --  137  --  137 140   POTASSIUM  --   --   --   --   --   --   --   --  4.0  --   --   --   --  3.4  --  3.6 3.8   CHLORIDE  --   --   --   --   --   --   --   --  111*  --   --   --   --  107  --  104 108   CO2  --   --   --   --   --   --   --    --  21  --   --   --   --  21  --  27 24   ANIONGAP  --   --   --   --   --   --   --   --  9  --   --   --   --  9  --  5 9   GLC  --   --   --   --   --   --   --   --  94  --   --   --   --  160*  --  85 104*   BUN  --   --   --   --   --   --   --   --  19  --   --   --   --  6*  --  10 14   CR 1.20* 1.19* 1.21* 1.07* 1.14* 1.11* 1.07*   < > 1.07* 0.60 0.50*   < > 0.48* 0.53 0.56 0.83 0.90   GFRESTIMATED 61 61 60* 70 64 67 70   < > 70 >90 >90   < > >90 >90 >90 >90 73   GFRESTBLACK  --   --   --   --   --   --   --   --   --   --   --   --  >90 >90 >90 >90 89   COLT  --   --   --   --   --   --   --   --  8.4*  --   --   --   --  8.4*  --  9.6 8.8   MAG  --   --   --  3.8* 4.0* 5.0* 6.6*   < >  --   --   --   --   --   --   --   --   --    PHOS  --   --   --   --   --   --   --   --   --   --   --   --   --  2.8  --   --   --    PROTTOTAL  --   --   --   --   --   --   --   --  6.5*  --   --   --   --   --   --  8.3 7.6   ALBUMIN  --   --   --   --   --   --   --   --  3.0*  --   --   --   --  2.7*  --  4.5 4.1   BILITOTAL  --   --   --   --   --   --   --   --  0.2  --   --   --   --   --   --  0.7 0.4   ALKPHOS  --   --   --   --   --   --   --   --  90  --   --   --   --   --   --  71 75   AST  --   --  17  --   --   --   --   --  19 19 13   < > 13  --  22 18 17   ALT  --   --  20  --   --   --   --   --  16 7 <6   < > 13  --  32 22 18    < > = values in this interval not displayed.     CBC  Recent Labs   Lab Test 04/06/22  1528 04/03/22  2329 03/25/22  0644 03/24/22  1742 03/23/22  1006   HGB 13.5 11.3* 10.5* 11.2* 11.2*   WBC 9.9 8.6  --  19.0* 13.4*   RBC 4.64 3.81  --  3.73* 3.78*   HCT 41.9 34.4*  --  34.0* 34.5*   MCV 90 90  --  91 91   MCH 29.1 29.7  --  30.0 29.6   MCHC 32.2 32.8  --  32.9 32.5   RDW 11.9 11.9  --  11.9 12.4   * 359 237 237 251     INR  Recent Labs   Lab Test 04/03/22  2329   INR 0.96   PTT 34     ABGNo lab results found.   URINE STUDIES  Recent Labs   Lab Test 04/04/22  0113  09/23/20  0904 08/24/20  1430 06/08/20  0620   COLOR Straw Yellow Yellow Yellow   APPEARANCE Slightly Cloudy* Slightly Cloudy Slightly Cloudy Clear   URINEGLC Negative Negative Negative Negative   URINEBILI Negative Negative Negative Negative   URINEKETONE Negative Negative Negative 40*   SG 1.007 1.013 1.021 1.014   UBLD Negative Negative Trace* Negative   URINEPH 6.5 7.0 5.5 6.0   PROTEIN Negative Negative Negative Negative   NITRITE Negative Negative Negative Negative   LEUKEST Moderate* Small* Large* Negative   RBCU 3* 2 2 4*   WBCU 16* 4 6* 0     Recent Labs   Lab Test 04/07/22  1115 03/25/22  0224 01/21/22  1611 09/22/21  0921 10/21/20  1515 09/23/20  0904 08/24/20  1430 06/08/20  0620 05/15/20  1201 05/13/20  1629   UTPG 0.17 0.63* 0.19 0.10 0.24* 0.21* 0.11 0.08 0.08 1.16*     EXAMINATION: US RENAL COMPLETE, 6/23/2020 12:49 PM      COMPARISON: None.     HISTORY: History of hypertension and young woman. Evaluate kidney size  and echogenicity.     FINDINGS:     Right kidney: Measures 10.9 cm cm in length. Parenchyma is of normal  thickness and echogenicity. No focal mass. No hydronephrosis.     Left kidney: Measures 11.5 cm cm in length. Parenchyma is of normal  thickness and echogenicity. No focal mass. No hydronephrosis.      Bladder: Decompressed.     Incidentally noted mildly increased echogenicity within the liver.  Anechoic avascular cystic-appearing lesion within the spleen measuring  1.2 x 1.0 x 1.0 cm. Single intrauterine pregnancy with a heart rate of  157 bpm.                                                                      IMPRESSION:  1.  Normal renal ultrasound.  2.  Incidentally noted  increased echogenicity throughout the liver  which is suggestive of hepatic steatosis.  3.  Single living intrauterine pregnancy.     I have personally reviewed the examination and initial interpretation  and I agree with the findings.     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  She is concerned that the Cr remains elevated, and her BP is still a little high.  She is asymptomatic.  I am reassured that the last measure shows a re-normalization of proteinuria.    Her Cr is now stable x 3.     She is about 3 weeks from delivery.    I discussed with her that this is still in the time frame of recovery from preeclampsia.  The literature refers to up to 12 weeks for considering renormalization of BP.  With respect to return to normal/baseline GFR, the data is less readily available with respect to the expected time frame.  A [place] study from 2008 reports return to a GFR comparable to non-preeclampsia control patients by 4 weeks.  However, patients in that study had a lesser degree of kidney impairement than experienced by Dr Rodriguez (Arpita LOZANO, et al. Course of preeclamptic glomerular injury after delivery. Am J Physiol Renal Physiol. 2008 Mar;294(3):F614-20.  PMID: [id number].).  Other cohort studies (not from [place] allude that some residual loss of GFR may persist in a substantial % of patients.  At this point, I think it is still early to assess whether there will be permanent damage.  The short term focus will be on optimizing BP control.     We will continue to monitor UPCR and renal function.      2- HTN.  BP remains elevated.  With breastfeeding, the use of JAGRUTI blockade is contraindicated.  I have added nifedipine ER 30 mg daily.      We agreed to a follow up appointment in ~4 weeks     Jerome Vargas MD  Division of Renal Disease and Hypertension  April 12, 2022

## 2022-04-12 NOTE — NURSING NOTE
Chief Complaint   Patient presents with     RECHECK     Preclamsia and creatnine     Blood pressure (!) 138/90, pulse 70, weight 79.3 kg (174 lb 12.8 oz), last menstrual period 06/28/2021, SpO2 97 %, unknown if currently breastfeeding.    Agatha Ralph, CMA

## 2022-04-12 NOTE — LETTER
4/12/2022       RE: Gabriele Rodriguez  [address] [facility] [address]     Dear Colleague,    Thank you for referring your patient, Gabriele Rodriguez, to the [facility]PHROLOG[facility] [place] at [facility]. Please see a copy of my visit note below.    Nephrology Clinic    Gabriele Rodriguez MRN:[id number] YOB: 1985  Date of Service: 04/12/2022  Primary care provider: Shana Sharif  Requesting physician: Rob Nieves       REASON FOR CONSULT: Proteinuria and hypertension in pregnancy    HISTORY OF PRESENT ILLNESS:   Gabriele Rodriguez is a 34 year old female who presents for evaluation of proteinuria and hypertension in pregnancy.     Ms Rodriguez is currently 15 weeks pregnant, on her first pregnancy.  She reports feeling fair other than for some am nausea, without vomiting.  The nausea is is getting better.  She has had HTN for at least 4 years, and was first diagnosed during Med School. She does not recall exactly what work up for HTN she has had while she was at [facility]. She does not recall undergoing a renal ultrasound.  (She does not currently have her med records).  She was initially treated with metoprolol, then switched to nifedipine which was stopped because of L Ext swelling.  She was then switched to lisinopril + hydrochlorothiazide.  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 She has since been switched to labetalol.  On review of systems, she reports occasional mild epistaxis, but the ROS is otherwise negative for symptoms of systemic disease.  She has had HgbA1c testing in the past which were reportedly normal    Interestingly, the first UPCR was obtained after she drinking a large volume of water.      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  She is now 15w3d into her pregnancy. Reports that morning sickness is resolved.   She is not  checking her BP at home.  Her last check was on 2020.  Reports no acute complaints.     2020  Qi is now 29w3d into her first pregnancy.   She has been feeling well. Sleeping OK, although is feeling a bit tired sometimes.  Denies L Ext swelling.  No rash, oral ulcers, joint pains.  No SOB or CP  She had glucosuria on the last UA, but she gave the urine sample shortly after taking the dose of Glucose for the GTT.    2022   Rodriguez's first pregnancy in  was carried to 37 w, and she underwent an emergent c section then.  Unfortunately the baby had a complicated course with severe brain damage and  within the first 2 weeks of life.    Michael is now s/p delivery with repeat c/s on 3/24, with a readmission from 4/3- for pre-eclampsia with severe features.  She is seen today urgently because of significant loss of GFR.  The  and delivery at 37 weeks were elective.  She delivered a healthy baby girl, named Ceci, who is thriving and growing well.    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  She noted significant increase in BP to 150->170/100->110 on 4/3 which prompted the  Admission.  She had some HA, but in retrospect, feels that this was relatively mild and probably related to sinus pain.  On admission, she was treated with IV magnesium and labetalol.   She was prescribed hydrochlorothiazide upon discharge, but this was stopped after 2-3 days because of a small rise in Cr.  The labetalol was then increased to 300 mg bid.  Her BP was under acceptable control on 22 at 133/88.  She states that her BP at home is generally around 145/95 in the evening.  On ROS,   She denies HA, Blurred vision. NO SOB, Cough, WATERS, CP, GI symptoms.  She feels that she has no swelling and is back to her baseline from that stand point.   She is getting some rest.  Her  has returned to work.  Her mother is at home helping with  the baby.  She is breastfeeding, through a combination of pumping and breastfeeding.        PAST MEDICAL HISTORY:  Reviewed with the patient  Past Medical History:   Diagnosis Date     ADHD      Anxiety      Chronic sinusitis      Depressive disorder     also anxiety, on and off since high school, has tried prozac and lexapro, wellbutrin, now off meds, last on meds      History of  section      History of  death      Hypertension     diagnosed in med school, 2016     Varicella     as a child     PAST SURGICAL HISTORY:  Past Surgical History:   Procedure Laterality Date     BIOPSY NAIL (LOCATION)      left hand, 4th digit      SECTION N/A 2020    Procedure:  SECTION;  Surgeon: Jojo Vaz MD;  Location: [facility] L+D      SECTION N/A 3/24/2022    Procedure: REPEAT  SECTION;  Surgeon: Giovanna sOorio MD;  Location: [facility] L+[facility]     MEDICATIONS:    Current Outpatient Medications   Medication Sig Dispense Refill     acetaminophen (TYLENOL) 325 MG tablet Take 2 tablets (650 mg) by mouth every 6 hours as needed for mild pain Start after Delivery. (Patient not taking: Reported on 2022) 100 tablet 0     famotidine (PEPCID) 10 MG tablet Take 10 mg by mouth At Bedtime  (Patient not taking: Reported on 2022)              labetalol (NORMODYNE) 200 MG tablet Take 1 tablet (200 mg) by mouth 2 times daily 180 tablet 3     Prenatal Vit-Fe Fumarate-FA (PRENATAL PO)  (Patient not taking: Reported on 2022)       senna-docusate (SENOKOT-S/PERICOLACE) 8.6-50 MG tablet Take 1 tablet by mouth daily Start after delivery. 100 tablet 0     sertraline (ZOLOFT) 50 MG tablet Take 1 tablet (50 mg) by mouth daily 90 tablet 1     vitamin D3 (CHOLECALCIFEROL) 50 mcg (2000 units) tablet Take 1 tablet by mouth daily (Patient not taking: Reported on 2022)       ALLERGIES:    Allergies   Allergen Reactions     Nickel Itching     REVIEW OF SYSTEMS:  Review Of Systems  A  comprehensive review of systems was performed and found to be negative except as described here or above.  SOCIAL HISTORY:   Social History     Socioeconomic History     Marital status:      Spouse name: Pedro     Number of children: Not on file     Years of education: Not on file     Highest education level: Not on file   Occupational History     Occupation: Medical resident     Comment: pathology   Tobacco Use     Smoking status: Never Smoker     Smokeless tobacco: Never Used   Substance and Sexual Activity     Alcohol use: Not Currently     Comment: Not while pregnant     Drug use: Never     Sexual activity: Yes     Partners: Male     Birth control/protection: None   Other Topics Concern     Parent/sibling w/ CABG, MI or angioplasty before 65F 55M? 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         demise 2020        Chief Resident      Social Determinants of Health     Financial Resource Strain: Not on file   Food Insecurity: Not on file   Transportation Needs: Not on file   Physical Activity: Not on file   Stress: Not on file   Social Connections: Not on file   Intimate Partner Violence: Not on file   Housing Stability: Not on file     FAMILY MEDICAL HISTORY:   Family History   Problem Relation Age of Onset     Hypertension Mother      Breast Cancer Mother         ductal stage 2, HER-2 negative, (+) estrogen/progesterone, BRCA negative     Skin Cancer Mother         basal cell     No Known Problems Father      No Known Problems Sister      Pancreatic Cancer Maternal Grandfather      Brain Cancer Paternal Aunt      Melanoma No family hx of      PHYSICAL EXAM:   BP (!) 138/90   Pulse 70   Wt 79.3 kg (174 lb 12.8 oz)   LMP 2021   SpO2 97%   BMI 30.96 kg/m     BP (!) 138/90   Pulse 70   Wt 79.3 kg (174 lb 12.8 oz)   LMP 2021   SpO2 97%   BMI 30.96 kg/m      GENERAL APPEARANCE: alert and no distress  EYES: nonicteric  OP mucosa pink without  lesions  RESP: Lungs clear bilat  COR: RRR, no r/g/m  Abd: S, NT  Ext: no edema   SKIN: no facial rash  NEURO: mentation intact and speech normal.  Gait normal  PSYCH: affect normal/bright   LABS:   Recent Results (from the past 1008 hour(s))   CBC with Platelets    Collection Time: 03/10/22 11:56 AM   Result Value Ref Range    WBC Count 14.0 (H) 4.0 - 11.0 10e3/uL    RBC Count 3.84 3.80 - 5.20 10e6/uL    Hemoglobin 11.4 (L) 11.7 - 15.7 g/dL    Hematocrit 34.7 (L) 35.0 - 47.0 %    MCV 90 78 - 100 fL    MCH 29.7 26.5 - 33.0 pg    MCHC 32.9 31.5 - 36.5 g/dL    RDW 12.0 10.0 - 15.0 %    Platelet Count 249 150 - 450 10e3/uL   Group B strep PCR    Collection Time: 03/15/22 10:54 AM    Specimen: Rectovaginal; Swab   Result Value Ref Range    Group B Strep PCR Negative Negative   Asymptomatic COVID-19 Virus (Coronavirus) by PCR Nose    Collection Time: 03/21/22 10:37 AM    Specimen: Nose; Swab   Result Value Ref Range    SARS CoV2 PCR Negative Negative, Testing sent to reference lab. Results will be returned via unsolicited result   CBC with platelets    Collection Time: 03/23/22 10:06 AM   Result Value Ref Range    WBC Count 13.4 (H) 4.0 - 11.0 10e3/uL    RBC Count 3.78 (L) 3.80 - 5.20 10e6/uL    Hemoglobin 11.2 (L) 11.7 - 15.7 g/dL    Hematocrit 34.5 (L) 35.0 - 47.0 %    MCV 91 78 - 100 fL    MCH 29.6 26.5 - 33.0 pg    MCHC 32.5 31.5 - 36.5 g/dL    RDW 12.4 10.0 - 15.0 %    Platelet Count 251 150 - 450 10e3/uL   Adult Type and Screen    Collection Time: 03/23/22 10:06 AM   Result Value Ref Range    ABO/RH(D) O POS     Antibody Screen Negative Negative    SPECIMEN EXPIRATION DATE [id number]    CBC with platelets    Collection Time: 03/24/22  5:42 PM   Result Value Ref Range    WBC Count 19.0 (H) 4.0 - 11.0 10e3/uL    RBC Count 3.73 (L) 3.80 - 5.20 10e6/uL    Hemoglobin 11.2 (L) 11.7 - 15.7 g/dL    Hematocrit 34.0 (L) 35.0 - 47.0 %    MCV 91 78 - 100 fL    MCH 30.0 26.5 - 33.0 pg    MCHC 32.9 31.5 - 36.5 g/dL    RDW  11.9 10.0 - 15.0 %    Platelet Count 237 150 - 450 10e3/uL   AST    Collection Time: 03/24/22  5:42 PM   Result Value Ref Range    AST 13 0 - 45 U/L   ALT    Collection Time: 03/24/22  5:42 PM   Result Value Ref Range    ALT <6 0 - 50 U/L   Creatinine    Collection Time: 03/24/22  5:42 PM   Result Value Ref Range    Creatinine 0.50 (L) 0.52 - 1.04 mg/dL    GFR Estimate >90 >60 mL/min/1.73m2   Protein  random urine    Collection Time: 03/25/22  2:24 AM   Result Value Ref Range    Total Protein Random Urine g/L 0.34 g/L    Total Protein Urine g/gr Creatinine 0.63 (H) 0.00 - 0.20 g/g Cr    Creatinine Urine mg/dL 54 mg/dL   Hemoglobin    Collection Time: 03/25/22  6:44 AM   Result Value Ref Range    Hemoglobin 10.5 (L) 11.7 - 15.7 g/dL   ALT    Collection Time: 03/25/22  6:44 AM   Result Value Ref Range    ALT 7 0 - 50 U/L   AST    Collection Time: 03/25/22  6:44 AM   Result Value Ref Range    AST 19 0 - 45 U/L   Creatinine    Collection Time: 03/25/22  6:44 AM   Result Value Ref Range    Creatinine 0.60 0.52 - 1.04 mg/dL    GFR Estimate >90 >60 mL/min/1.73m2   Platelet count    Collection Time: 03/25/22  6:44 AM   Result Value Ref Range    Platelet Count 237 150 - 450 10e3/uL   INR    Collection Time: 04/03/22 11:29 PM   Result Value Ref Range    INR 0.96 0.86 - 1.14   Partial thromboplastin time    Collection Time: 04/03/22 11:29 PM   Result Value Ref Range    aPTT 34 22 - 38 Seconds   Comprehensive metabolic panel    Collection Time: 04/03/22 11:29 PM   Result Value Ref Range    Sodium 141 133 - 144 mmol/L    Potassium 4.0 3.4 - 5.3 mmol/L    Chloride 111 (H) 94 - 109 mmol/L    Carbon Dioxide (CO2) 21 20 - 32 mmol/L    Anion Gap 9 3 - 14 mmol/L    Urea Nitrogen 19 7 - 30 mg/dL    Creatinine 1.07 (H) 0.52 - 1.04 mg/dL    Calcium 8.4 (L) 8.5 - 10.1 mg/dL    Glucose 94 70 - 99 mg/dL    Alkaline Phosphatase 90 40 - 150 U/L    AST 19 0 - 45 U/L    ALT 16 0 - 50 U/L    Protein Total 6.5 (L) 6.8 - 8.8 g/dL    Albumin 3.0  (L) 3.4 - 5.0 g/dL    Bilirubin Total 0.2 0.2 - 1.3 mg/dL    GFR Estimate 70 >60 mL/min/1.73m2   CBC with platelets and differential    Collection Time: 04/03/22 11:29 PM   Result Value Ref Range    WBC Count 8.6 4.0 - 11.0 10e3/uL    RBC Count 3.81 3.80 - 5.20 10e6/uL    Hemoglobin 11.3 (L) 11.7 - 15.7 g/dL    Hematocrit 34.4 (L) 35.0 - 47.0 %    MCV 90 78 - 100 fL    MCH 29.7 26.5 - 33.0 pg    MCHC 32.8 31.5 - 36.5 g/dL    RDW 11.9 10.0 - 15.0 %    Platelet Count 359 150 - 450 10e3/uL    % Neutrophils 52 %    % Lymphocytes 39 %    % Monocytes 5 %    % Eosinophils 2 %    % Basophils 1 %    % Immature Granulocytes 1 %    NRBCs per 100 WBC 0 <1 /100    Absolute Neutrophils 4.5 1.6 - 8.3 10e3/uL    Absolute Lymphocytes 3.3 0.8 - 5.3 10e3/uL    Absolute Monocytes 0.5 0.0 - 1.3 10e3/uL    Absolute Eosinophils 0.2 0.0 - 0.7 10e3/uL    Absolute Basophils 0.0 0.0 - 0.2 10e3/uL    Absolute Immature Granulocytes 0.0 <=0.4 10e3/uL    Absolute NRBCs 0.0 10e3/uL   Adult Type and Screen    Collection Time: 04/03/22 11:56 PM   Result Value Ref Range    ABO/RH(D) O POS     Antibody Screen Negative Negative    SPECIMEN EXPIRATION DATE [id number]    Protein  random urine    Collection Time: 04/04/22  1:13 AM   Result Value Ref Range    Total Protein Random Urine g/L <0.05 g/L    Total Protein Urine g/gr Creatinine      Creatinine Urine mg/dL 20 mg/dL   UA reflex to Microscopic    Collection Time: 04/04/22  1:13 AM   Result Value Ref Range    Color Urine Straw Colorless, Straw, Light Yellow, Yellow    Appearance Urine Slightly Cloudy (A) Clear    Glucose Urine Negative Negative mg/dL    Bilirubin Urine Negative Negative    Ketones Urine Negative Negative mg/dL    Specific Gravity Urine 1.007 1.003 - 1.035    Blood Urine Negative Negative    pH Urine 6.5 5.0 - 7.0    Protein Albumin Urine Negative Negative mg/dL    Urobilinogen Urine Normal Normal, 2.0 mg/dL    Nitrite Urine Negative Negative    Leukocyte Esterase Urine Moderate  (A) Negative    Bacteria Urine Moderate (A) None Seen /HPF    RBC Urine 3 (H) <=2 /HPF    WBC Urine 16 (H) <=5 /HPF    Squamous Epithelials Urine 14 (H) <=1 /HPF    Transitional Epithelials Urine <1 <=1 /HPF   Asymptomatic COVID-19 Virus (Coronavirus) by PCR Nasopharyngeal    Collection Time: 04/04/22  1:13 AM    Specimen: Nasopharyngeal; Swab   Result Value Ref Range    SARS CoV2 PCR Negative Negative   Magnesium    Collection Time: 04/04/22  7:27 AM   Result Value Ref Range    Magnesium 7.5 (HH) 1.6 - 2.3 mg/dL   Creatinine    Collection Time: 04/04/22  7:27 AM   Result Value Ref Range    Creatinine 1.04 0.52 - 1.04 mg/dL    GFR Estimate 72 >60 mL/min/1.73m2   Extra Purple Top Tube    Collection Time: 04/04/22  7:27 AM   Result Value Ref Range    Hold Specimen JI    Magnesium    Collection Time: 04/04/22  1:59 PM   Result Value Ref Range    Magnesium 9.3 (HH) 1.6 - 2.3 mg/dL   Creatinine    Collection Time: 04/04/22  1:59 PM   Result Value Ref Range    Creatinine 1.06 (H) 0.52 - 1.04 mg/dL    GFR Estimate 70 >60 mL/min/1.73m2   Creatinine    Collection Time: 04/04/22  8:18 PM   Result Value Ref Range    Creatinine 1.07 (H) 0.52 - 1.04 mg/dL    GFR Estimate 70 >60 mL/min/1.73m2   Magnesium    Collection Time: 04/04/22  8:18 PM   Result Value Ref Range    Magnesium 6.6 (H) 1.6 - 2.3 mg/dL   Creatinine    Collection Time: 04/05/22 12:30 AM   Result Value Ref Range    Creatinine 1.11 (H) 0.52 - 1.04 mg/dL    GFR Estimate 67 >60 mL/min/1.73m2   Magnesium    Collection Time: 04/05/22 12:30 AM   Result Value Ref Range    Magnesium 5.0 (H) 1.6 - 2.3 mg/dL   Creatinine    Collection Time: 04/05/22  7:50 AM   Result Value Ref Range    Creatinine 1.14 (H) 0.52 - 1.04 mg/dL    GFR Estimate 64 >60 mL/min/1.73m2   Magnesium    Collection Time: 04/05/22  7:50 AM   Result Value Ref Range    Magnesium 4.0 (H) 1.6 - 2.3 mg/dL   Magnesium    Collection Time: 04/05/22 12:08 PM   Result Value Ref Range    Magnesium 3.8 (H) 1.6 - 2.3  mg/dL   Creatinine    Collection Time: 04/05/22 12:08 PM   Result Value Ref Range    Creatinine 1.07 (H) 0.52 - 1.04 mg/dL    GFR Estimate 70 >60 mL/min/1.73m2   CBC with Platelets    Collection Time: 04/06/22  3:28 PM   Result Value Ref Range    WBC Count 9.9 4.0 - 11.0 10e3/uL    RBC Count 4.64 3.80 - 5.20 10e6/uL    Hemoglobin 13.5 11.7 - 15.7 g/dL    Hematocrit 41.9 35.0 - 47.0 %    MCV 90 78 - 100 fL    MCH 29.1 26.5 - 33.0 pg    MCHC 32.2 31.5 - 36.5 g/dL    RDW 11.9 10.0 - 15.0 %    Platelet Count 454 (H) 150 - 450 10e3/uL   AST    Collection Time: 04/06/22  3:28 PM   Result Value Ref Range    AST 17 0 - 45 U/L   ALT    Collection Time: 04/06/22  3:28 PM   Result Value Ref Range    ALT 20 0 - 50 U/L   Creatinine    Collection Time: 04/06/22  3:28 PM   Result Value Ref Range    Creatinine 1.21 (H) 0.52 - 1.04 mg/dL    GFR Estimate 60 (L) >60 mL/min/1.73m2   Creatinine    Collection Time: 04/07/22 11:12 AM   Result Value Ref Range    Creatinine 1.19 (H) 0.52 - 1.04 mg/dL    GFR Estimate 61 >60 mL/min/1.73m2   Sodium    Collection Time: 04/07/22 11:12 AM   Result Value Ref Range    Sodium 137 133 - 144 mmol/L   Protein  random urine    Collection Time: 04/07/22 11:15 AM   Result Value Ref Range    Total Protein Random Urine g/L 0.09 g/L    Total Protein Urine g/gr Creatinine 0.17 0.00 - 0.20 g/g Cr    Creatinine Urine mg/dL 53 mg/dL   Fractional Excretion of Sodium    Collection Time: 04/07/22 11:15 AM   Result Value Ref Range    %FENA 1.2 %    Sodium Urine mmol/L 71 mmol/L    Creatinine Urine mg/dL 53 mg/dL   Creatinine    Collection Time: 04/11/22  1:25 PM   Result Value Ref Range    Creatinine 1.20 (H) 0.52 - 1.04 mg/dL    GFR Estimate 61 >60 mL/min/1.73m2     CMP  Recent Labs   Lab Test 04/11/22  1325 04/07/22  1112 04/06/22  1528 04/05/22  1208 04/05/22  0750 04/05/22  0030 04/04/22  2018 04/04/22  0727 04/03/22  2329 03/25/22  0644 03/24/22  1742 09/22/21  0920 11/24/20  1830 09/23/20  0850 05/13/20  1632  07/23/19  1509 09/14/18  1609   NA  --  137  --   --   --   --   --   --  141  --   --   --   --  137  --  137 140   POTASSIUM  --   --   --   --   --   --   --   --  4.0  --   --   --   --  3.4  --  3.6 3.8   CHLORIDE  --   --   --   --   --   --   --   --  111*  --   --   --   --  107  --  104 108   CO2  --   --   --   --   --   --   --   --  21  --   --   --   --  21  --  27 24   ANIONGAP  --   --   --   --   --   --   --   --  9  --   --   --   --  9  --  5 9   GLC  --   --   --   --   --   --   --   --  94  --   --   --   --  160*  --  85 104*   BUN  --   --   --   --   --   --   --   --  19  --   --   --   --  6*  --  10 14   CR 1.20* 1.19* 1.21* 1.07* 1.14* 1.11* 1.07*   < > 1.07* 0.60 0.50*   < > 0.48* 0.53 0.56 0.83 0.90   GFRESTIMATED 61 61 60* 70 64 67 70   < > 70 >90 >90   < > >90 >90 >90 >90 73   GFRESTBLACK  --   --   --   --   --   --   --   --   --   --   --   --  >90 >90 >90 >90 89   COLT  --   --   --   --   --   --   --   --  8.4*  --   --   --   --  8.4*  --  9.6 8.8   MAG  --   --   --  3.8* 4.0* 5.0* 6.6*   < >  --   --   --   --   --   --   --   --   --    PHOS  --   --   --   --   --   --   --   --   --   --   --   --   --  2.8  --   --   --    PROTTOTAL  --   --   --   --   --   --   --   --  6.5*  --   --   --   --   --   --  8.3 7.6   ALBUMIN  --   --   --   --   --   --   --   --  3.0*  --   --   --   --  2.7*  --  4.5 4.1   BILITOTAL  --   --   --   --   --   --   --   --  0.2  --   --   --   --   --   --  0.7 0.4   ALKPHOS  --   --   --   --   --   --   --   --  90  --   --   --   --   --   --  71 75   AST  --   --  17  --   --   --   --   --  19 19 13   < > 13  --  22 18 17   ALT  --   --  20  --   --   --   --   --  16 7 <6   < > 13  --  32 22 18    < > = values in this interval not displayed.     CBC  Recent Labs   Lab Test 04/06/22  1528 04/03/22  2329 03/25/22  0644 03/24/22  1742 03/23/22  1006   HGB 13.5 11.3* 10.5* 11.2* 11.2*   WBC 9.9 8.6  --  19.0* 13.4*   RBC 4.64 3.81  --   3.73* 3.78*   HCT 41.9 34.4*  --  34.0* 34.5*   MCV 90 90  --  91 91   MCH 29.1 29.7  --  30.0 29.6   MCHC 32.2 32.8  --  32.9 32.5   RDW 11.9 11.9  --  11.9 12.4   * 359 237 237 251     INR  Recent Labs   Lab Test 04/03/22  2329   INR 0.96   PTT 34     ABGNo lab results found.   URINE STUDIES  Recent Labs   Lab Test 04/04/22  0113 09/23/20  0904 08/24/20  1430 06/08/20  0620   COLOR Straw Yellow Yellow Yellow   APPEARANCE Slightly Cloudy* Slightly Cloudy Slightly Cloudy Clear   URINEGLC Negative Negative Negative Negative   URINEBILI Negative Negative Negative Negative   URINEKETONE Negative Negative Negative 40*   SG 1.007 1.013 1.021 1.014   UBLD Negative Negative Trace* Negative   URINEPH 6.5 7.0 5.5 6.0   PROTEIN Negative Negative Negative Negative   NITRITE Negative Negative Negative Negative   LEUKEST Moderate* Small* Large* Negative   RBCU 3* 2 2 4*   WBCU 16* 4 6* 0     Recent Labs   Lab Test 04/07/22  1115 03/25/22  0224 01/21/22  1611 09/22/21  0921 10/21/20  1515 09/23/20  0904 08/24/20  1430 06/08/20  0620 05/15/20  1201 05/13/20  1629   UTPG 0.17 0.63* 0.19 0.10 0.24* 0.21* 0.11 0.08 0.08 1.16*     EXAMINATION: US RENAL COMPLETE, 6/23/2020 12:49 PM      COMPARISON: None.     HISTORY: History of hypertension and young woman. Evaluate kidney size  and echogenicity.     FINDINGS:     Right kidney: Measures 10.9 cm cm in length. Parenchyma is of normal  thickness and echogenicity. No focal mass. No hydronephrosis.     Left kidney: Measures 11.5 cm cm in length. Parenchyma is of normal  thickness and echogenicity. No focal mass. No hydronephrosis.      Bladder: Decompressed.     Incidentally noted mildly increased echogenicity within the liver.  Anechoic avascular cystic-appearing lesion within the spleen measuring  1.2 x 1.0 x 1.0 cm. Single intrauterine pregnancy with a heart rate of  157 bpm.                                                                      IMPRESSION:  1.  Normal renal  ultrasound.  2.  Incidentally noted  increased echogenicity throughout the liver  which is suggestive of hepatic steatosis.  3.  Single living intrauterine pregnancy.     I have personally reviewed the examination and initial interpretation  and I agree with the findings.     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  She is concerned that the Cr remains elevated, and her BP is still a little high.  She is asymptomatic.  I am reassured that the last measure shows a re-normalization of proteinuria.    Her Cr is now stable x 3.     She is about 3 weeks from delivery.    I discussed with her that this is still in the time frame of recovery from preeclampsia.  The literature refers to up to 12 weeks for considering renormalization of BP.  With respect to return to normal/baseline GFR, the data is less readily available with respect to the expected time frame.  A [place] study from 2008 reports return to a GFR comparable to non-preeclampsia control patients by 4 weeks.  However, patients in that study had a lesser degree of kidney impairement than experienced by Dr Rodriguez (Arpita LOZANO, et al. Course of preeclamptic glomerular injury after delivery. Am J Physiol Renal Physiol. 2008 Mar;294(3):F614-20.  PMID: [id number].).  Other cohort studies (not from [place] allude that some residual loss of GFR may persist in a substantial % of patients.  At this point, I think it is still early to assess whether there will be permanent damage.  The short term focus will be on optimizing BP control.     We will continue to monitor UPCR and renal function.      2- HTN.  BP remains elevated.  With breastfeeding, the use of JAGRUTI blockade is contraindicated.  I have added nifedipine ER 30 mg daily.      We agreed to a follow up appointment in ~4 weeks     Jerome Vargas MD  Division of  Renal Disease and Hypertension  April 12, 2022

## 2022-04-14 ENCOUNTER — LAB (OUTPATIENT)
Dept: LAB | Facility: CLINIC | Age: 37
End: 2022-04-14
Payer: COMMERCIAL

## 2022-04-14 LAB
ALBUMIN UR-MCNC: 10 MG/DL
APPEARANCE UR: CLEAR
BACTERIA #/AREA URNS HPF: ABNORMAL /HPF
BILIRUB UR QL STRIP: NEGATIVE
COLOR UR AUTO: ABNORMAL
CREAT UR-MCNC: 174 MG/DL
GLUCOSE UR STRIP-MCNC: NEGATIVE MG/DL
HGB UR QL STRIP: ABNORMAL
KETONES UR STRIP-MCNC: NEGATIVE MG/DL
LEUKOCYTE ESTERASE UR QL STRIP: ABNORMAL
MUCOUS THREADS #/AREA URNS LPF: PRESENT /LPF
NITRATE UR QL: NEGATIVE
PH UR STRIP: 5.5 [PH] (ref 5–7)
PROT UR-MCNC: 0.17 G/L
PROT/CREAT 24H UR: 0.1 G/G CR (ref 0–0.2)
RBC URINE: 38 /HPF
SP GR UR STRIP: 1.02 (ref 1–1.03)
SQUAMOUS EPITHELIAL: 1 /HPF
UROBILINOGEN UR STRIP-MCNC: NORMAL MG/DL
WBC URINE: 68 /HPF

## 2022-04-14 PROCEDURE — 87086 URINE CULTURE/COLONY COUNT: CPT

## 2022-04-14 PROCEDURE — 84156 ASSAY OF PROTEIN URINE: CPT

## 2022-04-14 PROCEDURE — 81001 URINALYSIS AUTO W/SCOPE: CPT

## 2022-04-16 LAB — BACTERIA UR CULT: NO GROWTH

## 2022-04-20 ENCOUNTER — VIRTUAL VISIT (OUTPATIENT)
Dept: PSYCHOLOGY | Facility: CLINIC | Age: 37
End: 2022-04-20
Attending: OBSTETRICS & GYNECOLOGY
Payer: COMMERCIAL

## 2022-04-20 DIAGNOSIS — F41.9 ANXIETY DISORDER, UNSPECIFIED TYPE: Primary | ICD-10-CM

## 2022-04-20 PROCEDURE — 90791 PSYCH DIAGNOSTIC EVALUATION: CPT | Mod: 95

## 2022-04-20 NOTE — PROGRESS NOTES
"  Name:  Gabriele Rodriguez  Mrn: [id number]  Date of visit: 2022    PSYCHOLOGY OUTPATIENT VISIT NOTE     The patient has been notified of the following:      \"We have found that certain health care needs can be provided without the need for a face to face visit.  This service lets us provide the care you need with a phone conversation.       I will have full access to your [place] medical record during this entire phone call.   I will be taking notes for your medical record.      Since this is like an office visit, we will bill your insurance company for this service.       Confidentiality still applies for telephone services, and nobody will record the visit.  Exceptiong to confidentiality reviewed, e.g. provider mandated .  It is important to be in a quiet, private space that is free of distractions  during the visit.??      Telehealth visit appropriate in context of pandemic.  Pt location: home    Referral Source:  Jacqueline August MD, [facility]    Identifying Information:  Qi Rodriguez is 37yo  (male) and uses she/her pronouns.  She is presenting with anxiety in the postpartum (Ceci, \"Desiree\"  3/24/22, girl).      PRESENTING PROBLEMS/SYMPTOMS:  \"Underlying anxiety has always been issue,\" worsening in medical school.  Recently triggered by postpartum preeclampsia, following 2nd L&D, then became very focused \"fixated\" on lab values.  \"Huge anxiety about baby all the time.\"  Board exam in a month, feeling not prepared, need to study but focused on baby.  \"Forcing\" self to study and able to do so.    First pregnancy, daughter Valeria, was induced at 37wks and had , baby had severe brain damage resulting in multiple medical and decision to withdraw life support (at approximately 2wks) and death, holding her, 20 baby .  Took 6 wks off and much support with , mutual.  Then \"wanted to have a baby as quickly as we could.\"  Now anxiety about 2nd baby, " "don't want to leave her, fear she will die.  But \"managable, I can care for her and love her.\"     extremely supportive.      Social History:  Born & raised in [place].  Parents remain  and living in [place] and mom living with them for a year to help with baby.  Dad traveling back and forth.  1 sister [place].    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  Housing - own house in [place].  (mom living in nearby [facility] [facility] [place] [facility] [place])    Mental Health History:  End of highschool had eating disorder and then depression; started on med, several selective serotonin reuptake inhibitor trials and off and on through medical school, very depressed in medical school.  Off medication 34yo, feeling better when identified pathology \"what I really wanted to do.\"  Depression has not returned since.  A few sessions of therapy in undergrad, but didn't follow up.  Past diagnoses ADHD, YESENIA and depression; also noted chronic anxiety difficulties.      Medical History:  History of  demise at 2wks, 22.  Second pregnancy -  3/24/22, scheduled  at 37wks, girl, Ceci, \"Desiree\", had some problems breathing, brief, and borderline low glucose level.  In NICU 1 day.  Baby doing well, \"perfectly healthy\" now.    10 days PP blood pressure increasing and went to ED and hospitalized with preeclampsia.  Continuing anxiety about her own health and baby's health.  High creatinine, worry re kidney function, met with nephrologist, reassured.    Pumping (every 3-4 hrs) and working on breast feeding; but exclusive pumping right now.      Psychological Symptoms:  Depression -   3-4/10, with 10 = severe depression and 0 = nondepressed  Crying - with anxiety around PP labs, otherwise infrequent  Anhedonia - no time to do much but study and baby, would feel interested if time.  Looking forward to going to comedy show.  " "  Sleeping - Always on verge of sleep deficit, trying to study or sleep.  Able to sleep 4hrs sessions; takes 35-45 min to fall asleep.    Eating - fine.  But worry about possibility of  baby's food intolerance because she has gas.    Anxiety - yes  SI - denied, and denied history  HI - denied, and denied history    ASSESSMENT: Engaged in visit.  A little tearful \"sad\" talking about Valeria  Mental Status Assessment:  Appearance:   unknown  Eye Contact:   n/a  Psychomotor Behavior: unknown  Attitude:   Cooperative   Orientation:   All  Speech   Rate / Production: Normal    Volume:  Normal   Mood:    Slight sad, reporting anxiety  Thought Content:  Clear   Thought Form:  Coherent  Logical   Insight:    Fair     DIAGNOSIS:    Axis I:  Unspecified anxiety disorder (r/o ALVIN)  Axis II:  deferred  Axis III:  Postpartum (preeclampsia); history of  demise  Axis IV:  none  Axis V:  GAF current = 55-60    Summary and Impressions:  Qi Rodriguez is 37yo, , postpartum and uses she/her pronouns.  Her history is notable for  demise, 22, second baby ( 3/24/22) had brief stay in NICU, postpartum preeclampsia, difficulties breast feeding (only able to pump at this time).  She notes a mental health history including ADHD, YESENIA and depression.  Difficulties with anxiety have predominant and continue with exacerbation in medical school and postpartum (medical concerns with focus on self and baby.)  She is experiencing some insomnia but reports still being able to study on her upcoming board exams with effort to focus.  She has engaged in trials of medications but currently is interested in psychotherapy.  Psychotherapy is recommended.  She will consider meds pending resolution of symptoms in a couple of months.     PLAN:    Patient to schedule followup visit.       Total time spent equals 55 minutes, diagnostic assessment.  Telephone  Start = 9:03             "

## 2022-04-29 ENCOUNTER — OFFICE VISIT (OUTPATIENT)
Dept: FAMILY MEDICINE | Facility: CLINIC | Age: 37
End: 2022-04-29
Attending: FAMILY MEDICINE
Payer: COMMERCIAL

## 2022-04-29 VITALS
HEIGHT: 63 IN | SYSTOLIC BLOOD PRESSURE: 106 MMHG | BODY MASS INDEX: 29.41 KG/M2 | HEART RATE: 83 BPM | DIASTOLIC BLOOD PRESSURE: 73 MMHG | WEIGHT: 166 LBS

## 2022-04-29 DIAGNOSIS — I10 ESSENTIAL HYPERTENSION: ICD-10-CM

## 2022-04-29 DIAGNOSIS — F90.0 ATTENTION DEFICIT HYPERACTIVITY DISORDER (ADHD), PREDOMINANTLY INATTENTIVE TYPE: Primary | ICD-10-CM

## 2022-04-29 DIAGNOSIS — F41.1 GAD (GENERALIZED ANXIETY DISORDER): ICD-10-CM

## 2022-04-29 PROCEDURE — 99215 OFFICE O/P EST HI 40 MIN: CPT | Performed by: FAMILY MEDICINE

## 2022-04-29 PROCEDURE — G0463 HOSPITAL OUTPT CLINIC VISIT: HCPCS

## 2022-04-29 ASSESSMENT — PAIN SCALES - GENERAL: PAINLEVEL: NO PAIN (0)

## 2022-04-29 NOTE — PROGRESS NOTES
Assessment & Plan   Problem List Items Addressed This Visit    None     Visit Diagnoses     Attention deficit hyperactivity disorder (ADHD), predominantly inattentive type    -  Primary    Relevant Orders    Med Therapy Management Referral    Essential hypertension        ALVIN (generalized anxiety disorder)                 45 minutes spent on the date of the encounter doing chart review, review of outside records, interpretation of tests and patient visit          Roc Morse MD  [facility] MELIDA Busby is a 36 year old who presents for the following health issues     HPI   Pt. Here to establish care; previous PCP had left,  She  had been seeing Dr. Osorio for ob care,  delivered 3/24/2022, now breastfeeding    1. HTN  Known HTN since age around early 20's, diagnosis unknown, prior work up excluded renal cause  Mother with HTN, age onset unknown  BP worse with weight gain and anxiety  No preeclampsia with 1st pregnancy, delivered via emergency  at 37 weeks,  baby  within first 2 weeks of life   Severe precelmapsia with 2nd pregnancy with postpartum onset, loss of GFR ,  Scheduled  at  37 weeks--baby doing well. 2022 nephrology note reviewed.    Medication management:  When not pregnant:   Metoprolol --worked better  Lisinopril/hctz--didn't work as well    During pregnancy:  labetolol 200 mg bid  nifidepine ER 30 mg daily added by nephrology, with recommendation to return in 4 weeks      Has had HTN diagnosis off ADHD meds        2. Depression in past, none x 4 years   3. ALVIN/anxiety  Currently is medical resident, studying for boards  Seeing therapist (Dr. Mcclain) now  Has been given Zoloft, but wants to wait until after boards to start again  In past:   Prozac  Lexapro--worked well  Buproprion--worked for depression      No psychiatric hospitalizatoins  ETOH--2 glasses since delievery, no problem in past  No rec substances  Sister  "anxiety  Eating disorder end of high school, resolved, not recurred    3. ADHD  Diagnosed age 23, neuropsychiatric testing 2017, [facility], note reviewed   CNP/PCP had been  managing ADHD medication  Never tried nonstimulant  Medications    Prior medications:  Methylphendiate long acting  Ritalin   Vyvanse 40 mg daily--worked best for her    Current  goal is for studying and exam boards--even a few days before exam, however coming up May 16 and June 2nd,       2. Breast cancer screening question  Mom with breast cancer over 60, tested BRCA negative  No other family members with breast or ovarian    PMH  c section x 2    Current Outpatient Medications   Medication     labetalol (NORMODYNE) 200 MG tablet     NIFEdipine ER OSMOTIC (PROCARDIA XL) 30 MG 24 hr tablet     sertraline (ZOLOFT) 50 MG tablet     No current facility-administered medications for this visit.         SH  Standard diet      Review of Systems   PHQ 6/9/2020 9/21/2020 1/21/2022   PHQ-9 Total Score 2 1 0   Q9: Thoughts of better off dead/self-harm past 2 weeks Not at all Not at all Not at all     ALVIN-7 SCORE 3/1/2022 3/16/2022 4/7/2022   Total Score 4 (minimal anxiety) 2 (minimal anxiety) -   Total Score 4 2 13   GAD7 today=7  PHQ9=4      Objective    /73   Pulse 83   Ht 1.6 m (5' 3\")   Wt 75.3 kg (166 lb)   LMP 06/28/2021   BMI 29.41 kg/m    Body mass index is 29.41 kg/m .  Physical Exam   Alert. NAD  Speech RRR, mood is anxious, no psychomotor changes, thought process is appropriate,   Affect is normal. No evidence of suicidality.     A/P  1. HTN  Normotensive today on current medications  Return to nephrology in 2 weeks, anticipate will go back to labetolol only and will slowly transfer to maintenance BP medication, likely labetalol for now  Goal is to see what bp medication and dose needed given adequate treatment of anxiety balanced with ADHD medication   Recommend repeat TSH in near future, last checked " 2019    2. Anxiety disorder  continue with Dr. Mcclain  Reasonable to start Zoloft  boards if contiue sig. Anxiety      3. ADHD  Discussed potential risks with breastfeeding, appears most data and least risk with short acting methylphenidate  Reviewed options for long term as well  Will refer to MTM management for  Further recommendations on ADHD stimulant medications and dosing with lactation    4. Counseled regarding breast cancer screening guidelines    Follow-up after boards.

## 2022-05-01 NOTE — PROGRESS NOTES
"Postpartum Visit Note  2022    Reason for visit: Postpartum visit    S: Patient is a 35 yo  who presents today for postpartum visit s/p RLTCS on 3/24/22 of daughter Ceci.  Pregnancy complicated by CHTN, development of SI preeclampsia with SF and required readmission PP from 4/3-22, Anxiety, ADHD, History of prior CS and history of  demise at 2 weeks of daughter Valeria in her first pregnancy.  Since delivery patient has been overall doing well.  Pain well controlled.  Small dark red.brown discharge now, no bright red bleeding.  Tolerating regular diet.  Urinating without issues.  Takes Miralax prn when feeling more constipated but overall ok.  Breastfeeding going well.  Desiree is doing well and thriving.  Mood wise feels ok, has anxiety about upcoming board examinations and this is likely compounding things.  Did not start Zoloft yet, but may consider after examination.  Denies any HA, vision changes, SOB, RUQ pain.  Slight swelling in bilateral LE but improved since readmission for preeclampsia.  Happy this seems to be improved now as was worried about this.  Desires Nexplanon for contraception, has had before and worked well for her.    PHQ-9 score: 0  Hx of Abuse:  No    Delivery Date: 3/24/22.    Delivering provider:  Giovanna Osorio MD.    Type of delivery:  Repeat .     Delivery complications: None  Infant gender:  girl, weight 6 pounds 6 oz.  Feeding Method:   and Bottlefed.  Complications reported with feeding:  none, infant thriving.    Bleeding:  None.  Duration:  6 weeks.  Menses resumed:  No  Bowel/Urinary problems:  No    Contraception Planned:  Nexplanon  She has not had intercourse since delivery.  ================================================================  ROS: 10 point ROS neg other than the symptoms noted above in the HPI.     EXAM:  /76   Pulse 61   Ht 1.6 m (5' 3\")   Wt 76.3 kg (168 lb 4.8 oz)   LMP 2021   BMI 29.81 kg/m  "     General: healthy, alert and no distress  Psych: anxiety  Breasts:  Lactating, Nipples intact with no lesions, Non-tender and No S/S of yeast or mastitis  Abdomen: Benign, Soft, flat, non-tender, No masses, organomegaly and Diastasis less than 1-2 FB  Incision:  well healed and dry and intact   : Deferred    Procedure note:    Patient presents for placement of Nexplanon for contraception.  She has had been counseled on side effects, risks, benefits and alternatives.  Patient desires to proceed.    Verification of Procedure:  Just before the procedure begans through verbal and active participation of team members, I verified:     Initials   Patient Name [facility]   Patient  [facility]   Procedure to be performed [facility]     Consent:  Risks, benefits of treatment, and alternative options for contraception were discussed.  Patient's questions were elicited and answered.  Written consent was obtained and scanned into medical record.     Patient was resting comfortably on exam table, left arm placed at shoulder level in a 90 degree angle.  Skin was marked 8cm from epicondyl and cleansed with betadine solution 2-3 cm inferior to biceps groove.  Insertion site and track infiltrated with 2cc 1% Lidocaine.      Nexplanon device visualized in applicator by patient and provider.  Skin punctured with applicator at insertion site and advanced with ease in the intradermal space.  Applicator was removed.  Nexplanon was palpated by provider and patient.    Small amount of bleeding noted at insertion site and no bruising noted along track of Nexplanon.  Bandage and pressure dressing applied to insertion site.       Patient tolerated procedure well.  Lot number [id number], EXP [id number].  To be removed/replaced in 3-4 years.    Written and verbal instructions provided to patient.    ASSESSMENT:   37 yo  presents for postpartum visit s/p RLTCS    PLAN:  1) Postpartum state: No further lifting, pelvic or activity restrictions.    2) Screening  for malignant neoplasm of cervix: Next cotesting due 11/2024  3) Contraception: Nexplanon placed today without complications.  4) CHTN/SI preeclampsia with SF: On Nifedipine and Labetalol.  Did have followup with Nephrology.  No s/sx preeclampsia today.  5) Anxiety: Saw Kiana Mcclain, Did not start Zoloft, may consider starting after board examinations, has continued visits established with Kiana which will be good for her.  6) Spotting: discussed if still present in 2 weeks to call and plan for US to evaluate, but should resolve by that time  7) RTC in 1 year for annual, earlier with any concerns    Jojo Vaz MD

## 2022-05-02 ENCOUNTER — OFFICE VISIT (OUTPATIENT)
Dept: OBGYN | Facility: CLINIC | Age: 37
End: 2022-05-02
Attending: OBSTETRICS & GYNECOLOGY
Payer: COMMERCIAL

## 2022-05-02 VITALS
HEART RATE: 61 BPM | SYSTOLIC BLOOD PRESSURE: 123 MMHG | HEIGHT: 63 IN | DIASTOLIC BLOOD PRESSURE: 76 MMHG | BODY MASS INDEX: 29.82 KG/M2 | WEIGHT: 168.3 LBS

## 2022-05-02 DIAGNOSIS — Z30.017 INSERTION OF NEXPLANON: ICD-10-CM

## 2022-05-02 DIAGNOSIS — Z98.891 S/P CESAREAN SECTION: ICD-10-CM

## 2022-05-02 DIAGNOSIS — Z87.59 HISTORY OF SEVERE PRE-ECLAMPSIA: ICD-10-CM

## 2022-05-02 PROCEDURE — G0463 HOSPITAL OUTPT CLINIC VISIT: HCPCS

## 2022-05-02 PROCEDURE — 11981 INSERTION DRUG DLVR IMPLANT: CPT | Performed by: OBSTETRICS & GYNECOLOGY

## 2022-05-02 PROCEDURE — 250N000011 HC RX IP 250 OP 636: Performed by: OBSTETRICS & GYNECOLOGY

## 2022-05-02 RX ADMIN — ETONOGESTREL 68 MG: 68 IMPLANT SUBCUTANEOUS at 15:30

## 2022-05-02 NOTE — NURSING NOTE
SUBJECTIVE:   Gabriele Rodriguez is here for her 6-week postpartum checkup.     PHQ-9 score: 0  Hx of Abuse:  No    Delivery Date: 3/24/22.    Delivering provider:  Giovanna Osorio MD.    Type of delivery:  Repeat .     Delivery complications: None  Infant gender:  girl, weight 6 pounds 6 oz.  Feeding Method:   and Bottlefed.  Complications reported with feeding:  none, infant thriving .    Bleeding:  None.  Duration:  6 weeks.  Menses resumed:  No  Bowel/Urinary problems:  No    Contraception Planned:  Nexplanon  She  has not had intercourse since delivery..

## 2022-05-02 NOTE — LETTER
2022       RE: Gabriele Rodriguez  [address] [place] [facility] [id number]     Dear Colleague,    Thank you for referring your patient, Gabriele Rodriguez, to the [facility] [place] at [facility]. Please see a copy of my visit note below.    Postpartum Visit Note  2022    Reason for visit: Postpartum visit    S: Patient is a 35 yo  who presents today for postpartum visit s/p RLTCS on 3/24/22 of daughter Ceci.  Pregnancy complicated by CHTN, development of SI preeclampsia with SF and required readmission PP from 4/3-22, Anxiety, ADHD, History of prior CS and history of  demise at 2 weeks of daughter Valeria in her first pregnancy.  Since delivery patient has been overall doing well.  Pain well controlled.  Small dark red.brown discharge now, no bright red bleeding.  Tolerating regular diet.  Urinating without issues.  Takes Miralax prn when feeling more constipated but overall ok.  Breastfeeding going well.  Desiree is doing well and thriving.  Mood wise feels ok, has anxiety about upcoming board examinations and this is likely compounding things.  Did not start Zoloft yet, but may consider after examination.  Denies any HA, vision changes, SOB, RUQ pain.  Slight swelling in bilateral LE but improved since readmission for preeclampsia.  Happy this seems to be improved now as was worried about this.  Desires Nexplanon for contraception, has had before and worked well for her.    PHQ-9 score: 0  Hx of Abuse:  No    Delivery Date: 3/24/22.    Delivering provider:  Giovanna Osorio MD.    Type of delivery:  Repeat .     Delivery complications: None  Infant gender:  girl, weight 6 pounds 6 oz.  Feeding Method:   and Bottlefed.  Complications reported with feeding:  none, infant thriving.    Bleeding:  None.  Duration:  6 weeks.  Menses resumed:  No  Bowel/Urinary problems:  No    Contraception Planned:   "Nexplanon  She has not had intercourse since delivery.  ================================================================  ROS: 10 point ROS neg other than the symptoms noted above in the HPI.     EXAM:  /76   Pulse 61   Ht 1.6 m (5' 3\")   Wt 76.3 kg (168 lb 4.8 oz)   LMP 2021   BMI 29.81 kg/m      General: healthy, alert and no distress  Psych: anxiety  Breasts:  Lactating, Nipples intact with no lesions, Non-tender and No S/S of yeast or mastitis  Abdomen: Benign, Soft, flat, non-tender, No masses, organomegaly and Diastasis less than 1-2 FB  Incision:  well healed and dry and intact   : Deferred    Procedure note:    Patient presents for placement of Nexplanon for contraception.  She has had been counseled on side effects, risks, benefits and alternatives.  Patient desires to proceed.    Verification of Procedure:  Just before the procedure begans through verbal and active participation of team members, I verified:     Initials   Patient Name [facility]   Patient  [facility]   Procedure to be performed [facility]     Consent:  Risks, benefits of treatment, and alternative options for contraception were discussed.  Patient's questions were elicited and answered.  Written consent was obtained and scanned into medical record.     Patient was resting comfortably on exam table, left arm placed at shoulder level in a 90 degree angle.  Skin was marked 8cm from epicondyl and cleansed with betadine solution 2-3 cm inferior to biceps groove.  Insertion site and track infiltrated with 2cc 1% Lidocaine.      Nexplanon device visualized in applicator by patient and provider.  Skin punctured with applicator at insertion site and advanced with ease in the intradermal space.  Applicator was removed.  Nexplanon was palpated by provider and patient.    Small amount of bleeding noted at insertion site and no bruising noted along track of Nexplanon.  Bandage and pressure dressing applied to insertion site.       Patient tolerated " procedure well.  Lot number [id number], EXP [id number].  To be removed/replaced in 3-4 years.    Written and verbal instructions provided to patient.    ASSESSMENT:   37 yo  presents for postpartum visit s/p RLTCS    PLAN:  1) Postpartum state: No further lifting, pelvic or activity restrictions.    2) Screening for malignant neoplasm of cervix: Next cotesting due 11/2024  3) Contraception: Nexplanon placed today without complications.  4) CHTN/SI preeclampsia with SF: On Nifedipine and Labetalol.  Did have followup with Nephrology.  No s/sx preeclampsia today.  5) Anxiety: Saw Kiana Mcclain, Did not start Zoloft, may consider starting after board examinations, has continued visits established with Kiana which will be good for her.  6) Spotting: discussed if still present in 2 weeks to call and plan for US to evaluate, but should resolve by that time  7) RTC in 1 year for annual, earlier with any concerns    Jojo Vaz MD

## 2022-05-04 ENCOUNTER — VIRTUAL VISIT (OUTPATIENT)
Dept: PSYCHOLOGY | Facility: CLINIC | Age: 37
End: 2022-05-04
Payer: COMMERCIAL

## 2022-05-04 DIAGNOSIS — F41.1 GAD (GENERALIZED ANXIETY DISORDER): Primary | ICD-10-CM

## 2022-05-04 PROCEDURE — 90837 PSYTX W PT 60 MINUTES: CPT | Mod: 95

## 2022-05-04 NOTE — PROGRESS NOTES
"  Name:  Gabriele Rodriguez  Mrn: [id number]  Date of visit: 2022    PSYCHOLOGY OUTPATIENT VISIT NOTE       Telehealth visit appropriate in context of pandemic  Pt location = home    PRESENTING PROBLEMS/SYMPTOMS:  \"Underlying anxiety has always been issue,\" And recently triggered in postpartum (preeclampsia) focused on her own health and baby's health.  History of  demise, Valeria  on 20.  (Ceci, \"Desiree\"  3/24/22, girl).     INTERVENTION AND RESPONSE:  Cognitive Behavioral therapy, individual.  This is patient's first psychotherapy visit following the initial psychological assessment.  Patient presented with openness to provider's idea: work on treatment plan.  Pt did not identify any specific goals for today's visit.  Worked on tx plan, see EMR.    ASSESSMENT:  (mom watching baby during visit)  Future oriented.  Engaged in visit, no difficulties recalling symptoms and began identifying some goals for tx.  Continuing context: First pregnancy, daughter Valeria, was induced at 37wks and had , baby had severe brain damage resulting in multiple medical and decision to withdraw life support (at approximately 2wks) and death, holding her, 20 baby .  second baby ( 3/24/22) had brief stay in NICU, postpartum preeclampsia, difficulties breast feeding (only able to pump at this time).  She notes a mental health history including ADHD, YESENIA and depression.     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 Board exam in a month, feeling not prepared, need to study but focused on baby.  \"Forcing\" self to study and able to do so.   Mental Status Assessment:  Appearance:   unknown  Eye Contact:   n/a  Psychomotor Behavior: unknown  Attitude:   Cooperative   Orientation:   All  Speech   Rate / Production: Normal    Volume:  Normal   Mood:    Normal in visit; reporting anxiety  Thought Content:  Clear   Thought Form:  Coherent  " Logical   Insight:    Fair     DIAGNOSIS: ALVIN    PLAN:    Patient to schedule followup visit.       Total time spent equals 57 minutes, individual psychotherapy. Telephone  Start = 8:03

## 2022-05-10 ENCOUNTER — VIRTUAL VISIT (OUTPATIENT)
Dept: PHARMACY | Facility: CLINIC | Age: 37
End: 2022-05-10
Payer: COMMERCIAL

## 2022-05-10 DIAGNOSIS — F90.0 ATTENTION DEFICIT HYPERACTIVITY DISORDER (ADHD), PREDOMINANTLY INATTENTIVE TYPE: Primary | ICD-10-CM

## 2022-05-10 DIAGNOSIS — O10.019: ICD-10-CM

## 2022-05-10 DIAGNOSIS — F90.0 ADHD (ATTENTION DEFICIT HYPERACTIVITY DISORDER), INATTENTIVE TYPE: Primary | ICD-10-CM

## 2022-05-10 PROCEDURE — 99605 MTMS BY PHARM NP 15 MIN: CPT | Performed by: PHARMACIST

## 2022-05-10 PROCEDURE — 99607 MTMS BY PHARM ADDL 15 MIN: CPT | Performed by: PHARMACIST

## 2022-05-10 RX ORDER — LABETALOL 200 MG/1
300 TABLET, FILM COATED ORAL 2 TIMES DAILY
COMMUNITY
Start: 2022-05-10 | End: 2023-09-18

## 2022-05-10 RX ORDER — METHYLPHENIDATE HYDROCHLORIDE 5 MG/1
5 TABLET ORAL 2 TIMES DAILY
Qty: 60 TABLET | Refills: 0 | Status: SHIPPED | OUTPATIENT
Start: 2022-05-10

## 2022-05-10 NOTE — PROGRESS NOTES
Medication Therapy Management (MTM) Encounter    ASSESSMENT:                            Medication Adherence/Access: No issues identified    ADHD:  Patient would benefit from treatment, especially given upcoming board exams.  Methylphenidate excretion in breast milk is <1% of maternal-weight adjusted dose, so quite small.   Since patient is exclusively pumping, she could mix breast milk pumped within 4-5 hours after doses with unexposed breast milk to further reduce dilute exposure.  Methylphenidate has been effective for patient in the past, so will likely be effective again.    Hypertension: Stable. Patient is meeting blood pressure goal of < 130/80mmHg.  She has additional follow-up scheduled with nephrology;  Recommend restarting occasional home BP monitoring to ensure stimulant is not worsening htn control.       PLAN:                            1.  Recommend starting methylphenidate 5 mg twice daily as needed for ADHD management.  Patient educated on side effects, including possibility of increasing blood pressure.  This recommendation was sent to Dr. Morse for approval.  2.  Recommend increasing home BP monitoring and continue to follow-up as scheduled with nephrology and primary care.     Follow-up:  With pharmacist as needed;  Continue to follow-up with Dr. Morse in 4-6 weeks.    SUBJECTIVE/OBJECTIVE:                          Gabriele Rodriguez is a 36 year old female called for an initial visit. She was referred to me from Dr. Morse.      Reason for visit: use of stimulants in breastfeeding.    Allergies/ADRs: Reviewed in chart  Past Medical History: Reviewed in chart  Tobacco: She reports that she has never smoked. She has never used smokeless tobacco.  Alcohol: Less than 1 beverages / week      Medication Adherence/Access: no issues reported    Postpartum:  Patient had a pregnancy in , infant had medical complexities and  in Dec 2020.  She then got pregnant again and gave birth to healthy  daughter on 3/24/22.    ADHD:  Patient has a long history of being treated for ADHD.   Was on methylphenidate, includeing as long-acting Concerta, for many years with good control.  She was switched to vyvanse, which also worked well for her.  She did taper down to 20mg during first pregnancy, but then stopped it due to safety concerns in pregnancy.  She restarted vyvanse at 20mg after the first pregnancy, but then stopped again prior to 2nd pregnancy.  Daughter is doing well;  Patient is breastfeeding and exclusively pumping and feeding with bottles;  She has a lot of breastmilk stored.   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  She know she needs treatment during the exam to stay focused, and recognizes it will be helpful for studying as well.    HTN:  Has chronic hypertension, and recently had post-partum pre-eclampsia (hospitalized 4/3/22).  Currently on labetalol 300mg twice daily, nifedipine 30 mg once daily.  Checks blood pressure at home occasionally, but less consistently as they have been fine;   Has nephrology appt later this week.    BP Readings from Last 3 Encounters:   05/02/22 123/76   04/29/22 106/73   04/12/22 (!) 138/90     Anxiety:   Seeing Dr. Mcclain for therapy.  Sertraline was prescribed, but patient didn't want to start a new medication until after her board examinations..      Today's Vitals: LMP 06/28/2021   ----------------      I spent 18 minutes with this patient today.  A copy of the visit note was provided to the patient's provider(s).    The patient was sent via [employer] a summary of these recommendations.     Jojo Swanson, Pharm.D., [facility], [facility]      Telemedicine Visit Details  Type of service:  Telephone visit  Start Time: 10:01  End Time: 10:19  Originating Location (patient location): Home  Distant Location (provider location):  [facility]     Medication  Therapy Recommendations  No medication therapy recommendations to display

## 2022-05-10 NOTE — PATIENT INSTRUCTIONS
"Recommendations from today's MTM visit:                                                    MTM (medication therapy management) is a service provided by a clinical pharmacist designed to help you get the most of out of your medicines.          1.  Recommend starting methylphenidate 5 mg twice daily as needed for ADHD management.  We discussed side effects, including possibility of increasing blood pressure.  This recommendation was sent to Dr. Morse for approval.    We discussed that current evidence indicates methylphenidate excretion in breast milk is <1% of maternal-weight adjusted dose, so quite small.   Since you are exclusively pumping, you could mix breast milk pumped within 4-5 hours after methylphenidate doses with unexposed breast milk to further reduce dilute exposure.      2.  Recommend increasing home BP monitoring and continue to follow-up as scheduled with nephrology and primary care.     Follow-up: with PCP, Dr. Morse in 4-6 weeks to follow-up on efficacy and safety of treatment    It was great speaking with you today.  I value your experience and would be very thankful for your time in providing feedback in our clinic survey. In the next few days, you may receive an email or text message from [employer] with a link to a survey related to your  clinical pharmacist.\"     To schedule another MTM appointment, please call the clinic directly or you may call the MTM scheduling line at [phone] or toll-free at [phone].     My Clinical Pharmacist's contact information:                                                      Please feel free to contact me with any questions or concerns you have.      Jojo Swanson, Pharm.D., [facility], [facility]  Phone:  [phone]      "

## 2022-05-11 ENCOUNTER — MYC MEDICAL ADVICE (OUTPATIENT)
Dept: PHARMACY | Facility: CLINIC | Age: 37
End: 2022-05-11
Payer: COMMERCIAL

## 2022-05-11 DIAGNOSIS — O10.019: ICD-10-CM

## 2022-05-11 LAB
CREAT SERPL-MCNC: 0.98 MG/DL (ref 0.52–1.04)
GFR SERPL CREATININE-BSD FRML MDRD: 77 ML/MIN/1.73M2

## 2022-05-11 PROCEDURE — 82565 ASSAY OF CREATININE: CPT | Performed by: PHARMACIST

## 2022-05-11 PROCEDURE — 36415 COLL VENOUS BLD VENIPUNCTURE: CPT | Performed by: PHARMACIST

## 2022-05-12 ENCOUNTER — OFFICE VISIT (OUTPATIENT)
Dept: NEPHROLOGY | Facility: CLINIC | Age: 37
End: 2022-05-12
Attending: INTERNAL MEDICINE
Payer: COMMERCIAL

## 2022-05-12 VITALS
HEART RATE: 82 BPM | DIASTOLIC BLOOD PRESSURE: 76 MMHG | OXYGEN SATURATION: 95 % | WEIGHT: 168.8 LBS | SYSTOLIC BLOOD PRESSURE: 110 MMHG | BODY MASS INDEX: 29.9 KG/M2

## 2022-05-12 DIAGNOSIS — I10 ESSENTIAL HYPERTENSION: ICD-10-CM

## 2022-05-12 PROCEDURE — 99213 OFFICE O/P EST LOW 20 MIN: CPT | Performed by: INTERNAL MEDICINE

## 2022-05-12 ASSESSMENT — PAIN SCALES - GENERAL: PAINLEVEL: NO PAIN (0)

## 2022-05-12 NOTE — PATIENT INSTRUCTIONS
Please check your BP at home (2-3 x a week).  Please decrease labetalol to 200 mg twice daily for 2 weeks.  If your BP remains below 130/80, you can decrease the labetalol to 100 mg (1/2 tablet) twice daily, for  2 weeks and stop it completely if your BP still meets those parameters.  I will plan on seeing you in 4-5 months

## 2022-05-12 NOTE — PROGRESS NOTES
Nephrology Clinic    Gabriele Rodriguez MRN:[id number] YOB: 1985  Date of Service: 05/12/2022  Primary care provider: Shana Sharif  Requesting physician: Rob Nieves       REASON FOR CONSULT: Proteinuria and hypertension in pregnancy    HISTORY OF PRESENT ILLNESS:   Gabriele Rodriguez is a 34 year old female who presents for evaluation of proteinuria and hypertension in pregnancy.     Ms Rodriguez is currently 15 weeks pregnant, on her first pregnancy.  She reports feeling fair other than for some am nausea, without vomiting.  The nausea is is getting better.  She has had HTN for at least 4 years, and was first diagnosed during Med School. She does not recall exactly what work up for HTN she has had while she was at [facility]. She does not recall undergoing a renal ultrasound.  (She does not currently have her med records).  She was initially treated with metoprolol, then switched to nifedipine which was stopped because of L Ext swelling.  She was then switched to lisinopril + hydrochlorothiazide.  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 She has since been switched to labetalol.  On review of systems, she reports occasional mild epistaxis, but the ROS is otherwise negative for symptoms of systemic disease.  She has had HgbA1c testing in the past which were reportedly normal    Interestingly, the first UPCR was obtained after she drinking a large volume of water.      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  She is now 15w3d into her pregnancy. Reports that morning sickness is resolved.   She is not checking her BP at home.  Her last check was on 6/9/2020.  Reports no acute complaints.     October 1, 2020  Qi is now 29w3d into her first pregnancy.   She has been feeling well. Sleeping OK, although is feeling a bit tired sometimes.  Denies L Ext swelling.  No rash, oral ulcers, joint pains.  No SOB or CP  She had  glucosuria on the last UA, but she gave the urine sample shortly after taking the dose of Glucose for the GTT.    2022  Dr Rodriguez's first pregnancy in  was carried to 37 w, and she underwent an emergent c section then.  Unfortunately the baby had a complicated course with severe brain damage and  within the first 2 weeks of life.   Dr Rodriguez is now s/p delivery with repeat c/s on 3/24, with a readmission from 4/3- for pre-eclampsia with severe features.  She is seen today urgently because of significant loss of GFR.  The  and delivery at 37 weeks were elective.  She delivered a healthy baby girl, named Ceci, who is thriving and growing well.    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  She noted significant increase in BP to 150->170/100->110 on 4/3 which prompted the  Admission.  She had some HA, but in retrospect, feels that this was relatively mild and probably related to sinus pain.  On admission, she was treated with IV magnesium and labetalol.   She was prescribed hydrochlorothiazide upon discharge, but this was stopped after 2-3 days because of a small rise in Cr.  The labetalol was then increased to 300 mg bid.  Her BP was under acceptable control on 22 at 133/88.  She states that her BP at home is generally around 145/95 in the evening.  On ROS,   She denies HA, Blurred vision. NO SOB, Cough, WATERS, CP, GI symptoms.  She feels that she has no swelling and is back to her baseline from that stand point.   She is getting some rest.  Her  has returned to work.  Her mother is at home helping with the baby.  She is breastfeeding, through a combination of pumping and breastfeeding.      May 12, 2022  Subjective L Ext swelling at the end of day.  No  symptoms.   No GI sptms, SOB.   Ceci is doing well.      PAST MEDICAL HISTORY:  Reviewed with the patient  Past Medical History:   Diagnosis Date     ADHD       Anxiety      Chronic sinusitis      Depressive disorder     also anxiety, on and off since high school, has tried prozac and lexapro, wellbutrin, now off meds, last on meds      History of  section      History of  death      Hypertension     diagnosed in med school, 2016     Hypertension in pregnancy, preeclampsia, severe, delivered/postpartum      Varicella     as a child     PAST SURGICAL HISTORY:  Past Surgical History:   Procedure Laterality Date     BIOPSY NAIL (LOCATION)      left hand, 4th digit      SECTION N/A 2020    Procedure:  SECTION;  Surgeon: Jojo Vaz MD;  Location: [facility] L[facility]      SECTION N/A 3/24/2022    Procedure: REPEAT  SECTION;  Surgeon: Giovanna Osorio MD;  Location: [facility] L[facility]     MEDICATIONS:  Current Outpatient Medications   Medication Sig Dispense Refill     labetalol (NORMODYNE) 200 MG tablet Take 1.5 tablets (300 mg) by mouth 2 times daily       methylphenidate (RITALIN) 5 MG tablet Take 1 tablet (5 mg) by mouth 2 times daily 60 tablet 0     NIFEdipine ER OSMOTIC (PROCARDIA XL) 30 MG 24 hr tablet Take 1 tablet (30 mg) by mouth in the morning. 90 tablet 3     sertraline (ZOLOFT) 50 MG tablet Take 1 tablet (50 mg) by mouth daily (Patient not taking: No sig reported) 90 tablet 1     ALLERGIES:    Allergies   Allergen Reactions     Nickel Itching     REVIEW OF SYSTEMS:  Review Of Systems  A comprehensive review of systems was performed and found to be negative except as described here or above.  SOCIAL HISTORY:   Social History     Socioeconomic History     Marital status:      Spouse name: Pedro     Number of children: Not on file     Years of education: Not on file     Highest education level: Not on file   Occupational History     Occupation: Medical resident     Comment: pathology   Tobacco Use     Smoking status: Never Smoker     Smokeless tobacco: Never Used   Vaping Use     Vaping Use: Never used   Substance and  Sexual Activity     Alcohol use: Yes     Comment: 1-2 a two months     Drug use: Never     Sexual activity: Not Currently     Partners: Male     Birth control/protection: None   Other Topics Concern     Parent/sibling w/ CABG, MI or angioplasty before 65F 55M? 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         demise 2020        Chief Resident      Social Determinants of Health     Financial Resource Strain: Not on file   Food Insecurity: Not on file   Transportation Needs: Not on file   Physical Activity: Not on file   Stress: Not on file   Social Connections: Not on file   Intimate Partner Violence: Not on file   Housing Stability: Not on file     FAMILY MEDICAL HISTORY:   Family History   Problem Relation Age of Onset     Hypertension Mother      Breast Cancer Mother         ductal stage 2, HER-2 negative, (+) estrogen/progesterone, BRCA negative     Skin Cancer Mother         basal cell     No Known Problems Father      No Known Problems Sister      Pancreatic Cancer Maternal Grandfather      Brain Cancer Paternal Aunt      Melanoma No family hx of      PHYSICAL EXAM:   LMP 2021    LMP 2021   /76   Pulse 82   Wt 76.6 kg (168 lb 12.8 oz)   LMP 2021   SpO2 95%   BMI 29.90 kg/m    GENERAL APPEARANCE: alert and no distress  EYES: nonicteric  OP mucosa pink without lesions  RESP: Lungs clear bilat  COR: RRR, no r/g/m  Abd: S, NT  Ext: no edema   SKIN: no facial rash  NEURO: mentation intact and speech normal.  Gait normal  PSYCH: affect normal/bright   LABS:   Recent Results (from the past 1008 hour(s))   INR    Collection Time: 22 11:29 PM   Result Value Ref Range    INR 0.96 0.86 - 1.14   Partial thromboplastin time    Collection Time: 22 11:29 PM   Result Value Ref Range    aPTT 34 22 - 38 Seconds   Comprehensive metabolic panel    Collection Time: 22 11:29 PM   Result Value Ref Range    Sodium 141 133 - 144 mmol/L    Potassium 4.0  3.4 - 5.3 mmol/L    Chloride 111 (H) 94 - 109 mmol/L    Carbon Dioxide (CO2) 21 20 - 32 mmol/L    Anion Gap 9 3 - 14 mmol/L    Urea Nitrogen 19 7 - 30 mg/dL    Creatinine 1.07 (H) 0.52 - 1.04 mg/dL    Calcium 8.4 (L) 8.5 - 10.1 mg/dL    Glucose 94 70 - 99 mg/dL    Alkaline Phosphatase 90 40 - 150 U/L    AST 19 0 - 45 U/L    ALT 16 0 - 50 U/L    Protein Total 6.5 (L) 6.8 - 8.8 g/dL    Albumin 3.0 (L) 3.4 - 5.0 g/dL    Bilirubin Total 0.2 0.2 - 1.3 mg/dL    GFR Estimate 70 >60 mL/min/1.73m2   CBC with platelets and differential    Collection Time: 04/03/22 11:29 PM   Result Value Ref Range    WBC Count 8.6 4.0 - 11.0 10e3/uL    RBC Count 3.81 3.80 - 5.20 10e6/uL    Hemoglobin 11.3 (L) 11.7 - 15.7 g/dL    Hematocrit 34.4 (L) 35.0 - 47.0 %    MCV 90 78 - 100 fL    MCH 29.7 26.5 - 33.0 pg    MCHC 32.8 31.5 - 36.5 g/dL    RDW 11.9 10.0 - 15.0 %    Platelet Count 359 150 - 450 10e3/uL    % Neutrophils 52 %    % Lymphocytes 39 %    % Monocytes 5 %    % Eosinophils 2 %    % Basophils 1 %    % Immature Granulocytes 1 %    NRBCs per 100 WBC 0 <1 /100    Absolute Neutrophils 4.5 1.6 - 8.3 10e3/uL    Absolute Lymphocytes 3.3 0.8 - 5.3 10e3/uL    Absolute Monocytes 0.5 0.0 - 1.3 10e3/uL    Absolute Eosinophils 0.2 0.0 - 0.7 10e3/uL    Absolute Basophils 0.0 0.0 - 0.2 10e3/uL    Absolute Immature Granulocytes 0.0 <=0.4 10e3/uL    Absolute NRBCs 0.0 10e3/uL   Adult Type and Screen    Collection Time: 04/03/22 11:56 PM   Result Value Ref Range    ABO/RH(D) O POS     Antibody Screen Negative Negative    SPECIMEN EXPIRATION DATE [id number]    Protein  random urine    Collection Time: 04/04/22  1:13 AM   Result Value Ref Range    Total Protein Random Urine g/L <0.05 g/L    Total Protein Urine g/gr Creatinine      Creatinine Urine mg/dL 20 mg/dL   UA reflex to Microscopic    Collection Time: 04/04/22  1:13 AM   Result Value Ref Range    Color Urine Straw Colorless, Straw, Light Yellow, Yellow    Appearance Urine Slightly Cloudy (A)  Clear    Glucose Urine Negative Negative mg/dL    Bilirubin Urine Negative Negative    Ketones Urine Negative Negative mg/dL    Specific Gravity Urine 1.007 1.003 - 1.035    Blood Urine Negative Negative    pH Urine 6.5 5.0 - 7.0    Protein Albumin Urine Negative Negative mg/dL    Urobilinogen Urine Normal Normal, 2.0 mg/dL    Nitrite Urine Negative Negative    Leukocyte Esterase Urine Moderate (A) Negative    Bacteria Urine Moderate (A) None Seen /HPF    RBC Urine 3 (H) <=2 /HPF    WBC Urine 16 (H) <=5 /HPF    Squamous Epithelials Urine 14 (H) <=1 /HPF    Transitional Epithelials Urine <1 <=1 /HPF   Asymptomatic COVID-19 Virus (Coronavirus) by PCR Nasopharyngeal    Collection Time: 04/04/22  1:13 AM    Specimen: Nasopharyngeal; Swab   Result Value Ref Range    SARS CoV2 PCR Negative Negative   Magnesium    Collection Time: 04/04/22  7:27 AM   Result Value Ref Range    Magnesium 7.5 (HH) 1.6 - 2.3 mg/dL   Creatinine    Collection Time: 04/04/22  7:27 AM   Result Value Ref Range    Creatinine 1.04 0.52 - 1.04 mg/dL    GFR Estimate 72 >60 mL/min/1.73m2   Extra Purple Top Tube    Collection Time: 04/04/22  7:27 AM   Result Value Ref Range    Hold Specimen JIC    Magnesium    Collection Time: 04/04/22  1:59 PM   Result Value Ref Range    Magnesium 9.3 (HH) 1.6 - 2.3 mg/dL   Creatinine    Collection Time: 04/04/22  1:59 PM   Result Value Ref Range    Creatinine 1.06 (H) 0.52 - 1.04 mg/dL    GFR Estimate 70 >60 mL/min/1.73m2   Creatinine    Collection Time: 04/04/22  8:18 PM   Result Value Ref Range    Creatinine 1.07 (H) 0.52 - 1.04 mg/dL    GFR Estimate 70 >60 mL/min/1.73m2   Magnesium    Collection Time: 04/04/22  8:18 PM   Result Value Ref Range    Magnesium 6.6 (H) 1.6 - 2.3 mg/dL   Creatinine    Collection Time: 04/05/22 12:30 AM   Result Value Ref Range    Creatinine 1.11 (H) 0.52 - 1.04 mg/dL    GFR Estimate 67 >60 mL/min/1.73m2   Magnesium    Collection Time: 04/05/22 12:30 AM   Result Value Ref Range     Magnesium 5.0 (H) 1.6 - 2.3 mg/dL   Creatinine    Collection Time: 04/05/22  7:50 AM   Result Value Ref Range    Creatinine 1.14 (H) 0.52 - 1.04 mg/dL    GFR Estimate 64 >60 mL/min/1.73m2   Magnesium    Collection Time: 04/05/22  7:50 AM   Result Value Ref Range    Magnesium 4.0 (H) 1.6 - 2.3 mg/dL   Magnesium    Collection Time: 04/05/22 12:08 PM   Result Value Ref Range    Magnesium 3.8 (H) 1.6 - 2.3 mg/dL   Creatinine    Collection Time: 04/05/22 12:08 PM   Result Value Ref Range    Creatinine 1.07 (H) 0.52 - 1.04 mg/dL    GFR Estimate 70 >60 mL/min/1.73m2   CBC with Platelets    Collection Time: 04/06/22  3:28 PM   Result Value Ref Range    WBC Count 9.9 4.0 - 11.0 10e3/uL    RBC Count 4.64 3.80 - 5.20 10e6/uL    Hemoglobin 13.5 11.7 - 15.7 g/dL    Hematocrit 41.9 35.0 - 47.0 %    MCV 90 78 - 100 fL    MCH 29.1 26.5 - 33.0 pg    MCHC 32.2 31.5 - 36.5 g/dL    RDW 11.9 10.0 - 15.0 %    Platelet Count 454 (H) 150 - 450 10e3/uL   AST    Collection Time: 04/06/22  3:28 PM   Result Value Ref Range    AST 17 0 - 45 U/L   ALT    Collection Time: 04/06/22  3:28 PM   Result Value Ref Range    ALT 20 0 - 50 U/L   Creatinine    Collection Time: 04/06/22  3:28 PM   Result Value Ref Range    Creatinine 1.21 (H) 0.52 - 1.04 mg/dL    GFR Estimate 60 (L) >60 mL/min/1.73m2   Creatinine    Collection Time: 04/07/22 11:12 AM   Result Value Ref Range    Creatinine 1.19 (H) 0.52 - 1.04 mg/dL    GFR Estimate 61 >60 mL/min/1.73m2   Sodium    Collection Time: 04/07/22 11:12 AM   Result Value Ref Range    Sodium 137 133 - 144 mmol/L   Protein  random urine    Collection Time: 04/07/22 11:15 AM   Result Value Ref Range    Total Protein Random Urine g/L 0.09 g/L    Total Protein Urine g/gr Creatinine 0.17 0.00 - 0.20 g/g Cr    Creatinine Urine mg/dL 53 mg/dL   Fractional Excretion of Sodium    Collection Time: 04/07/22 11:15 AM   Result Value Ref Range    %FENA 1.2 %    Sodium Urine mmol/L 71 mmol/L    Creatinine Urine mg/dL 53 mg/dL    Creatinine    Collection Time: 04/11/22  1:25 PM   Result Value Ref Range    Creatinine 1.20 (H) 0.52 - 1.04 mg/dL    GFR Estimate 61 >60 mL/min/1.73m2   UA with Microscopic reflex to Culture    Collection Time: 04/14/22  3:34 PM    Specimen: Urine, Midstream   Result Value Ref Range    Color Urine Light Yellow Colorless, Straw, Light Yellow, Yellow    Appearance Urine Clear Clear    Glucose Urine Negative Negative mg/dL    Bilirubin Urine Negative Negative    Ketones Urine Negative Negative mg/dL    Specific Gravity Urine 1.020 1.003 - 1.035    Blood Urine Large (A) Negative    pH Urine 5.5 5.0 - 7.0    Protein Albumin Urine 10  (A) Negative mg/dL    Urobilinogen Urine Normal Normal, 2.0 mg/dL    Nitrite Urine Negative Negative    Leukocyte Esterase Urine Large (A) Negative    Bacteria Urine Few (A) None Seen /HPF    Mucus Urine Present (A) None Seen /LPF    RBC Urine 38 (H) <=2 /HPF    WBC Urine 68 (H) <=5 /HPF    Squamous Epithelials Urine 1 <=1 /HPF   Protein  random urine    Collection Time: 04/14/22  3:34 PM   Result Value Ref Range    Total Protein Random Urine g/L 0.17 g/L    Total Protein Urine g/gr Creatinine 0.10 0.00 - 0.20 g/g Cr    Creatinine Urine mg/dL 174 mg/dL   Urine Culture    Collection Time: 04/14/22  3:34 PM    Specimen: Urine, Midstream   Result Value Ref Range    Culture No Growth    Creatinine    Collection Time: 05/11/22  2:06 PM   Result Value Ref Range    Creatinine 0.98 0.52 - 1.04 mg/dL    GFR Estimate 77 >60 mL/min/1.73m2     CMP  Recent Labs   Lab Test 05/11/22  1406 04/11/22  1325 04/07/22  1112 04/06/22  1528 04/05/22  1208 04/05/22  0750 04/05/22  0030 04/04/22  2018 04/04/22  0727 04/03/22  2329 03/25/22  0644 03/24/22  1742 09/22/21  0920 11/24/20  1830 09/23/20  0850 05/13/20  1632 07/23/19  1509 09/14/18  1609   NA  --   --  137  --   --   --   --   --   --  141  --   --   --   --  137  --  137 140   POTASSIUM  --   --   --   --   --   --   --   --   --  4.0  --   --   --    --  3.4  --  3.6 3.8   CHLORIDE  --   --   --   --   --   --   --   --   --  111*  --   --   --   --  107  --  104 108   CO2  --   --   --   --   --   --   --   --   --  21  --   --   --   --  21  --  27 24   ANIONGAP  --   --   --   --   --   --   --   --   --  9  --   --   --   --  9  --  5 9   GLC  --   --   --   --   --   --   --   --   --  94  --   --   --   --  160*  --  85 104*   BUN  --   --   --   --   --   --   --   --   --  19  --   --   --   --  6*  --  10 14   CR 0.98 1.20* 1.19* 1.21* 1.07* 1.14* 1.11* 1.07*   < > 1.07* 0.60 0.50*   < > 0.48* 0.53 0.56 0.83 0.90   GFRESTIMATED 77 61 61 60* 70 64 67 70   < > 70 >90 >90   < > >90 >90 >90 >90 73   GFRESTBLACK  --   --   --   --   --   --   --   --   --   --   --   --   --  >90 >90 >90 >90 89   COLT  --   --   --   --   --   --   --   --   --  8.4*  --   --   --   --  8.4*  --  9.6 8.8   MAG  --   --   --   --  3.8* 4.0* 5.0* 6.6*   < >  --   --   --   --   --   --   --   --   --    PHOS  --   --   --   --   --   --   --   --   --   --   --   --   --   --  2.8  --   --   --    PROTTOTAL  --   --   --   --   --   --   --   --   --  6.5*  --   --   --   --   --   --  8.3 7.6   ALBUMIN  --   --   --   --   --   --   --   --   --  3.0*  --   --   --   --  2.7*  --  4.5 4.1   BILITOTAL  --   --   --   --   --   --   --   --   --  0.2  --   --   --   --   --   --  0.7 0.4   ALKPHOS  --   --   --   --   --   --   --   --   --  90  --   --   --   --   --   --  71 75   AST  --   --   --  17  --   --   --   --   --  19 19 13   < > 13  --  22 18 17   ALT  --   --   --  20  --   --   --   --   --  16 7 <6   < > 13  --  32 22 18    < > = values in this interval not displayed.     CBC  Recent Labs   Lab Test 04/06/22  1528 04/03/22  2329 03/25/22  0644 03/24/22  1742 03/23/22  1006   HGB 13.5 11.3* 10.5* 11.2* 11.2*   WBC 9.9 8.6  --  19.0* 13.4*   RBC 4.64 3.81  --  3.73* 3.78*   HCT 41.9 34.4*  --  34.0* 34.5*   MCV 90 90  --  91 91   MCH 29.1 29.7  --  30.0 29.6    MCHC 32.2 32.8  --  32.9 32.5   RDW 11.9 11.9  --  11.9 12.4   * 359 237 237 251     INR  Recent Labs   Lab Test 04/03/22  2329   INR 0.96   PTT 34     ABGNo lab results found.   URINE STUDIES  Recent Labs   Lab Test 04/14/22  1534 04/04/22  0113 09/23/20  0904 08/24/20  1430   COLOR Light Yellow Straw Yellow Yellow   APPEARANCE Clear Slightly Cloudy* Slightly Cloudy Slightly Cloudy   URINEGLC Negative Negative Negative Negative   URINEBILI Negative Negative Negative Negative   URINEKETONE Negative Negative Negative Negative   SG 1.020 1.007 1.013 1.021   UBLD Large* Negative Negative Trace*   URINEPH 5.5 6.5 7.0 5.5   PROTEIN 10 * Negative Negative Negative   NITRITE Negative Negative Negative Negative   LEUKEST Large* Moderate* Small* Large*   RBCU 38* 3* 2 2   WBCU 68* 16* 4 6*     Recent Labs   Lab Test 04/14/22  1534 04/07/22  1115 03/25/22  0224 01/21/22  1611 09/22/21  0921 10/21/20  1515 09/23/20  0904 08/24/20  1430 06/08/20  0620 05/15/20  1201 05/13/20  1629   UTPG 0.10 0.17 0.63* 0.19 0.10 0.24* 0.21* 0.11 0.08 0.08 1.16*     EXAMINATION: US RENAL COMPLETE, 6/23/2020 12:49 PM      COMPARISON: None.     HISTORY: History of hypertension and young woman. Evaluate kidney size  and echogenicity.     FINDINGS:     Right kidney: Measures 10.9 cm cm in length. Parenchyma is of normal  thickness and echogenicity. No focal mass. No hydronephrosis.     Left kidney: Measures 11.5 cm cm in length. Parenchyma is of normal  thickness and echogenicity. No focal mass. No hydronephrosis.      Bladder: Decompressed.     Incidentally noted mildly increased echogenicity within the liver.  Anechoic avascular cystic-appearing lesion within the spleen measuring  1.2 x 1.0 x 1.0 cm. Single intrauterine pregnancy with a heart rate of  157 bpm.                                                                      IMPRESSION:  1.  Normal renal ultrasound.  2.  Incidentally noted  increased echogenicity throughout the  liver  which is suggestive of hepatic steatosis.  3.  Single living intrauterine pregnancy.     I have personally reviewed the examination and initial interpretation  and I agree with the findings.     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  She is concerned that the Cr remains elevated, and her BP is still a little high.  She is asymptomatic.  I am reassured that the last measure shows a re-normalization of proteinuria.    Her Cr is now stable x 3.     She is about 3 weeks from delivery.    I discussed with her that this is still in the time frame of recovery from preeclampsia.  The literature refers to up to 12 weeks for considering renormalization of BP.  With respect to return to normal/baseline GFR, the data is less readily available with respect to the expected time frame.  A [place] study from 2008 reports return to a GFR comparable to non-preeclampsia control patients by 4 weeks.  However, patients in that study had a lesser degree of kidney impairement than experienced by Dr Rodriguez (Arpita LOZANO, et al. Course of preeclamptic glomerular injury after delivery. Am J Physiol Renal Physiol. 2008 Mar;294(3):F614-20.  PMID: 66078718.).  Other cohort studies (not from [place] allude that some residual loss of GFR may persist in a substantial % of patients.  At this point, I think it is still early to assess whether there will be permanent damage.  The short term focus will be on optimizing BP control.     We will continue to monitor UPCR and renal function.       May 12, 2022  Review of the creatinines over the last 4 years suggest that Qi's baseline creatinine is about 0.85.   The low Cr values in 2020 and early 2022 were taken during pregnancy - and reflect the normal pattern of increased GFR in pregnancy.   Based on this, Qi's Cr is now back close to her  pre-pregnancy values.   These numbers were reviewed with Dr Rodriguez    2- HTN.  BP under very good control with the added nifedipine ER 30 mg daily.   She is to check BP at home and titrate down the labetalol as long as BP is =< 130/80.   She would like to be back on a once daily regimen       We agreed to a follow up appointment in ~4-5 months, at which time she may have stopped breastfeeding and we can readjust her meds     Jerome Vargas MD  Division of Renal Disease and Hypertension  May 12, 2022

## 2022-05-12 NOTE — LETTER
5/12/2022       RE: Gabriele Rodriguez  [address] [facility] [id number]     Dear Colleague,    Thank you for referring your patient, Gabriele Rodriguez, to the [facility] NEPHROLOGY [facility] [place] at [facility]. Please see a copy of my visit note below.    Nephrology Clinic    Gabriele Rodriguez MRN:[id number] YOB: 1985  Date of Service: 05/12/2022  Primary care provider: Shana Sharif  Requesting physician: Rob Nieves       REASON FOR CONSULT: Proteinuria and hypertension in pregnancy    HISTORY OF PRESENT ILLNESS:   Gabriele Rodriguez is a 34 year old female who presents for evaluation of proteinuria and hypertension in pregnancy.     Ms Rodriguez is currently 15 weeks pregnant, on her first pregnancy.  She reports feeling fair other than for some am nausea, without vomiting.  The nausea is is getting better.  She has had HTN for at least 4 years, and was first diagnosed during Med School. She does not recall exactly what work up for HTN she has had while she was at [facility]. She does not recall undergoing a renal ultrasound.  (She does not currently have her med records).  She was initially treated with metoprolol, then switched to nifedipine which was stopped because of L Ext swelling.  She was then switched to lisinopril + hydrochlorothiazide.  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 She has since been switched to labetalol.  On review of systems, she reports occasional mild epistaxis, but the ROS is otherwise negative for symptoms of systemic disease.  She has had HgbA1c testing in the past which were reportedly normal    Interestingly, the first UPCR was obtained after she drinking a large volume of water.      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  She is now 15w3d into her pregnancy. Reports that morning sickness is resolved.   She is not  checking her BP at home.  Her last check was on 2020.  Reports no acute complaints.     2020  Qi is now 29w3d into her first pregnancy.   She has been feeling well. Sleeping OK, although is feeling a bit tired sometimes.  Denies L Ext swelling.  No rash, oral ulcers, joint pains.  No SOB or CP  She had glucosuria on the last UA, but she gave the urine sample shortly after taking the dose of Glucose for the GTT.    2022   Rodriguez's first pregnancy in  was carried to 37 w, and she underwent an emergent c section then.  Unfortunately the baby had a complicated course with severe brain damage and  within the first 2 weeks of life.    Michael is now s/p delivery with repeat c/s on 3/24, with a readmission from 4/3- for pre-eclampsia with severe features.  She is seen today urgently because of significant loss of GFR.  The  and delivery at 37 weeks were elective.  She delivered a healthy baby girl, named Ceci, who is thriving and growing well.    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  She noted significant increase in BP to 150->170/100->110 on 4/3 which prompted the  Admission.  She had some HA, but in retrospect, feels that this was relatively mild and probably related to sinus pain.  On admission, she was treated with IV magnesium and labetalol.   She was prescribed hydrochlorothiazide upon discharge, but this was stopped after 2-3 days because of a small rise in Cr.  The labetalol was then increased to 300 mg bid.  Her BP was under acceptable control on 22 at 133/88.  She states that her BP at home is generally around 145/95 in the evening.  On ROS,   She denies HA, Blurred vision. NO SOB, Cough, WATERS, CP, GI symptoms.  She feels that she has no swelling and is back to her baseline from that stand point.   She is getting some rest.  Her  has returned to work.  Her mother is at home helping with  the baby.  She is breastfeeding, through a combination of pumping and breastfeeding.      May 12, 2022  Subjective L Ext swelling at the end of day.  No  symptoms.   No GI sptms, SOB.   Ceci is doing well.      PAST MEDICAL HISTORY:  Reviewed with the patient  Past Medical History:   Diagnosis Date     ADHD      Anxiety      Chronic sinusitis      Depressive disorder     also anxiety, on and off since high school, has tried prozac and lexapro, wellbutrin, now off meds, last on meds      History of  section      History of  death      Hypertension     diagnosed in med school, 2016     Hypertension in pregnancy, preeclampsia, severe, delivered/postpartum      Varicella     as a child     PAST SURGICAL HISTORY:  Past Surgical History:   Procedure Laterality Date     BIOPSY NAIL (LOCATION)      left hand, 4th digit      SECTION N/A 2020    Procedure:  SECTION;  Surgeon: Jojo Vaz MD;  Location: [facility] L+D      SECTION N/A 3/24/2022    Procedure: REPEAT  SECTION;  Surgeon: Giovanna Osorio MD;  Location: [facility] L+D     MEDICATIONS:  Current Outpatient Medications   Medication Sig Dispense Refill     labetalol (NORMODYNE) 200 MG tablet Take 1.5 tablets (300 mg) by mouth 2 times daily       methylphenidate (RITALIN) 5 MG tablet Take 1 tablet (5 mg) by mouth 2 times daily 60 tablet 0     NIFEdipine ER OSMOTIC (PROCARDIA XL) 30 MG 24 hr tablet Take 1 tablet (30 mg) by mouth in the morning. 90 tablet 3     sertraline (ZOLOFT) 50 MG tablet Take 1 tablet (50 mg) by mouth daily (Patient not taking: No sig reported) 90 tablet 1     ALLERGIES:    Allergies   Allergen Reactions     Nickel Itching     REVIEW OF SYSTEMS:  Review Of Systems  A comprehensive review of systems was performed and found to be negative except as described here or above.  SOCIAL HISTORY:   Social History     Socioeconomic History     Marital status:      Spouse name: Pedro      Number of children: Not on file     Years of education: Not on file     Highest education level: Not on file   Occupational History     Occupation: Medical resident     Comment: pathology   Tobacco Use     Smoking status: Never Smoker     Smokeless tobacco: Never Used   Vaping Use     Vaping Use: Never used   Substance and Sexual Activity     Alcohol use: Yes     Comment: 1-2 a two months     Drug use: Never     Sexual activity: Not Currently     Partners: Male     Birth control/protection: None   Other Topics Concern     Parent/sibling w/ CABG, MI or angioplasty before 65F 55M? 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         demise 2020        Chief Resident      Social Determinants of Health     Financial Resource Strain: Not on file   Food Insecurity: Not on file   Transportation Needs: Not on file   Physical Activity: Not on file   Stress: Not on file   Social Connections: Not on file   Intimate Partner Violence: Not on file   Housing Stability: Not on file     FAMILY MEDICAL HISTORY:   Family History   Problem Relation Age of Onset     Hypertension Mother      Breast Cancer Mother         ductal stage 2, HER-2 negative, (+) estrogen/progesterone, BRCA negative     Skin Cancer Mother         basal cell     No Known Problems Father      No Known Problems Sister      Pancreatic Cancer Maternal Grandfather      Brain Cancer Paternal Aunt      Melanoma No family hx of      PHYSICAL EXAM:   LMP 2021    LMP 2021   /76   Pulse 82   Wt 76.6 kg (168 lb 12.8 oz)   LMP 2021   SpO2 95%   BMI 29.90 kg/m    GENERAL APPEARANCE: alert and no distress  EYES: nonicteric  OP mucosa pink without lesions  RESP: Lungs clear bilat  COR: RRR, no r/g/m  Abd: S, NT  Ext: no edema   SKIN: no facial rash  NEURO: mentation intact and speech normal.  Gait normal  PSYCH: affect normal/bright   LABS:   Recent Results (from the past 1008 hour(s))   INR    Collection Time:  04/03/22 11:29 PM   Result Value Ref Range    INR 0.96 0.86 - 1.14   Partial thromboplastin time    Collection Time: 04/03/22 11:29 PM   Result Value Ref Range    aPTT 34 22 - 38 Seconds   Comprehensive metabolic panel    Collection Time: 04/03/22 11:29 PM   Result Value Ref Range    Sodium 141 133 - 144 mmol/L    Potassium 4.0 3.4 - 5.3 mmol/L    Chloride 111 (H) 94 - 109 mmol/L    Carbon Dioxide (CO2) 21 20 - 32 mmol/L    Anion Gap 9 3 - 14 mmol/L    Urea Nitrogen 19 7 - 30 mg/dL    Creatinine 1.07 (H) 0.52 - 1.04 mg/dL    Calcium 8.4 (L) 8.5 - 10.1 mg/dL    Glucose 94 70 - 99 mg/dL    Alkaline Phosphatase 90 40 - 150 U/L    AST 19 0 - 45 U/L    ALT 16 0 - 50 U/L    Protein Total 6.5 (L) 6.8 - 8.8 g/dL    Albumin 3.0 (L) 3.4 - 5.0 g/dL    Bilirubin Total 0.2 0.2 - 1.3 mg/dL    GFR Estimate 70 >60 mL/min/1.73m2   CBC with platelets and differential    Collection Time: 04/03/22 11:29 PM   Result Value Ref Range    WBC Count 8.6 4.0 - 11.0 10e3/uL    RBC Count 3.81 3.80 - 5.20 10e6/uL    Hemoglobin 11.3 (L) 11.7 - 15.7 g/dL    Hematocrit 34.4 (L) 35.0 - 47.0 %    MCV 90 78 - 100 fL    MCH 29.7 26.5 - 33.0 pg    MCHC 32.8 31.5 - 36.5 g/dL    RDW 11.9 10.0 - 15.0 %    Platelet Count 359 150 - 450 10e3/uL    % Neutrophils 52 %    % Lymphocytes 39 %    % Monocytes 5 %    % Eosinophils 2 %    % Basophils 1 %    % Immature Granulocytes 1 %    NRBCs per 100 WBC 0 <1 /100    Absolute Neutrophils 4.5 1.6 - 8.3 10e3/uL    Absolute Lymphocytes 3.3 0.8 - 5.3 10e3/uL    Absolute Monocytes 0.5 0.0 - 1.3 10e3/uL    Absolute Eosinophils 0.2 0.0 - 0.7 10e3/uL    Absolute Basophils 0.0 0.0 - 0.2 10e3/uL    Absolute Immature Granulocytes 0.0 <=0.4 10e3/uL    Absolute NRBCs 0.0 10e3/uL   Adult Type and Screen    Collection Time: 04/03/22 11:56 PM   Result Value Ref Range    ABO/RH(D) O POS     Antibody Screen Negative Negative    SPECIMEN EXPIRATION DATE [id number]    Protein  random urine    Collection Time: 04/04/22  1:13 AM    Result Value Ref Range    Total Protein Random Urine g/L <0.05 g/L    Total Protein Urine g/gr Creatinine      Creatinine Urine mg/dL 20 mg/dL   UA reflex to Microscopic    Collection Time: 04/04/22  1:13 AM   Result Value Ref Range    Color Urine Straw Colorless, Straw, Light Yellow, Yellow    Appearance Urine Slightly Cloudy (A) Clear    Glucose Urine Negative Negative mg/dL    Bilirubin Urine Negative Negative    Ketones Urine Negative Negative mg/dL    Specific Gravity Urine 1.007 1.003 - 1.035    Blood Urine Negative Negative    pH Urine 6.5 5.0 - 7.0    Protein Albumin Urine Negative Negative mg/dL    Urobilinogen Urine Normal Normal, 2.0 mg/dL    Nitrite Urine Negative Negative    Leukocyte Esterase Urine Moderate (A) Negative    Bacteria Urine Moderate (A) None Seen /HPF    RBC Urine 3 (H) <=2 /HPF    WBC Urine 16 (H) <=5 /HPF    Squamous Epithelials Urine 14 (H) <=1 /HPF    Transitional Epithelials Urine <1 <=1 /HPF   Asymptomatic COVID-19 Virus (Coronavirus) by PCR Nasopharyngeal    Collection Time: 04/04/22  1:13 AM    Specimen: Nasopharyngeal; Swab   Result Value Ref Range    SARS CoV2 PCR Negative Negative   Magnesium    Collection Time: 04/04/22  7:27 AM   Result Value Ref Range    Magnesium 7.5 (HH) 1.6 - 2.3 mg/dL   Creatinine    Collection Time: 04/04/22  7:27 AM   Result Value Ref Range    Creatinine 1.04 0.52 - 1.04 mg/dL    GFR Estimate 72 >60 mL/min/1.73m2   Extra Purple Top Tube    Collection Time: 04/04/22  7:27 AM   Result Value Ref Range    Hold Specimen JI    Magnesium    Collection Time: 04/04/22  1:59 PM   Result Value Ref Range    Magnesium 9.3 (HH) 1.6 - 2.3 mg/dL   Creatinine    Collection Time: 04/04/22  1:59 PM   Result Value Ref Range    Creatinine 1.06 (H) 0.52 - 1.04 mg/dL    GFR Estimate 70 >60 mL/min/1.73m2   Creatinine    Collection Time: 04/04/22  8:18 PM   Result Value Ref Range    Creatinine 1.07 (H) 0.52 - 1.04 mg/dL    GFR Estimate 70 >60 mL/min/1.73m2   Magnesium     Collection Time: 04/04/22  8:18 PM   Result Value Ref Range    Magnesium 6.6 (H) 1.6 - 2.3 mg/dL   Creatinine    Collection Time: 04/05/22 12:30 AM   Result Value Ref Range    Creatinine 1.11 (H) 0.52 - 1.04 mg/dL    GFR Estimate 67 >60 mL/min/1.73m2   Magnesium    Collection Time: 04/05/22 12:30 AM   Result Value Ref Range    Magnesium 5.0 (H) 1.6 - 2.3 mg/dL   Creatinine    Collection Time: 04/05/22  7:50 AM   Result Value Ref Range    Creatinine 1.14 (H) 0.52 - 1.04 mg/dL    GFR Estimate 64 >60 mL/min/1.73m2   Magnesium    Collection Time: 04/05/22  7:50 AM   Result Value Ref Range    Magnesium 4.0 (H) 1.6 - 2.3 mg/dL   Magnesium    Collection Time: 04/05/22 12:08 PM   Result Value Ref Range    Magnesium 3.8 (H) 1.6 - 2.3 mg/dL   Creatinine    Collection Time: 04/05/22 12:08 PM   Result Value Ref Range    Creatinine 1.07 (H) 0.52 - 1.04 mg/dL    GFR Estimate 70 >60 mL/min/1.73m2   CBC with Platelets    Collection Time: 04/06/22  3:28 PM   Result Value Ref Range    WBC Count 9.9 4.0 - 11.0 10e3/uL    RBC Count 4.64 3.80 - 5.20 10e6/uL    Hemoglobin 13.5 11.7 - 15.7 g/dL    Hematocrit 41.9 35.0 - 47.0 %    MCV 90 78 - 100 fL    MCH 29.1 26.5 - 33.0 pg    MCHC 32.2 31.5 - 36.5 g/dL    RDW 11.9 10.0 - 15.0 %    Platelet Count 454 (H) 150 - 450 10e3/uL   AST    Collection Time: 04/06/22  3:28 PM   Result Value Ref Range    AST 17 0 - 45 U/L   ALT    Collection Time: 04/06/22  3:28 PM   Result Value Ref Range    ALT 20 0 - 50 U/L   Creatinine    Collection Time: 04/06/22  3:28 PM   Result Value Ref Range    Creatinine 1.21 (H) 0.52 - 1.04 mg/dL    GFR Estimate 60 (L) >60 mL/min/1.73m2   Creatinine    Collection Time: 04/07/22 11:12 AM   Result Value Ref Range    Creatinine 1.19 (H) 0.52 - 1.04 mg/dL    GFR Estimate 61 >60 mL/min/1.73m2   Sodium    Collection Time: 04/07/22 11:12 AM   Result Value Ref Range    Sodium 137 133 - 144 mmol/L   Protein  random urine    Collection Time: 04/07/22 11:15 AM   Result Value Ref  Range    Total Protein Random Urine g/L 0.09 g/L    Total Protein Urine g/gr Creatinine 0.17 0.00 - 0.20 g/g Cr    Creatinine Urine mg/dL 53 mg/dL   Fractional Excretion of Sodium    Collection Time: 04/07/22 11:15 AM   Result Value Ref Range    %FENA 1.2 %    Sodium Urine mmol/L 71 mmol/L    Creatinine Urine mg/dL 53 mg/dL   Creatinine    Collection Time: 04/11/22  1:25 PM   Result Value Ref Range    Creatinine 1.20 (H) 0.52 - 1.04 mg/dL    GFR Estimate 61 >60 mL/min/1.73m2   UA with Microscopic reflex to Culture    Collection Time: 04/14/22  3:34 PM    Specimen: Urine, Midstream   Result Value Ref Range    Color Urine Light Yellow Colorless, Straw, Light Yellow, Yellow    Appearance Urine Clear Clear    Glucose Urine Negative Negative mg/dL    Bilirubin Urine Negative Negative    Ketones Urine Negative Negative mg/dL    Specific Gravity Urine 1.020 1.003 - 1.035    Blood Urine Large (A) Negative    pH Urine 5.5 5.0 - 7.0    Protein Albumin Urine 10  (A) Negative mg/dL    Urobilinogen Urine Normal Normal, 2.0 mg/dL    Nitrite Urine Negative Negative    Leukocyte Esterase Urine Large (A) Negative    Bacteria Urine Few (A) None Seen /HPF    Mucus Urine Present (A) None Seen /LPF    RBC Urine 38 (H) <=2 /HPF    WBC Urine 68 (H) <=5 /HPF    Squamous Epithelials Urine 1 <=1 /HPF   Protein  random urine    Collection Time: 04/14/22  3:34 PM   Result Value Ref Range    Total Protein Random Urine g/L 0.17 g/L    Total Protein Urine g/gr Creatinine 0.10 0.00 - 0.20 g/g Cr    Creatinine Urine mg/dL 174 mg/dL   Urine Culture    Collection Time: 04/14/22  3:34 PM    Specimen: Urine, Midstream   Result Value Ref Range    Culture No Growth    Creatinine    Collection Time: 05/11/22  2:06 PM   Result Value Ref Range    Creatinine 0.98 0.52 - 1.04 mg/dL    GFR Estimate 77 >60 mL/min/1.73m2     CMP  Recent Labs   Lab Test 05/11/22  1406 04/11/22  1325 04/07/22  1112 04/06/22  1528 04/05/22  1208 04/05/22  0750 04/05/22  0030  04/04/22 2018 04/04/22 0727 04/03/22  2329 03/25/22  0644 03/24/22  1742 09/22/21  0920 11/24/20  1830 09/23/20  0850 05/13/20  1632 07/23/19  1509 09/14/18  1609   NA  --   --  137  --   --   --   --   --   --  141  --   --   --   --  137  --  137 140   POTASSIUM  --   --   --   --   --   --   --   --   --  4.0  --   --   --   --  3.4  --  3.6 3.8   CHLORIDE  --   --   --   --   --   --   --   --   --  111*  --   --   --   --  107  --  104 108   CO2  --   --   --   --   --   --   --   --   --  21  --   --   --   --  21  --  27 24   ANIONGAP  --   --   --   --   --   --   --   --   --  9  --   --   --   --  9  --  5 9   GLC  --   --   --   --   --   --   --   --   --  94  --   --   --   --  160*  --  85 104*   BUN  --   --   --   --   --   --   --   --   --  19  --   --   --   --  6*  --  10 14   CR 0.98 1.20* 1.19* 1.21* 1.07* 1.14* 1.11* 1.07*   < > 1.07* 0.60 0.50*   < > 0.48* 0.53 0.56 0.83 0.90   GFRESTIMATED 77 61 61 60* 70 64 67 70   < > 70 >90 >90   < > >90 >90 >90 >90 73   GFRESTBLACK  --   --   --   --   --   --   --   --   --   --   --   --   --  >90 >90 >90 >90 89   COLT  --   --   --   --   --   --   --   --   --  8.4*  --   --   --   --  8.4*  --  9.6 8.8   MAG  --   --   --   --  3.8* 4.0* 5.0* 6.6*   < >  --   --   --   --   --   --   --   --   --    PHOS  --   --   --   --   --   --   --   --   --   --   --   --   --   --  2.8  --   --   --    PROTTOTAL  --   --   --   --   --   --   --   --   --  6.5*  --   --   --   --   --   --  8.3 7.6   ALBUMIN  --   --   --   --   --   --   --   --   --  3.0*  --   --   --   --  2.7*  --  4.5 4.1   BILITOTAL  --   --   --   --   --   --   --   --   --  0.2  --   --   --   --   --   --  0.7 0.4   ALKPHOS  --   --   --   --   --   --   --   --   --  90  --   --   --   --   --   --  71 75   AST  --   --   --  17  --   --   --   --   --  19 19 13   < > 13  --  22 18 17   ALT  --   --   --  20  --   --   --   --   --  16 7 <6   < > 13  --  32 22 18    < > = values  in this interval not displayed.     CBC  Recent Labs   Lab Test 04/06/22  1528 04/03/22  2329 03/25/22  0644 03/24/22  1742 03/23/22  1006   HGB 13.5 11.3* 10.5* 11.2* 11.2*   WBC 9.9 8.6  --  19.0* 13.4*   RBC 4.64 3.81  --  3.73* 3.78*   HCT 41.9 34.4*  --  34.0* 34.5*   MCV 90 90  --  91 91   MCH 29.1 29.7  --  30.0 29.6   MCHC 32.2 32.8  --  32.9 32.5   RDW 11.9 11.9  --  11.9 12.4   * 359 237 237 251     INR  Recent Labs   Lab Test 04/03/22  2329   INR 0.96   PTT 34     ABGNo lab results found.   URINE STUDIES  Recent Labs   Lab Test 04/14/22  1534 04/04/22  0113 09/23/20  0904 08/24/20  1430   COLOR Light Yellow Straw Yellow Yellow   APPEARANCE Clear Slightly Cloudy* Slightly Cloudy Slightly Cloudy   URINEGLC Negative Negative Negative Negative   URINEBILI Negative Negative Negative Negative   URINEKETONE Negative Negative Negative Negative   SG 1.020 1.007 1.013 1.021   UBLD Large* Negative Negative Trace*   URINEPH 5.5 6.5 7.0 5.5   PROTEIN 10 * Negative Negative Negative   NITRITE Negative Negative Negative Negative   LEUKEST Large* Moderate* Small* Large*   RBCU 38* 3* 2 2   WBCU 68* 16* 4 6*     Recent Labs   Lab Test 04/14/22  1534 04/07/22  1115 03/25/22  0224 01/21/22  1611 09/22/21  0921 10/21/20  1515 09/23/20  0904 08/24/20  1430 06/08/20  0620 05/15/20  1201 05/13/20  1629   UTPG 0.10 0.17 0.63* 0.19 0.10 0.24* 0.21* 0.11 0.08 0.08 1.16*     EXAMINATION: US RENAL COMPLETE, 6/23/2020 12:49 PM      COMPARISON: None.     HISTORY: History of hypertension and young woman. Evaluate kidney size  and echogenicity.     FINDINGS:     Right kidney: Measures 10.9 cm cm in length. Parenchyma is of normal  thickness and echogenicity. No focal mass. No hydronephrosis.     Left kidney: Measures 11.5 cm cm in length. Parenchyma is of normal  thickness and echogenicity. No focal mass. No hydronephrosis.      Bladder: Decompressed.     Incidentally noted mildly increased echogenicity within the  liver.  Anechoic avascular cystic-appearing lesion within the spleen measuring  1.2 x 1.0 x 1.0 cm. Single intrauterine pregnancy with a heart rate of  157 bpm.                                                                      IMPRESSION:  1.  Normal renal ultrasound.  2.  Incidentally noted  increased echogenicity throughout the liver  which is suggestive of hepatic steatosis.  3.  Single living intrauterine pregnancy.     I have personally reviewed the examination and initial interpretation  and I agree with the findings.     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  She is concerned that the Cr remains elevated, and her BP is still a little high.  She is asymptomatic.  I am reassured that the last measure shows a re-normalization of proteinuria.    Her Cr is now stable x 3.     She is about 3 weeks from delivery.    I discussed with her that this is still in the time frame of recovery from preeclampsia.  The literature refers to up to 12 weeks for considering renormalization of BP.  With respect to return to normal/baseline GFR, the data is less readily available with respect to the expected time frame.  A [place] study from 2008 reports return to a GFR comparable to non-preeclampsia control patients by 4 weeks.  However, patients in that study had a lesser degree of kidney impairement than experienced by Dr Rodriguez (Arpita LOZANO et al. Course of preeclamptic glomerular injury after delivery. Am J Physiol Renal Physiol. 2008 Mar;294(3):F614-20.  PMID: 78384354.).  Other cohort studies (not from [place] allude that some residual loss of GFR may persist in a substantial % of patients.  At this point, I think it is still early to assess whether there will be permanent damage.  The short term focus will be on optimizing BP control.     We will continue to monitor  UPCR and renal function.       May 12, 2022  Review of the creatinines over the last 4 years suggest that Qi's baseline creatinine is about 0.85.   The low Cr values in 2020 and early 2022 were taken during pregnancy - and reflect the normal pattern of increased GFR in pregnancy.   Based on this, Qi's Cr is now back close to her pre-pregnancy values.   These numbers were reviewed with Dr Rodriguez    2- HTN.  BP under very good control with the added nifedipine ER 30 mg daily.   She is to check BP at home and titrate down the labetalol as long as BP is =< 130/80.   She would like to be back on a once daily regimen       We agreed to a follow up appointment in ~4-5 months, at which time she may have stopped breastfeeding and we can readjust her meds     Jerome Vargas MD  Division of Renal Disease and Hypertension  May 12, 2022

## 2022-05-18 ENCOUNTER — VIRTUAL VISIT (OUTPATIENT)
Dept: PSYCHOLOGY | Facility: CLINIC | Age: 37
End: 2022-05-18
Payer: COMMERCIAL

## 2022-05-18 ENCOUNTER — MYC MEDICAL ADVICE (OUTPATIENT)
Dept: OBGYN | Facility: CLINIC | Age: 37
End: 2022-05-18
Payer: COMMERCIAL

## 2022-05-18 DIAGNOSIS — F41.1 GAD (GENERALIZED ANXIETY DISORDER): Primary | ICD-10-CM

## 2022-05-18 PROCEDURE — 90837 PSYTX W PT 60 MINUTES: CPT | Mod: 95

## 2022-05-18 NOTE — PROGRESS NOTES
"  Name:  Gabriele Rodriguez  Mrn: [id number]  Date of visit: 2022    PSYCHOLOGY OUTPATIENT VISIT NOTE       Telehealth visit appropriate in context of pandemic  Pt location = home    PRESENTING PROBLEMS/SYMPTOMS:  \"Underlying anxiety has always been issue,\" And recently triggered in postpartum (preeclampsia) focused on her own health and baby's health. Worry daily  And associated with concentration problems, irritability, tense jaw, occasional insomnia.  Social anxiety with associated perfectionistic tendencies.  Guilt.  History of  demise, Valeria  on 20.  (Ceci, \"Desiree\"  3/24/22, girl;  = Pedro).     INTERVENTION AND RESPONSE:  Cognitive Behavioral therapy, individual.  Took first exam on Mon.  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  Would have thrown me off in past.  Did take small dose ritalin.  The calmest I've ever been in exam, not distracted by other people.  One of the best test experiences I've ever had.  Allowed self to get more sleep the night before.  Discussed and reinforced progress, cognitive strategies employed.         Worked on tx plan, see EMR, to complete goals.    Had one exam and next is .    ASSESSMENT:  (mom watching baby during visit)  Future oriented. Engaged in visit.  Reinforcing her own progress.  Continuing context: First pregnancy, daughter Valeria, was induced at 37wks and had , baby had severe brain damage resulting in multiple medical and decision to withdraw life support (at approximately 2wks) and death, holding her, 20 baby .  second baby ( 3/24/22) had brief stay in NICU, postpartum preeclampsia, difficulties breast feeding (only able to pump at this time).  She notes a mental health history including ADHD, YESENIA and depression.     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 Board exam in a month, feeling not " "prepared, need to study but focused on baby.  \"Forcing\" self to study and able to do so.   Mental Status Assessment:  Appearance:   unknown  Eye Contact:   n/a  Psychomotor Behavior: unknown  Attitude:   Cooperative   Orientation:   All  Speech   Rate / Production: Normal    Volume:  Normal   Mood:    Normal in visit; reporting anxiety  Thought Content:  Clear   Thought Form:  Coherent  Logical   Insight:    Fair     DIAGNOSIS: ALVIN    PLAN:    Patient to schedule followup visit.       Total time spent equals 55 minutes, individual psychotherapy. Telephone  Start = 2:04             "

## 2022-05-19 ENCOUNTER — TELEPHONE (OUTPATIENT)
Dept: PSYCHOLOGY | Facility: CLINIC | Age: 37
End: 2022-05-19
Payer: COMMERCIAL

## 2022-05-24 ENCOUNTER — MEDICAL CORRESPONDENCE (OUTPATIENT)
Dept: HEALTH INFORMATION MANAGEMENT | Facility: CLINIC | Age: 37
End: 2022-05-24
Payer: COMMERCIAL

## 2022-05-25 ENCOUNTER — VIRTUAL VISIT (OUTPATIENT)
Dept: PSYCHOLOGY | Facility: CLINIC | Age: 37
End: 2022-05-25
Payer: COMMERCIAL

## 2022-05-25 DIAGNOSIS — F41.1 GAD (GENERALIZED ANXIETY DISORDER): Primary | ICD-10-CM

## 2022-05-25 PROCEDURE — 90837 PSYTX W PT 60 MINUTES: CPT | Mod: 95

## 2022-05-25 NOTE — PROGRESS NOTES
"  Name:  Gabriele Rodriguez  Mrn: [id number]  Date of visit: 2022    PSYCHOLOGY OUTPATIENT VISIT NOTE       Telehealth visit appropriate in context of pandemic  Pt location = home    PRESENTING PROBLEMS/SYMPTOMS:  \"Underlying anxiety has always been issue,\" And recently triggered in postpartum (preeclampsia) focused on her own health and baby's health. Worry daily  And associated with concentration problems, irritability, tense jaw, occasional insomnia.  Social anxiety with associated perfectionistic tendencies.  Guilt.  History of  demise, Valeria  on 20.  (Ceci, \"Desiree\"  3/24/22, girl;  = Pedro).     INTERVENTION AND RESPONSE:  Cognitive Behavioral therapy, individual.  Worked on goals list.  Back at work virtually.  Still trying to study for exam as time permits.  Overperforming started very young.  Getting lots of praise.  Mom is also \"an anxious person.\"  Mom's over-protectiveness of baby causes conflict with mom and FOB.  Mom \"questions\" FOB. Worked on tx goals, see EMR.   Pt stated she would prefer every other wk visits.  Plan.     Had one exam and next is .    ASSESSMENT:  (mom watching baby during visit)  Future oriented. Engaged in visit.  Recognizing patterns and goals. Continuing context: First pregnancy, daughter Valeria, was induced at 37wks and had , baby had severe brain damage resulting in multiple medical and decision to withdraw life support (at approximately 2wks) and death, holding her, 20 baby .  second baby ( 3/24/22) had brief stay in NICU, postpartum preeclampsia, difficulties breast feeding (only able to pump at this time).  She notes a mental health history including ADHD, YESENIA and depression.     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 Board exam in a month, feeling not prepared, need to study but focused on baby.  \"Forcing\" self to study and able to do so. "   Mental Status Assessment:  Appearance:   unknown  Eye Contact:   n/a  Psychomotor Behavior: unknown  Attitude:   Cooperative   Orientation:   All  Speech   Rate / Production: Normal    Volume:  Normal   Mood:    Normal in visit; reporting anxiety  Thought Content:  Clear   Thought Form:  Coherent  Logical   Insight:    Fair     DIAGNOSIS: ALVIN    PLAN:    Patient to schedule followup visit.       Total time spent equals 54 minutes, individual psychotherapy. Telephone  Start = 8:04

## 2022-06-14 ENCOUNTER — VIRTUAL VISIT (OUTPATIENT)
Dept: PSYCHOLOGY | Facility: CLINIC | Age: 37
End: 2022-06-14
Payer: COMMERCIAL

## 2022-06-14 DIAGNOSIS — F41.1 GAD (GENERALIZED ANXIETY DISORDER): Primary | ICD-10-CM

## 2022-06-14 PROCEDURE — 90834 PSYTX W PT 45 MINUTES: CPT | Mod: 95

## 2022-06-14 NOTE — PROGRESS NOTES
"  Name:  Gabriele Rodriguez  Mrn: [id number]  Date of visit: 2022    PSYCHOLOGY OUTPATIENT VISIT NOTE       Telehealth visit appropriate in context of pandemic  Pt location = at work    PRESENTING PROBLEMS/SYMPTOMS:  \"Underlying anxiety has always been issue,\" And recently triggered in postpartum (preeclampsia) focused on her own health and baby's health. Worry daily  And associated with concentration problems, irritability, tense jaw, occasional insomnia.  Social anxiety with associated perfectionistic tendencies.  Guilt.  History of  demise, Valeria  on 20.  (Ceci, \"Desiree\"  3/24/22, girl;  = Pedro).     INTERVENTION AND RESPONSE:  Cognitive Behavioral therapy, individual.  Felt less anxious with exam than compared with hx, maybe attributed to testing environment, meds, cog strategies.  Mom moved out of their home and better relationship between mom and H.  Then H OOT and Mom moving back in, worsening stress, \"we feed off of each others anxiety.\"  H can be \"relaxed\" and then resentful of him not taking things seriously.  Feeling guilty of \"not doing it on my own, mom needed to be there for help.\" drive (not external) for perfection, started as wanting to please everyone.  Ed provided.  \"good enough parenting\" concept.  But noted \"trying to be perfect\" parent, googling to find answers.  Loss of Valeria, things can go wrong at any time.  Mom now doing daily , looking to place J in  and mom can help otherwise, realizing this might be better for all of them.  Wondering about starting sertraline, had been prescribed in PP.  Plan to assess anxiety at next visit.  Concerns about breast feeding and med.    HW: MF with feeding off mom's anxiety; trying to be a perfect mom (and googling).    ASSESSMENT:  Future oriented. Engaged in visit.  Recognizing patterns and goals.   Continuing context: First pregnancy, daughter Valeria, was induced at 37wks and had , baby had severe " "brain damage resulting in multiple medical and decision to withdraw life support (at approximately 2wks) and death, holding her, 20 baby .  second baby ( 3/24/22) had brief stay in NICU, postpartum preeclampsia, difficulties breast feeding (only able to pump at this time).  She notes a mental health history including ADHD, YESENIA and depression.     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 Board exam in a month, feeling not prepared, need to study but focused on baby.  \"Forcing\" self to study and able to do so.   Mental Status Assessment:  Appearance:   unknown  Eye Contact:   n/a  Psychomotor Behavior: Unknown (pumping during visit noted)  Attitude:   Cooperative   Orientation:   All  Speech   Rate / Production: Normal    Volume:  Normal   Mood:    Normal in visit; reporting anxiety  Thought Content:  Clear   Thought Form:  Coherent  Logical   Insight:    Fair     DIAGNOSIS: ALVIN    PLAN:    Patient to schedule followup visit.       Total time spent equals 49 minutes, individual psychotherapy. Telephone  Start = 4:05             "

## 2022-06-28 ENCOUNTER — VIRTUAL VISIT (OUTPATIENT)
Dept: PSYCHOLOGY | Facility: CLINIC | Age: 37
End: 2022-06-28
Payer: COMMERCIAL

## 2022-06-28 DIAGNOSIS — F41.1 GAD (GENERALIZED ANXIETY DISORDER): Primary | ICD-10-CM

## 2022-06-28 PROCEDURE — 90837 PSYTX W PT 60 MINUTES: CPT | Mod: 95

## 2022-06-28 NOTE — PROGRESS NOTES
"  Name:  Gabriele Rodriguez  Mrn: [id number]  Date of visit: 2022    PSYCHOLOGY OUTPATIENT VISIT NOTE       Telehealth visit appropriate in context of pandemic  Pt location = home    PRESENTING PROBLEMS/SYMPTOMS:  \"Underlying anxiety has always been issue,\" And recently triggered in postpartum (preeclampsia) focused on her own health and baby's health. Worry daily  And associated with concentration problems, irritability, tense jaw, occasional insomnia.  Social anxiety with associated perfectionistic tendencies.  Guilt.  History of  demise, Valeria  on 20.  (Ceci, \"Desiree\"  3/24/22, girl;  = Pedro).     INTERVENTION AND RESPONSE:  Cognitive Behavioral therapy, individual.  Adjusting with letting go of perfection, being more efficient.  Emails, able to clarify if questions.  Discussed progress with being able to let go of some perfection.  Still trying to be perfect googling, when things go right try to figure what they did.  Get frustrated trying to figure it out.  Early steffany baby can cry up to 1hr.  Can't figure out why so fussy, hard to soothe.  Description of trying to sort it out: shoulders tense, fidgety energy (paper in hands).  Baby crying so hard, something I can't control, want to fix it.  More used to fixing things and/or find an answer; work - figure out diagnosis.  Hard not to find answer.  Parallels with Valeria, so much in grief did not even try to figure it out, diagnose.  Feeling some peace and love for Valeria, worked through much of grief.  Option to bring thoughts of E in eves when J hard to soothe, might be helpful.  To consider.    (previous visit:  Then H OOT and Mom moving back in, worsening stress, \"we feed off of each others anxiety.\"  H can be \"relaxed\" and then resentful of him not taking things seriously.  Feeling guilty of \"not doing it on my own, mom needed to be there for help.\" drive (not external) for perfection, started as wanting to please everyone.  Ed " "provided.  \"good enough parenting\" concept.  But noted \"trying to be perfect\" parent, googling to find answers.  Loss of Valeria, things can go wrong at any time.  Wondering about starting sertraline, had been prescribed in PP.  Plan to assess anxiety at next visit.  Concerns about breast feeding and med.    HW: MF with feeding off mom's anxiety; trying to be a perfect mom (and googling).    ASSESSMENT:  Future oriented. Engaged in visit.  Anxiety and grief.  Unclear if these are connected.  Noting connection between anxiety and drive to be perfect and understand/find answer. Continuing context: First pregnancy, daughter Valeria, was induced at 37wks and had , baby had severe brain damage resulting in multiple medical and decision to withdraw life support (at approximately 2wks) and death, holding her, 20 baby .  second baby ( 3/24/22) had brief stay in NICU, postpartum preeclampsia, difficulties breast feeding (only able to pump at this time).  She notes a mental health history including ADHD, YESENIA and depression.     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 Board exam in a month, feeling not prepared, need to study but focused on baby.  \"Forcing\" self to study and able to do so.   Mental Status Assessment:  Appearance:   unknown  Eye Contact:   n/a  Psychomotor Behavior: Unknown (pumping during visit noted by pt)  Attitude:   Cooperative   Orientation:   All  Speech   Rate / Production: Normal    Volume:  Normal   Mood:    Normal in visit; reporting anxiety  Thought Content:  Clear   Thought Form:  Coherent  Logical   Insight:    Fair     DIAGNOSIS: ALVIN    PLAN:    Patient to schedule followup visit.       Total time spent equals 55 minutes, individual psychotherapy. Telephone  Start = 4:08             "

## 2022-07-06 ENCOUNTER — ANCILLARY PROCEDURE (OUTPATIENT)
Dept: ULTRASOUND IMAGING | Facility: CLINIC | Age: 37
End: 2022-07-06
Attending: OBSTETRICS & GYNECOLOGY
Payer: COMMERCIAL

## 2022-07-06 PROCEDURE — 76830 TRANSVAGINAL US NON-OB: CPT | Mod: 26 | Performed by: OBSTETRICS & GYNECOLOGY

## 2022-07-06 PROCEDURE — 76830 TRANSVAGINAL US NON-OB: CPT

## 2022-07-20 ENCOUNTER — MEDICAL CORRESPONDENCE (OUTPATIENT)
Dept: PSYCHOLOGY | Facility: CLINIC | Age: 37
End: 2022-07-20

## 2022-08-16 ENCOUNTER — VIRTUAL VISIT (OUTPATIENT)
Dept: PSYCHOLOGY | Facility: CLINIC | Age: 37
End: 2022-08-16
Attending: ADVANCED PRACTICE MIDWIFE
Payer: COMMERCIAL

## 2022-08-16 DIAGNOSIS — F41.1 GAD (GENERALIZED ANXIETY DISORDER): Primary | ICD-10-CM

## 2022-08-16 PROCEDURE — 90834 PSYTX W PT 45 MINUTES: CPT | Mod: 95

## 2022-09-18 ENCOUNTER — HEALTH MAINTENANCE LETTER (OUTPATIENT)
Age: 37
End: 2022-09-18

## 2022-09-19 ENCOUNTER — VIRTUAL VISIT (OUTPATIENT)
Dept: PSYCHOLOGY | Facility: CLINIC | Age: 37
End: 2022-09-19
Payer: COMMERCIAL

## 2022-09-19 DIAGNOSIS — F41.1 GAD (GENERALIZED ANXIETY DISORDER): Primary | ICD-10-CM

## 2022-09-19 PROCEDURE — 90837 PSYTX W PT 60 MINUTES: CPT | Mod: 95

## 2022-09-19 NOTE — PROGRESS NOTES
"  Name:  Gabriele Rodriguez  Mrn: [id number]  Date of visit: 2022    PSYCHOLOGY OUTPATIENT VISIT NOTE       Telehealth visit appropriate in context of pandemic  Pt location = work    PRESENTING PROBLEMS/SYMPTOMS:  \"Underlying anxiety has always been issue,\" And recently triggered in postpartum (preeclampsia) focused on her own health and baby's health. Worry daily  And associated with concentration problems, irritability, tense jaw, occasional insomnia.  Social anxiety with associated perfectionistic tendencies.  Guilt.  History of  demise, Valeria  on 20.  (Ceci, \"Desiree\"  3/24/22, girl;  = Pedro).     INTERVENTION AND RESPONSE:  Cognitive Behavioral therapy, individual.  (pumping during visit)  J getting into better rhythm and getting to sleep earlier, more routine.  Decided to take job and affords better work-life balance.  A bit of a compromise, but able to live in [facility].  Continuing fellowship and working late.  Guilt that E has to be there alone with J every steffany.  J will be starting  and mom will no longer be caring for J.  Trigger: call E at end of day and hear J crying or if come home and E says J has been crying.  Trigger: mom feeling exhausted and asking pt to start looking into  for J.  E says \"fine\" but can tell he's exhausted.  Responsibility for others' emotions: when Valeria , felt I had to be responsible for talking about it, he kept emotions inside.  My depression is guilt.  Started with college, maybe depression made me more prone to guilt.  Always want people around me to be happy.  Make others happy, always been a people pleaser and avoid confrontation.  I've always wanted to make others happy because then people will like me more.  If they are unhappy, or if I do something wrong, take it personally and I feel unhappy.  May even overreact towards self.  Was the good kid growing up.  Know had ADHD since childhood, but people overlooked mistakes " "because good at making people like me. (was later diagnosed with ADHD, stopped med in pregnancy, haven't restarted.)  Dad - needed to walk on eggshells in childhood, he got worked up and upset, not explosive, frustrated easily.  Mom - pushed kids to do well.  Try to avoid frustrating dad.    Pattern: note people distressed, guilt, try to make them happy, want them to like me.  HW: Noticing pattern \"overreacting\" or people pleasing; then try some self compassion.      Previous HW: practice sitting in chair and feeling support, grounding.  (previous visit:  E and mom \"under stress.\"  Guilt.  Motivated to fix and my responsibility.   \"I'm a failure.\"  I missed what was going on with E.  Plan to start  earlier.  Processed multitasking right now.  Pressure to get things done.  Ex: practice allowing chair to settle her in.  Before on edge of chair and on toes hunched over phone.  Practiced grounding, centered in hips. /Baby crying so hard, something I can't control, want to fix it.  More used to fixing things and/or find an answer; work - figure out diagnosis.  Hard not to find answer.  Parallels with Valeria, so much in grief did not even try to figure it out, diagnose.  Feeling some peace and love for Valeria, worked through much of grief.  Option to bring thoughts of E in eves when J hard to soothe, might be helpful.) (previous visit:  Then H OOT and Mom moving back in, worsening stress, \"we feed off of each others anxiety.\"  H can be \"relaxed\" and then resentful of him not taking things seriously.  Feeling guilty of \"not doing it on my own, mom needed to be there for help.\" drive (not external) for perfection, started as wanting to please everyone.  Ed provided.  \"good enough parenting\" concept.  But noted \"trying to be perfect\" parent, googling to find answers.  Loss of Valeria, things can go wrong at any time.  Wondering about starting sertraline, had been prescribed in PP.  Plan to assess anxiety at next visit.  " Concerns about breast feeding and med.    HW: MF with feeding off mom's anxiety; trying to be a perfect mom (and googling).    ASSESSMENT:  Future oriented. Engaged in visit.  Working with pattern of people pleasing and  Guilt; contributing to depression and anxiety over the years.   Continuing context: First pregnancy, daughter Valeria, was induced at 37wks and had , baby had severe brain damage resulting in multiple medical and decision to withdraw life support (at approximately 2wks) and death, holding her, 20 baby .  second baby ( 3/24/22) had brief stay in NICU, postpartum preeclampsia, difficulties breast feeding (only able to pump at this time).  She notes a mental health history including ADHD, YESENIA and depression.     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   Mental Status Assessment:  Appearance:   unknown  Eye Contact:   n/a  Psychomotor Behavior: Unknown (pumping during visit noted by pt)   Attitude:   Cooperative   Orientation:   All  Speech   Rate / Production: Normal    Volume:  Normal   Mood:    Normal in visit; reporting anxiety  Thought Content:  Clear   Thought Form:  Coherent  Logical   Insight:    Fair to good    DIAGNOSIS: ALVIN    PLAN:    Patient to schedule followup visit.       Total time spent equals 55 minutes, individual psychotherapy. Telephone  Start = 2:05

## 2022-10-04 ENCOUNTER — VIRTUAL VISIT (OUTPATIENT)
Dept: PSYCHOLOGY | Facility: CLINIC | Age: 37
End: 2022-10-04
Payer: COMMERCIAL

## 2022-10-04 DIAGNOSIS — F41.1 GAD (GENERALIZED ANXIETY DISORDER): Primary | ICD-10-CM

## 2022-10-04 PROCEDURE — 90837 PSYTX W PT 60 MINUTES: CPT | Mod: 95

## 2022-10-04 NOTE — PROGRESS NOTES
"  Name:  Gabriele Rodriguez  Mrn: [id number]  Date of visit: 10/4/2022    PSYCHOLOGY OUTPATIENT VISIT NOTE       Telehealth visit appropriate in context of pandemic  Pt location = work    PRESENTING PROBLEMS/SYMPTOMS:  \"Underlying anxiety has always been issue,\" And recently triggered in postpartum (preeclampsia) focused on her own health and baby's health. Worry daily  And associated with concentration problems, irritability, tense jaw, occasional insomnia.  Social anxiety with associated perfectionistic tendencies.  Guilt.  History of  demise, Valeria  on 20.  (Ceci, \"Desiree\"  3/24/22, girl;  = Pedro).     INTERVENTION AND RESPONSE:  Cognitive Behavioral therapy, individual.  (pumping during visit).  J starting  tomorrow, somewhat scary, how will she adapt.  Past weekend, went out with a friend to get manicure, also committee mtg all day via zoom.  Ended up being out with friend extra 30min.  Then H really mad.  Other people are really upset.  Guilt for being gone, set me back.  Both working really hard and both need time off. Option to talk more with H about this if seems helpful.  What happened if people didn't like you?  Bullying, .  Also memory of having YESENIA, anorexia diagnosed by PCP or psychologist, restricted food & exercise-was overwt prior, 18yo - 21yo, was very paranoid about people around me making me eat so pushed friends away, had been social then very isolated.  Tx included dietician, school got involved and didn't trust school but did trust mom.  Also had depression. Possibility of tx but declined.  Then YESENIA resolved, forced self to eat so could go away to college.  (also ADHD). Put emotions on other people, believe it and feel bad.     Anxiety & guilt.    Previous HW: practice sitting in chair and feeling support, grounding.  (previous visit: Responsibility for others' emotions: when Valeria , felt I had to be responsible for talking about it, he " "kept emotions inside.  My depression is guilt.  Started with college, maybe depression made me more prone to guilt.  Always want people around me to be happy.  Make others happy, always been a people pleaser and avoid confrontation, because then people will like me more.  If they are unhappy, or if I do something wrong, take it personally and I feel unhappy.  May even overreact towards self.  Was the good kid growing up.  Know had ADHD since childhood, but people overlooked mistakes because good at making people like me. (was later diagnosed with ADHD, stopped med in pregnancy, haven't restarted.)  Dad - needed to walk on eggshells in childhood, he got worked up and upset, not explosive, frustrated easily.  Mom - pushed kids to do well.  Try to avoid frustrating dad.        (previous visit:  E and mom \"under stress.\"  Guilt.  Motivated to fix and my responsibility.   \"I'm a failure.\"  I missed what was going on with E.  Plan to start  earlier.  Processed multitasking right now.  Pressure to get things done.  Ex: practice allowing chair to settle her in.  Before on edge of chair and on toes hunched over phone.  Practiced grounding, centered in hips. /Baby crying so hard, something I can't control, want to fix it.  More used to fixing things and/or find an answer; work - figure out diagnosis.  Hard not to find answer.  Parallels with Valeria, so much in grief did not even try to figure it out, diagnose.  Feeling some peace and love for Valeria, worked through much of grief.  Option to bring thoughts of E in eves when J hard to soothe, might be helpful.) (previous visit:  Then H OOT and Mom moving back in, worsening stress, \"we feed off of each others anxiety.\"  H can be \"relaxed\" and then resentful of him not taking things seriously.  Feeling guilty of \"not doing it on my own, mom needed to be there for help.\" drive (not external) for perfection, started as wanting to please everyone.  Ed provided.  \"good enough " "parenting\" concept.  But noted \"trying to be perfect\" parent, googling to find answers.  Loss of Valeria, things can go wrong at any time.  Wondering about starting sertraline, had been prescribed in PP.  Plan to assess anxiety at next visit.  Concerns about breast feeding and med.    HW: MF with feeding off mom's anxiety; trying to be a perfect mom (and googling).    ASSESSMENT:  Future oriented. Engaged in visit.  Working with pattern of people pleasing and  Guilt; contributing to depression and anxiety over the years.   Continuing context: First pregnancy, daughter Valeria, was induced at 37wks and had , baby had severe brain damage resulting in multiple medical and decision to withdraw life support (at approximately 2wks) and death, holding her, 20 baby .  second baby ( 3/24/22) had brief stay in NICU, postpartum preeclampsia, difficulties breast feeding (only able to pump at this time).  She notes a mental health history including ADHD, YESENIA and depression.     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   Mental Status Assessment:  Appearance:   unknown  Eye Contact:   n/a  Psychomotor Behavior: Unknown (pumping during visit noted by pt)   Attitude:   Cooperative   Orientation:   All  Speech   Rate / Production: Normal    Volume:  Normal   Mood:    Normal in visit; reporting anxiety  Thought Content:  Clear   Thought Form:  Coherent  Logical   Insight:    Fair to good    DIAGNOSIS: ALVIN    PLAN:    Patient to schedule followup visit.       Total time spent equals 56 minutes, individual psychotherapy. Telephone  Start = 3:04             "

## 2022-10-25 ENCOUNTER — VIRTUAL VISIT (OUTPATIENT)
Dept: PSYCHOLOGY | Facility: CLINIC | Age: 37
End: 2022-10-25
Attending: ADVANCED PRACTICE MIDWIFE
Payer: COMMERCIAL

## 2022-10-25 DIAGNOSIS — F41.1 GAD (GENERALIZED ANXIETY DISORDER): Primary | ICD-10-CM

## 2022-10-25 PROCEDURE — 90837 PSYTX W PT 60 MINUTES: CPT | Mod: 95

## 2022-10-25 NOTE — PROGRESS NOTES
"  Name:  Gabriele Rodriguez  Mrn: [id number]  Date of visit: 10/25/2022    PSYCHOLOGY OUTPATIENT VISIT NOTE       Telehealth visit appropriate in context of pandemic  Pt location = driving home then at home    PRESENTING PROBLEMS/SYMPTOMS:  \"Underlying anxiety has always been issue,\" And recently triggered in postpartum (preeclampsia) focused on her own health and baby's health. Worry daily  And associated with concentration problems, irritability, tense jaw, occasional insomnia.  Social anxiety with associated perfectionistic tendencies.  Guilt.  History of  demise, Valeria  on 20.  (Ceci, \"Desiree\"  3/24/22, girl;  = Pedro).     INTERVENTION AND RESPONSE:  Cognitive Behavioral therapy, individual.  Going home early today and will need to come in early next day, but will have to bring J to .  PIETRO has been home all day with J.  Waves of anxiety especially in steffany, seems \"random.\"  Described trip (wedding) to [place] with baby, saw PIETRO's 98yo grandma, restaurant loud and worry about baby's ears, mad at H and his family, but recognized overreacted, anxiety.  (Also prior wedding trip and sitters never fed baby. )    Then a different sitter in [place] and baby teething, left sitter with tylenol with instructions, but then worried sitter gave too much (insomnia).  Blame self and others, guilt.  Pattern of people pleasing started in childhood, YESENIA in HS and resolved then depression started in college, also when guilt started.  (transferred in first yr from [employer] to [employer] - stayed home 1mo and crying daily).  Guilt for doing something wrong, heavy and if could get out of guilt, then depression would resolve.  Childhood, HS procrastinate homework, get frozen.  Guilt heavy, make it heavier.  I didn't do HW, not organized and got assignments from friends.  Dad worked a lot (not that close) and mom just nagged.  Then had YESENIA in HS.  ADHD dxed in college. In HS, was scattered and " "disorganized.  Guilt and anxiety take up much energy; they are distractors.  Guilt then I'm in control.  (fear) (not taking ADHD meds with BF).     Previous HW: practice sitting in chair and feeling support, grounding.  (previuos visit: Other people are really upset.  Guilt for being gone, manicure, set me back.  Both working really hard and both need time off. Option to talk more with H about this if seems helpful.  What happened if people didn't like you?  Bullying, .  Also memory of having YESENIA, anorexia diagnosed by PCP or psychologist, restricted food & exercise-was overwt prior, 18yo - 21yo, was very paranoid about people around me making me eat so pushed friends away, had been social then very isolated.  Tx included dietician, school got involved and didn't trust school but did trust mom.  Also had depression. Possibility of tx but declined.  Then YESENIA resolved, forced self to eat so could go away to college.  (also ADHD). Put emotions on other people, believe it and feel bad. / Responsibility for others' emotions: when Valeria , felt I had to be responsible for talking about it, he kept emotions inside.  My depression is guilt.  Started with college, maybe depression made me more prone to guilt.  Always want people around me to be happy.  Make others happy, always been a people pleaser and avoid confrontation, because then people will like me more.  If they are unhappy, or if I do something wrong, take it personally and I feel unhappy.  May even overreact towards self.  Was the good kid growing up.  Know had ADHD since childhood, but people overlooked mistakes because good at making people like me. (was later diagnosed with ADHD, stopped med in pregnancy, haven't restarted.)  Dad - needed to walk on eggshells in childhood, he got worked up and upset, not explosive, frustrated easily.  Mom - pushed kids to do well.  Try to avoid frustrating dad.        (previous visit:  E and mom \"under stress.\"  " "Guilt.  Motivated to fix and my responsibility.   \"I'm a failure.\"  I missed what was going on with E.  Plan to start  earlier.  Processed multitasking right now.  Pressure to get things done.  Ex: practice allowing chair to settle her in.  Before on edge of chair and on toes hunched over phone.  Practiced grounding, centered in hips. /Baby crying so hard, something I can't control, want to fix it.  More used to fixing things and/or find an answer; work - figure out diagnosis.  Hard not to find answer.  Parallels with Valeria, so much in grief did not even try to figure it out, diagnose.  Feeling some peace and love for Valeria, worked through much of grief.  Option to bring thoughts of E in eves when J hard to soothe, might be helpful.) (previous visit:  Then H OOT and Mom moving back in, worsening stress, \"we feed off of each others anxiety.\"  H can be \"relaxed\" and then resentful of him not taking things seriously.  Feeling guilty of \"not doing it on my own, mom needed to be there for help.\" drive (not external) for perfection, started as wanting to please everyone.  Ed provided.  \"good enough parenting\" concept.  But noted \"trying to be perfect\" parent, googling to find answers.  Loss of Valeria, things can go wrong at any time.  Wondering about starting sertraline, had been prescribed in PP.  Plan to assess anxiety at next visit.  Concerns about breast feeding and med.    HW: MF with feeding off mom's anxiety; trying to be a perfect mom (and googling).    ASSESSMENT:  Future oriented. Engaged in visit.  Working with pattern of people pleasing and  Guilt; contributing to depression and anxiety over the years.   Continuing context: First pregnancy, daughter Valeria, was induced at 37wks and had , baby had severe brain damage resulting in multiple medical and decision to withdraw life support (at approximately 2wks) and death, holding her, 22. second baby ( 3/24/22) had brief stay in NICU, " postpartum preeclampsia, difficulties breast feeding (only able to pump at this time).  She notes a mental health history including ADHD, YESENIA and depression.     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   Mental Status Assessment:  Appearance:   unknown  Eye Contact:   n/a  Psychomotor Behavior: Unknown   Attitude:   Cooperative   Orientation:   All  Speech   Rate / Production: Normal    Volume:  Normal   Mood:    Normal in visit; reporting anxiety  Thought Content:  Clear   Thought Form:  Coherent  Logical   Insight:    Fair to good    DIAGNOSIS: ALVIN    PLAN:    Patient to schedule followup visit.       Total time spent equals 53 minutes, individual psychotherapy. Telephone  Start = 4:05

## 2023-01-11 RX ORDER — NIFEDIPINE 30 MG/1
30 TABLET, EXTENDED RELEASE ORAL DAILY
Qty: 90 TABLET | Refills: 3 | Status: CANCELLED | OUTPATIENT
Start: 2023-01-11

## 2023-02-01 ENCOUNTER — HOSPITAL ENCOUNTER (EMERGENCY)
Facility: CLINIC | Age: 38
Discharge: HOME | End: 2023-02-02
Payer: COMMERCIAL

## 2023-02-01 ENCOUNTER — NURSE TRIAGE (OUTPATIENT)
Dept: NURSING | Facility: CLINIC | Age: 38
End: 2023-02-01
Payer: COMMERCIAL

## 2023-02-01 VITALS
WEIGHT: 141.3 LBS | RESPIRATION RATE: 18 BRPM | TEMPERATURE: 98.3 F | DIASTOLIC BLOOD PRESSURE: 72 MMHG | HEART RATE: 92 BPM | SYSTOLIC BLOOD PRESSURE: 107 MMHG | HEIGHT: 62 IN | BODY MASS INDEX: 26 KG/M2 | OXYGEN SATURATION: 98 %

## 2023-02-02 NOTE — TELEPHONE ENCOUNTER
Patient calling reports testing positive for COVID 1/30 with typical cold and flu symptoms but reporting oxygen levels between 90 and 91%. Patient also reporting feeling funny. Reports some confusion and anxiety so is unsure if oxygen level is related to this or separate. Denies chest pain, severe difficulty breathing and bluish face. Advised per protocol to be seen in the ER with oxygen 90% and below. Patient to check oxygen levels now and go to the ER if oxygen remains at 90% or below.     Madyson Stahl RN 02/01/23 11:19 PM   [facility] Triage Nurse Advisor      Reason for Disposition    Oxygen level (e.g., pulse oximetry) 90 percent or lower    Additional Information    Negative: Difficult to awaken or acting confused (e.g., disoriented, slurred speech)    Negative: SEVERE difficulty breathing (e.g., struggling for each breath, speaks in single words)    Negative: Bluish (or gray) lips or face now    Negative: Shock suspected (e.g., cold/pale/clammy skin, too weak to stand, low BP, rapid pulse)    Negative: Sounds like a life-threatening emergency to the triager    Negative: [1] Diagnosed or suspected COVID-19 AND [2] symptoms lasting 3 or more weeks    Negative: [1] COVID-19 exposure AND [2] no symptoms    Negative: COVID-19 vaccine reaction suspected (e.g., fever, headache, muscle aches) occurring 1 to 3 days after getting vaccine    Negative: COVID-19 vaccine, questions about    Negative: [1] Lives with someone known to have influenza (flu test positive) AND [2] flu-like symptoms (e.g., cough, runny nose, sore throat, SOB; with or without fever)    Negative: [1] Adult with possible COVID-19 symptoms AND [2] triager concerned about severity of symptoms or other causes    Negative: COVID-19 and breastfeeding, questions about    Negative: [1] Headache AND [2] stiff neck (can't touch chin to chest)    Negative: MODERATE difficulty breathing (e.g., speaks in phrases, SOB even at rest, pulse 100-120)    Negative:  SEVERE or constant chest pain or pressure  (Exception: Mild central chest pain, present only when coughing.)    Protocols used: CORONAVIRUS (COVID-19) DIAGNOSED OR [id number]

## 2023-02-02 NOTE — ED TRIAGE NOTES
Patient said she's covid positive since Monday. She has been taking Tylenol and Ibuprofen for sore throat. Patient denies shortness of breath. She said she got anxious because her watch has been reading 90 to 91% oxygen saturation.       Triage Assessment     Row Name 02/01/23 4833       Triage Assessment (Adult)    Airway WDL WDL       Respiratory WDL    Respiratory WDL X;cough  and congestion       Skin Circulation/Temperature WDL    Skin Circulation/Temperature WDL WDL       Cardiac WDL    Cardiac WDL WDL       Peripheral/Neurovascular WDL    Peripheral Neurovascular WDL WDL       Cognitive/Neuro/Behavioral WDL    Cognitive/Neuro/Behavioral WDL WDL

## 2023-02-02 NOTE — ED NOTES
Patient came to nurse's station and requesting to leave, per patient, she feels better and wanted to go home. Pt denies SI and HI. Pt signed declination of medical screening

## 2023-05-07 ENCOUNTER — HEALTH MAINTENANCE LETTER (OUTPATIENT)
Age: 38
End: 2023-05-07

## 2023-07-07 NOTE — NURSING NOTE
Chief Complaint   Patient presents with     Prenatal Care     HARRY 32 weeks 1 days   Isaura German LPN   Non family member

## 2023-09-18 ENCOUNTER — OFFICE VISIT (OUTPATIENT)
Dept: FAMILY MEDICINE | Facility: CLINIC | Age: 38
End: 2023-09-18
Payer: COMMERCIAL

## 2023-09-18 VITALS
TEMPERATURE: 98.1 F | SYSTOLIC BLOOD PRESSURE: 133 MMHG | DIASTOLIC BLOOD PRESSURE: 85 MMHG | BODY MASS INDEX: 26.05 KG/M2 | HEIGHT: 63 IN | WEIGHT: 147 LBS | HEART RATE: 94 BPM | OXYGEN SATURATION: 97 %

## 2023-09-18 DIAGNOSIS — F90.2 ATTENTION DEFICIT HYPERACTIVITY DISORDER (ADHD), COMBINED TYPE: ICD-10-CM

## 2023-09-18 DIAGNOSIS — I10 BENIGN ESSENTIAL HYPERTENSION: Primary | ICD-10-CM

## 2023-09-18 LAB
ALBUMIN SERPL BCG-MCNC: 4.2 G/DL (ref 3.5–5.2)
ALP SERPL-CCNC: 63 U/L (ref 35–104)
ALT SERPL W P-5'-P-CCNC: 7 U/L (ref 0–50)
ANION GAP SERPL CALCULATED.3IONS-SCNC: 11 MMOL/L (ref 7–15)
AST SERPL W P-5'-P-CCNC: 18 U/L (ref 0–45)
BASOPHILS # BLD MANUAL: 0 10E3/UL (ref 0–0.2)
BASOPHILS NFR BLD MANUAL: 0 %
BILIRUB SERPL-MCNC: 0.3 MG/DL
BUN SERPL-MCNC: 13.9 MG/DL (ref 6–20)
CALCIUM SERPL-MCNC: 9 MG/DL (ref 8.6–10)
CHLORIDE SERPL-SCNC: 106 MMOL/L (ref 98–107)
CREAT SERPL-MCNC: 0.77 MG/DL (ref 0.51–0.95)
DEPRECATED HCO3 PLAS-SCNC: 23 MMOL/L (ref 22–29)
EGFRCR SERPLBLD CKD-EPI 2021: >90 ML/MIN/1.73M2
EOSINOPHIL # BLD MANUAL: 0.1 10E3/UL (ref 0–0.7)
EOSINOPHIL NFR BLD MANUAL: 2 %
ERYTHROCYTE [DISTWIDTH] IN BLOOD BY AUTOMATED COUNT: 11.1 % (ref 10–15)
GLUCOSE SERPL-MCNC: 83 MG/DL (ref 70–99)
HCT VFR BLD AUTO: 41.5 % (ref 35–47)
HGB BLD-MCNC: 13.8 G/DL (ref 11.7–15.7)
LYMPHOCYTES # BLD MANUAL: 2.2 10E3/UL (ref 0.8–5.3)
LYMPHOCYTES NFR BLD MANUAL: 53 %
MCH RBC QN AUTO: 28.8 PG (ref 26.5–33)
MCHC RBC AUTO-ENTMCNC: 33.3 G/DL (ref 31.5–36.5)
MCV RBC AUTO: 87 FL (ref 78–100)
MONOCYTES # BLD MANUAL: 0.2 10E3/UL (ref 0–1.3)
MONOCYTES NFR BLD MANUAL: 4 %
NEUTROPHILS # BLD MANUAL: 1.7 10E3/UL (ref 1.6–8.3)
NEUTROPHILS NFR BLD MANUAL: 41 %
NRBC # BLD AUTO: 0 10E3/UL
NRBC BLD AUTO-RTO: 0 /100
PLAT MORPH BLD: ABNORMAL
PLATELET # BLD AUTO: 197 10E3/UL (ref 150–450)
POTASSIUM SERPL-SCNC: 3.9 MMOL/L (ref 3.4–5.3)
PROT SERPL-MCNC: 7.1 G/DL (ref 6.4–8.3)
RBC # BLD AUTO: 4.79 10E6/UL (ref 3.8–5.2)
RBC MORPH BLD: ABNORMAL
SODIUM SERPL-SCNC: 140 MMOL/L (ref 136–145)
VARIANT LYMPHS BLD QL SMEAR: PRESENT
WBC # BLD AUTO: 4.1 10E3/UL (ref 4–11)

## 2023-09-18 PROCEDURE — 85007 BL SMEAR W/DIFF WBC COUNT: CPT | Mod: ORL | Performed by: NURSE PRACTITIONER

## 2023-09-18 PROCEDURE — 85027 COMPLETE CBC AUTOMATED: CPT | Mod: ORL | Performed by: NURSE PRACTITIONER

## 2023-09-18 PROCEDURE — 80053 COMPREHEN METABOLIC PANEL: CPT | Mod: ORL | Performed by: NURSE PRACTITIONER

## 2023-09-18 RX ORDER — NIFEDIPINE 30 MG/1
30 TABLET, EXTENDED RELEASE ORAL DAILY
Qty: 90 TABLET | Refills: 3 | Status: SHIPPED | OUTPATIENT
Start: 2023-09-18

## 2023-09-18 RX ORDER — LISDEXAMFETAMINE DIMESYLATE 20 MG/1
20 CAPSULE ORAL EVERY MORNING
Qty: 90 CAPSULE | Refills: 0 | Status: SHIPPED | OUTPATIENT
Start: 2023-09-18 | End: 2024-02-09

## 2023-09-18 NOTE — NURSING NOTE
"ROOM:2  EMILIANO ENAMORADO    Preferred Name: Qi     How did you hear about us?  Current Patient    38 year old  Chief Complaint   Patient presents with     Establish Care     Hypertension       Blood pressure 133/85, pulse 94, temperature 98.1  F (36.7  C), temperature source Oral, height 1.61 m (5' 3.4\"), weight 66.7 kg (147 lb), SpO2 97 %, unknown if currently breastfeeding. Body mass index is 25.71 kg/m .  BP completed using cuff size:        Patient Active Problem List   Diagnosis     Anxiety     ADHD (attention deficit hyperactivity disorder), inattentive type     Supervision of high risk pregnancy in third trimester     Chronic hypertension affecting pregnancy- on meds     Chronic sinusitis      death- now schedueld for rltcs 3/24     S/P  section     Vitamin D deficiency     Abnormal 1 hour glucose test, passed 3 hour      History of  delivery     Preeclampsia in postpartum period     Preeclampsia, severe       Wt Readings from Last 2 Encounters:   23 66.7 kg (147 lb)   23 64.1 kg (141 lb 4.8 oz)     BP Readings from Last 3 Encounters:   23 133/85   23 107/72   22 110/76       Allergies   Allergen Reactions     Nickel Itching       Current Outpatient Medications   Medication     methylphenidate (RITALIN) 5 MG tablet     labetalol (NORMODYNE) 200 MG tablet     NIFEdipine ER OSMOTIC (PROCARDIA XL) 30 MG 24 hr tablet     sertraline (ZOLOFT) 50 MG tablet     No current facility-administered medications for this visit.       Social History     Tobacco Use     Smoking status: Never     Smokeless tobacco: Never   Vaping Use     Vaping Use: Never used   Substance Use Topics     Alcohol use: Yes     Comment: 1-2 a two months     Drug use: Never       Honoring Choices - Health Care Directive Guide offered to patient at time of visit.    Health Maintenance Due   Topic Date Due     ADVANCE CARE PLANNING  Never done     HEPATITIS B IMMUNIZATION (1 of 3 - 3-dose " series) Never done     YEARLY PREVENTIVE VISIT  11/08/2020     PHQ-2 (once per calendar year)  01/01/2023     INFLUENZA VACCINE (1) 09/01/2023       Immunization History   Administered Date(s) Administered     COVID-19 Bivalent 12+ (Pfizer) 10/15/2022     COVID-19 MONOVALENT 12+ (Pfizer) 01/04/2021, 01/25/2021, 10/15/2021     COVID-19 Monovalent 18+ (Moderna) 03/05/2022     Influenza Vaccine >6 months (Alfuria,Fluzone) 09/24/2019, 09/21/2021     TDAP (Adacel,Boostrix) 01/21/2022     TDAP Vaccine (Boostrix) 09/21/2020       Lab Results   Component Value Date    PAP NIL 11/08/2019       Recent Labs   Lab Test 05/11/22  1406 04/11/22  1325 04/07/22  1112 04/06/22  1528 04/04/22  0727 04/03/22  2329 03/25/22  0644 09/22/21  0920 11/24/20  1830 09/23/20  0850 07/23/19  1509 03/12/19  1627   ALT  --   --   --  20  --  16 7   < > 13  --    < >  --    CR 0.98 1.20*   < > 1.21*   < > 1.07* 0.60   < > 0.48* 0.53   < >  --    GFRESTIMATED 77 61   < > 60*   < > 70 >90   < > >90 >90   < >  --    GFRESTBLACK  --   --   --   --   --   --   --   --  >90 >90   < >  --    ALBUMIN  --   --   --   --   --  3.0*  --   --   --  2.7*   < >  --    POTASSIUM  --   --   --   --   --  4.0  --   --   --  3.4   < >  --    TSH  --   --   --   --   --   --   --   --   --   --   --  2.78    < > = values in this interval not displayed.           4/29/2022     9:08 AM 4/7/2022    10:31 AM   PHQ-2 ( 1999 Pfizer)   Q1: Little interest or pleasure in doing things 0 0   Q2: Feeling down, depressed or hopeless 0 0   PHQ-2 Score 0 0           11/8/2019     3:45 PM 6/9/2020     8:27 AM 9/21/2020     4:24 PM 1/21/2022     3:18 PM   PHQ-9 SCORE   PHQ-9 Total Score 0 2 1 0           3/1/2022     3:33 PM 3/16/2022     8:18 AM 4/7/2022    10:31 AM   ALVIN-7 SCORE   Total Score 4 (minimal anxiety) 2 (minimal anxiety)    Total Score 4    4    4 2 13            No data to display                Raoulmicaela Wisdomper    September 18, 2023 9:45 AM

## 2023-09-18 NOTE — PROGRESS NOTES
Gabriele Rodriguez is a 38 year old female who presents today to re-establish care, to refill antihypertensive medication and to discuss ADD.      Gabriele was diagnosed with hypertension, most likely in/around  and was put on labetalol for that and as combination therapy for both HTN and anxiety.  This was changed to nifedipine during a pregnancy.  She is doing well, she denies chest pains, shortness of breath, exercise intolerance.  She does have lower extremity swelling if she takes her nifedipine in the morning, otherwise no fluid retention that she is aware of.  She is requesting a refill on this.    Qi also has had ADD for many years, she has been treated with Vyvanse 20 mg and with methylphenidate while breast feeding.  She is interested in being on Vyvanse on a regular basis now, she was taken off that while breast feeding.  She uses these medications while doing intense school work and other parts of her education.    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  She is excited about that work and her new career. She is  and has one 17 month old daughter.  She is using a Nexplanon for contraception, she is interested in having another baby and will have it removed when ready.  She has had the Nexplanon for 11-12 months, she has some irregular bleeding or menses now.      Review Of Systems  See PMH    Past Medical History:   Diagnosis Date     ADHD      Anxiety      Chronic sinusitis      Depressive disorder     also anxiety, on and off since high school, has tried prozac and lexapro, wellbutrin, now off meds, last on meds      History of  section      History of  death      Hypertension     diagnosed in med school, 2016     Hypertension in pregnancy, preeclampsia, severe, delivered/postpartum      Varicella     as a child     Past Surgical History:   Procedure Laterality Date     BIOPSY NAIL (LOCATION)      left hand, 4th digit      SECTION N/A 2020     "Procedure:  SECTION;  Surgeon: Jojo Vaz MD;  Location: [facility]      SECTION N/A 3/24/2022    Procedure: REPEAT  SECTION;  Surgeon: Giovanna Osorio MD;  Location: [facility]     Social History     Socioeconomic History     Marital status:      Spouse name: Pedro     Number of children: Not on file     Years of education: Not on file     Highest education level: Not on file   Occupational History     Occupation: Medical resident     Comment: pathology   Tobacco Use     Smoking status: Never     Smokeless tobacco: Never   Vaping Use     Vaping Use: Never used   Substance and Sexual Activity     Alcohol use: Yes     Comment: 1-2 a two months     Drug use: Never     Sexual activity: Not Currently     Partners: Male     Birth control/protection: Implant   Other Topics Concern     Parent/sibling w/ CABG, MI or angioplasty before 65F 55M? 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         demise 2020        Chief Resident      Social Determinants of Health     Financial Resource Strain: Not on file   Food Insecurity: Not on file   Transportation Needs: Not on file   Physical Activity: Not on file   Stress: Not on file   Social Connections: Not on file   Intimate Partner Violence: Not on file   Housing Stability: Not on file     Family History   Problem Relation Age of Onset     Hypertension Mother      Breast Cancer Mother         ductal stage 2, HER-2 negative, (+) estrogen/progesterone, BRCA negative     Skin Cancer Mother         basal cell     No Known Problems Father      No Known Problems Sister      Pancreatic Cancer Maternal Grandfather      Brain Cancer Paternal Aunt      Melanoma No family hx of        /85   Pulse 94   Temp 98.1  F (36.7  C) (Oral)   Ht 1.61 m (5' 3.4\")   Wt 66.7 kg (147 lb)   SpO2 97%   BMI 25.71 kg/m      Exam:  Constitutional: healthy, alert and no distress  Cardiovascular: negative, PMI normal. No lifts, " heaves, or thrills. RRR. No murmurs, clicks gallops or rub  Respiratory: negative, Percussion normal. Good diaphragmatic excursion. Lungs clear.  Minimal bilateral lower extremity below the knee non pitting edema.  Psychiatric: mentation appears normal and affect normal/bright    Assessment/Plan:    (I10) Benign essential hypertension  (primary encounter diagnosis)  Comment: Refill niphedipine  Plan: Comprehensive metabolic panel, CBC with         platelets differential      (F90.2) Attention deficit hyperactivity disorder (ADHD), combined type  Comment: We received records of neuropsych testing done in 2017  Plan: lisdexamfetamine (VYVANSE) 20 MG capsule  Follow up 4-6 weeks.      Options for treatment and follow-up care were reviewed with the patient. Patient engaged in the decision making process and verbalized understanding of the options discussed and agreed with the final plan.

## 2024-01-02 ENCOUNTER — OFFICE VISIT (OUTPATIENT)
Dept: FAMILY MEDICINE | Facility: CLINIC | Age: 39
End: 2024-01-02
Payer: COMMERCIAL

## 2024-01-02 VITALS
DIASTOLIC BLOOD PRESSURE: 83 MMHG | HEART RATE: 112 BPM | SYSTOLIC BLOOD PRESSURE: 112 MMHG | OXYGEN SATURATION: 98 % | TEMPERATURE: 98.2 F | HEIGHT: 64 IN | BODY MASS INDEX: 25.08 KG/M2 | WEIGHT: 146.9 LBS | RESPIRATION RATE: 18 BRPM

## 2024-01-02 DIAGNOSIS — Z30.46 NEXPLANON REMOVAL: Primary | ICD-10-CM

## 2024-01-02 NOTE — NURSING NOTE
"ROOM:Procedure Room  DANIELLA AU    Preferred Name: Qi     How did you hear about us?  Current Patient    38 year old  Chief Complaint   Patient presents with     nexplanon      Nexplanon removal        Blood pressure 112/83, pulse 112, temperature 98.2  F (36.8  C), temperature source Oral, resp. rate 18, height 1.623 m (5' 3.9\"), weight 66.6 kg (146 lb 14.4 oz), SpO2 98%, unknown if currently breastfeeding. Body mass index is 25.29 kg/m .  BP completed using cuff size:        Patient Active Problem List   Diagnosis     Anxiety     ADHD (attention deficit hyperactivity disorder), inattentive type     Supervision of high risk pregnancy in third trimester     Chronic hypertension affecting pregnancy- on meds     Chronic sinusitis      death- now schedueld for rltcs 3/24     S/P  section     Vitamin D deficiency     Abnormal 1 hour glucose test, passed 3 hour      History of  delivery     Preeclampsia in postpartum period     Preeclampsia, severe       Wt Readings from Last 2 Encounters:   24 66.6 kg (146 lb 14.4 oz)   23 66.7 kg (147 lb)     BP Readings from Last 3 Encounters:   24 112/83   23 133/85   23 107/72       Allergies   Allergen Reactions     Nickel Itching       Current Outpatient Medications   Medication     lisdexamfetamine (VYVANSE) 20 MG capsule     methylphenidate (RITALIN) 5 MG tablet     NIFEdipine ER OSMOTIC (PROCARDIA XL) 30 MG 24 hr tablet     No current facility-administered medications for this visit.       Social History     Tobacco Use     Smoking status: Never     Smokeless tobacco: Never   Vaping Use     Vaping Use: Never used   Substance Use Topics     Alcohol use: Yes     Comment: 1-2 a two months     Drug use: Never       Honoring Choices - Health Care Directive Guide offered to patient at time of visit.    Health Maintenance Due   Topic Date Due     ADVANCE CARE PLANNING  Never done     HEPATITIS B IMMUNIZATION (" 3 - 3-dose series) Never done     YEARLY PREVENTIVE VISIT  11/08/2020       Immunization History   Administered Date(s) Administered     COVID-19 12+ (2023-24) (Pfizer) 10/13/2023     COVID-19 Bivalent 12+ (Pfizer) 10/15/2022     COVID-19 MONOVALENT 12+ (Pfizer) 01/04/2021, 01/25/2021, 10/15/2021     COVID-19 Monovalent 18+ (Moderna) 03/05/2022     Influenza Vaccine >6 months,quad, PF 09/24/2019, 09/21/2021     TDAP (Adacel,Boostrix) 01/21/2022     TDAP Vaccine (Boostrix) 09/21/2020       Lab Results   Component Value Date    PAP NIL 11/08/2019       Recent Labs   Lab Test 09/18/23  1044 05/11/22  1406 04/07/22  1112 04/06/22  1528 04/04/22  0727 04/03/22  2329 09/22/21  0920 11/24/20  1830 09/23/20  0850 07/23/19  1509 03/12/19  1627   ALT 7  --   --  20  --  16   < > 13  --    < >  --    CR 0.77 0.98   < > 1.21*   < > 1.07*   < > 0.48* 0.53   < >  --    GFRESTIMATED >90 77   < > 60*   < > 70   < > >90 >90   < >  --    GFRESTBLACK  --   --   --   --   --   --   --  >90 >90   < >  --    ALBUMIN 4.2  --   --   --   --  3.0*  --   --  2.7*   < >  --    POTASSIUM 3.9  --   --   --   --  4.0  --   --  3.4   < >  --    TSH  --   --   --   --   --   --   --   --   --   --  2.78    < > = values in this interval not displayed.           1/2/2024     7:55 AM 4/29/2022     9:08 AM   PHQ-2 ( 1999 Pfizer)   Q1: Little interest or pleasure in doing things 0 0   Q2: Feeling down, depressed or hopeless 0 0   PHQ-2 Score 0 0           11/8/2019     3:45 PM 6/9/2020     8:27 AM 9/21/2020     4:24 PM 1/21/2022     3:18 PM   PHQ-9 SCORE   PHQ-9 Total Score 0 2 1 0           3/1/2022     3:33 PM 3/16/2022     8:18 AM 4/7/2022    10:31 AM   ALVIN-7 SCORE   Total Score 4 (minimal anxiety) 2 (minimal anxiety)    Total Score 4    4    4 2 13            No data to display                Khushbu Reza, EMT    January 2, 2024 7:58 AM

## 2024-01-02 NOTE — PROGRESS NOTES
"       HPI       Gabriele Rodriguez is a 38 year old  who presents for No chief complaint on file.    38 year-old female presents to clinic for removal of Nexplanon. Placed 6/20/2022. Desiring pregnancy again so does not want replacement. No concerns with current Nexplanon.       Problem, Medication and Allergy Lists were reviewed and updated if needed..    Patient is an established patient of this clinic..         Review of Systems:   Review of Systems  GEN: good health, doing well  CV: BP well controlled  MOOD: OK. Working, liking job  : no concerns with Nexplanon       Physical Exam:   /83 (BP Location: Left arm, Patient Position: Sitting, Cuff Size: Adult Regular)   Pulse 112   Temp 98.2  F (36.8  C) (Oral)   Resp 18   Ht 1.623 m (5' 3.9\")   Wt 66.6 kg (146 lb 14.4 oz)   SpO2 98%   BMI 25.29 kg/m      There is no height or weight on file to calculate BMI.  Vitals were reviewed and were normal     Physical Exam  Progestin Implant Removal Note    Gabriele Rodriguez is here for removal of her Nexplanon implant. She would like it removed because of desiring pregnancy.     An informed consent was taken prior to removal and is to be scanned into the Electronic Health Record. Risks of the procedure include: bleeding, infection, difficult removal, scarring, and nerve damage. There may be bruising at the site of incision and down the arm.     Procedure Note:  Team: DORA Caldera CNP  Procedure: Progestin Implant Removal (Nexplanon)  Side: Left/Right  Position correct for procedure: Yes  Equipment for procedure available: Yes    The patient is placed in the supine position. Asceptic conditions are maintained. The alessandra is located by palpation. The area is cleaned with antiseptic. 2 cc of 1% lidocaine is injected just underneath the end of the implant closest to the elbow. After firmly pressing down on the end of the implant closer to the axilla, a 2-3 mm incision is made with a #11 scalpel. The " alessandra is pushed to the incision site and grasped with a sterile forceps and gently removed. Blunt dissection was needed. The patient tolerated the procedure well. The 4 cm alessandra was removed in its entirety. The incision was dressed with a small adhesive bandage closure and a pressure dressing was applied.            DORA Caldera CNP        Results:   No testing ordered today    Assessment and Plan        1. Nexplanon removal  Removed as above. Moderate amount of bleeding. Sensation intact post procedure. Nexplanon intact on removal.        Instructed on post procedural care: pressure dressing x 24 hours and avoid getting wet. Bandaid for 3-5 days. Advised to return if signs, symptoms of infection, neuropathy.   The patient is desiring pregnancy. She was instructed to begin prenatal vitamins and discuss current medications with OB.   There are no discontinued medications.    Options for treatment and follow-up care were reviewed with the patient. Gabriele Rodriguez  engaged in the decision making process and verbalized understanding of the options discussed and agreed with the final plan.    DORA Caldera CNP

## 2024-01-02 NOTE — PATIENT INSTRUCTIONS
Nexplanon removal  Removed with small amount of bleeding. Area may be bruised  Keep pressure dressing on for 24 hours then bandaid for 3-5 days.  Avoid showering next 24 hours.   Return if fever, chills, swelling, bleeding or numbness/tingling in fingers occurs  Begin prenatal vitamins  Speak with OB re: current medications

## 2024-01-26 NOTE — TELEPHONE ENCOUNTER
1/16/24 Preventative exam -- continue healthy diet and exercise. Check bloodwork.     Colonoscopy 2020 showed 4 and 8 mm polyps -- recheck due in 2025.   Tdap up to date -- next due 2025. Zostavax 7/18/17.   Shingrix 2020 x 2.     Weight loss tips provided for BM > 25 c/w healthy physique with high lean muscle mass. Encourage regular exercise and healthy diet. Offer prevnar 20 1/2023. Feels good on chinese herbal medicine.    # NO carotid bruit -- referred vibratory hum from heart murmur.     # Hx 2/6 heart murmur -- likely venous hum -- normal stress echo 2008. No cardiac symptoms.    # Mild anemia on exam 7/2015 -- normal b12 and ferritin. Patient donates blood every 6 weeks and has never been refused due to low hematocrit.   Spoke with patient to confirm upcoming appointment with Dr. Mcclain on 5/25.      - Cari Chowdhury   Clinic Services Assistant

## 2024-02-09 ENCOUNTER — MYC MEDICAL ADVICE (OUTPATIENT)
Dept: FAMILY MEDICINE | Facility: CLINIC | Age: 39
End: 2024-02-09

## 2024-02-09 DIAGNOSIS — F90.2 ATTENTION DEFICIT HYPERACTIVITY DISORDER (ADHD), COMBINED TYPE: ICD-10-CM

## 2024-02-09 RX ORDER — LISDEXAMFETAMINE DIMESYLATE 20 MG/1
20 CAPSULE ORAL EVERY MORNING
Qty: 90 CAPSULE | Refills: 0 | Status: SHIPPED | OUTPATIENT
Start: 2024-02-09 | End: 2024-02-20

## 2024-02-20 DIAGNOSIS — F90.2 ATTENTION DEFICIT HYPERACTIVITY DISORDER (ADHD), COMBINED TYPE: ICD-10-CM

## 2024-02-20 RX ORDER — LISDEXAMFETAMINE DIMESYLATE 20 MG/1
20 CAPSULE ORAL EVERY MORNING
Qty: 90 CAPSULE | Refills: 0 | Status: SHIPPED | OUTPATIENT
Start: 2024-02-20

## 2024-04-16 ENCOUNTER — LAB REQUISITION (OUTPATIENT)
Dept: LAB | Facility: CLINIC | Age: 39
End: 2024-04-16

## 2024-04-16 DIAGNOSIS — O09.529 SUPERVISION OF ELDERLY MULTIGRAVIDA, UNSPECIFIED TRIMESTER: ICD-10-CM

## 2024-04-16 DIAGNOSIS — O16.9 UNSPECIFIED MATERNAL HYPERTENSION, UNSPECIFIED TRIMESTER: ICD-10-CM

## 2024-04-16 DIAGNOSIS — Z36.9 ENCOUNTER FOR ANTENATAL SCREENING, UNSPECIFIED: ICD-10-CM

## 2024-04-16 LAB
ABO/RH(D): NORMAL
ALBUMIN MFR UR ELPH: <6 MG/DL
ANTIBODY SCREEN: NEGATIVE
BASOPHILS # BLD AUTO: 0 10E3/UL (ref 0–0.2)
BASOPHILS NFR BLD AUTO: 0 %
CREAT UR-MCNC: 42 MG/DL
EOSINOPHIL # BLD AUTO: 0.1 10E3/UL (ref 0–0.7)
EOSINOPHIL NFR BLD AUTO: 1 %
ERYTHROCYTE [DISTWIDTH] IN BLOOD BY AUTOMATED COUNT: 11.7 % (ref 10–15)
HBA1C MFR BLD: 5.4 %
HCT VFR BLD AUTO: 40.6 % (ref 35–47)
HGB BLD-MCNC: 13.7 G/DL (ref 11.7–15.7)
HIV 1+2 AB+HIV1 P24 AG SERPL QL IA: NONREACTIVE
IMM GRANULOCYTES # BLD: 0 10E3/UL
IMM GRANULOCYTES NFR BLD: 0 %
LYMPHOCYTES # BLD AUTO: 3.1 10E3/UL (ref 0.8–5.3)
LYMPHOCYTES NFR BLD AUTO: 38 %
MCH RBC QN AUTO: 28.8 PG (ref 26.5–33)
MCHC RBC AUTO-ENTMCNC: 33.7 G/DL (ref 31.5–36.5)
MCV RBC AUTO: 85 FL (ref 78–100)
MONOCYTES # BLD AUTO: 0.5 10E3/UL (ref 0–1.3)
MONOCYTES NFR BLD AUTO: 6 %
NEUTROPHILS # BLD AUTO: 4.5 10E3/UL (ref 1.6–8.3)
NEUTROPHILS NFR BLD AUTO: 55 %
NRBC # BLD AUTO: 0 10E3/UL
NRBC BLD AUTO-RTO: 0 /100
PLATELET # BLD AUTO: 251 10E3/UL (ref 150–450)
PROT/CREAT 24H UR: NORMAL MG/G{CREAT}
RBC # BLD AUTO: 4.76 10E6/UL (ref 3.8–5.2)
SPECIMEN EXPIRATION DATE: NORMAL
WBC # BLD AUTO: 8.3 10E3/UL (ref 4–11)

## 2024-04-16 PROCEDURE — 84156 ASSAY OF PROTEIN URINE: CPT | Performed by: ADVANCED PRACTICE MIDWIFE

## 2024-04-16 PROCEDURE — 84155 ASSAY OF PROTEIN SERUM: CPT | Performed by: ADVANCED PRACTICE MIDWIFE

## 2024-04-16 PROCEDURE — 86803 HEPATITIS C AB TEST: CPT | Performed by: ADVANCED PRACTICE MIDWIFE

## 2024-04-16 PROCEDURE — 85025 COMPLETE CBC W/AUTO DIFF WBC: CPT | Performed by: ADVANCED PRACTICE MIDWIFE

## 2024-04-16 PROCEDURE — 86762 RUBELLA ANTIBODY: CPT | Performed by: ADVANCED PRACTICE MIDWIFE

## 2024-04-16 PROCEDURE — 82374 ASSAY BLOOD CARBON DIOXIDE: CPT | Performed by: ADVANCED PRACTICE MIDWIFE

## 2024-04-16 PROCEDURE — 87086 URINE CULTURE/COLONY COUNT: CPT | Performed by: ADVANCED PRACTICE MIDWIFE

## 2024-04-16 PROCEDURE — 86900 BLOOD TYPING SEROLOGIC ABO: CPT | Performed by: ADVANCED PRACTICE MIDWIFE

## 2024-04-16 PROCEDURE — 87389 HIV-1 AG W/HIV-1&-2 AB AG IA: CPT | Performed by: ADVANCED PRACTICE MIDWIFE

## 2024-04-16 PROCEDURE — 83036 HEMOGLOBIN GLYCOSYLATED A1C: CPT | Performed by: ADVANCED PRACTICE MIDWIFE

## 2024-04-16 PROCEDURE — 87340 HEPATITIS B SURFACE AG IA: CPT | Performed by: ADVANCED PRACTICE MIDWIFE

## 2024-04-17 LAB
ALBUMIN SERPL BCG-MCNC: 4.5 G/DL (ref 3.5–5.2)
ALP SERPL-CCNC: 51 U/L (ref 40–150)
ALT SERPL W P-5'-P-CCNC: 11 U/L (ref 0–50)
ANION GAP SERPL CALCULATED.3IONS-SCNC: 10 MMOL/L (ref 7–15)
AST SERPL W P-5'-P-CCNC: 28 U/L (ref 0–45)
BILIRUB SERPL-MCNC: 0.3 MG/DL
BUN SERPL-MCNC: 10.3 MG/DL (ref 6–20)
CALCIUM SERPL-MCNC: 9.6 MG/DL (ref 8.6–10)
CHLORIDE SERPL-SCNC: 101 MMOL/L (ref 98–107)
CREAT SERPL-MCNC: 0.64 MG/DL (ref 0.51–0.95)
DEPRECATED HCO3 PLAS-SCNC: 27 MMOL/L (ref 22–29)
EGFRCR SERPLBLD CKD-EPI 2021: >90 ML/MIN/1.73M2
GLUCOSE SERPL-MCNC: 72 MG/DL (ref 70–99)
HBV SURFACE AG SERPL QL IA: NONREACTIVE
HCV AB SERPL QL IA: NONREACTIVE
POTASSIUM SERPL-SCNC: 4.2 MMOL/L (ref 3.4–5.3)
PROT SERPL-MCNC: 7.8 G/DL (ref 6.4–8.3)
RPR SER QL: NONREACTIVE
RUBV IGG SERPL QL IA: 8.97 INDEX
RUBV IGG SERPL QL IA: POSITIVE
SODIUM SERPL-SCNC: 138 MMOL/L (ref 135–145)

## 2024-04-18 LAB — BACTERIA UR CULT: NORMAL

## 2024-07-14 ENCOUNTER — HEALTH MAINTENANCE LETTER (OUTPATIENT)
Age: 39
End: 2024-07-14

## 2024-11-04 ASSESSMENT — ANXIETY QUESTIONNAIRES: GAD7 TOTAL SCORE: 2

## 2024-12-27 ENCOUNTER — LAB REQUISITION (OUTPATIENT)
Dept: LAB | Facility: CLINIC | Age: 39
End: 2024-12-27

## 2024-12-27 DIAGNOSIS — Z12.4 ENCOUNTER FOR SCREENING FOR MALIGNANT NEOPLASM OF CERVIX: ICD-10-CM

## 2024-12-27 PROCEDURE — 87624 HPV HI-RISK TYP POOLED RSLT: CPT | Performed by: REGISTERED NURSE

## 2024-12-30 LAB
HPV HR 12 DNA CVX QL NAA+PROBE: NEGATIVE
HPV16 DNA CVX QL NAA+PROBE: NEGATIVE
HPV18 DNA CVX QL NAA+PROBE: NEGATIVE
HUMAN PAPILLOMA VIRUS FINAL DIAGNOSIS: NORMAL

## 2025-06-11 NOTE — NURSING NOTE
"33 year old  Chief Complaint   Patient presents with     Eye Problem     Pt. presents to the clinic today with left eye crust X 1 day.      Nasal Congestion     chills, sinus drainage, pressure in ears, cough at night. X 1 week.       Blood pressure (!) 137/91, pulse 84, temperature 98.7  F (37.1  C), temperature source Oral, resp. rate 16, height 1.595 m (5' 2.8\"), weight 73.5 kg (162 lb), SpO2 100 %, not currently breastfeeding. Body mass index is 28.88 kg/m .  BP completed using cuff size:    Patient Active Problem List   Diagnosis     Anxiety     ADHD (attention deficit hyperactivity disorder), inattentive type     Benign essential hypertension       Wt Readings from Last 2 Encounters:   01/07/19 73.5 kg (162 lb)   12/14/18 69.9 kg (154 lb 1.6 oz)     BP Readings from Last 3 Encounters:   01/07/19 (!) 137/91   12/14/18 (!) 143/97   09/14/18 136/84       Allergies   Allergen Reactions     Nickel        Current Outpatient Medications   Medication     BUPROPION HCL PO     etonogestrel (IMPLANON/NEXPLANON) 68 MG IMPL     hydrochlorothiazide (HYDRODIURIL) 12.5 MG tablet     lisdexamfetamine (VYVANSE) 40 MG capsule     No current facility-administered medications for this visit.        Social History     Tobacco Use     Smoking status: Never Smoker     Smokeless tobacco: Never Used   Substance Use Topics     Alcohol use: No     Drug use: No         Honoring Choices - Health Care Directive Guide offered to patient at time of visit.    Health Maintenance Due   Topic Date Due     HIV SCREEN (SYSTEM ASSIGNED)  07/24/2005     PAP SCREENING Q3 YR (SYSTEM ASSIGNED)  07/24/2008     DTAP/TDAP/TD IMMUNIZATION (1 - Tdap) 07/24/2012     INFLUENZA VACCINE (1) 09/01/2018         There is no immunization history on file for this patient.    No results found for: PAP      Recent Labs   Lab Test 09/14/18  1609   ALT 18   CR 0.90   GFRESTIMATED 73   GFRESTBLACK 89   ALBUMIN 4.1   POTASSIUM 3.8       PHQ-2 ( 1999 Pfizer) 12/14/2018 " 9/14/2018   Q1: Little interest or pleasure in doing things 0 0   Q2: Feeling down, depressed or hopeless 0 0   PHQ-2 Score 0 0       PHQ-9 SCORE 9/14/2018   PHQ-9 Total Score 2       ALVIN-7 SCORE 8/10/2018 9/14/2018   Total Score 3 2       No flowsheet data found.    Grace Aviles CMA  January 7, 2019 10:34 AM   left upper arm

## 2025-07-09 NOTE — PROGRESS NOTES
"Gabriele Rodriguez is a 34 year old female who is being evaluated via a billable video visit.      The patient has been notified of following:     \"This video visit will be conducted via a call between you and your physician/provider. We have found that certain health care needs can be provided without the need for an in-person physical exam.  This service lets us provide the care you need with a video conversation.  If a prescription is necessary we can send it directly to your pharmacy.  If lab work is needed we can place an order for that and you can then stop by our lab to have the test done at a later time.    Video visits are billed at different rates depending on your insurance coverage.  Please reach out to your insurance provider with any questions.    If during the course of the call the physician/provider feels a video visit is not appropriate, you will not be charged for this service.\"    Patient has given verbal consent for Video visit? Yes    Will anyone else be joining your video visit? No      Video-Visit Details    Type of service:  Video Visit    Video Start Time:13:49  Video End Time: 13:59    Originating Location (pt. Location): Other work/office    Distant Location (provider location):   [facility]     Platform used for Video Visit: Keren Vargas MD          Nephrology Clinic    Gabriele Rodriguez MRN:[id number] YOB: 1985  Date of Service: 10/01/2020  Primary care provider: Shana Sharif  Requesting physician: Rob Nieves       REASON FOR CONSULT: Proteinuria and hypertension in pregnancy    HISTORY OF PRESENT ILLNESS:   Gabriele Rodriguez is a 34 year old female who presents for evaluation of proteinuria and hypertension in pregnancy.     Ms Rodriguez is currently 15 weeks pregnant, on her first pregnancy.  She reports feeling fair other than for some am nausea, without vomiting.  The nausea is is getting better.  She has had HTN for at least 4 years, and was first " diagnosed during Med School. She does not recall exactly what work up for HTN she has had while she was at [facility]. She does not recall undergoing a renal ultrasound.  (She does not currently have her med records).  She was initially treated with metoprolol, then switched to nifedipine which was stopped because of L Ext swelling.  She was then switched to lisinopril + hydrochlorothiazide.  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 She has since been switched to labetalol.  On review of systems, she reports occasional mild epistaxis, but the ROS is otherwise negative for symptoms of systemic disease.  She has had HgbA1c testing in the past which were reportedly normal    Interestingly, the first UPCR was obtained after she drinking a large volume of water.      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  She is now 15w3d into her pregnancy. Reports that morning sickness is resolved.   She is not checking her BP at home.  Her last check was on 6/9/2020.  Reports no acute complaints.     October 1, 2020  Qi is now 29w3d into her first pregnancy.   She has been feeling well. Sleeping OK, although is feeling a bit tired sometimes.  Denies L Ext swelling.  No rash, oral ulcers, joint pains.  No SOB or CP  She had glucosuria on the last UA, but she gave the urine sample shortly after taking the dose of Glucose for the GTT.      PAST MEDICAL HISTORY:  Reviewed with the patient  Past Medical History:   Diagnosis Date     ADHD      Anxiety      Chronic sinusitis      Depressive disorder      Hypertension      PAST SURGICAL HISTORY:  Past Surgical History:   Procedure Laterality Date     BIOPSY NAIL (LOCATION)      left hand, 4th digit     MEDICATIONS:    Current Outpatient Medications   Medication Sig Dispense Refill     aspirin (ASA) 81 MG EC tablet Take 1 tablet (81 mg) by mouth daily , begin at 12 weeks pregnant. 90 tablet 3     famotidine  (PEPCID) 20 MG tablet Take 1 tablet (20 mg) by mouth 2 times daily 60 tablet 3     labetalol (NORMODYNE) 200 MG tablet Take 1 tablet (200 mg) by mouth 2 times daily 60 tablet 3     Prenatal Vit-Fe Fumarate-FA (PRENATAL MULTIVITAMIN W/IRON) 27-0.8 MG tablet Take 1 tablet by mouth daily 100 tablet 3     ALLERGIES:    Allergies   Allergen Reactions     Nickel      REVIEW OF SYSTEMS:  Review Of Systems  A comprehensive review of systems was performed and found to be negative except as described here or above.  SOCIAL HISTORY:   Social History     Socioeconomic History     Marital status:      Spouse name: Pedro     Number of children: Not on file     Years of education: Not on file     Highest education level: Not on file   Occupational History     Occupation: Medical resident     Comment: pathology   Social Needs     Financial resource strain: Not on file     Food insecurity     Worry: Not on file     Inability: Not on file     Transportation needs     Medical: Not on file     Non-medical: Not on file   Tobacco Use     Smoking status: Never Smoker     Smokeless tobacco: Never Used   Substance and Sexual Activity     Alcohol use: No     Drug use: No     Sexual activity: Yes     Partners: Male   Lifestyle     Physical activity     Days per week: Not on file     Minutes per session: Not on file     Stress: Not on file   Relationships     Social connections     Talks on phone: Not on file     Gets together: Not on file     Attends [employer] service: Not on file     Active member of club or organization: Not on file     Attends meetings of clubs or organizations: Not on file     Relationship status: Not on file     Intimate partner violence     Fear of current or ex partner: Not on file     Emotionally abused: Not on file     Physically abused: Not on file     Forced sexual activity: Not on file   Other Topics Concern     Not on file   Social History Narrative    In 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     FAMILY MEDICAL HISTORY:    Family History   Problem Relation Age of Onset     Hypertension Mother      Breast Cancer Mother         ductal stage 2, HER-2 negative, (+) estrogen/progesterone     No Known Problems Father      No Known Problems Sister      Pancreatic Cancer Maternal Grandfather      Brain Cancer Paternal Aunt      PHYSICAL EXAM:   LMP 03/02/2020    BPs reportedly 120's/70'ss  GENERAL APPEARANCE: alert and no distress  EYES: nonicteric  RESP: breathing non labored   SKIN: no facial rash  NEURO: mentation intact and speech normal  PSYCH: affect normal/bright   LABS:   Recent Results (from the past 1008 hour(s))   Protein  random urine with Creat Ratio    Collection Time: 08/24/20  2:30 PM   Result Value Ref Range    Protein Random Urine 0.17 g/L    Protein Total Urine g/gr Creatinine 0.11 0 - 0.2 g/g Cr   Routine UA with micro reflex to culture    Collection Time: 08/24/20  2:30 PM    Specimen: Unspecified Urine   Result Value Ref Range    Color Urine Yellow     Appearance Urine Slightly Cloudy     Glucose Urine Negative NEG^Negative mg/dL    Bilirubin Urine Negative NEG^Negative    Ketones Urine Negative NEG^Negative mg/dL    Specific Gravity Urine 1.021 1.003 - 1.035    Blood Urine Trace (A) NEG^Negative    pH Urine 5.5 5.0 - 7.0 pH    Protein Albumin Urine Negative NEG^Negative mg/dL    Urobilinogen mg/dL Normal 0.0 - 2.0 mg/dL    Nitrite Urine Negative NEG^Negative    Leukocyte Esterase Urine Large (A) NEG^Negative    Source Unspecified Urine     WBC Urine 6 (H) 0 - 5 /HPF    RBC Urine 2 0 - 2 /HPF    Bacteria Urine Few (A) NEG^Negative /HPF    Squamous Epithelial /HPF Urine 30 (H) 0 - 1 /HPF    Mucous Urine Present (A) NEG^Negative /LPF   Creatinine urine calculation only    Collection Time: 08/24/20  2:30 PM   Result Value Ref Range    Creatinine Urine 160 mg/dL   Urine Culture Aerobic Bacterial    Collection Time: 08/24/20  2:30 PM    Specimen: Midstream Urine   Result Value Ref Range    Specimen Description Midstream  Urine     Culture Micro       10,000 to 50,000 colonies/mL  mixed urogenital jolene  Susceptibility testing not routinely done     25- OH-Vitamin D    Collection Time: 09/23/20  8:50 AM   Result Value Ref Range    Vitamin D Deficiency screening 33 20 - 75 ug/L   CBC with Platelets    Collection Time: 09/23/20  8:50 AM   Result Value Ref Range    WBC 10.5 4.0 - 11.0 10e9/L    RBC Count 3.76 (L) 3.8 - 5.2 10e12/L    Hemoglobin 11.5 (L) 11.7 - 15.7 g/dL    Hematocrit 34.7 (L) 35.0 - 47.0 %    MCV 92 78 - 100 fl    MCH 30.6 26.5 - 33.0 pg    MCHC 33.1 31.5 - 36.5 g/dL    RDW 11.9 10.0 - 15.0 %    Platelet Count 180 150 - 450 10e9/L   Glucose 1 Hour    Collection Time: 09/23/20  8:50 AM   Result Value Ref Range    Glu Gest Screen 1hr 50g 168 (H) 60 - 129 mg/dL   Treponema Abs w Reflex to RPR and Titer    Collection Time: 09/23/20  8:50 AM   Result Value Ref Range    Treponema Antibodies Nonreactive NR^Nonreactive   Renal panel    Collection Time: 09/23/20  8:50 AM   Result Value Ref Range    Sodium 137 133 - 144 mmol/L    Potassium 3.4 3.4 - 5.3 mmol/L    Chloride 107 94 - 109 mmol/L    Carbon Dioxide 21 20 - 32 mmol/L    Anion Gap 9 3 - 14 mmol/L    Glucose 160 (H) 70 - 99 mg/dL    Urea Nitrogen 6 (L) 7 - 30 mg/dL    Creatinine 0.53 0.52 - 1.04 mg/dL    GFR Estimate >90 >60 mL/min/[1.73_m2]    GFR Estimate If Black >90 >60 mL/min/[1.73_m2]    Calcium 8.4 (L) 8.5 - 10.1 mg/dL    Phosphorus 2.8 2.5 - 4.5 mg/dL    Albumin 2.7 (L) 3.4 - 5.0 g/dL   Routine UA with micro reflex to culture    Collection Time: 09/23/20  9:04 AM    Specimen: Midstream Urine   Result Value Ref Range    Color Urine Yellow     Appearance Urine Slightly Cloudy     Glucose Urine Negative NEG^Negative mg/dL    Bilirubin Urine Negative NEG^Negative    Ketones Urine Negative NEG^Negative mg/dL    Specific Gravity Urine 1.013 1.003 - 1.035    Blood Urine Negative NEG^Negative    pH Urine 7.0 5.0 - 7.0 pH    Protein Albumin Urine Negative NEG^Negative  mg/dL    Urobilinogen mg/dL 0.0 0.0 - 2.0 mg/dL    Nitrite Urine Negative NEG^Negative    Leukocyte Esterase Urine Small (A) NEG^Negative    Source Midstream Urine     WBC Urine 4 0 - 5 /HPF    RBC Urine 2 0 - 2 /HPF    Bacteria Urine Few (A) NEG^Negative /HPF    Squamous Epithelial /HPF Urine 24 (H) 0 - 1 /HPF    Mucous Urine Present (A) NEG^Negative /LPF   Protein  random urine with Creat Ratio    Collection Time: 09/23/20  9:04 AM   Result Value Ref Range    Protein Random Urine 0.18 g/L    Protein Total Urine g/gr Creatinine 0.21 (H) 0 - 0.2 g/g Cr   Creatinine urine calculation only    Collection Time: 09/23/20  9:04 AM   Result Value Ref Range    Creatinine Urine 88 mg/dL   Glucose 3 Hour    Collection Time: 09/24/20  8:24 AM   Result Value Ref Range    Glucose Fasting 100 Grams 81 60 - 94 mg/dL    Glucose 1 Hour 100 Grams 162 60 - 179 mg/dL    Glucose 2 Hour 100 Grams 127 60 - 154 mg/dL    Glucose 3 Hour 100 Grams 133 60 - 139 mg/dL     CMP  Recent Labs   Lab Test 09/23/20  0850 05/13/20  1632 07/23/19  1509 09/14/18  1609     --  137 140   POTASSIUM 3.4  --  3.6 3.8   CHLORIDE 107  --  104 108   CO2 21  --  27 24   ANIONGAP 9  --  5 9   *  --  85 104*   BUN 6*  --  10 14   CR 0.53 0.56 0.83 0.90   GFRESTIMATED >90 >90 >90 73   GFRESTBLACK >90 >90 >90 89   COLT 8.4*  --  9.6 8.8   PHOS 2.8  --   --   --    PROTTOTAL  --   --  8.3 7.6   ALBUMIN 2.7*  --  4.5 4.1   BILITOTAL  --   --  0.7 0.4   ALKPHOS  --   --  71 75   AST  --  22 18 17   ALT  --  32 22 18     CBC  Recent Labs   Lab Test 09/23/20  0850 05/13/20  1632   HGB 11.5* 14.0   WBC 10.5 12.0*   RBC 3.76* 4.66   HCT 34.7* 40.9   MCV 92 88   MCH 30.6 30.0   MCHC 33.1 34.2   RDW 11.9 11.8    235     INRNo lab results found.  ABGNo lab results found.   URINE STUDIES  Recent Labs   Lab Test 09/23/20  0904 08/24/20  1430 06/08/20  0620   COLOR Yellow Yellow Yellow   APPEARANCE Slightly Cloudy Slightly Cloudy Clear   URINEGLC Negative  Negative Negative   URINEBILI Negative Negative Negative   URINEKETONE Negative Negative 40*   SG 1.013 1.021 1.014   UBLD Negative Trace* Negative   URINEPH 7.0 5.5 6.0   PROTEIN Negative Negative Negative   NITRITE Negative Negative Negative   LEUKEST Small* Large* Negative   RBCU 2 2 4*   WBCU 4 6* 0     Recent Labs   Lab Test 09/23/20  0904 08/24/20  1430 06/08/20  0620 05/15/20  1201 05/13/20  1629   UTPG 0.21* 0.11 0.08 0.08 1.16*     EXAMINATION: US RENAL COMPLETE, 6/23/2020 12:49 PM      COMPARISON: None.     HISTORY: History of hypertension and young woman. Evaluate kidney size  and echogenicity.     FINDINGS:     Right kidney: Measures 10.9 cm cm in length. Parenchyma is of normal  thickness and echogenicity. No focal mass. No hydronephrosis.     Left kidney: Measures 11.5 cm cm in length. Parenchyma is of normal  thickness and echogenicity. No focal mass. No hydronephrosis.      Bladder: Decompressed.     Incidentally noted mildly increased echogenicity within the liver.  Anechoic avascular cystic-appearing lesion within the spleen measuring  1.2 x 1.0 x 1.0 cm. Single intrauterine pregnancy with a heart rate of  157 bpm.                                                                      IMPRESSION:  1.  Normal renal ultrasound.  2.  Incidentally noted  increased echogenicity throughout the liver  which is suggestive of hepatic steatosis.  3.  Single living intrauterine pregnancy.     I have personally reviewed the examination and initial interpretation  and I agree with the findings.     SNEHA KING, DO    ASSESSMENT AND PLAN:   Dr Rodriguez is referred for evaluation of proteinuria detected at 9 weeks gestation by UPCR.  The repeat thest 2 days later revealed a normal UPCR. THe urine creatinine on the first sample is very low (and likely erroneous?).  We will obtain a full UA and repeat the UPCR + UACR.  Her routine serologies for pregnancy are unrevealing.  Her ROS is not suggestive of a systemic  inflammatory disease (e.g.SLE).  I that the result of 5/13/20 was spurious.  Since then, the UPCR has remained normal, although there was a minimal increase on the last measurement.  We agreed that she should continue to monitor a UPCR every ~ 4 weeks, and call me if there is an increase.    2- HTN.  Dr Rodriguez's BP appears to be under control on the current regiment of labetalol.  No change in meds indicated at this point .     We agreed to a follow up appointment in ~4 months, after her anticipated delivery.  Dr Rodriguez will call/page  me if she has any concerns in the interim     Jerome Vargas MD  Division of Renal Disease and Hypertension  October 1, 2020       Admission Reconciliation is Completed  Discharge Reconciliation is Not Complete Admission Reconciliation is Completed  Discharge Reconciliation is Completed

## 2025-07-19 ENCOUNTER — HEALTH MAINTENANCE LETTER (OUTPATIENT)
Age: 40
End: 2025-07-19

## (undated) DEVICE — SU MONOCRYL 0 CT-1 36" Y346H

## (undated) DEVICE — SU VICRYL 0 CT-1 36" J346H

## (undated) DEVICE — STOCKING SLEEVE COMPRESSION CALF LG

## (undated) DEVICE — DRSG STERI STRIP 1/2X4" R1547

## (undated) DEVICE — GLOVE PROTEXIS BLUE W/NEU-THERA 6.5  2D73EB65

## (undated) DEVICE — STRAP KNEE/BODY 31143004

## (undated) DEVICE — PREP CHLORAPREP 26ML TINTED ORANGE  260815

## (undated) DEVICE — SU VICRYL 3-0 CTX 36" UND J980H

## (undated) DEVICE — SOL NACL 0.9% IRRIG 1000ML BOTTLE 07138-09

## (undated) DEVICE — GLOVE ESTEEM POWDER FREE SMT 6.5  2D72PT65

## (undated) DEVICE — SU MONOCRYL 4-0 PS-2 18" UND Y496G

## (undated) DEVICE — CATH TRAY FOLEY 16FR BARDEX W/DRAIN BAG STATLOCK 300316A

## (undated) DEVICE — SOL WATER IRRIG 1000ML BOTTLE 07139-09

## (undated) DEVICE — ESU GROUND PAD UNIVERSAL W/O CORD

## (undated) DEVICE — PACK C-SECTION LF PL15OTA83B

## (undated) RX ORDER — KETOROLAC TROMETHAMINE 30 MG/ML
INJECTION, SOLUTION INTRAMUSCULAR; INTRAVENOUS
Status: DISPENSED
Start: 2022-03-24

## (undated) RX ORDER — ONDANSETRON 2 MG/ML
INJECTION INTRAMUSCULAR; INTRAVENOUS
Status: DISPENSED
Start: 2022-03-24

## (undated) RX ORDER — FENTANYL CITRATE 50 UG/ML
INJECTION, SOLUTION INTRAMUSCULAR; INTRAVENOUS
Status: DISPENSED
Start: 2022-03-24

## (undated) RX ORDER — FENTANYL CITRATE 50 UG/ML
INJECTION, SOLUTION INTRAMUSCULAR; INTRAVENOUS
Status: DISPENSED
Start: 2020-11-25

## (undated) RX ORDER — TRANEXAMIC ACID 10 MG/ML
INJECTION, SOLUTION INTRAVENOUS
Status: DISPENSED
Start: 2022-03-24

## (undated) RX ORDER — PHENYLEPHRINE HCL IN 0.9% NACL 50MG/250ML
PLASTIC BAG, INJECTION (ML) INTRAVENOUS
Status: DISPENSED
Start: 2022-03-24

## (undated) RX ORDER — MORPHINE SULFATE 1 MG/ML
INJECTION, SOLUTION EPIDURAL; INTRATHECAL; INTRAVENOUS
Status: DISPENSED
Start: 2020-11-25

## (undated) RX ORDER — PROPOFOL 10 MG/ML
INJECTION, EMULSION INTRAVENOUS
Status: DISPENSED
Start: 2022-03-24

## (undated) RX ORDER — MORPHINE SULFATE 1 MG/ML
INJECTION, SOLUTION EPIDURAL; INTRATHECAL; INTRAVENOUS
Status: DISPENSED
Start: 2022-03-24

## (undated) RX ORDER — OXYTOCIN/0.9 % SODIUM CHLORIDE 30/500 ML
PLASTIC BAG, INJECTION (ML) INTRAVENOUS
Status: DISPENSED
Start: 2022-03-24

## (undated) RX ORDER — EPINEPHRINE 1 MG/ML
INJECTION, SOLUTION, CONCENTRATE INTRAVENOUS
Status: DISPENSED
Start: 2022-03-24

## (undated) RX ORDER — FENTANYL CITRATE-0.9 % NACL/PF 10 MCG/ML
PLASTIC BAG, INJECTION (ML) INTRAVENOUS
Status: DISPENSED
Start: 2022-03-24

## (undated) RX ORDER — BUPIVACAINE HYDROCHLORIDE 2.5 MG/ML
INJECTION, SOLUTION EPIDURAL; INFILTRATION; INTRACAUDAL
Status: DISPENSED
Start: 2022-03-24

## (undated) RX ORDER — CANDIDA ALBICANS 1000 [PNU]/ML
INJECTION, SOLUTION INTRADERMAL
Status: DISPENSED
Start: 2021-02-23

## (undated) RX ORDER — OXYTOCIN/0.9 % SODIUM CHLORIDE 30/500 ML
PLASTIC BAG, INJECTION (ML) INTRAVENOUS
Status: DISPENSED
Start: 2020-11-25